# Patient Record
Sex: MALE | Race: WHITE | Employment: FULL TIME | URBAN - METROPOLITAN AREA
[De-identification: names, ages, dates, MRNs, and addresses within clinical notes are randomized per-mention and may not be internally consistent; named-entity substitution may affect disease eponyms.]

---

## 2017-02-20 ENCOUNTER — TELEPHONE (OUTPATIENT)
Dept: CARDIOLOGY CLINIC | Age: 52
End: 2017-02-20

## 2017-02-20 DIAGNOSIS — R07.9 CHEST PAIN, UNSPECIFIED TYPE: Primary | ICD-10-CM

## 2018-10-03 ENCOUNTER — APPOINTMENT (OUTPATIENT)
Dept: GENERAL RADIOLOGY | Age: 53
End: 2018-10-03
Payer: COMMERCIAL

## 2018-10-03 ENCOUNTER — HOSPITAL ENCOUNTER (OUTPATIENT)
Age: 53
Setting detail: OBSERVATION
Discharge: HOME | End: 2018-10-04
Attending: EMERGENCY MEDICINE | Admitting: FAMILY MEDICINE
Payer: COMMERCIAL

## 2018-10-03 ENCOUNTER — APPOINTMENT (OUTPATIENT)
Dept: NUCLEAR MEDICINE | Age: 53
End: 2018-10-03
Payer: COMMERCIAL

## 2018-10-03 DIAGNOSIS — R07.9 CHEST PAIN, UNSPECIFIED TYPE: Primary | ICD-10-CM

## 2018-10-03 LAB
ALBUMIN SERPL-MCNC: 4.1 GM/DL (ref 3.4–5)
ALP BLD-CCNC: 44 IU/L (ref 40–129)
ALT SERPL-CCNC: 22 U/L (ref 10–40)
ANION GAP SERPL CALCULATED.3IONS-SCNC: 13 MMOL/L (ref 4–16)
AST SERPL-CCNC: 19 IU/L (ref 15–37)
BASOPHILS ABSOLUTE: 0.1 K/CU MM
BASOPHILS RELATIVE PERCENT: 0.8 % (ref 0–1)
BILIRUB SERPL-MCNC: 0.4 MG/DL (ref 0–1)
BUN BLDV-MCNC: 17 MG/DL (ref 6–23)
CALCIUM SERPL-MCNC: 9 MG/DL (ref 8.3–10.6)
CHLORIDE BLD-SCNC: 102 MMOL/L (ref 99–110)
CO2: 25 MMOL/L (ref 21–32)
CREAT SERPL-MCNC: 0.9 MG/DL (ref 0.9–1.3)
D DIMER: <200 NG/ML(DDU)
DIFFERENTIAL TYPE: ABNORMAL
EKG ATRIAL RATE: 81 BPM
EKG DIAGNOSIS: NORMAL
EKG P AXIS: 42 DEGREES
EKG P-R INTERVAL: 146 MS
EKG Q-T INTERVAL: 358 MS
EKG QRS DURATION: 80 MS
EKG QTC CALCULATION (BAZETT): 415 MS
EKG R AXIS: 26 DEGREES
EKG T AXIS: 27 DEGREES
EKG VENTRICULAR RATE: 81 BPM
EOSINOPHILS ABSOLUTE: 0.2 K/CU MM
EOSINOPHILS RELATIVE PERCENT: 2.6 % (ref 0–3)
GFR AFRICAN AMERICAN: >60 ML/MIN/1.73M2
GFR NON-AFRICAN AMERICAN: >60 ML/MIN/1.73M2
GLUCOSE BLD-MCNC: 118 MG/DL (ref 70–99)
HCT VFR BLD CALC: 43.7 % (ref 42–52)
HEMOGLOBIN: 14.4 GM/DL (ref 13.5–18)
IMMATURE NEUTROPHIL %: 0.8 % (ref 0–0.43)
LV EF: 60 %
LVEF MODALITY: NORMAL
LYMPHOCYTES ABSOLUTE: 2 K/CU MM
LYMPHOCYTES RELATIVE PERCENT: 27.6 % (ref 24–44)
MCH RBC QN AUTO: 30.3 PG (ref 27–31)
MCHC RBC AUTO-ENTMCNC: 33 % (ref 32–36)
MCV RBC AUTO: 91.8 FL (ref 78–100)
MONOCYTES ABSOLUTE: 0.7 K/CU MM
MONOCYTES RELATIVE PERCENT: 9.2 % (ref 0–4)
NUCLEATED RBC %: 0 %
PDW BLD-RTO: 12.4 % (ref 11.7–14.9)
PLATELET # BLD: 190 K/CU MM (ref 140–440)
PMV BLD AUTO: 9.1 FL (ref 7.5–11.1)
POTASSIUM SERPL-SCNC: 4.5 MMOL/L (ref 3.5–5.1)
RBC # BLD: 4.76 M/CU MM (ref 4.6–6.2)
SEGMENTED NEUTROPHILS ABSOLUTE COUNT: 4.2 K/CU MM
SEGMENTED NEUTROPHILS RELATIVE PERCENT: 59 % (ref 36–66)
SODIUM BLD-SCNC: 140 MMOL/L (ref 135–145)
TOTAL IMMATURE NEUTOROPHIL: 0.06 K/CU MM
TOTAL NUCLEATED RBC: 0 K/CU MM
TOTAL PROTEIN: 7 GM/DL (ref 6.4–8.2)
TROPONIN T: <0.01 NG/ML
WBC # BLD: 7.2 K/CU MM (ref 4–10.5)

## 2018-10-03 PROCEDURE — 84484 ASSAY OF TROPONIN QUANT: CPT

## 2018-10-03 PROCEDURE — 94761 N-INVAS EAR/PLS OXIMETRY MLT: CPT

## 2018-10-03 PROCEDURE — 3430000000 HC RX DIAGNOSTIC RADIOPHARMACEUTICAL: Performed by: INTERNAL MEDICINE

## 2018-10-03 PROCEDURE — 93017 CV STRESS TEST TRACING ONLY: CPT

## 2018-10-03 PROCEDURE — 85379 FIBRIN DEGRADATION QUANT: CPT

## 2018-10-03 PROCEDURE — 6370000000 HC RX 637 (ALT 250 FOR IP): Performed by: PHYSICIAN ASSISTANT

## 2018-10-03 PROCEDURE — G0378 HOSPITAL OBSERVATION PER HR: HCPCS

## 2018-10-03 PROCEDURE — 99243 OFF/OP CNSLTJ NEW/EST LOW 30: CPT | Performed by: INTERNAL MEDICINE

## 2018-10-03 PROCEDURE — 71045 X-RAY EXAM CHEST 1 VIEW: CPT

## 2018-10-03 PROCEDURE — A9500 TC99M SESTAMIBI: HCPCS | Performed by: INTERNAL MEDICINE

## 2018-10-03 PROCEDURE — 36415 COLL VENOUS BLD VENIPUNCTURE: CPT

## 2018-10-03 PROCEDURE — 6370000000 HC RX 637 (ALT 250 FOR IP): Performed by: EMERGENCY MEDICINE

## 2018-10-03 PROCEDURE — 93010 ELECTROCARDIOGRAM REPORT: CPT | Performed by: INTERNAL MEDICINE

## 2018-10-03 PROCEDURE — 6370000000 HC RX 637 (ALT 250 FOR IP): Performed by: INTERNAL MEDICINE

## 2018-10-03 PROCEDURE — 80053 COMPREHEN METABOLIC PANEL: CPT

## 2018-10-03 PROCEDURE — 85025 COMPLETE CBC W/AUTO DIFF WBC: CPT

## 2018-10-03 PROCEDURE — 99285 EMERGENCY DEPT VISIT HI MDM: CPT

## 2018-10-03 PROCEDURE — 78452 HT MUSCLE IMAGE SPECT MULT: CPT

## 2018-10-03 PROCEDURE — 2580000003 HC RX 258: Performed by: NURSE PRACTITIONER

## 2018-10-03 PROCEDURE — 93005 ELECTROCARDIOGRAM TRACING: CPT | Performed by: EMERGENCY MEDICINE

## 2018-10-03 RX ORDER — NITROGLYCERIN 0.4 MG/1
0.4 TABLET SUBLINGUAL EVERY 5 MIN PRN
Status: DISCONTINUED | OUTPATIENT
Start: 2018-10-03 | End: 2018-10-04 | Stop reason: HOSPADM

## 2018-10-03 RX ORDER — ACETAMINOPHEN 325 MG/1
650 TABLET ORAL EVERY 4 HOURS PRN
Status: DISCONTINUED | OUTPATIENT
Start: 2018-10-03 | End: 2018-10-04 | Stop reason: HOSPADM

## 2018-10-03 RX ORDER — NITROGLYCERIN 0.4 MG/1
0.4 TABLET SUBLINGUAL EVERY 5 MIN PRN
Status: COMPLETED | OUTPATIENT
Start: 2018-10-03 | End: 2018-10-03

## 2018-10-03 RX ORDER — CALCIUM CARBONATE 200(500)MG
2 TABLET,CHEWABLE ORAL PRN
COMMUNITY
End: 2020-02-04

## 2018-10-03 RX ORDER — SODIUM CHLORIDE 0.9 % (FLUSH) 0.9 %
10 SYRINGE (ML) INJECTION EVERY 12 HOURS SCHEDULED
Status: DISCONTINUED | OUTPATIENT
Start: 2018-10-03 | End: 2018-10-04 | Stop reason: HOSPADM

## 2018-10-03 RX ORDER — ASPIRIN 81 MG/1
81 TABLET ORAL DAILY
Status: DISCONTINUED | OUTPATIENT
Start: 2018-10-03 | End: 2018-10-04 | Stop reason: HOSPADM

## 2018-10-03 RX ORDER — ASPIRIN 81 MG/1
243 TABLET, CHEWABLE ORAL ONCE
Status: COMPLETED | OUTPATIENT
Start: 2018-10-03 | End: 2018-10-03

## 2018-10-03 RX ORDER — ONDANSETRON 2 MG/ML
4 INJECTION INTRAMUSCULAR; INTRAVENOUS EVERY 6 HOURS PRN
Status: DISCONTINUED | OUTPATIENT
Start: 2018-10-03 | End: 2018-10-04 | Stop reason: HOSPADM

## 2018-10-03 RX ORDER — NITROGLYCERIN 0.4 MG/1
0.4 TABLET SUBLINGUAL EVERY 5 MIN PRN
Status: DISCONTINUED | OUTPATIENT
Start: 2018-10-03 | End: 2018-10-03 | Stop reason: SDUPTHER

## 2018-10-03 RX ORDER — METOPROLOL TARTRATE 50 MG/1
100 TABLET, FILM COATED ORAL ONCE
Status: COMPLETED | OUTPATIENT
Start: 2018-10-03 | End: 2018-10-03

## 2018-10-03 RX ORDER — SODIUM CHLORIDE 0.9 % (FLUSH) 0.9 %
10 SYRINGE (ML) INJECTION PRN
Status: DISCONTINUED | OUTPATIENT
Start: 2018-10-03 | End: 2018-10-04 | Stop reason: HOSPADM

## 2018-10-03 RX ADMIN — NITROGLYCERIN 0.4 MG: 0.4 TABLET, ORALLY DISINTEGRATING SUBLINGUAL at 16:15

## 2018-10-03 RX ADMIN — Medication 10 MILLICURIE: at 12:25

## 2018-10-03 RX ADMIN — NITROGLYCERIN 0.4 MG: 0.4 TABLET, ORALLY DISINTEGRATING SUBLINGUAL at 09:49

## 2018-10-03 RX ADMIN — ASPIRIN 81 MG CHEWABLE TABLET 243 MG: 81 TABLET CHEWABLE at 09:49

## 2018-10-03 RX ADMIN — NITROGLYCERIN 1 INCH: 20 OINTMENT TOPICAL at 11:40

## 2018-10-03 RX ADMIN — METOPROLOL TARTRATE 100 MG: 50 TABLET ORAL at 21:09

## 2018-10-03 RX ADMIN — NITROGLYCERIN 0.4 MG: 0.4 TABLET, ORALLY DISINTEGRATING SUBLINGUAL at 11:37

## 2018-10-03 RX ADMIN — NITROGLYCERIN 0.4 MG: 0.4 TABLET SUBLINGUAL at 21:05

## 2018-10-03 RX ADMIN — SODIUM CHLORIDE, PRESERVATIVE FREE 10 ML: 5 INJECTION INTRAVENOUS at 21:10

## 2018-10-03 RX ADMIN — Medication 30 MILLICURIE: at 13:45

## 2018-10-03 ASSESSMENT — PAIN DESCRIPTION - ORIENTATION
ORIENTATION: LEFT;UPPER

## 2018-10-03 ASSESSMENT — PAIN DESCRIPTION - DESCRIPTORS
DESCRIPTORS: ACHING;DULL
DESCRIPTORS: ACHING;DULL
DESCRIPTORS: DULL;ACHING
DESCRIPTORS: ACHING;DULL

## 2018-10-03 ASSESSMENT — PAIN DESCRIPTION - ONSET
ONSET: ON-GOING
ONSET: ON-GOING

## 2018-10-03 ASSESSMENT — PAIN SCALES - GENERAL
PAINLEVEL_OUTOF10: 5
PAINLEVEL_OUTOF10: 3
PAINLEVEL_OUTOF10: 2
PAINLEVEL_OUTOF10: 3

## 2018-10-03 ASSESSMENT — PAIN DESCRIPTION - PAIN TYPE
TYPE: ACUTE PAIN

## 2018-10-03 ASSESSMENT — PAIN DESCRIPTION - FREQUENCY
FREQUENCY: CONTINUOUS
FREQUENCY: CONTINUOUS

## 2018-10-03 ASSESSMENT — PAIN DESCRIPTION - LOCATION
LOCATION: CHEST
LOCATION: CHEST;SHOULDER
LOCATION: CHEST
LOCATION: CHEST

## 2018-10-03 ASSESSMENT — PAIN DESCRIPTION - DIRECTION: RADIATING_TOWARDS: SHOULDER BLADE

## 2018-10-03 ASSESSMENT — PAIN DESCRIPTION - PROGRESSION
CLINICAL_PROGRESSION: GRADUALLY IMPROVING
CLINICAL_PROGRESSION: GRADUALLY WORSENING

## 2018-10-03 NOTE — ED PROVIDER NOTES
Physician Provided Reason for Exam: chest pain  Acuity: Acute  Type of Exam: Initial  Additional signs and symptoms: Chest Pain (Dr Franklin Friday is pt cardiologist, hx  CAD, left sided chest pain radiating through to back and woke pt up at 0300,  short of breath    FINDINGS:  No lines or tubes. Normal cardiomediastinal silhouette.  The lungs are clear  without focal consolidation or pleural effusion.  No suspicious pulmonary  nodules.  No pulmonary edema.  No pneumothorax. No acute osseous abnormality.                   LABS:  Results for orders placed or performed during the hospital encounter of 10/03/18   CBC with Auto Diff   Result Value Ref Range    WBC 7.2 4.0 - 10.5 K/CU MM    RBC 4.76 4.6 - 6.2 M/CU MM    Hemoglobin 14.4 13.5 - 18.0 GM/DL    Hematocrit 43.7 42 - 52 %    MCV 91.8 78 - 100 FL    MCH 30.3 27 - 31 PG    MCHC 33.0 32.0 - 36.0 %    RDW 12.4 11.7 - 14.9 %    Platelets 215 705 - 736 K/CU MM    MPV 9.1 7.5 - 11.1 FL    Differential Type AUTOMATED DIFFERENTIAL     Segs Relative 59.0 36 - 66 %    Lymphocytes % 27.6 24 - 44 %    Monocytes % 9.2 (H) 0 - 4 %    Eosinophils % 2.6 0 - 3 %    Basophils % 0.8 0 - 1 %    Segs Absolute 4.2 K/CU MM    Lymphocytes # 2.0 K/CU MM    Monocytes # 0.7 K/CU MM    Eosinophils # 0.2 K/CU MM    Basophils # 0.1 K/CU MM    Nucleated RBC % 0.0 %    Total Nucleated RBC 0.0 K/CU MM    Total Immature Neutrophil 0.06 K/CU MM    Immature Neutrophil % 0.8 (H) 0 - 0.43 %   Comprehensive Metabolic Panel   Result Value Ref Range    Sodium 140 135 - 145 MMOL/L    Potassium 4.5 3.5 - 5.1 MMOL/L    Chloride 102 99 - 110 mMol/L    CO2 25 21 - 32 MMOL/L    BUN 17 6 - 23 MG/DL    CREATININE 0.9 0.9 - 1.3 MG/DL    Glucose 118 (H) 70 - 99 MG/DL    Calcium 9.0 8.3 - 10.6 MG/DL    Alb 4.1 3.4 - 5.0 GM/DL    Total Protein 7.0 6.4 - 8.2 GM/DL    Total Bilirubin 0.4 0.0 - 1.0 MG/DL    ALT 22 10 - 40 U/L    AST 19 15 - 37 IU/L    Alkaline Phosphatase 44 40 - 129 IU/L    GFR Non- >60

## 2018-10-03 NOTE — CONSULTS
Name:  Nia Saavedra /Age/Sex: 1965  (47 y.o. male)   MRN & CSN:  [id number] & [id number] Admission Date/Time: 10/3/2018  8:29 AM   Location:  [facility] PCP: Jaqui Bernstein, [address] [place] Day: 1          Referring physician:  Katherine Reynolds MD         Reason for consultation:  CP        Thanks for referral.    Information source: patient    CC;  CP    Thank you for involving me in taking  care of Nia Saavedra who  is a 47 y. o.year  Old male  Presents with  Recurrent, mid sternal, moderate cp radiation to LUE non exertional, started this am of sudden onset with SOB and diaphoresis, took nitro with some relief. EKG and trops are normal.                      Past medical history:    has a past medical history of CAD (coronary artery disease); H/O echocardiogram; Hyperlipidemia; and Hypertension. Past surgical history:   has a past surgical history that includes Appendectomy and shoulder surgery. Social History:   reports that he has never smoked. He has quit using smokeless tobacco. He reports that he drinks about 9.0 oz of alcohol per week . He reports that he does not use drugs. Family history:  family history includes Cancer in his mother; Heart Disease in his father.     No Known Allergies      nitroGLYCERIN (NITROSTAT) SL tablet 0.4 mg Q5 Min PRN   nitroGLYCERIN (NITROSTAT) SL tablet 0.4 mg Q5 Min PRN     Current Facility-Administered Medications   Medication Dose Route Frequency Provider Last Rate Last Dose    nitroGLYCERIN (NITROSTAT) SL tablet 0.4 mg  0.4 mg Sublingual Q5 Min PRN Katrinka Dakin Jolliff, PA-C   0.4 mg at 10/03/18 1137    nitroGLYCERIN (NITROSTAT) SL tablet 0.4 mg  0.4 mg Sublingual Q5 Min PRN Jennifer Blackman MD         Current Outpatient Prescriptions   Medication Sig Dispense Refill    aspirin 81 MG tablet Take 81 mg by mouth daily       Review of Systems:  All 14 systems reviewed, all negative except for  CP    Physical Examination:    BP (!) 131/90   Pulse 83

## 2018-10-04 ENCOUNTER — APPOINTMENT (OUTPATIENT)
Dept: CT IMAGING | Age: 53
End: 2018-10-04
Payer: COMMERCIAL

## 2018-10-04 VITALS
OXYGEN SATURATION: 98 % | SYSTOLIC BLOOD PRESSURE: 142 MMHG | TEMPERATURE: 98.8 F | DIASTOLIC BLOOD PRESSURE: 92 MMHG | RESPIRATION RATE: 18 BRPM | HEIGHT: 68 IN | HEART RATE: 84 BPM | BODY MASS INDEX: 36.1 KG/M2 | WEIGHT: 238.2 LBS

## 2018-10-04 LAB
ANION GAP SERPL CALCULATED.3IONS-SCNC: 10 MMOL/L (ref 4–16)
BUN BLDV-MCNC: 16 MG/DL (ref 6–23)
CALCIUM SERPL-MCNC: 8.4 MG/DL (ref 8.3–10.6)
CHLORIDE BLD-SCNC: 99 MMOL/L (ref 99–110)
CHOLESTEROL: 213 MG/DL
CO2: 27 MMOL/L (ref 21–32)
CREAT SERPL-MCNC: 1 MG/DL (ref 0.9–1.3)
ESTIMATED AVERAGE GLUCOSE: 123 MG/DL
GFR AFRICAN AMERICAN: >60 ML/MIN/1.73M2
GFR NON-AFRICAN AMERICAN: >60 ML/MIN/1.73M2
GLUCOSE BLD-MCNC: 136 MG/DL (ref 70–99)
HBA1C MFR BLD: 5.9 % (ref 4.2–6.3)
HDLC SERPL-MCNC: 42 MG/DL
LDL CHOLESTEROL DIRECT: 118 MG/DL
LV EF: 53 %
LVEF MODALITY: NORMAL
POTASSIUM SERPL-SCNC: 4.3 MMOL/L (ref 3.5–5.1)
SODIUM BLD-SCNC: 136 MMOL/L (ref 135–145)
TRIGL SERPL-MCNC: 569 MG/DL

## 2018-10-04 PROCEDURE — G0378 HOSPITAL OBSERVATION PER HR: HCPCS

## 2018-10-04 PROCEDURE — 36415 COLL VENOUS BLD VENIPUNCTURE: CPT

## 2018-10-04 PROCEDURE — 2580000003 HC RX 258: Performed by: INTERNAL MEDICINE

## 2018-10-04 PROCEDURE — 93306 TTE W/DOPPLER COMPLETE: CPT

## 2018-10-04 PROCEDURE — 83721 ASSAY OF BLOOD LIPOPROTEIN: CPT

## 2018-10-04 PROCEDURE — 96372 THER/PROPH/DIAG INJ SC/IM: CPT

## 2018-10-04 PROCEDURE — 6370000000 HC RX 637 (ALT 250 FOR IP): Performed by: NURSE PRACTITIONER

## 2018-10-04 PROCEDURE — G0008 ADMIN INFLUENZA VIRUS VAC: HCPCS | Performed by: FAMILY MEDICINE

## 2018-10-04 PROCEDURE — 6360000002 HC RX W HCPCS: Performed by: NURSE PRACTITIONER

## 2018-10-04 PROCEDURE — 80061 LIPID PANEL: CPT

## 2018-10-04 PROCEDURE — 6360000002 HC RX W HCPCS: Performed by: FAMILY MEDICINE

## 2018-10-04 PROCEDURE — 71275 CT ANGIOGRAPHY CHEST: CPT

## 2018-10-04 PROCEDURE — 99212 OFFICE O/P EST SF 10 MIN: CPT | Performed by: INTERNAL MEDICINE

## 2018-10-04 PROCEDURE — 83036 HEMOGLOBIN GLYCOSYLATED A1C: CPT

## 2018-10-04 PROCEDURE — 75574 CT ANGIO HRT W/3D IMAGE: CPT | Performed by: INTERNAL MEDICINE

## 2018-10-04 PROCEDURE — 2580000003 HC RX 258: Performed by: NURSE PRACTITIONER

## 2018-10-04 PROCEDURE — 90686 IIV4 VACC NO PRSV 0.5 ML IM: CPT | Performed by: FAMILY MEDICINE

## 2018-10-04 PROCEDURE — 6360000004 HC RX CONTRAST MEDICATION: Performed by: INTERNAL MEDICINE

## 2018-10-04 PROCEDURE — 80048 BASIC METABOLIC PNL TOTAL CA: CPT

## 2018-10-04 RX ORDER — ATORVASTATIN CALCIUM 40 MG/1
80 TABLET, FILM COATED ORAL NIGHTLY
Status: DISCONTINUED | OUTPATIENT
Start: 2018-10-04 | End: 2018-10-04 | Stop reason: HOSPADM

## 2018-10-04 RX ORDER — NITROGLYCERIN 0.4 MG/1
TABLET SUBLINGUAL
Qty: 25 TABLET | Refills: 3 | Status: SHIPPED | OUTPATIENT
Start: 2018-10-04 | End: 2021-06-18 | Stop reason: SDUPTHER

## 2018-10-04 RX ORDER — ATORVASTATIN CALCIUM 80 MG/1
80 TABLET, FILM COATED ORAL NIGHTLY
Qty: 30 TABLET | Refills: 3 | Status: ON HOLD | OUTPATIENT
Start: 2018-10-04 | End: 2019-11-02 | Stop reason: HOSPADM

## 2018-10-04 RX ORDER — 0.9 % SODIUM CHLORIDE 0.9 %
10 VIAL (ML) INJECTION
Status: COMPLETED | OUTPATIENT
Start: 2018-10-04 | End: 2018-10-04

## 2018-10-04 RX ADMIN — SODIUM CHLORIDE, PRESERVATIVE FREE 10 ML: 5 INJECTION INTRAVENOUS at 09:26

## 2018-10-04 RX ADMIN — SODIUM CHLORIDE, PRESERVATIVE FREE 10 ML: 5 INJECTION INTRAVENOUS at 10:49

## 2018-10-04 RX ADMIN — INFLUENZA A VIRUS A/MICHIGAN/45/2015 X-275 (H1N1) ANTIGEN (FORMALDEHYDE INACTIVATED), INFLUENZA A VIRUS A/SINGAPORE/INFIMH-16-0019/2016 IVR-186 (H3N2) ANTIGEN (FORMALDEHYDE INACTIVATED), INFLUENZA B VIRUS B/PHUKET/3073/2013 ANTIGEN (FORMALDEHYDE INACTIVATED), AND INFLUENZA B VIRUS B/MARYLAND/15/2016 BX-69A ANTIGEN (FORMALDEHYDE INACTIVATED) 0.5 ML: 15; 15; 15; 15 INJECTION, SUSPENSION INTRAMUSCULAR at 10:47

## 2018-10-04 RX ADMIN — ASPIRIN 81 MG: 81 TABLET, COATED ORAL at 10:49

## 2018-10-04 RX ADMIN — ENOXAPARIN SODIUM 40 MG: 40 INJECTION SUBCUTANEOUS at 10:49

## 2018-10-04 RX ADMIN — IOPAMIDOL 100 ML: 755 INJECTION, SOLUTION INTRAVENOUS at 09:26

## 2018-10-04 ASSESSMENT — PAIN SCALES - GENERAL: PAINLEVEL_OUTOF10: 0

## 2018-10-04 NOTE — DISCHARGE SUMMARY
Nia Saavedra 1965 [id number]  PCP:  Jaqui Bernstein MD    Admit date: 10/3/2018  Admitting Physician: Katherine Reynolds MD    Discharge date: 10/4/2018 Discharge Physician: Katherine Reynolds MD         Hospital Course and Discharge Diagnoses Include:    Chest Pain r/o ACS  - trops neg  - stress test normal  - echo: ef 50-55%  - CTA cardiac: mild-mod CAD, per cardio okay to d/c  - asa + statin increased   - cardio following     Obesity  HTN  HLD  CAD        Physical Exam on Discharge date: 10/04/18  Gen:  awake, alert, cooperative, no apparent distress  Head/Eyes:  Normocephalic atraumatic, EOMI   NECK:   symmetrical, trachea midline  LUNGS: Normal Effort   CARDIOVASCULAR:  Normal rate  ABDOMEN: Non tender, non distended, no HSM noted. MUSCULOSKELETAL:  ROM WNL  NEUROLOGIC: Alert and Oriented,  Cranial nerves II-XII are grossly intact. SKIN:  no bruising or bleeding, normal skin color,  no redness    Procedures:  See above  Xr Chest Portable    Result Date: 10/3/2018  EXAMINATION: SINGLE XRAY VIEW OF THE CHEST 10/3/2018 8:43 am COMPARISON: February 18, 2017 HISTORY: ORDERING SYSTEM PROVIDED HISTORY: chest pain TECHNOLOGIST PROVIDED HISTORY: Reason for exam:->chest pain Ordering Physician Provided Reason for Exam: chest pain Acuity: Acute Type of Exam: Initial Additional signs and symptoms: Chest Pain (Dr Minesh Andrade is pt cardiologist, hx CAD, left sided chest pain radiating through to back and woke pt up at 0300, short of breath FINDINGS: No lines or tubes. Normal cardiomediastinal silhouette. The lungs are clear without focal consolidation or pleural effusion. No suspicious pulmonary nodules. No pulmonary edema. No pneumothorax. No acute osseous abnormality. No acute cardiopulmonary disease.      Nm Myocardial Spect Rest Exercise Or Rx    Result Date: 10/3/2018  Cardiac Perfusion Imaging   Demographics   Patient Name      Clare SCALES       Date of study        10/03/2018   Date of Birth     1965

## 2018-10-04 NOTE — PLAN OF CARE
Problem: Health Behavior:  Goal: Identification of resources available to assist in meeting health care needs will improve  Identification of resources available to assist in meeting health care needs will improve  Outcome: Ongoing

## 2018-10-04 NOTE — CARE COORDINATION
Referral from [facility]. Faxed 1x $40 voucher for Nitro and Atorvastatin to the pharmacy. Please have patient contact our office at [phone] with any questions.

## 2019-03-16 ENCOUNTER — APPOINTMENT (OUTPATIENT)
Dept: GENERAL RADIOLOGY | Age: 54
End: 2019-03-16
Payer: COMMERCIAL

## 2019-03-16 ENCOUNTER — HOSPITAL ENCOUNTER (EMERGENCY)
Age: 54
Discharge: HOME | End: 2019-03-16
Payer: COMMERCIAL

## 2019-03-16 VITALS
DIASTOLIC BLOOD PRESSURE: 89 MMHG | SYSTOLIC BLOOD PRESSURE: 144 MMHG | TEMPERATURE: 97.9 F | WEIGHT: 230 LBS | HEART RATE: 95 BPM | BODY MASS INDEX: 34.86 KG/M2 | RESPIRATION RATE: 16 BRPM | OXYGEN SATURATION: 97 % | HEIGHT: 68 IN

## 2019-03-16 DIAGNOSIS — M70.41 PREPATELLAR BURSITIS OF RIGHT KNEE: Primary | ICD-10-CM

## 2019-03-16 PROCEDURE — 96372 THER/PROPH/DIAG INJ SC/IM: CPT

## 2019-03-16 PROCEDURE — 73560 X-RAY EXAM OF KNEE 1 OR 2: CPT

## 2019-03-16 PROCEDURE — 99283 EMERGENCY DEPT VISIT LOW MDM: CPT

## 2019-03-16 PROCEDURE — 6360000002 HC RX W HCPCS: Performed by: PHYSICIAN ASSISTANT

## 2019-03-16 RX ORDER — KETOROLAC TROMETHAMINE 30 MG/ML
30 INJECTION, SOLUTION INTRAMUSCULAR; INTRAVENOUS ONCE
Status: COMPLETED | OUTPATIENT
Start: 2019-03-16 | End: 2019-03-16

## 2019-03-16 RX ORDER — IBUPROFEN 800 MG/1
800 TABLET ORAL EVERY 8 HOURS PRN
Qty: 30 TABLET | Refills: 0 | Status: ON HOLD | OUTPATIENT
Start: 2019-03-16 | End: 2019-11-02 | Stop reason: HOSPADM

## 2019-03-16 RX ADMIN — KETOROLAC TROMETHAMINE 30 MG: 30 INJECTION, SOLUTION INTRAMUSCULAR; INTRAVENOUS at 11:13

## 2019-03-16 ASSESSMENT — PAIN DESCRIPTION - LOCATION: LOCATION: KNEE

## 2019-03-16 ASSESSMENT — PAIN SCALES - GENERAL
PAINLEVEL_OUTOF10: 10
PAINLEVEL_OUTOF10: 10

## 2019-03-16 ASSESSMENT — PAIN DESCRIPTION - ORIENTATION: ORIENTATION: RIGHT

## 2019-03-16 ASSESSMENT — PAIN DESCRIPTION - PAIN TYPE: TYPE: ACUTE PAIN

## 2019-05-31 ENCOUNTER — OFFICE VISIT (OUTPATIENT)
Dept: INTERNAL MEDICINE CLINIC | Age: 54
End: 2019-05-31
Payer: COMMERCIAL

## 2019-05-31 VITALS
SYSTOLIC BLOOD PRESSURE: 148 MMHG | BODY MASS INDEX: 35.4 KG/M2 | DIASTOLIC BLOOD PRESSURE: 102 MMHG | OXYGEN SATURATION: 96 % | HEIGHT: 68 IN | WEIGHT: 233.6 LBS | HEART RATE: 84 BPM

## 2019-05-31 DIAGNOSIS — I25.10 CORONARY ARTERY DISEASE INVOLVING NATIVE CORONARY ARTERY OF NATIVE HEART WITHOUT ANGINA PECTORIS: ICD-10-CM

## 2019-05-31 DIAGNOSIS — E78.5 HYPERLIPIDEMIA, UNSPECIFIED HYPERLIPIDEMIA TYPE: ICD-10-CM

## 2019-05-31 DIAGNOSIS — R73.03 PREDIABETES: ICD-10-CM

## 2019-05-31 DIAGNOSIS — I10 ESSENTIAL HYPERTENSION: Primary | ICD-10-CM

## 2019-05-31 PROCEDURE — 99204 OFFICE O/P NEW MOD 45 MIN: CPT | Performed by: FAMILY MEDICINE

## 2019-05-31 RX ORDER — LISINOPRIL 10 MG/1
10 TABLET ORAL DAILY
Qty: 30 TABLET | Refills: 2 | Status: ON HOLD | OUTPATIENT
Start: 2019-05-31 | End: 2019-11-02 | Stop reason: HOSPADM

## 2019-05-31 ASSESSMENT — PATIENT HEALTH QUESTIONNAIRE - PHQ9
SUM OF ALL RESPONSES TO PHQ QUESTIONS 1-9: 0
SUM OF ALL RESPONSES TO PHQ9 QUESTIONS 1 & 2: 0
2. FEELING DOWN, DEPRESSED OR HOPELESS: 0
SUM OF ALL RESPONSES TO PHQ QUESTIONS 1-9: 0
1. LITTLE INTEREST OR PLEASURE IN DOING THINGS: 0

## 2019-06-05 ASSESSMENT — ENCOUNTER SYMPTOMS
BACK PAIN: 0
SHORTNESS OF BREATH: 0
BLOOD IN STOOL: 0
WHEEZING: 0
DIARRHEA: 0
NAUSEA: 0
EYES NEGATIVE: 1
CHEST TIGHTNESS: 0
ABDOMINAL PAIN: 0
CONSTIPATION: 0

## 2019-06-05 NOTE — PROGRESS NOTES
Srinivas Ventura  1965  49 y.o.  male    Chief Complaint   Patient presents with    New Patient         History of Present Illness  Srinivas Ventura is a 49 y.o. male who presents today for new patient visit.  - HTN- Uncontrolled. He states his Bp has remained up with several elevated readings. -CAD-Stable. No chest pain. He has seen Dr. Cristine Lieberman in the past, but has not followed up recently  -HL- , HDL 42, . He went off the Atorvastatin 80 mg  -Prediabetes- Hba1c 5.9  -Moderate CTS- Hands hurt, but he is not ready to do anything about it    Review of Systems   Constitutional: Negative for chills, diaphoresis and fever. HENT: Negative. Eyes: Negative. Respiratory: Negative for chest tightness, shortness of breath and wheezing. Cardiovascular: Negative for chest pain and palpitations. Gastrointestinal: Negative for abdominal pain, blood in stool, constipation, diarrhea and nausea. Genitourinary: Negative for difficulty urinating and dysuria. Musculoskeletal: Negative for back pain. Chronic shoulder pain     Skin: Negative. Neurological: Positive for numbness (hands). Negative for dizziness, light-headedness and headaches. Psychiatric/Behavioral: Negative for sleep disturbance. No changes in mood       No Known Allergies    Past Medical History:   Diagnosis Date    CAD (coronary artery disease)     H/O echocardiogram 04/28/2016    EF 55% WNL- Stage 1 dysfunction     Hyperlipidemia     Hypertension        Past Surgical History:   Procedure Laterality Date    APPENDECTOMY      SHOULDER SURGERY      right      Family History   Problem Relation Age of Onset    Cancer Mother         lung    Heart Disease Father        Social History     Tobacco Use    Smoking status: Never Smoker    Smokeless tobacco: Former User     Types: Chew   Substance Use Topics    Alcohol use:  Yes     Alcohol/week: 9.0 oz     Types: 12 Cans of beer, 3 Shots of liquor per week     Comment: Only on the [employer] Drug use: No       Current Outpatient Medications   Medication Sig Dispense Refill    lisinopril (PRINIVIL;ZESTRIL) 10 MG tablet Take 1 tablet by mouth daily 30 tablet 2    aspirin 81 MG tablet Take 81 mg by mouth daily      ibuprofen (ADVIL;MOTRIN) 800 MG tablet Take 1 tablet by mouth every 8 hours as needed for Pain 30 tablet 0    atorvastatin (LIPITOR) 80 MG tablet Take 1 tablet by mouth nightly 30 tablet 3    nitroGLYCERIN (NITROSTAT) 0.4 MG SL tablet up to max of 3 total doses. If no relief after 1 dose, call 911. 25 tablet 3    calcium carbonate (TUMS) 500 MG chewable tablet Take 2 tablets by mouth as needed for Heartburn       No current facility-administered medications for this visit. OBJECTIVE:    BP (!) 148/102   Pulse 84   Ht 5' 8\" (1.727 m)   Wt 233 lb 9.6 oz (106 kg)   SpO2 96%   BMI 35.52 kg/m²     Physical Exam   Constitutional: He is oriented to person, place, and time. He appears well-developed. No distress. HENT:   Right Ear: External ear normal.   Left Ear: External ear normal.   Nose: Nose normal.   Mouth/Throat: Oropharynx is clear and moist.   Eyes: Pupils are equal, round, and reactive to light. EOM are normal.   Neck: Neck supple. Cardiovascular: Normal rate, regular rhythm and normal heart sounds. Pulmonary/Chest: Effort normal and breath sounds normal. No respiratory distress. Abdominal: Soft. Bowel sounds are normal. He exhibits no distension. There is no tenderness. Musculoskeletal: Normal range of motion. He exhibits no edema. Lymphadenopathy:     He has no cervical adenopathy. Neurological: He is alert and oriented to person, place, and time. A sensory deficit (hands ) is present. No cranial nerve deficit. Skin: Skin is warm and dry. Psychiatric: He has a normal mood and affect. Vitals reviewed. ASSESSMENT:  1. Essential hypertension    2. Prediabetes    3.  Coronary artery disease involving native coronary artery of native heart without angina pectoris    4. Hyperlipidemia, unspecified hyperlipidemia type        PLAN:    Orders Placed This Encounter   Procedures    BASIC METABOLIC PANEL       Orders Placed This Encounter   Medications    lisinopril (PRINIVIL;ZESTRIL) 10 MG tablet     Sig: Take 1 tablet by mouth daily     Dispense:  30 tablet     Refill:  2   Old records reviewed  Start Lisinopril 10 mg  ADR's explained  Monitor BP   Obtain BMP as directed. Due to insurance he would like to obtain other labs in September  The patient is asked to make an attempt to improve diet and exercise patterns to aid in medical management of this problem. Advised that he f/u with cardiology         Return in about 3 months (around 8/31/2019) for HTN.     Electronically Signed by Rey Louise DO

## 2019-08-02 ENCOUNTER — TELEPHONE (OUTPATIENT)
Dept: INTERNAL MEDICINE CLINIC | Age: 54
End: 2019-08-02

## 2019-11-02 ENCOUNTER — HOSPITAL ENCOUNTER (OUTPATIENT)
Age: 54
Setting detail: OBSERVATION
Discharge: HOME | End: 2019-11-02
Attending: EMERGENCY MEDICINE | Admitting: INTERNAL MEDICINE
Payer: COMMERCIAL

## 2019-11-02 ENCOUNTER — APPOINTMENT (OUTPATIENT)
Dept: GENERAL RADIOLOGY | Age: 54
End: 2019-11-02
Payer: COMMERCIAL

## 2019-11-02 VITALS
OXYGEN SATURATION: 98 % | SYSTOLIC BLOOD PRESSURE: 122 MMHG | WEIGHT: 231.5 LBS | HEART RATE: 83 BPM | TEMPERATURE: 97.7 F | BODY MASS INDEX: 35.09 KG/M2 | RESPIRATION RATE: 18 BRPM | HEIGHT: 68 IN | DIASTOLIC BLOOD PRESSURE: 74 MMHG

## 2019-11-02 DIAGNOSIS — R07.9 CHEST PAIN, UNSPECIFIED TYPE: Primary | ICD-10-CM

## 2019-11-02 LAB
ALBUMIN SERPL-MCNC: 4.2 GM/DL (ref 3.4–5)
ALP BLD-CCNC: 45 IU/L (ref 40–128)
ALT SERPL-CCNC: 18 U/L (ref 10–40)
AMPHETAMINES: NEGATIVE
ANION GAP SERPL CALCULATED.3IONS-SCNC: 14 MMOL/L (ref 4–16)
AST SERPL-CCNC: 21 IU/L (ref 15–37)
BARBITURATE SCREEN URINE: NEGATIVE
BASOPHILS ABSOLUTE: 0.1 K/CU MM
BASOPHILS RELATIVE PERCENT: 1.2 % (ref 0–1)
BENZODIAZEPINE SCREEN, URINE: NEGATIVE
BILIRUB SERPL-MCNC: 0.2 MG/DL (ref 0–1)
BUN BLDV-MCNC: 17 MG/DL (ref 6–23)
CALCIUM SERPL-MCNC: 8.7 MG/DL (ref 8.3–10.6)
CANNABINOID SCREEN URINE: NEGATIVE
CHLORIDE BLD-SCNC: 103 MMOL/L (ref 99–110)
CHOLESTEROL: 167 MG/DL
CO2: 23 MMOL/L (ref 21–32)
COCAINE METABOLITE: NEGATIVE
CREAT SERPL-MCNC: 0.9 MG/DL (ref 0.9–1.3)
D DIMER: <200 NG/ML(DDU)
DIFFERENTIAL TYPE: ABNORMAL
EKG ATRIAL RATE: 90 BPM
EKG ATRIAL RATE: 97 BPM
EKG DIAGNOSIS: NORMAL
EKG DIAGNOSIS: NORMAL
EKG P AXIS: 39 DEGREES
EKG P AXIS: 40 DEGREES
EKG P-R INTERVAL: 150 MS
EKG P-R INTERVAL: 150 MS
EKG Q-T INTERVAL: 350 MS
EKG Q-T INTERVAL: 368 MS
EKG QRS DURATION: 82 MS
EKG QRS DURATION: 84 MS
EKG QTC CALCULATION (BAZETT): 444 MS
EKG QTC CALCULATION (BAZETT): 450 MS
EKG R AXIS: 14 DEGREES
EKG R AXIS: 37 DEGREES
EKG T AXIS: 24 DEGREES
EKG T AXIS: 40 DEGREES
EKG VENTRICULAR RATE: 90 BPM
EKG VENTRICULAR RATE: 97 BPM
EOSINOPHILS ABSOLUTE: 0.2 K/CU MM
EOSINOPHILS RELATIVE PERCENT: 2.5 % (ref 0–3)
GFR AFRICAN AMERICAN: >60 ML/MIN/1.73M2
GFR NON-AFRICAN AMERICAN: >60 ML/MIN/1.73M2
GLUCOSE BLD-MCNC: 131 MG/DL (ref 70–99)
HCT VFR BLD CALC: 42.4 % (ref 42–52)
HDLC SERPL-MCNC: 51 MG/DL
HEMOGLOBIN: 13.7 GM/DL (ref 13.5–18)
IMMATURE NEUTROPHIL %: 0.4 % (ref 0–0.43)
LDL CHOLESTEROL DIRECT: 96 MG/DL
LYMPHOCYTES ABSOLUTE: 1.8 K/CU MM
LYMPHOCYTES RELATIVE PERCENT: 26.4 % (ref 24–44)
MAGNESIUM: 2.3 MG/DL (ref 1.8–2.4)
MCH RBC QN AUTO: 29.3 PG (ref 27–31)
MCHC RBC AUTO-ENTMCNC: 32.3 % (ref 32–36)
MCV RBC AUTO: 90.8 FL (ref 78–100)
MONOCYTES ABSOLUTE: 0.7 K/CU MM
MONOCYTES RELATIVE PERCENT: 9.7 % (ref 0–4)
NUCLEATED RBC %: 0 %
OPIATES, URINE: ABNORMAL
OXYCODONE: ABNORMAL
PDW BLD-RTO: 12.7 % (ref 11.7–14.9)
PHENCYCLIDINE, URINE: NEGATIVE
PLATELET # BLD: 201 K/CU MM (ref 140–440)
PMV BLD AUTO: 9.2 FL (ref 7.5–11.1)
POTASSIUM SERPL-SCNC: 4.2 MMOL/L (ref 3.5–5.1)
RBC # BLD: 4.67 M/CU MM (ref 4.6–6.2)
SEGMENTED NEUTROPHILS ABSOLUTE COUNT: 4.1 K/CU MM
SEGMENTED NEUTROPHILS RELATIVE PERCENT: 59.8 % (ref 36–66)
SODIUM BLD-SCNC: 140 MMOL/L (ref 135–145)
TOTAL IMMATURE NEUTOROPHIL: 0.03 K/CU MM
TOTAL NUCLEATED RBC: 0 K/CU MM
TOTAL PROTEIN: 6.9 GM/DL (ref 6.4–8.2)
TRIGL SERPL-MCNC: 337 MG/DL
TROPONIN T: <0.01 NG/ML
TROPONIN T: <0.01 NG/ML
WBC # BLD: 6.8 K/CU MM (ref 4–10.5)

## 2019-11-02 PROCEDURE — 99285 EMERGENCY DEPT VISIT HI MDM: CPT

## 2019-11-02 PROCEDURE — 6360000002 HC RX W HCPCS: Performed by: EMERGENCY MEDICINE

## 2019-11-02 PROCEDURE — 85379 FIBRIN DEGRADATION QUANT: CPT

## 2019-11-02 PROCEDURE — 93005 ELECTROCARDIOGRAM TRACING: CPT | Performed by: EMERGENCY MEDICINE

## 2019-11-02 PROCEDURE — 99243 OFF/OP CNSLTJ NEW/EST LOW 30: CPT | Performed by: INTERNAL MEDICINE

## 2019-11-02 PROCEDURE — 80307 DRUG TEST PRSMV CHEM ANLYZR: CPT

## 2019-11-02 PROCEDURE — 6370000000 HC RX 637 (ALT 250 FOR IP): Performed by: INTERNAL MEDICINE

## 2019-11-02 PROCEDURE — 96372 THER/PROPH/DIAG INJ SC/IM: CPT

## 2019-11-02 PROCEDURE — G0378 HOSPITAL OBSERVATION PER HR: HCPCS

## 2019-11-02 PROCEDURE — 6370000000 HC RX 637 (ALT 250 FOR IP): Performed by: EMERGENCY MEDICINE

## 2019-11-02 PROCEDURE — 84484 ASSAY OF TROPONIN QUANT: CPT

## 2019-11-02 PROCEDURE — 71046 X-RAY EXAM CHEST 2 VIEWS: CPT

## 2019-11-02 PROCEDURE — 80053 COMPREHEN METABOLIC PANEL: CPT

## 2019-11-02 PROCEDURE — 80061 LIPID PANEL: CPT

## 2019-11-02 PROCEDURE — 96374 THER/PROPH/DIAG INJ IV PUSH: CPT

## 2019-11-02 PROCEDURE — 36415 COLL VENOUS BLD VENIPUNCTURE: CPT

## 2019-11-02 PROCEDURE — 6360000002 HC RX W HCPCS: Performed by: INTERNAL MEDICINE

## 2019-11-02 PROCEDURE — 83735 ASSAY OF MAGNESIUM: CPT

## 2019-11-02 PROCEDURE — 93010 ELECTROCARDIOGRAM REPORT: CPT | Performed by: INTERNAL MEDICINE

## 2019-11-02 PROCEDURE — 85025 COMPLETE CBC W/AUTO DIFF WBC: CPT

## 2019-11-02 PROCEDURE — 83721 ASSAY OF BLOOD LIPOPROTEIN: CPT

## 2019-11-02 RX ORDER — NITROGLYCERIN 0.4 MG/1
0.4 TABLET SUBLINGUAL EVERY 5 MIN PRN
Status: DISCONTINUED | OUTPATIENT
Start: 2019-11-02 | End: 2019-11-02 | Stop reason: HOSPADM

## 2019-11-02 RX ORDER — MORPHINE SULFATE 4 MG/ML
4 INJECTION, SOLUTION INTRAMUSCULAR; INTRAVENOUS ONCE
Status: COMPLETED | OUTPATIENT
Start: 2019-11-02 | End: 2019-11-02

## 2019-11-02 RX ORDER — ACETAMINOPHEN 325 MG/1
650 TABLET ORAL EVERY 4 HOURS PRN
Status: DISCONTINUED | OUTPATIENT
Start: 2019-11-02 | End: 2019-11-02 | Stop reason: HOSPADM

## 2019-11-02 RX ORDER — ASPIRIN 81 MG/1
81 TABLET, CHEWABLE ORAL DAILY
Status: DISCONTINUED | OUTPATIENT
Start: 2019-11-02 | End: 2019-11-02 | Stop reason: HOSPADM

## 2019-11-02 RX ORDER — ASPIRIN 81 MG/1
243 TABLET, CHEWABLE ORAL ONCE
Status: COMPLETED | OUTPATIENT
Start: 2019-11-02 | End: 2019-11-02

## 2019-11-02 RX ADMIN — ASPIRIN 81 MG 81 MG: 81 TABLET ORAL at 09:31

## 2019-11-02 RX ADMIN — ASPIRIN 81 MG 243 MG: 81 TABLET ORAL at 02:44

## 2019-11-02 RX ADMIN — MORPHINE SULFATE 4 MG: 4 INJECTION, SOLUTION INTRAMUSCULAR; INTRAVENOUS at 02:44

## 2019-11-02 RX ADMIN — ENOXAPARIN SODIUM 40 MG: 40 INJECTION SUBCUTANEOUS at 09:31

## 2019-11-02 ASSESSMENT — PAIN DESCRIPTION - ORIENTATION
ORIENTATION: LEFT
ORIENTATION: LEFT

## 2019-11-02 ASSESSMENT — HEART SCORE: ECG: 0

## 2019-11-02 ASSESSMENT — PAIN DESCRIPTION - DESCRIPTORS
DESCRIPTORS: ACHING;STABBING
DESCRIPTORS: ACHING

## 2019-11-02 ASSESSMENT — PAIN SCALES - GENERAL
PAINLEVEL_OUTOF10: 8
PAINLEVEL_OUTOF10: 8
PAINLEVEL_OUTOF10: 7
PAINLEVEL_OUTOF10: 7

## 2019-11-02 ASSESSMENT — PAIN DESCRIPTION - PAIN TYPE
TYPE: ACUTE PAIN
TYPE: ACUTE PAIN

## 2019-11-02 ASSESSMENT — PAIN DESCRIPTION - LOCATION
LOCATION: CHEST
LOCATION: ARM;CHEST

## 2020-01-10 ENCOUNTER — OFFICE VISIT (OUTPATIENT)
Dept: INTERNAL MEDICINE CLINIC | Age: 55
End: 2020-01-10
Payer: COMMERCIAL

## 2020-01-10 VITALS
SYSTOLIC BLOOD PRESSURE: 151 MMHG | OXYGEN SATURATION: 97 % | HEART RATE: 86 BPM | BODY MASS INDEX: 35.83 KG/M2 | DIASTOLIC BLOOD PRESSURE: 102 MMHG | HEIGHT: 68 IN | WEIGHT: 236.4 LBS

## 2020-01-10 PROCEDURE — 99213 OFFICE O/P EST LOW 20 MIN: CPT | Performed by: FAMILY MEDICINE

## 2020-01-10 ASSESSMENT — PATIENT HEALTH QUESTIONNAIRE - PHQ9
2. FEELING DOWN, DEPRESSED OR HOPELESS: 0
1. LITTLE INTEREST OR PLEASURE IN DOING THINGS: 0
SUM OF ALL RESPONSES TO PHQ QUESTIONS 1-9: 0
SUM OF ALL RESPONSES TO PHQ QUESTIONS 1-9: 0
SUM OF ALL RESPONSES TO PHQ9 QUESTIONS 1 & 2: 0

## 2020-01-17 ENCOUNTER — HOSPITAL ENCOUNTER (OUTPATIENT)
Dept: ULTRASOUND IMAGING | Age: 55
Discharge: HOME | End: 2020-01-17
Payer: COMMERCIAL

## 2020-01-17 PROCEDURE — 76705 ECHO EXAM OF ABDOMEN: CPT

## 2020-01-18 ASSESSMENT — ENCOUNTER SYMPTOMS
NAUSEA: 0
SHORTNESS OF BREATH: 0
DIARRHEA: 0
CHEST TIGHTNESS: 0
SINUS PAIN: 0
BLOOD IN STOOL: 0
COUGH: 0
ABDOMINAL PAIN: 1
CONSTIPATION: 0
BACK PAIN: 0
RHINORRHEA: 1
SORE THROAT: 1

## 2020-01-26 ENCOUNTER — APPOINTMENT (OUTPATIENT)
Dept: GENERAL RADIOLOGY | Age: 55
End: 2020-01-26
Payer: COMMERCIAL

## 2020-01-26 ENCOUNTER — HOSPITAL ENCOUNTER (EMERGENCY)
Age: 55
Discharge: HOME | End: 2020-01-26
Payer: COMMERCIAL

## 2020-01-26 ENCOUNTER — APPOINTMENT (OUTPATIENT)
Dept: ULTRASOUND IMAGING | Age: 55
End: 2020-01-26
Payer: COMMERCIAL

## 2020-01-26 VITALS
HEART RATE: 85 BPM | OXYGEN SATURATION: 98 % | RESPIRATION RATE: 16 BRPM | SYSTOLIC BLOOD PRESSURE: 126 MMHG | HEIGHT: 68 IN | DIASTOLIC BLOOD PRESSURE: 74 MMHG | TEMPERATURE: 98.2 F | WEIGHT: 236 LBS | BODY MASS INDEX: 35.77 KG/M2

## 2020-01-26 PROCEDURE — 93971 EXTREMITY STUDY: CPT

## 2020-01-26 PROCEDURE — 6370000000 HC RX 637 (ALT 250 FOR IP): Performed by: PHYSICIAN ASSISTANT

## 2020-01-26 PROCEDURE — 96372 THER/PROPH/DIAG INJ SC/IM: CPT

## 2020-01-26 PROCEDURE — 73560 X-RAY EXAM OF KNEE 1 OR 2: CPT

## 2020-01-26 PROCEDURE — 99284 EMERGENCY DEPT VISIT MOD MDM: CPT

## 2020-01-26 PROCEDURE — 6360000002 HC RX W HCPCS: Performed by: PHYSICIAN ASSISTANT

## 2020-01-26 RX ORDER — KETOROLAC TROMETHAMINE 30 MG/ML
60 INJECTION, SOLUTION INTRAMUSCULAR; INTRAVENOUS ONCE
Status: COMPLETED | OUTPATIENT
Start: 2020-01-26 | End: 2020-01-26

## 2020-01-26 RX ORDER — HYDROCODONE BITARTRATE AND ACETAMINOPHEN 5; 325 MG/1; MG/1
1 TABLET ORAL ONCE
Status: COMPLETED | OUTPATIENT
Start: 2020-01-26 | End: 2020-01-26

## 2020-01-26 RX ORDER — IBUPROFEN 600 MG/1
600 TABLET ORAL EVERY 6 HOURS PRN
Qty: 20 TABLET | Refills: 0 | Status: SHIPPED | OUTPATIENT
Start: 2020-01-26 | End: 2020-02-04

## 2020-01-26 RX ORDER — TRAMADOL HYDROCHLORIDE 50 MG/1
50 TABLET ORAL ONCE
Status: COMPLETED | OUTPATIENT
Start: 2020-01-26 | End: 2020-01-26

## 2020-01-26 RX ORDER — HYDROCODONE BITARTRATE AND ACETAMINOPHEN 5; 325 MG/1; MG/1
1 TABLET ORAL EVERY 8 HOURS PRN
Qty: 12 TABLET | Refills: 0 | Status: SHIPPED | OUTPATIENT
Start: 2020-01-26 | End: 2020-01-29

## 2020-01-26 RX ADMIN — TRAMADOL HYDROCHLORIDE 50 MG: 50 TABLET, FILM COATED ORAL at 02:25

## 2020-01-26 RX ADMIN — HYDROCODONE BITARTRATE AND ACETAMINOPHEN 1 TABLET: 5; 325 TABLET ORAL at 04:35

## 2020-01-26 RX ADMIN — KETOROLAC TROMETHAMINE 60 MG: 30 INJECTION, SOLUTION INTRAMUSCULAR at 02:25

## 2020-01-26 ASSESSMENT — PAIN SCALES - GENERAL
PAINLEVEL_OUTOF10: 10
PAINLEVEL_OUTOF10: 6
PAINLEVEL_OUTOF10: 9
PAINLEVEL_OUTOF10: 10

## 2020-01-26 ASSESSMENT — PAIN DESCRIPTION - LOCATION: LOCATION: KNEE

## 2020-01-26 ASSESSMENT — PAIN DESCRIPTION - PAIN TYPE: TYPE: ACUTE PAIN

## 2020-01-26 ASSESSMENT — PAIN DESCRIPTION - ORIENTATION: ORIENTATION: LEFT

## 2020-01-26 NOTE — ED PROVIDER NOTES
Triage Chief Complaint:   Knee Pain (posterior knee popped about an hour ago )    Kiana:  Gianluca Vazquez is a 49 y.o. male that presents today complaining of L sided knee pain. Context is, patient was trying to dance and sprained his knee. Has been unable to ambulate well since. No paresthesias. Pain is ranked 10/10. Patient admits to no radiation. Pain is made worse with movement and palpation. Pain relieved some with rest.     ROS:  At least 06 systems reviewed and otherwise negative except as in the [address] Past Medical History:   Diagnosis Date    CAD (coronary artery disease)     H/O echocardiogram 04/28/2016    EF 55% WNL- Stage 1 dysfunction     Hyperlipidemia     Hypertension      Past Surgical History:   Procedure Laterality Date    APPENDECTOMY      SHOULDER SURGERY      right      Family History   Problem Relation Age of Onset    Cancer Mother         lung    Heart Disease Father      Social History     Socioeconomic History    Marital status: Single     Spouse name: Not on file    Number of children: Not on file    Years of education: Not on file    Highest education level: Not on file   Occupational History    Not on file   Social Needs    Financial resource strain: Not on file    Food insecurity:     Worry: Not on file     Inability: Not on file    Transportation needs:     Medical: Not on file     Non-medical: Not on file   Tobacco Use    Smoking status: Never Smoker    Smokeless tobacco: Former User     Types: Chew   Substance and Sexual Activity    Alcohol use:  Yes     Alcohol/week: 15.0 standard drinks     Types: 12 Cans of beer, 3 Shots of liquor per week     Comment: Only on the [employer] Drug use: No    Sexual activity: Not on file   Lifestyle    Physical activity:     Days per week: Not on file     Minutes per session: Not on file    Stress: Not on file   Relationships    Social connections:     Talks on phone: Not on file     Gets together: Not on file     Attends follow-up is warranted. Fatty liver. MDM:   Neurovascularly intact. Patient presents today with signs and symptoms that are congruent with musculoskeletal strain/sprain of the knee   Xray negative for any acute osseous abnormality     I have very low clinical suspicion of any fracture. However patient is educated that should symptoms persist and did not improve over the next 4-5 days patient should have orthopedic follow up as referred for x-rays to rule out fracture. I estimate there is LOW risk for Fracture, COMPARTMENT SYNDROME, DEEP VENOUS THROMBOSIS, COMPLETE TENDON RUPTURE, OR NEUROVASCULAR INJURY, thus I consider the discharge disposition reasonable. Pt is to be discharged home. Pt is  to return immediately to the emergency department if he has any new, worrisome or worsening symptoms. Pt is to follow up with PCP within 2 days. Patient/Surrogate vocalizes agreement and understanding with this plan and he has no questions upon disposition. Pt is comfortable upon disposition home. Patient is stable, Patients vital signs are stable. Vital signs and nursing notes reviewed during ED course. I independently managed patient today in the ED. All pertinent Lab data and radiographic results reviewed with patient at bedside. The patient and/or the family were informed of the results of any tests/labs/imaging, the treatment plan, and time was allotted to answer questions. See chart for details of medications given during the ED stay. /70   Pulse 98   Temp 98.3 °F (36.8 °C) (Oral)   Resp 20   Ht 5' 8\" (1.727 m)   Wt 236 lb (107 kg)   SpO2 95%   BMI 35.88 kg/m²       Clinical Impression:  1.  Acute pain of right knee        Disposition referral (if applicable):  [phone]    In 2 days      Disposition medications (if applicable):  New Prescriptions    No medications on file       Comment: Please note this report has been produced using speech recognition software and may contain errors related to that system including errors in grammar, punctuation, and spelling, as well as words and phrases that may be inappropriate. If there are any questions or concerns please feel free to contact the dictating provider for clarification.     Curt Lester, [place], [place]  01/26/20 1465

## 2020-01-26 NOTE — ED NOTES
Bed: ED-34  Expected date:   Expected time:   Means of arrival:   Comments:  triage     Benita Modi RN  01/26/20 [id number]

## 2020-01-26 NOTE — ED NOTES
Narrative   EXAMINATION:   DUPLEX VENOUS ULTRASOUND OF THE LEFT LOWER EXTREMITY       1/26/2020 1:47 am       TECHNIQUE:   Duplex ultrasound and Doppler images were obtained of the left lower   extremity.       COMPARISON:   None.       HISTORY:   ORDERING SYSTEM PROVIDED HISTORY: pain   TECHNOLOGIST PROVIDED HISTORY:   Reason for exam:->pain   Reason for Exam: pain   Acuity: Acute   Type of Exam: Initial   Additional signs and symptoms: knee popped while dancing tonight       FINDINGS:   The visualized veins of the left lower extremity are patent and free of   echogenic thrombus. The veins are normally compressible and have normal   phasic flow.  Note is made of a Baker's cyst measuring 5.6 x 1.3 x 1.5 cm.           Impression   1. No evidence of DVT in the left lower extremity.         Marylen Boyden, RN  01/26/20 [id number]

## 2020-01-31 ENCOUNTER — HOSPITAL ENCOUNTER (OUTPATIENT)
Dept: NUCLEAR MEDICINE | Age: 55
Discharge: HOME | End: 2020-01-31
Payer: COMMERCIAL

## 2020-01-31 VITALS — BODY MASS INDEX: 36.07 KG/M2 | WEIGHT: 238 LBS | HEIGHT: 68 IN

## 2020-01-31 PROCEDURE — 3430000000 HC RX DIAGNOSTIC RADIOPHARMACEUTICAL: Performed by: FAMILY MEDICINE

## 2020-01-31 PROCEDURE — A9537 TC99M MEBROFENIN: HCPCS | Performed by: FAMILY MEDICINE

## 2020-01-31 PROCEDURE — 78226 HEPATOBILIARY SYSTEM IMAGING: CPT

## 2020-01-31 RX ADMIN — Medication 6 MILLICURIE: at 11:25

## 2020-02-04 ENCOUNTER — OFFICE VISIT (OUTPATIENT)
Dept: ORTHOPEDIC SURGERY | Age: 55
End: 2020-02-04
Payer: COMMERCIAL

## 2020-02-04 VITALS — OXYGEN SATURATION: 99 % | RESPIRATION RATE: 16 BRPM | HEART RATE: 87 BPM

## 2020-02-04 PROCEDURE — 99203 OFFICE O/P NEW LOW 30 MIN: CPT | Performed by: PHYSICIAN ASSISTANT

## 2020-02-04 NOTE — PROGRESS NOTES
History     Socioeconomic History    Marital status: Single     Spouse name: None    Number of children: None    Years of education: None    Highest education level: None   Occupational History    None   Social Needs    Financial resource strain: None    Food insecurity:     Worry: None     Inability: None    Transportation needs:     Medical: None     Non-medical: None   Tobacco Use    Smoking status: Never Smoker    Smokeless tobacco: Former User     Types: Chew   Substance and Sexual Activity    Alcohol use: Yes     Alcohol/week: 15.0 standard drinks     Types: 12 Cans of beer, 3 Shots of liquor per week     Comment: Only on the [employer] Drug use: No    Sexual activity: None   Lifestyle    Physical activity:     Days per week: None     Minutes per session: None    Stress: None   Relationships    Social connections:     Talks on phone: None     Gets together: None     Attends Judaism service: None     Active member of club or organization: None     Attends meetings of clubs or organizations: None     Relationship status: None    Intimate partner violence:     Fear of current or ex partner: None     Emotionally abused: None     Physically abused: None     Forced sexual activity: None   Other Topics Concern    None   Social History Narrative    None       REVIEW OF SYSTEMS  Comprehensive ROS completed. In specific,  - Constitutional: Denies fevers, chills, fatigue, weakness, unexpected weight loss/gain  - Cardiovascular: Denies chest pain, shortness of breath, palpitation and dyspnea on exertion  - Musculoskeletal: pain left knee  - Neuro: Denies numbness, tingling, paresthesias, loss of consciousness, dizziness  - Skin: Denies ulceration, bruising, scars, open wounds    All other systems reviewed and are negative unless otherwise stated above or in the HPI.       PHYSICAL EXAM  VITAL SIGNS: Pulse 87   Resp 16   SpO2 99%   General Appearance Alert, well appearing, No acute distress   Eyes clear   Ears, Nose, Throat clear    Neck Supple, non tender   Respiratory Clear breath sounds bilaterally, non-labored breathing   Cardiovascular Heart regular rate and rythm   Gastrointestinal Abdomen: soft, non-tender, non-distended   Lymphatics No adenopathy   Musculoskeletal LLE- SILT; CR <2 sec; moderate knee effusion; no redness/warmth/erythema; moderate swelling posterior knee consistent with bakers cyst; able to do SLR; knee ROM 0-120 actively; calf soft/NT; no laxity about knee with varus/valgus stress; negative anterior/posterior drawer; negative mcmurrays. Skin Normal. No rash or lesions   Neurological Awake, alert and oriented. No focal deficits. Motor and sensory intact   Psychiatric Normal       LABS   No results for input(s): WBC, HEMOGLOBIN, HCT, PLT, NA, K, CL, CO2, BUN, CREATININE, CALCIUM, PHOS, ALBUMIN, MG, INR, PTT, CKMB, TROPONINI, AST, ALT, LIPASE, LACTATE, TSH in the last 72 hours.     Invalid input(s): GLU, PT, CK1, TBILI, URINE  No components found for: HGBA1C    IMAGING  Narrative   EXAMINATION:   TWO XRAY VIEWS OF THE LEFT KNEE       1/26/2020 2:42 am       COMPARISON:   None.       HISTORY:   ORDERING SYSTEM PROVIDED HISTORY: pain   TECHNOLOGIST PROVIDED HISTORY:   Reason for exam:->pain   Reason for Exam: twisted knee dancing   Acuity: Acute   Type of Exam: Initial       FINDINGS:   There is no acute fracture or dislocation.  Mild degenerative changes noted   of the anterior compartment.  Soft tissues are unremarkable           Impression   No acute osseous abnormality             Narrative   EXAMINATION:   DUPLEX VENOUS ULTRASOUND OF THE LEFT LOWER EXTREMITY       1/26/2020 1:47 am       TECHNIQUE:   Duplex ultrasound and Doppler images were obtained of the left lower   extremity.       COMPARISON:   None.       HISTORY:   ORDERING SYSTEM PROVIDED HISTORY: pain   TECHNOLOGIST PROVIDED HISTORY:   Reason for exam:->pain   Reason for Exam: pain   Acuity: Acute   Type of Exam: Initial

## 2020-02-05 ENCOUNTER — TELEPHONE (OUTPATIENT)
Dept: INTERNAL MEDICINE CLINIC | Age: 55
End: 2020-02-05

## 2020-02-05 NOTE — TELEPHONE ENCOUNTER
I'm not sure what he is talking about. He had a HIDA scan for the gallbladder?   See result note and inform pt

## 2020-03-26 ENCOUNTER — HOSPITAL ENCOUNTER (OUTPATIENT)
Dept: ULTRASOUND IMAGING | Age: 55
Discharge: HOME | End: 2020-03-26
Payer: COMMERCIAL

## 2020-03-26 ENCOUNTER — OFFICE VISIT (OUTPATIENT)
Dept: FAMILY MEDICINE CLINIC | Age: 55
End: 2020-03-26
Payer: COMMERCIAL

## 2020-03-26 ENCOUNTER — TELEPHONE (OUTPATIENT)
Dept: INTERNAL MEDICINE CLINIC | Age: 55
End: 2020-03-26

## 2020-03-26 ENCOUNTER — TELEPHONE (OUTPATIENT)
Dept: ORTHOPEDIC SURGERY | Age: 55
End: 2020-03-26

## 2020-03-26 VITALS
BODY MASS INDEX: 35.67 KG/M2 | HEART RATE: 95 BPM | DIASTOLIC BLOOD PRESSURE: 94 MMHG | TEMPERATURE: 98.2 F | OXYGEN SATURATION: 97 % | SYSTOLIC BLOOD PRESSURE: 166 MMHG | WEIGHT: 234.6 LBS

## 2020-03-26 PROCEDURE — 99202 OFFICE O/P NEW SF 15 MIN: CPT | Performed by: NURSE PRACTITIONER

## 2020-03-26 PROCEDURE — 93971 EXTREMITY STUDY: CPT

## 2020-03-26 NOTE — PROGRESS NOTES
History of Present Illness     Chief Complaint   Pain (left calf )    Perlita Quintero is a 48year old male who presents to the walk in clinic for complaint of pain and swelling in the mid left calf, pain non radiating, denies any know injury. Onset of symptoms was 5 days. Patient describes pain as burning. Pain severity 7/10. Pain is aggravated by ambulation and touch. Pain is alleviated by nothing. Symptoms associated with pain include swelling and lividity inner lower left ankle. The patient denies any injuries. He has not used any OTC or Rx products for the pain, he has not applied warm moist heat or ice to area of concern. He was recently diagnosed with baker cyst and started wearing a knee brace 2 days ago intermittently. He attempted to contact his PCP and office was closed with message to go to the walk in clinic for further evaluation. Past Medical History:   Diagnosis Date    CAD (coronary artery disease)     H/O echocardiogram 04/28/2016    EF 55% WNL- Stage 1 dysfunction     Hyperlipidemia     Hypertension      Family History   Problem Relation Age of Onset    Cancer Mother         lung    Heart Disease Father        No Known Allergies  Social History     Socioeconomic History    Marital status: Single     Spouse name: Not on file    Number of children: Not on file    Years of education: Not on file    Highest education level: Not on file   Occupational History    Not on file   Social Needs    Financial resource strain: Not on file    Food insecurity     Worry: Not on file     Inability: Not on file    Transportation needs     Medical: Not on file     Non-medical: Not on file   Tobacco Use    Smoking status: Never Smoker    Smokeless tobacco: Former User     Types: Chew   Substance and Sexual Activity    Alcohol use:  Yes     Alcohol/week: 15.0 standard drinks     Types: 12 Cans of beer, 3 Shots of liquor per week     Comment: Only on the [place] Drug use: No    Sexual activity: Not on file   Lifestyle    Physical activity     Days per week: Not on file     Minutes per session: Not on file    Stress: Not on file   Relationships    Social connections     Talks on phone: Not on file     Gets together: Not on file     Attends Mandaen service: Not on file     Active member of club or organization: Not on file     Attends meetings of clubs or organizations: Not on file     Relationship status: Not on file    Intimate partner violence     Fear of current or ex partner: Not on file     Emotionally abused: Not on file     Physically abused: Not on file     Forced sexual activity: Not on file   Other Topics Concern    Not on file   Social History Narrative    Not on file     REVIEW OF SYSTEMS:    CONSTITUTIONAL:  negative for fevers, chills, diaphoresis, activity change, appetite change, night sweats and unexpected weight change.    RESPIRATORY: Negative for shortness of breath, negative cough, negative wheeze  CARDIOVASCULAR:  Negative for chest pain, palpitations, exertional chest pressure/discomfort, edema, syncope  GASTROINTESTINAL: negative for nausea, vomiting, diarrhea, constipation, blood in stool and abdominal pain  HEMATOLOGIC/LYMPHATIC:  negative for easy bruising, bleeding and lymphadenopathy  ALLERGIC/IMMUNOLOGIC:  negative for recurrent infections, angioedema, anaphylaxis and drug reaction  MUSCULOSKELETAL: Positive left calf pain, joint swelling, decreased range of motion and muscle weakness  NEURO: negative for headache, AMS, decrease sensations  SKIN: Negative for rashes or lesions       Physical Exam     BP (!) 166/94   Pulse 95   Temp 98.2 °F (36.8 °C) (Oral)   Wt 234 lb 9.6 oz (106.4 kg)   SpO2 97%   BMI 35.67 kg/m²   PHYSICAL EXAM:    CONSTITUTIONAL:  awake, alert, cooperative, no apparent distress, and appears stated age  CHEST: Chest expansion equal and symmetrical, no intercostal retraction, no increased work of breathing  LUNGS: Clear and equal bilaterally, good air movement, no wheezes or rhonchi, no shortness of breath noted, no cough  CARDIOVASCULAR: Normal S1 and S2 , no murmurs or gallops ,no pericardial rubs  EXTREMITIES: Tenderness to posterior left calf on exam, positive Homans sign on left lower extremity, slight swelling of left calf compared to right calf, noted sock line on left calf, lividity noted inner left lower ankle along with ecchymosis going along posterior ankle, PT and DP pulses 2+ and equal bilateral, cap refill good increased pain and guarding when walking LLE  NEURO: Oriented X 3, No focal deficits  SKIN: warm and dry      Clinic Course     Studies: Duplex, venous, left lower extremity - stat    Patient is wearing velcro type brace on left knee however he is wearing brace incorrectly and brace has slid off knee on to lower knee upper calf. Pain and swelling could be related to brace however he state that pain started prior to wearing brace. Due to positive Hollmes sign, swelling and no report of recent injury we will precede with venous duplex to R/O DVT today. His blood pressure is noted elevated in triage, could be related to pain but he does have a cardiac history, I suggest that he monitor his blood pressure over the next few days. Should he develop visual changes or have a severe headache with elevated blood pressure he needs to go immediately to the emergency department    We have discussed the need to maintain yearly flu immunization, pneumococcal vaccination. We have discussed Coronavirus precaution including handwashing practice, wiping items touched in public such as gas pumps, door handles, shopping carts, etc. Self monitoring for infection - fever, chills, cough, SOB. Should they develop symptoms they should call office for further instructions.

## 2020-03-27 ENCOUNTER — ANCILLARY ORDERS (OUTPATIENT)
Dept: FAMILY MEDICINE CLINIC | Age: 55
End: 2020-03-27

## 2020-04-14 ENCOUNTER — OFFICE VISIT (OUTPATIENT)
Dept: ORTHOPEDIC SURGERY | Age: 55
End: 2020-04-14
Payer: COMMERCIAL

## 2020-04-14 VITALS
RESPIRATION RATE: 16 BRPM | BODY MASS INDEX: 35.46 KG/M2 | HEART RATE: 103 BPM | OXYGEN SATURATION: 98 % | HEIGHT: 68 IN | WEIGHT: 234 LBS

## 2020-04-14 PROCEDURE — 99212 OFFICE O/P EST SF 10 MIN: CPT | Performed by: ORTHOPAEDIC SURGERY

## 2020-04-14 NOTE — PROGRESS NOTES
ORTHOPEDIC PROGRESS NOTE    2020    Patient name: Tu Benjamin  : 1965    SUBJECTIVE   The patient was seen and examined. Tu Benjamin is a 48 y.o. male continued to report 6/10 pain in the left knee. Mild medial joint line pain. He still feels most of his symptoms are posterior in the popliteal area. He has not pursued formal therapy. He is not interested in an injection at this time. OBJECTIVE     Vitals:    20 1508   Pulse: 103   Resp: 16   SpO2: 98%       Physical Exam:   GEN: AO NAD  MS: LLE: CR<2sec, SILT, no knee effusion, no erythema, palpable bakers cyst, knee ROM 0-120, + medial joint line pain with palpation. Labs:   CBC/COAGS  No results for input(s): WBC, HEMOGLOBIN, HCT, PLT, INR in the last 72 hours. BMP  No results for input(s): NA, K, CL, CO2, BUN, CREATININE in the last 72 hours. Invalid input(s): GLU      ASSESSMENT     48 y.o. male with Left knee OA and bakers cyst    PLAN     1. Activity, PT/OT: will pursue home exercise program at this time. exercise print out provided  2. DVT prophylaxis: none indicated. Patient does take 81mg ASA daily   3. Aleeve OTC prn  4. Follow up as needed. If pain continues that he may benefits from an injection and/or formal physical therapy.      Electronically signed by:Anibal Hayes MD, 2020 3:36 PM

## 2020-05-18 ENCOUNTER — HOSPITAL ENCOUNTER (OUTPATIENT)
Dept: GENERAL RADIOLOGY | Age: 55
Discharge: HOME | End: 2020-05-18
Payer: COMMERCIAL

## 2020-05-18 ENCOUNTER — HOSPITAL ENCOUNTER (OUTPATIENT)
Age: 55
Discharge: HOME | End: 2020-05-18
Payer: COMMERCIAL

## 2020-05-18 ENCOUNTER — OFFICE VISIT (OUTPATIENT)
Dept: FAMILY MEDICINE CLINIC | Age: 55
End: 2020-05-18
Payer: COMMERCIAL

## 2020-05-18 ENCOUNTER — TELEPHONE (OUTPATIENT)
Dept: INTERNAL MEDICINE CLINIC | Age: 55
End: 2020-05-18

## 2020-05-18 VITALS
SYSTOLIC BLOOD PRESSURE: 138 MMHG | WEIGHT: 236 LBS | HEIGHT: 68 IN | BODY MASS INDEX: 35.77 KG/M2 | OXYGEN SATURATION: 97 % | TEMPERATURE: 98.3 F | HEART RATE: 99 BPM | DIASTOLIC BLOOD PRESSURE: 100 MMHG

## 2020-05-18 LAB — URIC ACID, SERUM: 6.9 MG/DL (ref 3.5–7.2)

## 2020-05-18 PROCEDURE — 99213 OFFICE O/P EST LOW 20 MIN: CPT | Performed by: NURSE PRACTITIONER

## 2020-05-18 PROCEDURE — 36415 COLL VENOUS BLD VENIPUNCTURE: CPT | Performed by: NURSE PRACTITIONER

## 2020-05-18 PROCEDURE — 73562 X-RAY EXAM OF KNEE 3: CPT

## 2020-05-18 RX ORDER — IBUPROFEN 800 MG/1
800 TABLET ORAL EVERY 8 HOURS PRN
Qty: 40 TABLET | Refills: 0 | Status: SHIPPED | OUTPATIENT
Start: 2020-05-18 | End: 2020-05-21

## 2020-05-18 RX ORDER — IBUPROFEN 200 MG
200 TABLET ORAL EVERY 6 HOURS PRN
COMMUNITY
End: 2020-05-18 | Stop reason: SDUPTHER

## 2020-05-18 ASSESSMENT — ENCOUNTER SYMPTOMS
RESPIRATORY NEGATIVE: 1
VOMITING: 0
NAUSEA: 0
DIARRHEA: 0

## 2020-05-18 NOTE — PROGRESS NOTES
Readings from Last 3 Encounters:   05/18/20 (!) 138/100   03/26/20 (!) 166/94   01/26/20 126/74     Wt Readings from Last 3 Encounters:   05/18/20 236 lb (107 kg)   04/14/20 234 lb (106.1 kg)   03/26/20 234 lb 9.6 oz (106.4 kg)         Physical Exam  Constitutional:       Appearance: Normal appearance. HENT:      Head: Normocephalic. Neck:      Musculoskeletal: Neck supple. Cardiovascular:      Rate and Rhythm: Normal rate and regular rhythm. Heart sounds: Normal heart sounds. Pulmonary:      Effort: Pulmonary effort is normal.      Breath sounds: Normal breath sounds. Musculoskeletal:      Left knee: He exhibits decreased range of motion and swelling. Tenderness found. Skin:     General: Skin is warm and dry. Neurological:      Mental Status: He is alert and oriented to person, place, and time. Psychiatric:         Mood and Affect: Mood normal.         Behavior: Behavior normal.         Lab Results   Component Value Date    WBC 6.8 11/02/2019    HGB 13.7 11/02/2019    HCT 42.4 11/02/2019    MCV 90.8 11/02/2019     11/02/2019     Lab Results   Component Value Date     11/02/2019    K 4.2 11/02/2019     11/02/2019    CO2 23 11/02/2019    BUN 17 11/02/2019    CREATININE 0.9 11/02/2019    GLUCOSE 131 (H) 11/02/2019    CALCIUM 8.7 11/02/2019    PROT 6.9 11/02/2019    LABALBU 4.2 11/02/2019    BILITOT 0.2 11/02/2019    ALKPHOS 45 11/02/2019    AST 21 11/02/2019    ALT 18 11/02/2019    LABGLOM >60 11/02/2019    GFRAA >60 11/02/2019     Lab Results   Component Value Date    CHOL 167 11/02/2019    CHOL 213 (H) 10/04/2018     Lab Results   Component Value Date    TRIG 337 (H) 11/02/2019    TRIG 569 (H) 10/04/2018     Lab Results   Component Value Date    HDL 51 11/02/2019    HDL 42 10/04/2018     No results found for: LDLCALC, LDLCHOLESTEROL  Lab Results   Component Value Date    LABA1C 5.9 10/04/2018     No results found for: TSHFT4, TSH, TSHHS      ASSESSMENT/PLAN:      1.  Acute pain

## 2020-05-18 NOTE — TELEPHONE ENCOUNTER
Patient feels that he has gout in his knee. Warm to touch and painful. He contacted ortho and they told him to contact PCP.  Please advise

## 2020-05-19 ENCOUNTER — TELEPHONE (OUTPATIENT)
Dept: FAMILY MEDICINE CLINIC | Age: 55
End: 2020-05-19

## 2020-05-20 ENCOUNTER — TELEPHONE (OUTPATIENT)
Dept: FAMILY MEDICINE CLINIC | Age: 55
End: 2020-05-20

## 2020-05-20 NOTE — TELEPHONE ENCOUNTER
Put external referral - send to Steffen Llanes or Erika. Give patient phone number so he can call them tomorrow.

## 2020-05-21 ENCOUNTER — HOSPITAL ENCOUNTER (EMERGENCY)
Age: 55
Discharge: HOME | End: 2020-05-21
Payer: COMMERCIAL

## 2020-05-21 ENCOUNTER — TELEPHONE (OUTPATIENT)
Dept: INTERNAL MEDICINE CLINIC | Age: 55
End: 2020-05-21

## 2020-05-21 ENCOUNTER — APPOINTMENT (OUTPATIENT)
Dept: GENERAL RADIOLOGY | Age: 55
End: 2020-05-21
Payer: COMMERCIAL

## 2020-05-21 VITALS
OXYGEN SATURATION: 98 % | SYSTOLIC BLOOD PRESSURE: 160 MMHG | DIASTOLIC BLOOD PRESSURE: 96 MMHG | WEIGHT: 236 LBS | TEMPERATURE: 98.5 F | RESPIRATION RATE: 18 BRPM | HEIGHT: 68 IN | BODY MASS INDEX: 35.77 KG/M2 | HEART RATE: 78 BPM

## 2020-05-21 PROCEDURE — 99283 EMERGENCY DEPT VISIT LOW MDM: CPT

## 2020-05-21 PROCEDURE — 73560 X-RAY EXAM OF KNEE 1 OR 2: CPT

## 2020-05-21 PROCEDURE — 6370000000 HC RX 637 (ALT 250 FOR IP): Performed by: PHYSICIAN ASSISTANT

## 2020-05-21 RX ORDER — HYDROCODONE BITARTRATE AND ACETAMINOPHEN 5; 325 MG/1; MG/1
1 TABLET ORAL EVERY 6 HOURS PRN
Qty: 10 TABLET | Refills: 0 | Status: SHIPPED | OUTPATIENT
Start: 2020-05-21 | End: 2020-05-24

## 2020-05-21 RX ORDER — LIDOCAINE 4 G/G
1 PATCH TOPICAL DAILY
Qty: 30 PATCH | Refills: 0 | Status: SHIPPED | OUTPATIENT
Start: 2020-05-21 | End: 2020-06-20

## 2020-05-21 RX ORDER — HYDROCODONE BITARTRATE AND ACETAMINOPHEN 5; 325 MG/1; MG/1
1 TABLET ORAL ONCE
Status: COMPLETED | OUTPATIENT
Start: 2020-05-21 | End: 2020-05-21

## 2020-05-21 RX ORDER — IBUPROFEN 600 MG/1
600 TABLET ORAL EVERY 6 HOURS PRN
Qty: 30 TABLET | Refills: 0 | Status: SHIPPED | OUTPATIENT
Start: 2020-05-21 | End: 2020-11-13

## 2020-05-21 RX ADMIN — HYDROCODONE BITARTRATE AND ACETAMINOPHEN 1 TABLET: 5; 325 TABLET ORAL at 07:48

## 2020-05-21 ASSESSMENT — PAIN SCALES - GENERAL
PAINLEVEL_OUTOF10: 10
PAINLEVEL_OUTOF10: 10
PAINLEVEL_OUTOF10: 9
PAINLEVEL_OUTOF10: 8

## 2020-05-21 ASSESSMENT — PAIN DESCRIPTION - PAIN TYPE: TYPE: ACUTE PAIN

## 2020-05-21 ASSESSMENT — PAIN DESCRIPTION - LOCATION
LOCATION: KNEE

## 2020-05-21 ASSESSMENT — PAIN DESCRIPTION - ORIENTATION
ORIENTATION: LEFT

## 2020-05-21 ASSESSMENT — PAIN DESCRIPTION - FREQUENCY: FREQUENCY: CONTINUOUS

## 2020-05-21 ASSESSMENT — PAIN DESCRIPTION - ONSET: ONSET: OTHER (COMMENT)

## 2020-05-21 ASSESSMENT — PAIN DESCRIPTION - DESCRIPTORS: DESCRIPTORS: SHARP;THROBBING;SHOOTING

## 2020-05-21 NOTE — ED PROVIDER NOTES
eMERGENCY dEPARTMENT eNCOUnter      PCP: Sirisha Glover, [address]    Chief Complaint   Patient presents with    Knee Pain     left,nki       HPI    Titus Moreno is a 48 y.o. male who presents with left knee pain x1 week. 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 The following day, pain increased in the anterior and upper aspect of his knee and has been consistent since onset. Pain worsens with action of the knee and ambulation. He has been taking ibuprofen with improvement of symptoms, however states that this does not last long enough to provide enough relief while working. Patient was seen at a walk-in clinic and had negative x-rays, states that he also had lab work to check uric acid levels with his history of gout. He denies any overlying skin changes of his knee, no warmth. No fevers. No known lacerations or cuts to the knee. He denies history of similar pain. Does state that he has a Baker's cyst in this knee and has seen orthopedic physician, Dr. Gerald Domingo in the past for this. Patient denies numbness, weakness, tingling, and functional/motor deficits. No history of DVT. REVIEW OF SYSTEMS    General: Denies fever or chills  Cardiac: Denies chest pain  Pulmonary: Denies shortness of breath  GI: Denies abdominal pain, vomiting, or diarrhea  : No dysuria or hematuria  Musculoskeletal: Denies any other musculoskeletal injuries, including chest wall and back.     All other review of systems are negative  See HPI and nursing notes for additional information     PAST MEDICAL & SURGICAL HISTORY    Past Medical History:   Diagnosis Date    CAD (coronary artery disease)     H/O echocardiogram 04/28/2016    EF 55% WNL- Stage 1 dysfunction     Hyperlipidemia     Hypertension      Past Surgical History:   Procedure Laterality Date    APPENDECTOMY      and phrases that may be inappropriate. If there are any questions or concerns please feel free to contact the dictating provider for clarification.           Lashell Johnson [place]  05/21/20 0094

## 2020-05-21 NOTE — TELEPHONE ENCOUNTER
Patient has appointment with Dr. Keira Thomas Thursday. Should patient wait until Ortho for them to do the MRI? This is the impression of the Xray:  No acute osseous abnormality. Viri Jorgenesn is high clinical suspicion for occult   injury or internal derangement MRI could be obtained on a nonemergent basis.

## 2020-06-01 ENCOUNTER — HOSPITAL ENCOUNTER (OUTPATIENT)
Dept: MRI IMAGING | Age: 55
Discharge: HOME | End: 2020-06-01
Payer: COMMERCIAL

## 2020-06-01 PROCEDURE — 73721 MRI JNT OF LWR EXTRE W/O DYE: CPT

## 2020-06-02 ENCOUNTER — OFFICE VISIT (OUTPATIENT)
Dept: ORTHOPEDIC SURGERY | Age: 55
End: 2020-06-02
Payer: COMMERCIAL

## 2020-06-02 VITALS — BODY MASS INDEX: 35.16 KG/M2 | RESPIRATION RATE: 16 BRPM | WEIGHT: 232 LBS | HEIGHT: 68 IN

## 2020-06-02 PROCEDURE — 20610 DRAIN/INJ JOINT/BURSA W/O US: CPT | Performed by: ORTHOPAEDIC SURGERY

## 2020-06-02 PROCEDURE — 99203 OFFICE O/P NEW LOW 30 MIN: CPT | Performed by: ORTHOPAEDIC SURGERY

## 2020-06-02 RX ORDER — IBUPROFEN 800 MG/1
800 TABLET ORAL EVERY 8 HOURS PRN
Qty: 90 TABLET | Refills: 1 | Status: ON HOLD | OUTPATIENT
Start: 2020-06-02 | End: 2021-07-21 | Stop reason: SDUPTHER

## 2020-06-02 ASSESSMENT — ENCOUNTER SYMPTOMS
EYE REDNESS: 0
EYE PAIN: 0
CHEST TIGHTNESS: 0
COLOR CHANGE: 0
VOMITING: 0
WHEEZING: 0
SHORTNESS OF BREATH: 0

## 2020-06-02 NOTE — LETTER
[address] [facility]  [address]  Phone: [phone]  Fax: [phone]    Meera Pablo MD        June 2, 2020     Patient: Katy Mora   YOB: 1965   Date of Visit: 6/2/2020       To Whom It May Concern: It is my medical opinion that Bess Garrett may return to work on 6/3/2020. If you have any questions or concerns, please don't hesitate to call.     Sincerely,        Meera Pablo MD

## 2020-06-02 NOTE — PROGRESS NOTES
6/2/2020   Chief Complaint   Patient presents with    Knee Pain     left knee pain         History of Present Illness:                             Deanne Kruse is a 48 y.o. male who presents today with left-sided knee pain. He thinks he may have injured the knee about 5 months ago while dancing. Since that time he has had progressively worsening pain, swelling, and stiffness in the knee. He also complains of catching and popping both in medial aspect of the knee and underneath his kneecap. He has difficult time bending the knee because of pressure in the back of his knee and has been diagnosed with a Baker's cyst.  He has been treated with over-the-counter anti-inflammatory medications but feels that his symptoms become more severe making it difficult to function on a daily basis. He is walking with a severe limp and has trouble bending and squatting on the knee. He has no history of previous injuries or issues with the knee. He did have an MRI performed and then presents today for further evaluation discussion of treatment options. Patient here for a new patient ED follow up on left knee pain. He has been seen at the ED a few times for this issue and the pain and swelling is just persistently worsening. He states there was KNI and cannot relate it to a direct cause. Pain is global but also medial aspect. He states he has not had any prior surgeries or consecutive treatment other than OTC NSAIDS and a knee brace. He rates his pain a 4-7/10 depending on activity. His      Provider needs to conduct a hands on physical today due to patients injury        MRI LEFT KNEE 6/1/2020  Impression   Radial tear involving the posterior horn medial meniscus near the root with   mild extrusion of the body approximately 3 mm.       Small joint effusion.  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       Very small semimembranosus gastrocnemius bursal cyst.       Mild distal quadriceps tendinosis.      laxity, normal patellar mobility, no bony tenderness and no MCL laxity. No medial joint line and no lateral joint line tenderness noted. Comments: Left Lower Extremity:  There is moderate tenderness to palpation throughout the knee most significant along the medial joint line and lateral patellofemoral joint. There is a mild effusion present and mild global swelling at the knee anteriorly. Moderate restricted range of motion at the knee with approximately 5 degrees short of full extension and knee flexion up to 110 degrees with pain at the extremes of motion. There is mild crepitation at the knee during active range of motion. There is normal knee alignment. There is 5 out of 5 strength with knee flexion and extension. There is no instability to varus or valgus stress testing or anterior and posterior drawer testing. Sensation is intact to light touch throughout the lower extremity. There is a moderately positive Candace's test with tenderness to palpation and pain along the medial joint line. Skin is intact. Pulses intact    No pain with active range of motion of the hip. Strength and range of motion of the hip are intact. No tenderness to palpation at the hip. Skin:     General: Skin is warm and dry. Neurological:      Mental Status: He is alert and oriented to person, place, and time. Psychiatric:         Mood and Affect: Mood normal.         Behavior: Behavior normal.            Diagnostic testing:  X-rays reviewed in office, I independently reviewed the films in the office today:   MRI LEFT KNEE 6/1/2020  Impression   Radial tear involving the posterior horn medial meniscus near the root with   mild extrusion of the body approximately 3 mm.       Small joint effusion.  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       Very small semimembranosus gastrocnemius bursal cyst.       Mild distal quadriceps tendinosis.           ED XR LEFT KNEE 5/21/2020      Impression   No acute osseous stretching and low impact activities such as walking, biking, or elliptical machines. We discussed the possibility of physical therapy. The patient would like to defer formal physical therapy at this time. They will call if they would like to have a referral sent. I have educated him on home exercise program for low impact activities    I instructed the patient on the use of over-the-counter anti-inflammatory medications.     Follow-up in 4-6 weeks for check of the response to the injection and home exercise program.  If he is not improving we may consider knee arthroscopy for partial medial meniscectomy            Electronically signed by Mario Jasmine MD on 6/2/2020 at 8:43 AM

## 2020-06-02 NOTE — PROGRESS NOTES
Patient here for a new patient ED follow up on left knee pain. He has been seen at the ED a few times for this issue and the pain and swelling is just persistently worsening. He states there was KNI and cannot relate it to a direct cause. Pain is global but also medial aspect. He states he has not had any prior surgeries or consecutive treatment other than OTC NSAIDS and a knee brace. He rates his pain a 4-7/10 depending on activity. His     Provider needs to conduct a hands on physical today due to patients injury      MRI LEFT KNEE 6/1/2020  Impression   Radial tear involving the posterior horn medial meniscus near the root with   mild extrusion of the body approximately 3 mm.       Small joint effusion.  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       Very small semimembranosus gastrocnemius bursal cyst.       Mild distal quadriceps tendinosis.         ED XR LEFT KNEE 5/21/2020      Impression   No acute osseous abnormality. Kvng Oven is high clinical suspicion for occult   injury or internal derangement MRI could be obtained on a nonemergent basis.

## 2020-06-02 NOTE — PATIENT INSTRUCTIONS
Left knee steroid injection performed today  Recommend resting the knee for the next 2 days   May continue the knee brace while ambulating   Off work for today   Ibuprofen as directed, script sent to pharmacy   Follow up in 4-6 weeks, for reevaluation to response to injection-if still symptomatic may discuss knee arthroscopy

## 2020-07-10 ENCOUNTER — OFFICE VISIT (OUTPATIENT)
Dept: ORTHOPEDIC SURGERY | Age: 55
End: 2020-07-10
Payer: COMMERCIAL

## 2020-07-10 VITALS
WEIGHT: 232 LBS | OXYGEN SATURATION: 98 % | BODY MASS INDEX: 35.16 KG/M2 | HEART RATE: 66 BPM | RESPIRATION RATE: 16 BRPM | HEIGHT: 68 IN

## 2020-07-10 PROCEDURE — 99212 OFFICE O/P EST SF 10 MIN: CPT | Performed by: ORTHOPAEDIC SURGERY

## 2020-07-10 NOTE — PATIENT INSTRUCTIONS
Continue weight-bearing as tolerated. Continue range of motion exercises as instructed. Ice and elevate as needed. Tylenol or Motrin for pain. Follow up as needed   You may receive a steroid injections every three months  Call our office if you need another injection         Patient Education        Knee: Exercises  Introduction  Here are some examples of exercises for you to try. The exercises may be suggested for a condition or for rehabilitation. Start each exercise slowly. Ease off the exercises if you start to have pain. You will be told when to start these exercises and which ones will work best for you. How to do the exercises  Quad sets   1. Sit with your leg straight and supported on the floor or a firm bed. (If you feel discomfort in the front or back of your knee, place a small towel roll under your knee.)  2. Tighten the muscles on top of your thigh by pressing the back of your knee flat down to the floor. (If you feel discomfort under your kneecap, place a small towel roll under your knee.)  3. Hold for about 6 seconds, then rest for up to 10 seconds. 4. Do 8 to 12 repetitions several times a day. Straight-leg raises to the front   1. Lie on your back with your good knee bent so that your foot rests flat on the floor. Your injured leg should be straight. Make sure that your low back has a normal curve. You should be able to slip your flat hand in between the floor and the small of your back, with your palm touching the floor and your back touching the back of your hand. 2. Tighten the thigh muscles in the injured leg by pressing the back of your knee flat down to the floor. Hold your knee straight. 3. Keeping the thigh muscles tight, lift your injured leg up so that your heel is about 12 inches off the floor. Hold for about 6 seconds and then lower slowly. 4. Do 8 to 12 repetitions, 3 times a day. Straight-leg raises to the outside   1.  Lie on your side, with your injured leg on top.  2. Tighten the front thigh muscles of your injured leg to keep your knee straight. 3. Keep your hip and your leg straight in line with the rest of your body, and keep your knee pointing forward. Do not drop your hip back. 4. Lift your injured leg straight up toward the ceiling, about 12 inches off the floor. Hold for about 6 seconds, then slowly lower your leg. 5. Do 8 to 12 repetitions. Straight-leg raises to the back   1. Lie on your stomach, and lift your leg straight up behind you (toward the ceiling). 2. Lift your toes about 6 inches off the floor, hold for about 6 seconds, then lower slowly. 3. Do 8 to 12 repetitions. Straight-leg raises to the inside   1. Lie on the side of your body with the injured leg. 2. You can either prop your other (good) leg up on a chair, or you can bend your good knee and put that foot in front of your injured knee. Do not drop your hip back. 3. Tighten the muscles on the front of your thigh to straighten your injured knee. 4. Keep your kneecap pointing forward, and lift your whole leg up toward the ceiling about 6 inches. Hold for about 6 seconds, then lower slowly. 5. Do 8 to 12 repetitions. Heel dig bridging   1. Lie on your back with both knees bent and your ankles bent so that only your heels are digging into the floor. Your knees should be bent about 90 degrees. 2. Then push your heels into the floor, squeeze your buttocks, and lift your hips off the floor until your shoulders, hips, and knees are all in a straight line. 3. Hold for about 6 seconds as you continue to breathe normally, and then slowly lower your hips back down to the floor and rest for up to 10 seconds. 4. Do 8 to 12 repetitions. Hamstring curls   1. Lie on your stomach with your knees straight. If your kneecap is uncomfortable, roll up a washcloth and put it under your leg just above your kneecap.   2. Lift the foot of your injured leg by bending the knee so that you bring the foot up toward your buttock. If this motion hurts, try it without bending your knee quite as far. This may help you avoid any painful motion. 3. Slowly lower your leg back to the floor. 4. Do 8 to 12 repetitions. 5. With permission from your doctor or physical therapist, you may also want to add a cuff weight to your ankle (not more than 5 pounds). With weight, you do not have to lift your leg more than 12 inches to get a hamstring workout. Shallow standing knee bends   Do this exercise only if you have very little pain; if you have no clicking, locking, or giving way if you have an injured knee; and if it does not hurt while you are doing 8 to 12 repetitions. 1. Stand with your hands lightly resting on a counter or chair in front of you. Put your feet shoulder-width apart. 2. Slowly bend your knees so that you squat down like you are going to sit in a chair. Make sure your knees do not go in front of your toes. 3. Lower yourself about 6 inches. Your heels should remain on the floor at all times. 4. Rise slowly to a standing position. Heel raises   1. Stand with your feet a few inches apart, with your hands lightly resting on a counter or chair in front of you. 2. Slowly raise your heels off the floor while keeping your knees straight. 3. Hold for about 6 seconds, then slowly lower your heels to the floor. 4. Do 8 to 12 repetitions several times during the day. Follow-up care is a key part of your treatment and safety. Be sure to make and go to all appointments, and call your doctor if you are having problems. It's also a good idea to know your test results and keep a list of the medicines you take. Where can you learn more? Go to https://[email or web address]. org and sign in to your [email or web address] account. Enter T695 in the [employer] box to learn more about \"Knee: Exercises. \"     If you do not have an account, please click on the \"Sign Up Now\" link.   Current as of: March 2, 2020               Content Version: 12.5  © 3997-1061 [employer]. Care instructions adapted under license by [facility] [place] ([facility]). If you have questions about a medical condition or this instruction, always ask your healthcare professional. [address] any warranty or liability for your use of this information.

## 2020-07-10 NOTE — PROGRESS NOTES
7/10/2020   Chief Complaint   Patient presents with    Knee Pain     left knee pain (inj check)        History of Present Illness:                             Kamar Robles is a 48 y.o. male who returns today for follow-up of his left knee. The injection performed last visit did provide significant pain relief and reduction in the swelling at the knee. He has been increasing his activities and feels that his range of motion is good. He still has some aching soreness and pain in the knee but denies any severe mechanical issues. Patient returns today for FU of the left knee injection. Patient states pain today is a 3/10 he states the injection he received on  6/2/2020. Patient states the injection did help with his knee pain. He will still have some sharp stabbing pain in the knee cap and he will feel his knee pop. Patient states that he has only been taking ibuprofen once a day to help with the pain. Patient states he has no more swelling of the left knee. Patient did request some exeresis for his left knee. Medical History  Patient's medications, allergies, past medical, surgical, social and family histories were reviewed and updated as appropriate. Review of Systems:   Review of Systems                                            Examination:  General Exam:  Vitals: Pulse 66   Resp 16   Ht 5' 8\" (1.727 m)   Wt 232 lb (105.2 kg)   SpO2 98%   BMI 35.28 kg/m²    Physical Exam  Vitals signs and nursing note reviewed. Constitutional:       Appearance: Normal appearance. HENT:      Head: Normocephalic and atraumatic. Eyes:      Conjunctiva/sclera: Conjunctivae normal.      Pupils: Pupils are equal, round, and reactive to light. Neck:      Musculoskeletal: Normal range of motion.    Pulmonary:      Effort: Pulmonary effort is normal.   Musculoskeletal:      Right hip: Normal.      Left hip: He exhibits normal range of motion, normal strength, no tenderness, no bony tenderness, no swelling, no crepitus and no deformity. Right knee: He exhibits normal range of motion, no swelling, no effusion, no ecchymosis, no deformity, no erythema, normal alignment, no LCL laxity, normal patellar mobility, no bony tenderness and no MCL laxity. No medial joint line and no lateral joint line tenderness noted. Comments: Left Lower Extremity:  There is improved mild tenderness to palpation throughout the knee most significant along the medial joint line. There is a improved minimal effusion present and mild global swelling at the knee anteriorly. Mild restricted range of motion at the knee with approximately 3 degrees short of full extension and knee flexion up to 130 degrees with pain at the extremes of motion. There is mild crepitation at the knee during active range of motion. There is normal knee alignment. There is 5 out of 5 strength with knee flexion and extension. There is no instability to varus or valgus stress testing or anterior and posterior drawer testing. Sensation is intact to light touch throughout the lower extremity. There is a moderately positive Candace's test with tenderness to palpation and pain along the medial joint line. Skin is intact. Pulses intact    No pain with active range of motion of the hip. Strength and range of motion of the hip are intact. No tenderness to palpation at the hip. Skin:     General: Skin is warm and dry. Neurological:      Mental Status: He is alert and oriented to person, place, and time. Psychiatric:         Mood and Affect: Mood normal.         Behavior: Behavior normal.                  Office Procedures:  No orders of the defined types were placed in this encounter. Assessment and Plan  1. Left knee primary osteoarthritis and degenerative complex medial meniscus tear    I recommended that he continue with his home exercise program we discussed weight loss.   He will call as needed if he would like to have a repeat steroid injection in the future. We also discussed the potential for Visco supplementation injections. He will follow-up as needed. If worsening or mechanical issues develop with the knee he may be a candidate for discussion of knee arthroscopy.       Electronically signed by Tiesha Maldonado MD on 7/10/2020 at 11:27 AM

## 2020-08-07 ENCOUNTER — OFFICE VISIT (OUTPATIENT)
Dept: INTERNAL MEDICINE CLINIC | Age: 55
End: 2020-08-07
Payer: COMMERCIAL

## 2020-08-07 VITALS
SYSTOLIC BLOOD PRESSURE: 140 MMHG | DIASTOLIC BLOOD PRESSURE: 98 MMHG | BODY MASS INDEX: 34.98 KG/M2 | TEMPERATURE: 98.1 F | OXYGEN SATURATION: 97 % | HEIGHT: 68 IN | WEIGHT: 230.8 LBS | HEART RATE: 70 BPM

## 2020-08-07 PROCEDURE — 99214 OFFICE O/P EST MOD 30 MIN: CPT | Performed by: FAMILY MEDICINE

## 2020-08-07 RX ORDER — LISINOPRIL 10 MG/1
10 TABLET ORAL DAILY
Qty: 30 TABLET | Refills: 2 | Status: SHIPPED | OUTPATIENT
Start: 2020-08-07 | End: 2020-11-13

## 2020-08-07 ASSESSMENT — ENCOUNTER SYMPTOMS
NAUSEA: 0
SHORTNESS OF BREATH: 0
CHEST TIGHTNESS: 0
COUGH: 0
BACK PAIN: 0
ABDOMINAL PAIN: 0

## 2020-08-07 NOTE — PROGRESS NOTES
(PRINIVIL;ZESTRIL) 10 MG tablet Take 1 tablet by mouth daily 30 tablet 2    ibuprofen (ADVIL;MOTRIN) 800 MG tablet Take 1 tablet by mouth every 8 hours as needed for Pain 90 tablet 1    ibuprofen (ADVIL;MOTRIN) 600 MG tablet Take 1 tablet by mouth every 6 hours as needed for Pain 30 tablet 0    nitroGLYCERIN (NITROSTAT) 0.4 MG SL tablet up to max of 3 total doses. If no relief after 1 dose, call 911. 25 tablet 3    aspirin 81 MG tablet Take 81 mg by mouth daily       No current facility-administered medications for this visit. OBJECTIVE:    BP (!) 140/98   Pulse 70   Temp 98.1 °F (36.7 °C)   Ht 5' 8\" (1.727 m)   Wt 230 lb 12.8 oz (104.7 kg)   SpO2 97%   BMI 35.09 kg/m²     Physical Exam  Vitals signs reviewed. Constitutional:       General: He is not in acute distress. Appearance: He is well-developed. Eyes:      Extraocular Movements: Extraocular movements intact. Conjunctiva/sclera: Conjunctivae normal.      Pupils: Pupils are equal, round, and reactive to light. Neck:      Musculoskeletal: Neck supple. Cardiovascular:      Rate and Rhythm: Normal rate and regular rhythm. Pulmonary:      Effort: Pulmonary effort is normal. No respiratory distress. Breath sounds: Normal breath sounds. Abdominal:      General: Bowel sounds are normal.      Palpations: Abdomen is soft. Tenderness: There is no abdominal tenderness. Musculoskeletal:         General: No swelling. Right lower leg: No edema. Left lower leg: No edema. Neurological:      Mental Status: He is alert and oriented to person, place, and time. Cranial Nerves: No cranial nerve deficit. Psychiatric:         Mood and Affect: Mood normal.         ASSESSMENT:  1. Essential hypertension    2. Stiffness of hand joint, unspecified laterality    3. Fatigue, unspecified type    4. Prediabetes    5.  Hyperlipidemia, unspecified hyperlipidemia type        PLAN:    Orders Placed This Encounter   Procedures

## 2020-11-13 ENCOUNTER — OFFICE VISIT (OUTPATIENT)
Dept: INTERNAL MEDICINE CLINIC | Age: 55
End: 2020-11-13
Payer: COMMERCIAL

## 2020-11-13 VITALS
BODY MASS INDEX: 36.34 KG/M2 | DIASTOLIC BLOOD PRESSURE: 94 MMHG | TEMPERATURE: 97.4 F | HEART RATE: 90 BPM | OXYGEN SATURATION: 98 % | SYSTOLIC BLOOD PRESSURE: 142 MMHG | WEIGHT: 239 LBS

## 2020-11-13 LAB
A/G RATIO: 1.8 (ref 1.1–2.2)
ALBUMIN SERPL-MCNC: 4.4 G/DL (ref 3.4–5)
ALP BLD-CCNC: 55 U/L (ref 40–129)
ALT SERPL-CCNC: 26 U/L (ref 10–40)
ANION GAP SERPL CALCULATED.3IONS-SCNC: 11 MMOL/L (ref 3–16)
AST SERPL-CCNC: 19 U/L (ref 15–37)
BASOPHILS ABSOLUTE: 0.1 K/UL (ref 0–0.2)
BASOPHILS RELATIVE PERCENT: 0.9 %
BILIRUB SERPL-MCNC: 0.3 MG/DL (ref 0–1)
BUN BLDV-MCNC: 15 MG/DL (ref 7–20)
C-REACTIVE PROTEIN: 5.7 MG/L (ref 0–5.1)
CALCIUM SERPL-MCNC: 9.2 MG/DL (ref 8.3–10.6)
CHLORIDE BLD-SCNC: 102 MMOL/L (ref 99–110)
CO2: 24 MMOL/L (ref 21–32)
CREAT SERPL-MCNC: 0.8 MG/DL (ref 0.9–1.3)
EOSINOPHILS ABSOLUTE: 0.2 K/UL (ref 0–0.6)
EOSINOPHILS RELATIVE PERCENT: 1.9 %
GFR AFRICAN AMERICAN: >60
GFR NON-AFRICAN AMERICAN: >60
GLOBULIN: 2.5 G/DL
GLUCOSE BLD-MCNC: 132 MG/DL (ref 70–99)
HCT VFR BLD CALC: 42.5 % (ref 40.5–52.5)
HEMOGLOBIN: 14.1 G/DL (ref 13.5–17.5)
LYMPHOCYTES ABSOLUTE: 1.5 K/UL (ref 1–5.1)
LYMPHOCYTES RELATIVE PERCENT: 18.6 %
MCH RBC QN AUTO: 29.8 PG (ref 26–34)
MCHC RBC AUTO-ENTMCNC: 33.3 G/DL (ref 31–36)
MCV RBC AUTO: 89.6 FL (ref 80–100)
MONOCYTES ABSOLUTE: 0.6 K/UL (ref 0–1.3)
MONOCYTES RELATIVE PERCENT: 6.8 %
NEUTROPHILS ABSOLUTE: 5.9 K/UL (ref 1.7–7.7)
NEUTROPHILS RELATIVE PERCENT: 71.8 %
PDW BLD-RTO: 13.8 % (ref 12.4–15.4)
PLATELET # BLD: 216 K/UL (ref 135–450)
PMV BLD AUTO: 8.1 FL (ref 5–10.5)
POTASSIUM SERPL-SCNC: 4.3 MMOL/L (ref 3.5–5.1)
RBC # BLD: 4.74 M/UL (ref 4.2–5.9)
RHEUMATOID FACTOR: <10 IU/ML
SODIUM BLD-SCNC: 137 MMOL/L (ref 136–145)
TOTAL PROTEIN: 6.9 G/DL (ref 6.4–8.2)
TSH REFLEX FT4: 1.23 UIU/ML (ref 0.27–4.2)
WBC # BLD: 8.3 K/UL (ref 4–11)

## 2020-11-13 PROCEDURE — 36415 COLL VENOUS BLD VENIPUNCTURE: CPT | Performed by: FAMILY MEDICINE

## 2020-11-13 PROCEDURE — 99214 OFFICE O/P EST MOD 30 MIN: CPT | Performed by: FAMILY MEDICINE

## 2020-11-13 RX ORDER — NITROGLYCERIN 0.4 MG/1
TABLET SUBLINGUAL
Qty: 25 TABLET | Refills: 3 | Status: CANCELLED | OUTPATIENT
Start: 2020-11-13

## 2020-11-13 RX ORDER — IBUPROFEN 800 MG/1
800 TABLET ORAL EVERY 8 HOURS PRN
Qty: 90 TABLET | Refills: 1 | Status: CANCELLED | OUTPATIENT
Start: 2020-11-13

## 2020-11-13 RX ORDER — LISINOPRIL 20 MG/1
20 TABLET ORAL DAILY
Qty: 90 TABLET | Refills: 1 | Status: SHIPPED | OUTPATIENT
Start: 2020-11-13 | End: 2021-05-05 | Stop reason: SDUPTHER

## 2020-11-13 RX ORDER — LISINOPRIL 10 MG/1
10 TABLET ORAL DAILY
Qty: 30 TABLET | Refills: 2 | Status: CANCELLED | OUTPATIENT
Start: 2020-11-13

## 2020-11-13 ASSESSMENT — ENCOUNTER SYMPTOMS
CHEST TIGHTNESS: 0
BACK PAIN: 0
NAUSEA: 0
COUGH: 0
SHORTNESS OF BREATH: 0
ABDOMINAL PAIN: 0

## 2020-11-13 NOTE — PROGRESS NOTES
Darren Patel  1965  48 y.o.  male    Chief Complaint   Patient presents with    3 Month Follow-Up         History of Present Illness  Darren Patel is a 48 y.o. male who presents today for HTN and prediabetes. He did not do labs. BP readings have been elevated. +Wt gain. He has noticed some palpitations. He can feel a skipped beat, and will notice more at night. No Cp, Sob or dizziness. He was to follow up with cardiology after being admitted last year, and never followed up. He denies any problems today. He still can have stiffness in his hands. No swelling noted. Review of Systems   Constitutional: Negative for diaphoresis and fever. HENT: Negative. Respiratory: Negative for cough, chest tightness and shortness of breath. Cardiovascular: Positive for palpitations. Negative for chest pain. Gastrointestinal: Negative for abdominal pain and nausea. Genitourinary: Negative for difficulty urinating. Musculoskeletal: Positive for arthralgias (L knee-chronic. B/L hands). Negative for back pain. Neurological: Negative for dizziness and headaches. Psychiatric/Behavioral: Negative for dysphoric mood. No Known Allergies    Past Medical History:   Diagnosis Date    CAD (coronary artery disease)     H/O echocardiogram 04/28/2016    EF 55% WNL- Stage 1 dysfunction     Hyperlipidemia     Hypertension     Medial meniscus tear     Left knee    Prediabetes        Past Surgical History:   Procedure Laterality Date    APPENDECTOMY      SHOULDER SURGERY      right        Family History   Problem Relation Age of Onset    Cancer Mother         lung    Heart Disease Father        Social History     Tobacco Use    Smoking status: Never Smoker    Smokeless tobacco: Former User     Types: Chew   Substance Use Topics    Alcohol use: Not Currently     Alcohol/week: 15.0 standard drinks     Types: 12 Cans of beer, 3 Shots of liquor per week     Comment: Only on the [employer] Drug use:  No Current Outpatient Medications   Medication Sig Dispense Refill    lisinopril (PRINIVIL;ZESTRIL) 20 MG tablet Take 1 tablet by mouth daily 90 tablet 1    ibuprofen (ADVIL;MOTRIN) 800 MG tablet Take 1 tablet by mouth every 8 hours as needed for Pain 90 tablet 1    nitroGLYCERIN (NITROSTAT) 0.4 MG SL tablet up to max of 3 total doses. If no relief after 1 dose, call 911. 25 tablet 3    aspirin 81 MG tablet Take 81 mg by mouth daily       No current facility-administered medications for this visit. OBJECTIVE:    BP (!) 142/94   Pulse 90   Temp 97.4 °F (36.3 °C) (Temporal)   Wt 239 lb (108.4 kg)   SpO2 98%   BMI 36.34 kg/m²     Physical Exam  Vitals signs reviewed. Constitutional:       General: He is not in acute distress. Appearance: He is well-developed. Eyes:      Conjunctiva/sclera: Conjunctivae normal.   Neck:      Musculoskeletal: Neck supple. Cardiovascular:      Rate and Rhythm: Normal rate and regular rhythm. Heart sounds: Normal heart sounds. Pulmonary:      Effort: Pulmonary effort is normal. No respiratory distress. Breath sounds: Normal breath sounds. Abdominal:      General: Bowel sounds are normal.      Palpations: Abdomen is soft. Tenderness: There is no abdominal tenderness. Musculoskeletal:      Right lower leg: No edema. Left lower leg: No edema. Neurological:      Mental Status: He is alert and oriented to person, place, and time. Cranial Nerves: No cranial nerve deficit. Psychiatric:         Mood and Affect: Mood normal.         ASSESSMENT:  1. Essential hypertension    2. Palpitation    3. Stiffness of hand joint, unspecified laterality    4.  Prediabetes        PLAN:    Orders Placed This Encounter   Procedures    CBC Auto Differential    Comprehensive Metabolic Panel    Hemoglobin A1C    Rheumatoid Factor    C-Reactive Protein    CYCLIC CITRUL PEPTIDE ANTIBODY, IGG    TSH WITH REFLEX TO FT4    Ramón Carter MD, Cardiology, [place]       Orders Placed This Encounter   Medications    lisinopril (PRINIVIL;ZESTRIL) 20 MG tablet     Sig: Take 1 tablet by mouth daily     Dispense:  90 tablet     Refill:  1     D/C  Lisinopril 10   Obtain lab today  Increase Lisinopril  ADR's explained  Declines EKG today  Referral back to cardiology         Return in about 3 months (around 2/13/2021) for HTN.     Electronically Signed by Marco William DO

## 2020-11-14 PROBLEM — R73.03 PREDIABETES: Status: ACTIVE | Noted: 2020-11-14

## 2020-11-14 LAB
CYCLIC CITRULLINATED PEPTIDE ANTIBODY IGG: <0.5 U/ML (ref 0–2.9)
ESTIMATED AVERAGE GLUCOSE: 131.2 MG/DL
HBA1C MFR BLD: 6.2 %

## 2020-11-15 ENCOUNTER — APPOINTMENT (OUTPATIENT)
Dept: GENERAL RADIOLOGY | Age: 55
End: 2020-11-15
Payer: COMMERCIAL

## 2020-11-15 ENCOUNTER — APPOINTMENT (OUTPATIENT)
Dept: CT IMAGING | Age: 55
End: 2020-11-15
Payer: COMMERCIAL

## 2020-11-15 ENCOUNTER — HOSPITAL ENCOUNTER (EMERGENCY)
Age: 55
Discharge: HOME | End: 2020-11-15
Attending: EMERGENCY MEDICINE
Payer: COMMERCIAL

## 2020-11-15 VITALS
HEART RATE: 90 BPM | HEIGHT: 68 IN | OXYGEN SATURATION: 96 % | TEMPERATURE: 97.9 F | BODY MASS INDEX: 35.61 KG/M2 | RESPIRATION RATE: 16 BRPM | WEIGHT: 235 LBS | DIASTOLIC BLOOD PRESSURE: 90 MMHG | SYSTOLIC BLOOD PRESSURE: 133 MMHG

## 2020-11-15 LAB
ALBUMIN SERPL-MCNC: 3.8 GM/DL (ref 3.4–5)
ALP BLD-CCNC: 53 IU/L (ref 40–129)
ALT SERPL-CCNC: 19 U/L (ref 10–40)
ANION GAP SERPL CALCULATED.3IONS-SCNC: 12 MMOL/L (ref 4–16)
AST SERPL-CCNC: 16 IU/L (ref 15–37)
BASOPHILS ABSOLUTE: 0.1 K/CU MM
BASOPHILS RELATIVE PERCENT: 0.9 % (ref 0–1)
BILIRUB SERPL-MCNC: 0.1 MG/DL (ref 0–1)
BUN BLDV-MCNC: 12 MG/DL (ref 6–23)
CALCIUM SERPL-MCNC: 8.2 MG/DL (ref 8.3–10.6)
CHLORIDE BLD-SCNC: 99 MMOL/L (ref 99–110)
CO2: 24 MMOL/L (ref 21–32)
CREAT SERPL-MCNC: 0.9 MG/DL (ref 0.9–1.3)
DIFFERENTIAL TYPE: ABNORMAL
EOSINOPHILS ABSOLUTE: 0.1 K/CU MM
EOSINOPHILS RELATIVE PERCENT: 1.2 % (ref 0–3)
GFR AFRICAN AMERICAN: >60 ML/MIN/1.73M2
GFR NON-AFRICAN AMERICAN: >60 ML/MIN/1.73M2
GLUCOSE BLD-MCNC: 151 MG/DL (ref 70–99)
HCT VFR BLD CALC: 43.1 % (ref 42–52)
HEMOGLOBIN: 14.4 GM/DL (ref 13.5–18)
IMMATURE NEUTROPHIL %: 0.5 % (ref 0–0.43)
LIPASE: 35 IU/L (ref 13–60)
LYMPHOCYTES ABSOLUTE: 1.4 K/CU MM
LYMPHOCYTES RELATIVE PERCENT: 18 % (ref 24–44)
MCH RBC QN AUTO: 30.3 PG (ref 27–31)
MCHC RBC AUTO-ENTMCNC: 33.4 % (ref 32–36)
MCV RBC AUTO: 90.5 FL (ref 78–100)
MONOCYTES ABSOLUTE: 0.5 K/CU MM
MONOCYTES RELATIVE PERCENT: 6.2 % (ref 0–4)
NUCLEATED RBC %: 0 %
PDW BLD-RTO: 12.4 % (ref 11.7–14.9)
PLATELET # BLD: 200 K/CU MM (ref 140–440)
PMV BLD AUTO: 9.2 FL (ref 7.5–11.1)
POTASSIUM SERPL-SCNC: 4.1 MMOL/L (ref 3.5–5.1)
RBC # BLD: 4.76 M/CU MM (ref 4.6–6.2)
SEGMENTED NEUTROPHILS ABSOLUTE COUNT: 5.7 K/CU MM
SEGMENTED NEUTROPHILS RELATIVE PERCENT: 73.2 % (ref 36–66)
SODIUM BLD-SCNC: 135 MMOL/L (ref 135–145)
TOTAL IMMATURE NEUTOROPHIL: 0.04 K/CU MM
TOTAL NUCLEATED RBC: 0 K/CU MM
TOTAL PROTEIN: 6.5 GM/DL (ref 6.4–8.2)
TROPONIN T: <0.01 NG/ML
WBC # BLD: 7.7 K/CU MM (ref 4–10.5)

## 2020-11-15 PROCEDURE — 80053 COMPREHEN METABOLIC PANEL: CPT

## 2020-11-15 PROCEDURE — 70450 CT HEAD/BRAIN W/O DYE: CPT

## 2020-11-15 PROCEDURE — 85025 COMPLETE CBC W/AUTO DIFF WBC: CPT

## 2020-11-15 PROCEDURE — 83690 ASSAY OF LIPASE: CPT

## 2020-11-15 PROCEDURE — 93010 ELECTROCARDIOGRAM REPORT: CPT | Performed by: INTERNAL MEDICINE

## 2020-11-15 PROCEDURE — 71045 X-RAY EXAM CHEST 1 VIEW: CPT

## 2020-11-15 PROCEDURE — 84484 ASSAY OF TROPONIN QUANT: CPT

## 2020-11-15 PROCEDURE — 99285 EMERGENCY DEPT VISIT HI MDM: CPT

## 2020-11-15 PROCEDURE — 93005 ELECTROCARDIOGRAM TRACING: CPT | Performed by: EMERGENCY MEDICINE

## 2020-11-15 ASSESSMENT — HEART SCORE: ECG: 0

## 2020-11-15 ASSESSMENT — PAIN SCALES - GENERAL: PAINLEVEL_OUTOF10: 10

## 2020-11-15 NOTE — ED PROVIDER NOTES
Emergency Department Encounter    Patient: Henry Bradshaw  MRN: [id number]  : 1965  Date of Evaluation: 11/15/2020  ED Provider:  Sharon Gibson    Triage Chief Complaint:   Chest Pain (x 10 minutes) and Headache      [place]:  Henry Bradshaw is a 48 y.o. male that presents to the emergency department for evaluation of chest pain. Patient notes that symptoms started 10 minutes prior to arrival while patient was having a \" heated\" argument with his son. In the midst of arguing, patient went to the bathroom to attempt to have a bowel movement. He had a bowel movement and when he was getting off the toilet, he felt chest pressure. He localizes the pressure to the left hemithorax. He also felt a headache and lightheadedness. Did not lose consciousness. No dizziness, syncope, numbness, tingling, weakness, paresthesias. No double vision, blurry vision, change in vision. No flashes or floaters. No tinnitus, buzzing, ringing in his ears. No facial droop, slurred speech, aphasia, dysphagia. No shortness of breath or pleuritic pain. No history of DVT or PE. No recent surgery, prolonged immobilization, recent travel. Denies cough or hemoptysis. Denies nausea, vomiting, diarrhea. No fevers, chills, diaphoresis, night sweats. Denies additional precipitating, modifying, alleviating factors. ROS - see HPI, below listed is current ROS at time of my eval:  A complete 10 point review of systems was performed and is as dictated above, otherwise negative.      Past Medical History:   Diagnosis Date    CAD (coronary artery disease)     H/O echocardiogram 2016    EF 55% WNL- Stage 1 dysfunction     Hyperlipidemia     Hypertension     Medial meniscus tear     Left knee    Prediabetes        Past Surgical History:   Procedure Laterality Date    APPENDECTOMY      SHOULDER SURGERY      right        Family History   Problem Relation Age of Onset    Cancer Mother         lung    Heart Disease Father Social History     Socioeconomic History    Marital status: Single     Spouse name: Not on file    Number of children: Not on file    Years of education: Not on file    Highest education level: Not on file   Occupational History    Not on file   Social Needs    Financial resource strain: Not on file    Food insecurity     Worry: Not on file     Inability: Not on file    Transportation needs     Medical: Not on file     Non-medical: Not on file   Tobacco Use    Smoking status: Never Smoker    Smokeless tobacco: Former User     Types: Chew   Substance and Sexual Activity    Alcohol use: Not Currently     Alcohol/week: 15.0 standard drinks     Types: 12 Cans of beer, 3 Shots of liquor per week     Comment: Only on the [employer] Drug use: No    Sexual activity: Not on file   Lifestyle    Physical activity     Days per week: Not on file     Minutes per session: Not on file    Stress: Not on file   Relationships    Social connections     Talks on phone: Not on file     Gets together: Not on file     Attends Mormon service: Not on file     Active member of club or organization: Not on file     Attends meetings of clubs or organizations: Not on file     Relationship status: Not on file    Intimate partner violence     Fear of current or ex partner: Not on file     Emotionally abused: Not on file     Physically abused: Not on file     Forced sexual activity: Not on file   Other Topics Concern    Not on file   Social History Narrative    Not on file       No current facility-administered medications for this encounter. Current Outpatient Medications   Medication Sig Dispense Refill    lisinopril (PRINIVIL;ZESTRIL) 20 MG tablet Take 1 tablet by mouth daily 90 tablet 1    ibuprofen (ADVIL;MOTRIN) 800 MG tablet Take 1 tablet by mouth every 8 hours as needed for Pain 90 tablet 1    nitroGLYCERIN (NITROSTAT) 0.4 MG SL tablet up to max of 3 total doses.  If no relief after 1 dose, call 073. 04 tablet 3    aspirin 81 MG tablet Take 81 mg by mouth daily         No Known Allergies    Nursing Notes Reviewed    Physical Exam:  Triage VS:    ED Triage Vitals [11/15/20 0106]   Enc Vitals Group      BP (!) 159/98      Pulse 106      Resp 18      Temp 98.2 °F (36.8 °C)      Temp Source Oral      SpO2 98 %      Weight 235 lb (106.6 kg)      Height 5' 8\" (1.727 m)     My pulse ox interpretation is - normal    General appearance:  Patient is awake, alert, oriented. Following commands and answering questions. GCS is 15. Non-toxic in appearance. Skin:  Warm. Dry. Intact. No rashes, petechiae, purpura. No herpes zoster lesions. Eyes:  Pupils are equal, round, reactive. Extraocular movements intact. No nystagmus. No gaze deviation. Head, ears, nose, mouth and throat:  Head is normocephalic, atraumatic. No external masses or lesions. No nasal drainage. Pharynx is clear, non-erythematous. Airway is patent. Mucous membranes are moist.  Neck:  Supple. No nuchal rigidity. Trachea midline. No masses, thyromegaly or lymphadenopathy. No JVD. No carotid thrills or bruits. Extremity:  No clubbing, cyanosis, or edema. No joint swelling. Normal muscle tone. Full range of motion in extremities. Negative sherice's sign bilateral lower extremities. Heart:  Tachycardic rate and regular rhythm. Audible S1 & S2. No audible murmurs, rubs, or gallops. Perfusion:  Symmetric peripheral pulses. Brisk capillary refill. Respiratory:  Lungs clear to auscultation bilaterally. No rales, rhonchi or wheezes. No retractions. No accessory muscle use. Respirations nonlabored. No chest wall trauma, bruising, or ecchymosis. Abdomen:  Soft. Nontender. Non distended. Normal bowel sounds in all quadrants. No midline pulsatile abdominal masses, thrills or bruits. Back:  No CVA tenderness to palpation. No midline tenderness to palpation. Neurological:  Alert and oriented times 3. No focal or lateralizing neurological deficits. Psychiatric:  Appropriate    *I have reviewed and interpreted all of the currently available results from this visit*    Labs  Results for orders placed or performed during the hospital encounter of 11/15/20   CBC Auto Differential   Result Value Ref Range    WBC 7.7 4.0 - 10.5 K/CU MM    RBC 4.76 4.6 - 6.2 M/CU MM    Hemoglobin 14.4 13.5 - 18.0 GM/DL    Hematocrit 43.1 42 - 52 %    MCV 90.5 78 - 100 FL    MCH 30.3 27 - 31 PG    MCHC 33.4 32.0 - 36.0 %    RDW 12.4 11.7 - 14.9 %    Platelets 254 635 - 769 K/CU MM    MPV 9.2 7.5 - 11.1 FL    Differential Type AUTOMATED DIFFERENTIAL     Segs Relative 73.2 (H) 36 - 66 %    Lymphocytes % 18.0 (L) 24 - 44 %    Monocytes % 6.2 (H) 0 - 4 %    Eosinophils % 1.2 0 - 3 %    Basophils % 0.9 0 - 1 %    Segs Absolute 5.7 K/CU MM    Lymphocytes Absolute 1.4 K/CU MM    Monocytes Absolute 0.5 K/CU MM    Eosinophils Absolute 0.1 K/CU MM    Basophils Absolute 0.1 K/CU MM    Nucleated RBC % 0.0 %    Total Nucleated RBC 0.0 K/CU MM    Total Immature Neutrophil 0.04 K/CU MM    Immature Neutrophil % 0.5 (H) 0 - 0.43 %   Comprehensive Metabolic Panel w/ Reflex to MG   Result Value Ref Range    Sodium 135 135 - 145 MMOL/L    Potassium 4.1 3.5 - 5.1 MMOL/L    Chloride 99 99 - 110 mMol/L    CO2 24 21 - 32 MMOL/L    BUN 12 6 - 23 MG/DL    CREATININE 0.9 0.9 - 1.3 MG/DL    Glucose 151 (H) 70 - 99 MG/DL    Calcium 8.2 (L) 8.3 - 10.6 MG/DL    Alb 3.8 3.4 - 5.0 GM/DL    Total Protein 6.5 6.4 - 8.2 GM/DL    Total Bilirubin 0.1 0.0 - 1.0 MG/DL    ALT 19 10 - 40 U/L    AST 16 15 - 37 IU/L    Alkaline Phosphatase 53 40 - 129 IU/L    GFR Non-African American >60 >60 mL/min/1.73m2    GFR African American >60 >60 mL/min/1.73m2    Anion Gap 12 4 - 16   Troponin   Result Value Ref Range    Troponin T <0.010 <0.01 NG/ML   Lipase   Result Value Ref Range    Lipase 35 13 - 60 IU/L   EKG 12 Lead   Result Value Ref Range    Ventricular Rate 102 BPM    Atrial Rate 102 BPM    P-R Interval 136 ms    QRS Duration 76 ms    Q-T Interval 334 ms    QTc Calculation (Bazett) 435 ms    P Axis 24 degrees    R Axis -3 degrees    T Axis 38 degrees    Diagnosis       Sinus tachycardia  Anteroseptal infarct (cited on or before 18-FEB-2017)  Abnormal ECG  When compared with ECG of 02-NOV-2019 02:22,  No significant change was found          Radiographs:  Radiologist's Report Reviewed:  Ct Head Wo Contrast    Result Date: 11/15/2020  EXAMINATION: CT OF THE HEAD WITHOUT CONTRAST  11/15/2020 2:19 am TECHNIQUE: CT of the head was performed without the administration of intravenous contrast. Dose modulation, iterative reconstruction, and/or weight based adjustment of the mA/kV was utilized to reduce the radiation dose to as low as reasonably achievable. COMPARISON: 10/04/2015 HISTORY: ORDERING SYSTEM PROVIDED HISTORY: headache TECHNOLOGIST PROVIDED HISTORY: Reason for exam:->headache Has a \"code stroke\" or \"stroke alert\" been called? ->No Reason for Exam: headache FINDINGS: BRAIN/VENTRICLES: There is no acute intracranial hemorrhage, mass effect or midline shift. No abnormal extra-axial fluid collection. The gray-white differentiation is maintained without evidence of an acute infarct. There is no evidence of hydrocephalus. ORBITS: The visualized portion of the orbits demonstrate no acute abnormality. SINUSES: The visualized paranasal sinuses and mastoid air cells demonstrate no acute abnormality. SOFT TISSUES/SKULL:  No acute abnormality of the visualized skull or soft tissues. No acute intracranial abnormality. Xr Chest Portable    Result Date: 11/15/2020  EXAMINATION: ONE XRAY VIEW OF THE CHEST 11/15/2020 1:48 am COMPARISON: 11/02/2019 HISTORY: ORDERING SYSTEM PROVIDED HISTORY: CP TECHNOLOGIST PROVIDED HISTORY: Reason for exam:->CP Reason for Exam: CP Acuity: Acute Type of Exam: Initial FINDINGS: Cardiac silhouette unremarkable. Costophrenic angles sharp. Lungs clear. Trachea midline. No pneumothorax.   Osseous structures intact. No acute cardiopulmonary process. EKG: (All EKG's are interpreted by myself in the absence of a cardiologist) EKG performed on 11/15/2020 at 1:09 AM demonstrates ventricular rate of 102 bpm in sinus tachycardia. No signs of acute ischemia, infarct, high degree heart block. Intervals are within normal limits. MDM:  Patient was seen and evaluated in the emergency department by myself. A thorough history and physical exam were performed, prior medical records were reviewed. Upon arrival to the emergency department, patient's vital signs were noted. He was mildly tachycardic. No tachypnea or hypoxia. Blood pressure slightly elevated 159/98. Patient was afebrile. Patient was connected to continuous cardiopulmonary monitoring. Rhythm strip interpreted by myself demonstrating sinus tachycardia. EKG was performed, interpreted by myself, as detailed above. Differential diagnoses and treatment plan were discussed. Pertinent labs were drawn and radiographic studies were performed. Once results are available, they are reviewed by myself. Labs demonstrate no leukocytosis, anemia, thrombocytopenia. Electrolytes are all within normal limits. No signs of kidney injury. Glucose elevated 151. AST ALT unremarkable. Troponin less than 0.010. Lipase is 35. Chest x-ray radiology report reads no acute cardiopulmonary process. CT brain without IV contrast radiology report reads no acute intracranial abnormality    On repeat evaluations, patient remains hemodynamically stable. Reports improvement in symptoms through the ED course. Results of laboratory and radiographic data along with differential diagnoses and treatment plan were discussed with the patient. Patient presents with chest pain. Heart score is:    HEART Score:  Heart Score for chest pain patients  History:  Moderately Suspicious  ECG: Normal  Patient Age: > 39 and < 65 years  *Risk factors for Atherosclerotic disease: Coronary Artery Disease, Hypercholesterolemia, Hypertension  Risk Factors: > 3 Risk factors or history of atherosclerotic disease*  Troponin: < 1X normal limit  Heart Score Total: 4    Patient is moderate risk for major acute coronary event. Last echocardiogram is from October 2018 at which time left ventricular ejection fraction was 50 to 55%. Last stress test is from October 2018 at which time patient had a normal stress nuclear scintigraphic study suggestive of normal myocardial perfusion. Low clinical suspicion for pulmonary embolism. Results of laboratory and radiographic data along with differential diagnosis and treatment discussed with patient. He presents with acute headache and chest pain. Symptoms resolved while patient is in the emergency department. He attributes his symptoms to stress. He states that once he is calm down his symptoms have resolved. He is requesting discharge. Declines waiting for second troponin. He is moderate risk for major acute coronary event. He does have an outpatient appointment with his cardiologist Dr. Gloria Bundy. He states that he will follow up with Dr. Gloria Bundy. He is instructed to follow-up within 72 hours for stress test, echocardiogram, Holter monitoring. Instructed to return to the emergency department immediately with any new, worsening, concerning symptoms. Additional verbal and printed discharge instructions were provided. Patient verbalized understanding and was agreeable with discharge plan. Was discharged in hemodynamically stable condition. Clinical Impressions:  1.  Acute chest pain        Disposition referral:  [address]  [facility] [phone]    In 2 days  Please follow-up with your primary care provider for reevaluation    [address] [facility] Emergency Department  [facility] [id number] [phone]  Go to   Immediately with any new, worsening, concerning symptoms. Félix Caban MD  [facility], [address] [place] [address]  [phone]    In 3 days  Please follow-up with cardiologist within 72 hours for stress test, echocardiogram, Holter monitoring. ED Provider Disposition  DISPOSITION  Discharge      Comment: Please note this report has been produced using speech recognition software and may contain errors related to that system including errors in grammar, punctuation, and spelling, as well as words and phrases that may be inappropriate. Efforts were made to edit the dictations.        [address] [place] [address],   11/15/20 8760

## 2020-11-18 LAB
EKG ATRIAL RATE: 102 BPM
EKG DIAGNOSIS: NORMAL
EKG P AXIS: 24 DEGREES
EKG P-R INTERVAL: 136 MS
EKG Q-T INTERVAL: 334 MS
EKG QRS DURATION: 76 MS
EKG QTC CALCULATION (BAZETT): 435 MS
EKG R AXIS: -3 DEGREES
EKG T AXIS: 38 DEGREES
EKG VENTRICULAR RATE: 102 BPM

## 2020-12-08 ENCOUNTER — TELEPHONE (OUTPATIENT)
Dept: CARDIOLOGY CLINIC | Age: 55
End: 2020-12-08

## 2021-05-05 ENCOUNTER — OFFICE VISIT (OUTPATIENT)
Dept: INTERNAL MEDICINE CLINIC | Age: 56
End: 2021-05-05
Payer: COMMERCIAL

## 2021-05-05 VITALS
TEMPERATURE: 97.1 F | WEIGHT: 235 LBS | DIASTOLIC BLOOD PRESSURE: 88 MMHG | OXYGEN SATURATION: 100 % | HEART RATE: 100 BPM | BODY MASS INDEX: 35.73 KG/M2 | SYSTOLIC BLOOD PRESSURE: 130 MMHG

## 2021-05-05 DIAGNOSIS — G89.29 CHRONIC LEFT SHOULDER PAIN: ICD-10-CM

## 2021-05-05 DIAGNOSIS — I10 ESSENTIAL HYPERTENSION: Primary | ICD-10-CM

## 2021-05-05 DIAGNOSIS — M25.512 CHRONIC LEFT SHOULDER PAIN: ICD-10-CM

## 2021-05-05 DIAGNOSIS — G56.03 BILATERAL CARPAL TUNNEL SYNDROME: ICD-10-CM

## 2021-05-05 PROCEDURE — 99213 OFFICE O/P EST LOW 20 MIN: CPT | Performed by: FAMILY MEDICINE

## 2021-05-05 RX ORDER — LISINOPRIL 20 MG/1
20 TABLET ORAL DAILY
Qty: 90 TABLET | Refills: 1 | Status: SHIPPED | OUTPATIENT
Start: 2021-05-05 | End: 2021-09-28 | Stop reason: SDUPTHER

## 2021-05-05 ASSESSMENT — ENCOUNTER SYMPTOMS
DIARRHEA: 0
NAUSEA: 0
COUGH: 0
CHEST TIGHTNESS: 0
ABDOMINAL PAIN: 0
BACK PAIN: 0
CONSTIPATION: 0
SHORTNESS OF BREATH: 0

## 2021-05-05 ASSESSMENT — PATIENT HEALTH QUESTIONNAIRE - PHQ9
SUM OF ALL RESPONSES TO PHQ9 QUESTIONS 1 & 2: 0
SUM OF ALL RESPONSES TO PHQ QUESTIONS 1-9: 0
SUM OF ALL RESPONSES TO PHQ QUESTIONS 1-9: 0
1. LITTLE INTEREST OR PLEASURE IN DOING THINGS: 0

## 2021-05-07 ENCOUNTER — HOSPITAL ENCOUNTER (OUTPATIENT)
Age: 56
Discharge: HOME | End: 2021-05-07
Payer: COMMERCIAL

## 2021-05-07 ENCOUNTER — HOSPITAL ENCOUNTER (OUTPATIENT)
Dept: GENERAL RADIOLOGY | Age: 56
Discharge: HOME | End: 2021-05-07
Payer: COMMERCIAL

## 2021-05-07 DIAGNOSIS — G89.29 CHRONIC LEFT SHOULDER PAIN: ICD-10-CM

## 2021-05-07 DIAGNOSIS — M25.512 CHRONIC LEFT SHOULDER PAIN: ICD-10-CM

## 2021-05-07 PROCEDURE — 73030 X-RAY EXAM OF SHOULDER: CPT

## 2021-05-12 ENCOUNTER — TELEPHONE (OUTPATIENT)
Dept: INTERNAL MEDICINE CLINIC | Age: 56
End: 2021-05-12

## 2021-05-12 DIAGNOSIS — M25.512 CHRONIC LEFT SHOULDER PAIN: Primary | ICD-10-CM

## 2021-05-12 DIAGNOSIS — G89.29 CHRONIC LEFT SHOULDER PAIN: Primary | ICD-10-CM

## 2021-05-14 ENCOUNTER — TELEPHONE (OUTPATIENT)
Dept: INTERNAL MEDICINE CLINIC | Age: 56
End: 2021-05-14

## 2021-06-01 ENCOUNTER — OFFICE VISIT (OUTPATIENT)
Dept: ORTHOPEDIC SURGERY | Age: 56
End: 2021-06-01
Payer: COMMERCIAL

## 2021-06-01 VITALS
OXYGEN SATURATION: 94 % | BODY MASS INDEX: 35.61 KG/M2 | RESPIRATION RATE: 15 BRPM | WEIGHT: 235 LBS | HEART RATE: 87 BPM | HEIGHT: 68 IN

## 2021-06-01 DIAGNOSIS — S43.422A SPRAIN OF LEFT ROTATOR CUFF CAPSULE, INITIAL ENCOUNTER: Primary | ICD-10-CM

## 2021-06-01 DIAGNOSIS — G56.03 BILATERAL CARPAL TUNNEL SYNDROME: ICD-10-CM

## 2021-06-01 PROCEDURE — 99214 OFFICE O/P EST MOD 30 MIN: CPT | Performed by: ORTHOPAEDIC SURGERY

## 2021-06-01 NOTE — PROGRESS NOTES
6/1/2021   Chief Complaint   Patient presents with    Wrist Pain     bilateral    Shoulder Pain     left shoulder pain         History of Present Illness:                             Shayna Traylor is a 55 y.o. male who presents today for evaluation of his left shoulder pain and weakness. He had an injury about 5 weeks ago when he was lifting a heavy log. He had sudden sharp pain in the shoulder and since that time has had difficulty with reaching out away from his body or overhead. He has profound weakness and cannot lift anything heavy. He has to keep his arm by his side for activities because of sharp shooting pain at the anterior and superior aspects of the shoulder with activities away from his body. He is pain is constant and worse with active movement or lifting. He has dealt with similar issues in the past on his right shoulder. He previously underwent right rotator cuff repair and did well following the recovery process with therapy. Additionally he has been dealing with bilateral hand pain numbness and tingling. He had an EMG performed in 2016. IMPRESSION:     1. Moderate bilateral carpal tunnel syndrome with prolongation of both median  and sensory distal latencies and transcarpal sensory latencies on the left and  right side. Mild neurogenic EMG changes are seen in thenar muscles.     2. No definite EMG changes are seen in the left arm, shoulder and cervical  paraspinal muscles at this time. Nerve conduction velocities are normal       Patient presents to the office today for left shoulder pain and Bilateral wrist pain. Pt states that pain in the left shoulder started about 5 weeks ago when he lifted a heavy log. Pt states that his pain in the left shoulder started the next day. Pt states he is having a difficult time raising his arm above his shoulder and behind his back. Pt states he has been taking ibuprofen which does not help with his pain.  Pt states pain is globally in the shoulder and will travel into his elbow. Pt states that he is having bilateral wrist pain which has been going on for years now. He does wear a brace at work which does help with the numbness and tingling in the bilateral wrist.         Medical History  Patient's medications, allergies, past medical, surgical, social and family histories were reviewed and updated as appropriate. Past Medical History:   Diagnosis Date    Bilateral carpal tunnel syndrome     CAD (coronary artery disease)     H/O echocardiogram 04/28/2016    EF 55% WNL- Stage 1 dysfunction     Hyperlipidemia     Hypertension     Medial meniscus tear     Left knee    Prediabetes      Past Surgical History:   Procedure Laterality Date    APPENDECTOMY      SHOULDER SURGERY      right      Family History   Problem Relation Age of Onset    Cancer Mother         lung    Heart Disease Father      Social History     Socioeconomic History    Marital status: Single     Spouse name: Not on file    Number of children: Not on file    Years of education: Not on file    Highest education level: Not on file   Occupational History    Not on file   Tobacco Use    Smoking status: Never Smoker    Smokeless tobacco: Former User     Types: Chew   Vaping Use    Vaping Use: Never used   Substance and Sexual Activity    Alcohol use: Not Currently     Alcohol/week: 15.0 standard drinks     Types: 12 Cans of beer, 3 Shots of liquor per week     Comment: Only on the [employer] Drug use: No    Sexual activity: Not on file   Other Topics Concern    Not on file   Social History Narrative    Not on file     Social Determinants of Health     Financial Resource Strain:     Difficulty of Paying Living Expenses:    Food Insecurity:     Worried About Running Out of Food in the Last Year:     Ran Out of Food in the Last Year:    Transportation Needs:     Lack of Transportation (Medical):      Lack of Transportation (Non-Medical):    Physical Activity:     Days of Exercise per Week:     Minutes of Exercise per Session:    Stress:     Feeling of Stress :    Social Connections:     Frequency of Communication with Friends and Family:     Frequency of Social Gatherings with Friends and Family:     Attends Christianity Services:     Active Member of Clubs or Organizations:     Attends Club or Organization Meetings:     Marital Status:    Intimate Partner Violence:     Fear of Current or Ex-Partner:     Emotionally Abused:     Physically Abused:     Sexually Abused:      Current Outpatient Medications   Medication Sig Dispense Refill    lisinopril (PRINIVIL;ZESTRIL) 20 MG tablet Take 1 tablet by mouth daily 90 tablet 1    ibuprofen (ADVIL;MOTRIN) 800 MG tablet Take 1 tablet by mouth every 8 hours as needed for Pain 90 tablet 1    nitroGLYCERIN (NITROSTAT) 0.4 MG SL tablet up to max of 3 total doses. If no relief after 1 dose, call 911. (Patient not taking: Reported on 5/5/2021) 25 tablet 3    aspirin 81 MG tablet Take 81 mg by mouth daily       No current facility-administered medications for this visit. No Known Allergies    Review of Systems:   Review of Systems                                            Examination:  General Exam:  Vitals: Pulse 87   Resp 15   Ht 5' 8\" (1.727 m)   Wt 235 lb (106.6 kg)   SpO2 94%   BMI 35.73 kg/m²    Physical Exam  Vitals and nursing note reviewed. Constitutional:       Appearance: Normal appearance. HENT:      Head: Normocephalic and atraumatic. Eyes:      Conjunctiva/sclera: Conjunctivae normal.      Pupils: Pupils are equal, round, and reactive to light. Pulmonary:      Effort: Pulmonary effort is normal.   Musculoskeletal:      Right shoulder: No swelling, deformity, laceration, tenderness or bony tenderness. Normal range of motion. Normal strength. Right elbow: Normal.      Left elbow: Normal. No swelling, deformity, effusion or lacerations. Normal range of motion. No tenderness.       Cervical back: Normal range of motion. Comments: Left Upper Extremity:    There is moderate to severe tenderness diffusely throughout the shoulder. Tenderness to palpation is maximal over the anterior and lateral aspects of the shoulder specifically along the greater tuberosity and proximal biceps tendon. Active range of motion is limited due to pain. Forward elevation present to 100 degrees, abduction present to 80 degrees, external rotation 15 degrees. Passive range of motion is moderately restricted and limited due to pain and apprehension. Forward elevation 120 degrees, abduction 100 degrees. Rotator cuff testing is difficult due to pain. Strength 4/5 forward elevation and abduction of the shoulder. There is breakaway to strength testing due to pain. Positive empty can test.  Positive drop arm sign. Positive Goss and Neer signs. Moderate pain at the acromioclavicular joint with crossarm test.    Skin is intact. Sensation is intact in the upper extremity. 2+ radial pulse. No edema. No muscle atrophy. Left Upper Extremity:    There is mild tenderness to palpation of the volar aspect of the wrist at the level of the carpal tunnel. There is decreased sensation to light touch in the median nerve distribution. Sensation is intact to light touch along the ulnar and radial nerves. Positive carpal compression test and Phalen's test.  There is mild relative weakness with 4+ strength out of 5 during  test, pinch test, and flexor pollicis brevis. Range of motion of the wrist and fingers is intact without limitation. Skin is intact without wounds or rash. 2+ radial pulse with brisk cap refill throughout the fingers. No atrophy of the thenar musculature. Strength 5/5 in finger and wrist flexion and extension. Skin:     General: Skin is warm and dry. Neurological:      Mental Status: He is alert and oriented to person, place, and time.    Psychiatric:         Mood and Affect: Mood normal. Behavior: Behavior normal.            Diagnostic testing:  X-ray images were reviewed by myself and discussed with the patient:  FINDINGS:   Glenohumeral joint is normally aligned.  No evidence of acute fracture or   dislocation.  No abnormal periarticular calcifications.  AC joint and   glenohumeral joint degenerative changes.       Visualized lung is unremarkable.           Impression   No acute abnormality.       AC joint and glenohumeral joint degenerative changes         Office Procedures:  Orders Placed This Encounter   Procedures    MRI SHOULDER LEFT WO CONTRAST     Standing Status:   Future     Standing Expiration Date:   6/1/2022     Order Specific Question:   Reason for exam:     Answer:   possible rotator cuff tear       Assessment and Plan  1. Left shoulder rotator cuff sprain, likely full-thickness tear    2. Left shoulder mild primary osteoarthritis    3. Bilateral carpal tunnel syndrome    The patient is having worsening pain and dysfunction in the shoulder despite appropriate conservative measures. He has severe pain and weakness after his lifting injury which is consistent with rotator cuff tear      I feel it is medically necessary to obtain an MRI to evaluate for tear of the rotator cuff and other intra-articular pathology such injury to the labrum, biceps tendon, and cartilage surfaces of the shoulder. I have instructed the patient on gentle range of motion exercises for the shoulder and avoidance of lifting pushing and pulling with that arm. Continue with anti-inflammatory medications and activity modification to avoid stress and strain at the shoulder. You will call if he needs a restrictions at work. They will follow-up after the MRI for a review of the results.           Electronically signed by Catia Swift MD on 6/1/2021 at 9:16 AM

## 2021-06-01 NOTE — PATIENT INSTRUCTIONS
Continue to avoid heavy lifting pushing or pulling of the left arm  Continue range of motion exercises as instructed. Ice and elevate as needed. Tylenol or Motrin for pain. MRI of the left shoulder   Central scheduling [phone] will be calling you to schedule your MRI. If you do not hear from them with in a week give them a call.

## 2021-06-01 NOTE — PROGRESS NOTES
Patient presents to the office today for left shoulder pain and Bilateral wrist pain. Pt states that pain in the left shoulder started about 5 weeks ago when he lifted a heavy log. Pt states that his pain in the left shoulder started the next day. Pt states he is having a difficult time raising his arm above his shoulder and behind his back. Pt states he has been taking ibuprofen which does not help with his pain. Pt states pain is globally in the shoulder and will travel into his elbow. Pt states that he is having bilateral wrist pain which has been going on for years now.  He does wear a brace at work which does help with the numbness and tingling in the bilateral wrist.

## 2021-06-04 ENCOUNTER — HOSPITAL ENCOUNTER (OUTPATIENT)
Dept: MRI IMAGING | Age: 56
Discharge: HOME | End: 2021-06-04
Payer: COMMERCIAL

## 2021-06-04 DIAGNOSIS — S43.422A SPRAIN OF LEFT ROTATOR CUFF CAPSULE, INITIAL ENCOUNTER: ICD-10-CM

## 2021-06-04 PROCEDURE — 73221 MRI JOINT UPR EXTREM W/O DYE: CPT

## 2021-06-11 ENCOUNTER — OFFICE VISIT (OUTPATIENT)
Dept: ORTHOPEDIC SURGERY | Age: 56
End: 2021-06-11
Payer: COMMERCIAL

## 2021-06-11 VITALS
OXYGEN SATURATION: 94 % | HEART RATE: 100 BPM | WEIGHT: 235 LBS | HEIGHT: 68 IN | BODY MASS INDEX: 35.61 KG/M2 | RESPIRATION RATE: 16 BRPM

## 2021-06-11 DIAGNOSIS — G56.03 BILATERAL CARPAL TUNNEL SYNDROME: ICD-10-CM

## 2021-06-11 DIAGNOSIS — S43.422A SPRAIN OF LEFT ROTATOR CUFF CAPSULE, INITIAL ENCOUNTER: Primary | ICD-10-CM

## 2021-06-11 PROCEDURE — 99214 OFFICE O/P EST MOD 30 MIN: CPT | Performed by: ORTHOPAEDIC SURGERY

## 2021-06-11 NOTE — PROGRESS NOTES
Patient returns to the office for a follow up on his recent MRI pertaining to his left shoulder. MRI completed on 6/4/21 revealed results as follows:       MRI SHOULDER LEFT WO CONTRAST  Impression   1. Image quality is degraded secondary to motion artifact. 2. Full-thickness tearing of the distal and anterior supraspinatus tendon   measuring approximately 1.0 x 1.5 cm.  Underlying moderate supraspinatus,   infraspinatus, and subscapularis tendinosis. 3. The proximal long head biceps tendon is not well visualized.  Findings may   reflect severe tendinosis versus tearing.  Tendinosis is favored. 4. Mild glenohumeral and mild to moderate acromioclavicular osteoarthritis. 5. No acute osseous abnormality identified. Pain is rated in office at a 8/10 with patient reporting worsening symptoms since his last office visit. He continues to have the sharp pain along the rotator cuff region with limited ROM without the patient able to sit or sleep comfortably. Patient continues to take Ibuprofen as needed with no relief. No recent injury reported at this time.

## 2021-06-11 NOTE — PATIENT INSTRUCTIONS
MRI results discussed  Surgery discussed in office  Consent signed  Follow up for surgery as scheduled  You may call FRANΑΡΑΝΤΙ, our Surgery Scheduler, with any further questions or concerns regarding your upcoming procedure at [phone]

## 2021-06-11 NOTE — PROGRESS NOTES
6/11/2021   Chief Complaint   Patient presents with    Shoulder Pain     left        History of Present Illness:                             Patrick Knapp is a 55 y.o. male who returns today for follow-up of his left shoulder. Symptoms are the same and may be even a little worse with his pain level and weakness at this time. He continues to have problems reaching out away from her body and continues to have popping sensations with rotational movements of the shoulder. Additionally he is still having same symptoms with pain numbness and tingling in the hand along the median nerve distribution consistent with his carpal tunnel syndrome. He had an injury about 5 weeks ago when he was lifting a heavy log. He had sudden sharp pain in the shoulder and since that time has had difficulty with reaching out away from his body or overhead. He has profound weakness and cannot lift anything heavy. He has to keep his arm by his side for activities because of sharp shooting pain at the anterior and superior aspects of the shoulder with activities away from his body. He is pain is constant and worse with active movement or lifting. He has dealt with similar issues in the past on his right shoulder. He previously underwent right rotator cuff repair and did well following the recovery process with therapy.     Additionally he has been dealing with bilateral hand pain numbness and tingling. He had an EMG performed in 2016.     IMPRESSION:     1.  Moderate bilateral carpal tunnel syndrome with prolongation of both median  and sensory distal latencies and transcarpal sensory latencies on the left and  right side.  Mild neurogenic EMG changes are seen in thenar muscles.     2.  No definite EMG changes are seen in the left arm, shoulder and cervical  paraspinal muscles at this time.  Nerve conduction velocities are normal      Patient returns to the office for a follow up on his recent MRI pertaining to his left Transportation Needs:     Lack of Transportation (Medical):  Lack of Transportation (Non-Medical):    Physical Activity:     Days of Exercise per Week:     Minutes of Exercise per Session:    Stress:     Feeling of Stress :    Social Connections:     Frequency of Communication with Friends and Family:     Frequency of Social Gatherings with Friends and Family:     Attends [employer] Services:     Active Member of Clubs or Organizations:     Attends Club or Organization Meetings:     Marital Status:    Intimate Partner Violence:     Fear of Current or Ex-Partner:     Emotionally Abused:     Physically Abused:     Sexually Abused:      Current Outpatient Medications   Medication Sig Dispense Refill    lisinopril (PRINIVIL;ZESTRIL) 20 MG tablet Take 1 tablet by mouth daily 90 tablet 1    ibuprofen (ADVIL;MOTRIN) 800 MG tablet Take 1 tablet by mouth every 8 hours as needed for Pain 90 tablet 1    nitroGLYCERIN (NITROSTAT) 0.4 MG SL tablet up to max of 3 total doses. If no relief after 1 dose, call 911. 25 tablet 3    aspirin 81 MG tablet Take 81 mg by mouth daily       No current facility-administered medications for this visit. No Known Allergies      Review of Systems   Constitutional: Negative for chills and fever. HENT: Negative for congestion and sneezing. Eyes: Negative for pain and redness. Respiratory: Negative for chest tightness, shortness of breath and wheezing. Cardiovascular: Negative for chest pain and palpitations. Gastrointestinal: Negative for vomiting. Musculoskeletal: Positive for arthralgias. Skin: Negative for color change and rash. Neurological: Positive for weakness and numbness. Psychiatric/Behavioral: Negative for agitation. The patient is not nervous/anxious.                                                 Examination:  General Exam:  Vitals: Pulse 100   Resp 16   Ht 5' 8\" (1.727 m)   Wt 235 lb (106.6 kg)   SpO2 94%   BMI 35.73 kg/m² Physical Exam  Vitals and nursing note reviewed. Constitutional:       Appearance: Normal appearance. HENT:      Head: Normocephalic and atraumatic. Eyes:      Conjunctiva/sclera: Conjunctivae normal.      Pupils: Pupils are equal, round, and reactive to light. Pulmonary:      Effort: Pulmonary effort is normal.   Musculoskeletal:      Right shoulder: No swelling, deformity, laceration, tenderness or bony tenderness. Normal range of motion. Normal strength. Right elbow: Normal.      Left elbow: Normal. No swelling, deformity, effusion or lacerations. Normal range of motion. No tenderness. Cervical back: Normal range of motion. Comments: Left Upper Extremity:    There is moderate to severe tenderness diffusely throughout the shoulder. Tenderness to palpation is maximal over the anterior and lateral aspects of the shoulder specifically along the greater tuberosity and proximal biceps tendon. Active range of motion is limited due to pain. Forward elevation present to 100 degrees, abduction present to 80 degrees, external rotation 15 degrees. Passive range of motion is moderately restricted and limited due to pain and apprehension. Forward elevation 120 degrees, abduction 100 degrees. Rotator cuff testing is difficult due to pain. Strength 4/5 forward elevation and abduction of the shoulder. There is breakaway to strength testing due to pain. Positive empty can test.  Positive drop arm sign. Positive Goss and Neer signs. Moderate pain at the acromioclavicular joint with crossarm test.    Skin is intact. Sensation is intact in the upper extremity. 2+ radial pulse. No edema. No muscle atrophy. Left Upper Extremity:    There is mild tenderness to palpation of the volar aspect of the wrist at the level of the carpal tunnel. There is decreased sensation to light touch in the median nerve distribution. Sensation is intact to light touch along the ulnar and radial nerves. Positive carpal compression test and Phalen's test.  There is mild relative weakness with 4+ strength out of 5 during  test, pinch test, and flexor pollicis brevis. Range of motion of the wrist and fingers is intact without limitation. Skin is intact without wounds or rash. 2+ radial pulse with brisk cap refill throughout the fingers. No atrophy of the thenar musculature. Strength 5/5 in finger and wrist flexion and extension. Skin:     General: Skin is warm and dry. Neurological:      Mental Status: He is alert and oriented to person, place, and time. Psychiatric:         Mood and Affect: Mood normal.         Behavior: Behavior normal.            Diagnostic testing:  X-ray images were reviewed by myself and discussed with the patient:    MRI images of the left shoulder were reviewed by myself and discussed with the patient:     MRI SHOULDER LEFT WO CONTRAST  Impression   1. Image quality is degraded secondary to motion artifact. 2. Full-thickness tearing of the distal and anterior supraspinatus tendon   measuring approximately 1.0 x 1.5 cm.  Underlying moderate supraspinatus,   infraspinatus, and subscapularis tendinosis. 3. The proximal long head biceps tendon is not well visualized.  Findings may   reflect severe tendinosis versus tearing.  Tendinosis is favored. 4. Mild glenohumeral and mild to moderate acromioclavicular osteoarthritis. 5. No acute osseous abnormality identified. Office Procedures:  No orders of the defined types were placed in this encounter. Assessment and Plan  1. Left shoulder rotator cuff tear and impingement syndrome    2. Bilateral left worse than right carpal tunnel syndrome    I reviewed the findings of the MRI with the patient. We discussed the severity of the injury to the rotator cuff.   Given the patient's symptoms and the findings on the MRI I have recommended that we proceed with shoulder arthroscopy for rotator cuff repair and subacromial decompression. We discussed the risks and benefits of surgery. We discussed the postoperative course and need for initial immobilization followed by subsequent rehabilitation and physical therapy. We discussed the potential risks with surgery including possible incomplete repair or rerupture after successful repair. We discussed the potential for residual pain, weakness, or stiffness at the shoulder despite appropriate surgical intervention. We discussed the goals of surgery to restore range of motion and strength which may take months to return. The patient understands and would like to proceed with shoulder arthroscopy for rotator cuff repair and subacromial decompression. We discussed his carpal tunnel syndrome in the left upper extremity as well. His symptoms have been progressing and have discussed the findings of the EMG. He would like to proceed with carpal tunnel release at the time of his rotator cuff repair. We discussed risks and benefits of this procedure as well along with the expected recovery process. All his questions were answered.             Electronically signed by Lucy Anne MD on 6/11/2021 at 10:40 AM

## 2021-06-17 DIAGNOSIS — S43.422A SPRAIN OF LEFT ROTATOR CUFF CAPSULE, INITIAL ENCOUNTER: ICD-10-CM

## 2021-06-17 DIAGNOSIS — G56.02 CARPAL TUNNEL SYNDROME ON LEFT: ICD-10-CM

## 2021-06-17 DIAGNOSIS — Z01.818 PRE-OP EVALUATION: Primary | ICD-10-CM

## 2021-06-18 ENCOUNTER — TELEPHONE (OUTPATIENT)
Dept: CARDIOLOGY CLINIC | Age: 56
End: 2021-06-18

## 2021-06-18 ENCOUNTER — OFFICE VISIT (OUTPATIENT)
Dept: CARDIOLOGY CLINIC | Age: 56
End: 2021-06-18
Payer: COMMERCIAL

## 2021-06-18 VITALS
SYSTOLIC BLOOD PRESSURE: 120 MMHG | HEART RATE: 100 BPM | HEIGHT: 68 IN | DIASTOLIC BLOOD PRESSURE: 78 MMHG | BODY MASS INDEX: 35.25 KG/M2 | WEIGHT: 232.6 LBS

## 2021-06-18 DIAGNOSIS — I10 ESSENTIAL HYPERTENSION: ICD-10-CM

## 2021-06-18 DIAGNOSIS — I25.10 CAD IN NATIVE ARTERY: Primary | ICD-10-CM

## 2021-06-18 DIAGNOSIS — E78.5 HYPERLIPIDEMIA, UNSPECIFIED HYPERLIPIDEMIA TYPE: ICD-10-CM

## 2021-06-18 PROCEDURE — 93000 ELECTROCARDIOGRAM COMPLETE: CPT | Performed by: NURSE PRACTITIONER

## 2021-06-18 PROCEDURE — 99214 OFFICE O/P EST MOD 30 MIN: CPT | Performed by: NURSE PRACTITIONER

## 2021-06-18 RX ORDER — NITROGLYCERIN 0.4 MG/1
TABLET SUBLINGUAL
Qty: 25 TABLET | Refills: 3 | Status: SHIPPED | OUTPATIENT
Start: 2021-06-18 | End: 2021-09-28 | Stop reason: SDUPTHER

## 2021-06-18 ASSESSMENT — ENCOUNTER SYMPTOMS
ORTHOPNEA: 0
SHORTNESS OF BREATH: 0

## 2021-06-18 NOTE — PROGRESS NOTES
movements intact. Pupils: Pupils are equal, round, and reactive to light. Cardiovascular:      Rate and Rhythm: Normal rate and regular rhythm. Pulses: Normal pulses. Pulmonary:      Effort: Pulmonary effort is normal.      Breath sounds: Normal breath sounds. Abdominal:      General: There is no distension. Palpations: Abdomen is soft. Tenderness: There is no abdominal tenderness. Musculoskeletal:         General: Tenderness and signs of injury present. Cervical back: Normal range of motion and neck supple. Right lower leg: No edema. Left lower leg: No edema. Skin:     General: Skin is warm. Capillary Refill: Capillary refill takes less than 2 seconds. Neurological:      General: No focal deficit present. Mental Status: He is alert and oriented to person, place, and time. Psychiatric:         Mood and Affect: Mood normal.         Behavior: Behavior normal.             All pertinent data reviewed and discussed with patient including:       ASSESSMENT/PLAN:    Hypertension   Blood pressure is Controlled   Encouraged a low sodium diet  Recommend to continue with lisinopril    Dyslipidemia   No recent lipids available   To have lipids done with pcp- if not will have labs done   Discussed healthy eating habits including low fat -low cholesterol diet    Left ventricular hypertrophy  Commended recheck echocardiogram this patient has a history of having mild concentric LVH in the setting of hypertension and ETOH use          Medications reviewed and confirmed with patient     Tests ordered:  echo    OV in 6 months     Signed:  RYANN Cummins CNP, 6/18/2021, 3:06 PM    An electronic signature was used to authenticate this note.

## 2021-06-18 NOTE — LETTER
Norma Sheth  1965  [id number]    Have you had any Chest Pain that is not new? - No         Have you had any Shortness of Breath - No  If Yes - When     Have you had any dizziness - No     Have you had any palpitations that are not new? - No       Do you have any edema - swelling in No    If Yes - CHECK TO SEE IF THE EDEMA IS PITTING    When did you have your last labs drawn 11/2020 in epic. Lipids not done        If we do not have these labs you are retrieve these labs for these providers! Do you have a surgery or procedure scheduled in the near future - Yes  If Yes- DO EKG  If Yes - Who is the surgery with?  Dr. Carol Rockwell  Phone number of physician     Fax number of physician     Type of surgery  Left Rotator Cuff and Carpal Tunnel Release  GIVE THIS INFORMATION TO HANNAH HERNÁNDEZ

## 2021-06-18 NOTE — PATIENT INSTRUCTIONS
**It is YOUR responsibilty to bring medication bottles and/or updated medication list to [address] [place] [address]. This will allow us to better serve you and all your healthcare needs**      Please be informed that if you contact our office outside of normal business hours the physician on call cannot help with any scheduling or rescheduling issues, procedure instruction questions or any type of medication issue. We advise you for any urgent/emergency that you go to the nearest emergency room!     PLEASE CALL OUR OFFICE DURING NORMAL BUSINESS HOURS    Monday - Friday   8 am to 5 pm    [place]: [address]: [phone]    [place]:  [phone]

## 2021-06-18 NOTE — TELEPHONE ENCOUNTER
Pt needs Echo for cardiac clear and would like to go to [address] to have done to expedite surgery, please.

## 2021-06-20 PROBLEM — S43.422A SPRAIN OF LEFT ROTATOR CUFF CAPSULE: Status: ACTIVE | Noted: 2021-06-20

## 2021-06-20 ASSESSMENT — ENCOUNTER SYMPTOMS
EYE REDNESS: 0
EYE PAIN: 0
VOMITING: 0
WHEEZING: 0
COLOR CHANGE: 0
SHORTNESS OF BREATH: 0
CHEST TIGHTNESS: 0

## 2021-06-24 ENCOUNTER — TELEPHONE (OUTPATIENT)
Dept: ORTHOPEDIC SURGERY | Age: 56
End: 2021-06-24

## 2021-06-24 ENCOUNTER — TELEPHONE (OUTPATIENT)
Dept: CARDIOLOGY CLINIC | Age: 56
End: 2021-06-24

## 2021-06-24 NOTE — TELEPHONE ENCOUNTER
Called patient's insurance spoke with Josselin Loza no prior Rolan Bahena is needed for cpt codes 61101, [id number] with dx codes: G56.02 and C98.962G  Ref# [id number]

## 2021-07-05 ENCOUNTER — HOSPITAL ENCOUNTER (EMERGENCY)
Age: 56
Discharge: HOME | End: 2021-07-05
Attending: EMERGENCY MEDICINE
Payer: COMMERCIAL

## 2021-07-05 ENCOUNTER — APPOINTMENT (OUTPATIENT)
Dept: GENERAL RADIOLOGY | Age: 56
End: 2021-07-05
Payer: COMMERCIAL

## 2021-07-05 VITALS
DIASTOLIC BLOOD PRESSURE: 85 MMHG | HEART RATE: 84 BPM | TEMPERATURE: 98.5 F | RESPIRATION RATE: 16 BRPM | HEIGHT: 68 IN | BODY MASS INDEX: 35.92 KG/M2 | SYSTOLIC BLOOD PRESSURE: 117 MMHG | WEIGHT: 237 LBS | OXYGEN SATURATION: 98 %

## 2021-07-05 DIAGNOSIS — M25.569 KNEE PAIN, UNSPECIFIED CHRONICITY, UNSPECIFIED LATERALITY: Primary | ICD-10-CM

## 2021-07-05 PROCEDURE — 6370000000 HC RX 637 (ALT 250 FOR IP): Performed by: EMERGENCY MEDICINE

## 2021-07-05 PROCEDURE — 73590 X-RAY EXAM OF LOWER LEG: CPT

## 2021-07-05 PROCEDURE — 73552 X-RAY EXAM OF FEMUR 2/>: CPT

## 2021-07-05 PROCEDURE — 99285 EMERGENCY DEPT VISIT HI MDM: CPT

## 2021-07-05 PROCEDURE — 73562 X-RAY EXAM OF KNEE 3: CPT

## 2021-07-05 PROCEDURE — 73502 X-RAY EXAM HIP UNI 2-3 VIEWS: CPT

## 2021-07-05 RX ORDER — ONDANSETRON 4 MG/1
4 TABLET, ORALLY DISINTEGRATING ORAL ONCE
Status: COMPLETED | OUTPATIENT
Start: 2021-07-05 | End: 2021-07-05

## 2021-07-05 RX ORDER — ACETAMINOPHEN 325 MG/1
650 TABLET ORAL EVERY 6 HOURS PRN
Qty: 120 TABLET | Refills: 3 | Status: SHIPPED | OUTPATIENT
Start: 2021-07-05 | End: 2021-09-28

## 2021-07-05 RX ORDER — NAPROXEN 500 MG/1
500 TABLET ORAL 2 TIMES DAILY
Qty: 60 TABLET | Refills: 0 | Status: ON HOLD | OUTPATIENT
Start: 2021-07-05 | End: 2021-07-21 | Stop reason: HOSPADM

## 2021-07-05 RX ORDER — HYDROCODONE BITARTRATE AND ACETAMINOPHEN 5; 325 MG/1; MG/1
1 TABLET ORAL ONCE
Status: COMPLETED | OUTPATIENT
Start: 2021-07-05 | End: 2021-07-05

## 2021-07-05 RX ADMIN — HYDROCODONE BITARTRATE AND ACETAMINOPHEN 1 TABLET: 5; 325 TABLET ORAL at 02:23

## 2021-07-05 RX ADMIN — ONDANSETRON 4 MG: 4 TABLET, ORALLY DISINTEGRATING ORAL at 02:23

## 2021-07-05 ASSESSMENT — ENCOUNTER SYMPTOMS
ALLERGIC/IMMUNOLOGIC NEGATIVE: 1
RESPIRATORY NEGATIVE: 1
EYES NEGATIVE: 1
GASTROINTESTINAL NEGATIVE: 1

## 2021-07-05 ASSESSMENT — PAIN SCALES - GENERAL
PAINLEVEL_OUTOF10: 8
PAINLEVEL_OUTOF10: 7

## 2021-07-05 NOTE — ED PROVIDER NOTES
[address] CHIEF COMPLAINT:   Fall      [place]:  Jaqueline Tan is a 55 y.o. male that presents with complaint of left knee pain. Patient has a history of left knee pain he does see orthopedic surgery he is getting ready to have a knee replacement. Patient states tonight at a bonfire he turned the wrong way and heard a pop, crack in his left knee he feels like his kneecap was dislocated now feels back into place but it hurts. He denies any injury or fall or chest pain or shortness of breath or weakness numbness or tingling that is new no other question concerns no other pain. No other questions or concerns he has never had any Dislocation before does have meniscal injuries before. REVIEW OF SYSTEMS:  At least 10 systems reviewed and otherwise acutely negative except as in the [address] Review of Systems   Constitutional: Negative. HENT: Negative. Eyes: Negative. Respiratory: Negative. Cardiovascular: Negative. Gastrointestinal: Negative. Endocrine: Negative. Genitourinary: Negative. Musculoskeletal: Positive for arthralgias and myalgias. Skin: Negative. Allergic/Immunologic: Negative. Neurological: Negative. Hematological: Negative. Psychiatric/Behavioral: Negative. All other systems reviewed and are negative.       Past Medical History:   Diagnosis Date    Bilateral carpal tunnel syndrome     CAD (coronary artery disease)     H/O echocardiogram 04/28/2016    EF 55% WNL- Stage 1 dysfunction     Hyperlipidemia     Hypertension     Medial meniscus tear     Left knee    Prediabetes      Past Surgical History:   Procedure Laterality Date    APPENDECTOMY      SHOULDER SURGERY      right      Family History   Problem Relation Age of Onset    Cancer Mother         lung    Heart Disease Father     Heart Attack Sister     Stroke Sister      Social History     Socioeconomic History    Marital status: Single     Spouse name: Not on file    Number of children: Not on file    Years of education: Not on file    Highest education level: Not on file   Occupational History    Not on file   Tobacco Use    Smoking status: Never Smoker    Smokeless tobacco: Former User     Types: Chew   Vaping Use    Vaping Use: Never used   Substance and Sexual Activity    Alcohol use: Not Currently     Alcohol/week: 15.0 standard drinks     Types: 12 Cans of beer, 3 Shots of liquor per week     Comment: Only on the [employer] Drug use: No    Sexual activity: Not on file   Other Topics Concern    Not on file   Social History Narrative    Not on file     Social Determinants of Health     Financial Resource Strain:     Difficulty of Paying Living Expenses:    Food Insecurity:     Worried About Running Out of Food in the Last Year:     Ran Out of Food in the Last Year:    Transportation Needs:     Lack of Transportation (Medical):  Lack of Transportation (Non-Medical):    Physical Activity:     Days of Exercise per Week:     Minutes of Exercise per Session:    Stress:     Feeling of Stress :    Social Connections:     Frequency of Communication with Friends and Family:     Frequency of Social Gatherings with Friends and Family:     Attends Zoroastrian Services:     Active Member of Clubs or Organizations:     Attends Club or Organization Meetings:     Marital Status:    Intimate Partner Violence:     Fear of Current or Ex-Partner:     Emotionally Abused:     Physically Abused:     Sexually Abused:      No current facility-administered medications for this encounter. Current Outpatient Medications   Medication Sig Dispense Refill    naproxen (NAPROSYN) 500 MG tablet Take 1 tablet by mouth 2 times daily 60 tablet 0    acetaminophen (TYLENOL) 325 MG tablet Take 2 tablets by mouth every 6 hours as needed for Pain 120 tablet 3    nitroGLYCERIN (NITROSTAT) 0.4 MG SL tablet up to max of 3 total doses. If no relief after 1 dose, call 911. 25 tablet 3    lisinopril (PRINIVIL;ZESTRIL) 20 MG tablet Take 1 tablet by mouth daily 90 tablet 1    ibuprofen (ADVIL;MOTRIN) 800 MG tablet Take 1 tablet by mouth every 8 hours as needed for Pain 90 tablet 1    aspirin 81 MG tablet Take 81 mg by mouth daily        No Known Allergies  No current facility-administered medications for this encounter. Current Outpatient Medications   Medication Sig Dispense Refill    naproxen (NAPROSYN) 500 MG tablet Take 1 tablet by mouth 2 times daily 60 tablet 0    acetaminophen (TYLENOL) 325 MG tablet Take 2 tablets by mouth every 6 hours as needed for Pain 120 tablet 3    nitroGLYCERIN (NITROSTAT) 0.4 MG SL tablet up to max of 3 total doses. If no relief after 1 dose, call 911. 25 tablet 3    lisinopril (PRINIVIL;ZESTRIL) 20 MG tablet Take 1 tablet by mouth daily 90 tablet 1    ibuprofen (ADVIL;MOTRIN) 800 MG tablet Take 1 tablet by mouth every 8 hours as needed for Pain 90 tablet 1    aspirin 81 MG tablet Take 81 mg by mouth daily         Nursing Notes Reviewed    VITAL SIGNS:  ED Triage Vitals   Enc Vitals Group      BP       Pulse       Resp       Temp       Temp src       SpO2       Weight       Height       Head Circumference       Peak Flow       Pain Score       Pain Loc       Pain Edu? Excl. in [address]? PHYSICAL EXAM:  Physical Exam  Vitals and nursing note reviewed. Constitutional:       General: He is not in acute distress. Appearance: Normal appearance. He is well-developed and well-groomed. He is not ill-appearing, toxic-appearing or diaphoretic. HENT:      Head: Normocephalic and atraumatic. Right Ear: External ear normal.      Left Ear: External ear normal.      Nose: No congestion or rhinorrhea. Eyes:      General: No scleral icterus. Right eye: No discharge. Left eye: No discharge. Extraocular Movements: Extraocular movements intact. Conjunctiva/sclera: Conjunctivae normal.   Neck:      Vascular: No JVD. Trachea: Phonation normal.   Cardiovascular:      Rate and Rhythm: Normal rate and regular rhythm. Pulses: Normal pulses. Heart sounds: Normal heart sounds. No murmur heard. No friction rub. No gallop. Pulmonary:      Effort: Pulmonary effort is normal. No respiratory distress. Breath sounds: Normal breath sounds. No stridor. No wheezing, rhonchi or rales. Abdominal:      General: Bowel sounds are normal. There is no distension. Palpations: Abdomen is soft. There is no mass. Tenderness: There is no abdominal tenderness. There is no guarding or rebound. Negative signs include Cerda's sign, Rovsing's sign and McBurney's sign. Hernia: No hernia is present. Musculoskeletal:         General: Tenderness present. No swelling, deformity or signs of injury. Cervical back: Full passive range of motion without pain and normal range of motion. No edema, erythema, signs of trauma, rigidity, torticollis or crepitus. No pain with movement. Normal range of motion. Left hip: Normal.      Left upper leg: Normal.      Left knee: No swelling, deformity, effusion, erythema, ecchymosis or lacerations. Decreased range of motion. Tenderness present over the patellar tendon. Normal patellar mobility. Normal pulse. Right lower leg: No edema. Left lower leg: Normal. No edema. Left ankle: Normal.   Skin:     General: Skin is warm. Coloration: Skin is not jaundiced or pale. Findings: No bruising, erythema, lesion or rash. Neurological:      General: No focal deficit present. Mental Status: He is alert and oriented to person, place, and time. GCS: GCS eye subscore is 4. GCS verbal subscore is 5. GCS motor subscore is 6. Cranial Nerves: Cranial nerves are intact. No cranial nerve deficit, dysarthria or facial asymmetry. Sensory: Sensation is intact. No sensory deficit. Motor: Motor function is intact.  No weakness, tremor, atrophy, abnormal muscle tone or seizure activity. Coordination: Coordination is intact. Coordination normal.   Psychiatric:         Mood and Affect: Mood normal.         Behavior: Behavior normal. Behavior is cooperative. Thought Content: Thought content normal.         Judgment: Judgment normal.           I have reviewed andinterpreted all of the currently available lab results from this visit (if applicable):    No results found for this visit on 07/05/21. Radiographs (if obtained):  [] The following radiograph was interpreted by myself in the absence of a radiologist:  [x] Radiologist's Report Reviewed:    Xray Knee, Xray L femur, L tib/fib    EKG (if obtained): (All EKG's are interpreted by myself in the absence of a cardiologist)    MDM:    Patient here with left knee pain. Again before arrival he states he was at [facility] for 4 July had some drinks he states he turned the wrong way and heard a pop, crack in his left knee. He thought like his kneecap was dislocated but now appears back into place. He has never dislocated his kneecap before. He denies any anterior posterior dislocation just side to side kneecap pain. He appears otherwise well he came in by EMS he does have pain over his patellar tendon, patella x-ray performed negative for fracture dislocation. He has 2+ DP PT popliteal pulses. No signs of ischemia or compartment syndrome his compartments are soft. He does have range of motion decreased secondary to pain. Again x-rays negative for fracture dislocation. I will put him in a knee immobilizer given possible dislocation and spontaneous reduction of kneecap he does see orthopedic surgeon already supposed to be having knee replacement surgery of his left knee here soon as well. Otherwise patient stable give him anti-inflammatories crutches and knee immobilizer discharge stable. I do not think he has ischemia or DVT or compartment syndrome or infection patient stable discharge.   He is okay with plan.     CLINICAL IMPRESSION:  Final diagnoses:   Knee pain, unspecified chronicity, unspecified laterality       (Please note that portions of this note may have been completed with a voice recognition program. Efforts were made to edit the dictations but occasionally words aremis-transcribed.)    DISPOSITION REFERRAL (if applicable):  DO Berto Shabazz[phone]    In 1 day      [facility] Emergency Department  [facility] [address]    If symptoms worsen    Jovan Lunsford MD  [address] [facility] [address] [place] [address]    Schedule an appointment as soon as possible for a visit in 1 day        DISPOSITION MEDICATIONS (if applicable):  New Prescriptions    ACETAMINOPHEN (TYLENOL) 325 MG TABLET    Take 2 tablets by mouth every 6 hours as needed for Pain    NAPROXEN (NAPROSYN) 500 MG TABLET    Take 1 tablet by mouth 2 times daily          DO Tiffanie Spencer DO  07/05/21 7166

## 2021-07-05 NOTE — ED NOTES
Bed: 01TR-01  Expected date: 7/5/21  Expected time:   Means of arrival:   Comments:  EMS     Christel Witt RN  07/05/21 9930

## 2021-07-07 DIAGNOSIS — G56.02 CARPAL TUNNEL SYNDROME ON LEFT: ICD-10-CM

## 2021-07-07 DIAGNOSIS — Z01.818 PRE-OP EXAM: Primary | ICD-10-CM

## 2021-07-07 DIAGNOSIS — S43.422A SPRAIN OF LEFT ROTATOR CUFF CAPSULE, INITIAL ENCOUNTER: ICD-10-CM

## 2021-07-13 NOTE — PROGRESS NOTES
..Surgery  7/21/2021  you will be called  7/20/2021    with times               1. Do not eat or drink anything after midnight - unless instructed by your doctor prior to surgery. This includes                   no water, chewing gum or mints. 2. Follow your directions as prescribed by the doctor for your procedure and medications. 3. Check with your Doctor regarding stopping Plavix, Coumadin, Lovenox,Effient,Pradaxa,Xarelto, Fragmin or other blood thinners and                   follow their instructions. 4. Do not smoke, and do not drink any alcoholic beverages 24 hours prior to surgery. This includes NA Beer. 5. You may brush your teeth and gargle the morning of surgery. DO NOT SWALLOW WATER   6. You MUST make arrangements for a responsible adult to take you home after your surgery and be able to check on you every couple                   hours for the day. You will not be allowed to leave alone or drive yourself home. It is strongly suggested someone stay with you the first 24                   hrs. Your surgery will be cancelled if you do not have a ride home. 7. Please wear simple, loose fitting clothing to the hospital.  Briana Hurt not bring valuables (money, credit cards, checkbooks, etc.) Do not wear any                   makeup (including no eye makeup) or nail polish on your fingers or toes. 8. DO NOT wear any jewelry or piercings on day of surgery. All body piercing jewelry must be removed. 9. If you have dentures, they will be removed before going to the OR; we will provide you a container. If you wear contact lenses or glasses,                  they will be removed; please bring a case for them. 10. If you  have a Living Will and Durable Power of  for Healthcare, please bring in a copy. 11. Please bring picture ID,  insurance card, paperwork from the doctors office    (H & P, Consent, & card for implantable devices).            12. Take a shower

## 2021-07-14 ENCOUNTER — ANESTHESIA EVENT (OUTPATIENT)
Dept: OPERATING ROOM | Age: 56
End: 2021-07-14
Payer: COMMERCIAL

## 2021-07-14 ENCOUNTER — PROCEDURE VISIT (OUTPATIENT)
Dept: CARDIOLOGY CLINIC | Age: 56
End: 2021-07-14
Payer: COMMERCIAL

## 2021-07-14 ENCOUNTER — HOSPITAL ENCOUNTER (OUTPATIENT)
Age: 56
Discharge: HOME | End: 2021-07-14
Payer: COMMERCIAL

## 2021-07-14 ENCOUNTER — HOSPITAL ENCOUNTER (OUTPATIENT)
Dept: PREADMISSION TESTING | Age: 56
Discharge: HOME | End: 2021-07-14

## 2021-07-14 ENCOUNTER — HOSPITAL ENCOUNTER (OUTPATIENT)
Dept: LAB | Age: 56
Discharge: HOME | End: 2021-07-14
Payer: COMMERCIAL

## 2021-07-14 DIAGNOSIS — I10 ESSENTIAL HYPERTENSION: ICD-10-CM

## 2021-07-14 LAB
ANION GAP SERPL CALCULATED.3IONS-SCNC: 15 MMOL/L (ref 4–16)
BACTERIA: NEGATIVE /HPF
BILIRUBIN URINE: NEGATIVE MG/DL
BLOOD, URINE: NEGATIVE
BUN BLDV-MCNC: 17 MG/DL (ref 6–23)
CALCIUM SERPL-MCNC: 9.4 MG/DL (ref 8.3–10.6)
CHLORIDE BLD-SCNC: 101 MMOL/L (ref 99–110)
CHOLESTEROL: 219 MG/DL
CLARITY: CLEAR
CO2: 25 MMOL/L (ref 21–32)
COLOR: YELLOW
CREAT SERPL-MCNC: 0.9 MG/DL (ref 0.9–1.3)
GFR AFRICAN AMERICAN: >60 ML/MIN/1.73M2
GFR NON-AFRICAN AMERICAN: >60 ML/MIN/1.73M2
GLUCOSE BLD-MCNC: 119 MG/DL (ref 70–99)
GLUCOSE, URINE: NEGATIVE MG/DL
HCT VFR BLD CALC: 45 % (ref 42–52)
HDLC SERPL-MCNC: 49 MG/DL
HEMOGLOBIN: 14.7 GM/DL (ref 13.5–18)
KETONES, URINE: NEGATIVE MG/DL
LDL CHOLESTEROL DIRECT: 147 MG/DL
LEUKOCYTE ESTERASE, URINE: NEGATIVE
LV EF: 58 %
LVEF MODALITY: NORMAL
MCH RBC QN AUTO: 29.5 PG (ref 27–31)
MCHC RBC AUTO-ENTMCNC: 32.7 % (ref 32–36)
MCV RBC AUTO: 90.4 FL (ref 78–100)
MUCUS: ABNORMAL HPF
NITRITE URINE, QUANTITATIVE: NEGATIVE
PDW BLD-RTO: 12.9 % (ref 11.7–14.9)
PH, URINE: 5 (ref 5–8)
PLATELET # BLD: 222 K/CU MM (ref 140–440)
PMV BLD AUTO: 9.2 FL (ref 7.5–11.1)
POTASSIUM SERPL-SCNC: 4.4 MMOL/L (ref 3.5–5.1)
PROTEIN UA: NEGATIVE MG/DL
RBC # BLD: 4.98 M/CU MM (ref 4.6–6.2)
RBC URINE: <1 /HPF (ref 0–3)
SARS-COV-2: DETECTED
SODIUM BLD-SCNC: 141 MMOL/L (ref 135–145)
SOURCE: ABNORMAL
SPECIFIC GRAVITY UA: 1.02 (ref 1–1.03)
TRICHOMONAS: ABNORMAL /HPF
TRIGL SERPL-MCNC: 204 MG/DL
UROBILINOGEN, URINE: NEGATIVE MG/DL (ref 0.2–1)
WBC # BLD: 7.6 K/CU MM (ref 4–10.5)
WBC UA: 1 /HPF (ref 0–2)

## 2021-07-14 PROCEDURE — 81001 URINALYSIS AUTO W/SCOPE: CPT

## 2021-07-14 PROCEDURE — 85027 COMPLETE CBC AUTOMATED: CPT

## 2021-07-14 PROCEDURE — 36415 COLL VENOUS BLD VENIPUNCTURE: CPT

## 2021-07-14 PROCEDURE — 93306 TTE W/DOPPLER COMPLETE: CPT | Performed by: INTERNAL MEDICINE

## 2021-07-14 PROCEDURE — 83721 ASSAY OF BLOOD LIPOPROTEIN: CPT

## 2021-07-14 PROCEDURE — U0005 INFEC AGEN DETEC AMPLI PROBE: HCPCS

## 2021-07-14 PROCEDURE — 80048 BASIC METABOLIC PNL TOTAL CA: CPT

## 2021-07-14 PROCEDURE — U0003 INFECTIOUS AGENT DETECTION BY NUCLEIC ACID (DNA OR RNA); SEVERE ACUTE RESPIRATORY SYNDROME CORONAVIRUS 2 (SARS-COV-2) (CORONAVIRUS DISEASE [COVID-19]), AMPLIFIED PROBE TECHNIQUE, MAKING USE OF HIGH THROUGHPUT TECHNOLOGIES AS DESCRIBED BY CMS-2020-01-R: HCPCS

## 2021-07-14 PROCEDURE — 80061 LIPID PANEL: CPT

## 2021-07-14 NOTE — PROGRESS NOTES
Called and notified Dr Humberto Estrella office that the patient had glucose of 119 and had + COVID test results- pt gave no hx of having COVID or recent exposure.  Then called and notified Dr Dereck Hermosillo of the above- states that his understanding is that if pt tested + for covid had to wait 6 weeks for elective surgery

## 2021-07-15 ENCOUNTER — TELEPHONE (OUTPATIENT)
Dept: INTERNAL MEDICINE CLINIC | Age: 56
End: 2021-07-15

## 2021-07-15 ENCOUNTER — TELEPHONE (OUTPATIENT)
Dept: CARDIOLOGY CLINIC | Age: 56
End: 2021-07-15

## 2021-07-15 DIAGNOSIS — Z01.818 PRE-OP EVALUATION: Primary | ICD-10-CM

## 2021-07-15 DIAGNOSIS — G56.02 CARPAL TUNNEL SYNDROME ON LEFT: ICD-10-CM

## 2021-07-15 DIAGNOSIS — S43.422A SPRAIN OF LEFT ROTATOR CUFF CAPSULE, INITIAL ENCOUNTER: ICD-10-CM

## 2021-07-15 NOTE — TELEPHONE ENCOUNTER
Results given to the patient. Limited study due to patients body habitus. Left ventricular function and size is normal, EF is estimated at 55-60%. Mild left ventricular hypertrophy. E/A reversal; indeterminate diastolic function. No regional wall motion abnormalities were detected. Mild pulmonic regurgitation present. No significant valvular disease noted. No evidence of pericardial effusion.

## 2021-07-16 ENCOUNTER — HOSPITAL ENCOUNTER (OUTPATIENT)
Age: 56
Setting detail: SPECIMEN
Discharge: HOME | End: 2021-07-16
Payer: COMMERCIAL

## 2021-07-16 PROCEDURE — U0003 INFECTIOUS AGENT DETECTION BY NUCLEIC ACID (DNA OR RNA); SEVERE ACUTE RESPIRATORY SYNDROME CORONAVIRUS 2 (SARS-COV-2) (CORONAVIRUS DISEASE [COVID-19]), AMPLIFIED PROBE TECHNIQUE, MAKING USE OF HIGH THROUGHPUT TECHNOLOGIES AS DESCRIBED BY CMS-2020-01-R: HCPCS

## 2021-07-16 PROCEDURE — U0005 INFEC AGEN DETEC AMPLI PROBE: HCPCS

## 2021-07-17 LAB
SARS-COV-2: NOT DETECTED
SOURCE: NORMAL

## 2021-07-19 ENCOUNTER — TELEPHONE (OUTPATIENT)
Dept: ORTHOPEDIC SURGERY | Age: 56
End: 2021-07-19

## 2021-07-19 NOTE — ANESTHESIA PRE PROCEDURE
Department of Anesthesiology  Preprocedure Note       Name:  Candance South   Age:  55 y.o.  :  1965                                          MRN:  [id number]         Date:  2021      Surgeon: Eloisa Rachel):  Batsheva Florentino MD    Procedure: Procedure(s):  LEFT SHOULDER ARTHROSCOPY ROTATOR CUFF REPAIR, SUBACROMIAL DECOMPRESSION  LEFTCARPAL TUNNEL RELEASE    Medications prior to admission:   Prior to Admission medications    Medication Sig Start Date End Date Taking? Authorizing Provider   naproxen (NAPROSYN) 500 MG tablet Take 1 tablet by mouth 2 times daily 21   Mayra Bo, DO   acetaminophen (TYLENOL) 325 MG tablet Take 2 tablets by mouth every 6 hours as needed for Pain 21   Mayra Bo, DO   nitroGLYCERIN (NITROSTAT) 0.4 MG SL tablet up to max of 3 total doses. If no relief after 1 dose, call 911. 21   RYANN Whitlock CNP   lisinopril (PRINIVIL;ZESTRIL) 20 MG tablet Take 1 tablet by mouth daily  Patient taking differently: Take 20 mg by mouth nightly  21   Danya Weems,    ibuprofen (ADVIL;MOTRIN) 800 MG tablet Take 1 tablet by mouth every 8 hours as needed for Pain 20   Batsheva Florentino MD   aspirin 81 MG tablet Take 81 mg by mouth daily    Historical Provider, MD       Current medications:    No current facility-administered medications for this encounter. Current Outpatient Medications   Medication Sig Dispense Refill    naproxen (NAPROSYN) 500 MG tablet Take 1 tablet by mouth 2 times daily 60 tablet 0    acetaminophen (TYLENOL) 325 MG tablet Take 2 tablets by mouth every 6 hours as needed for Pain 120 tablet 3    nitroGLYCERIN (NITROSTAT) 0.4 MG SL tablet up to max of 3 total doses.  If no relief after 1 dose, call 911. 25 tablet 3    lisinopril (PRINIVIL;ZESTRIL) 20 MG tablet Take 1 tablet by mouth daily (Patient taking differently: Take 20 mg by mouth nightly ) 90 tablet 1    ibuprofen (ADVIL;MOTRIN) 800 MG tablet Take 1 tablet by mouth every 8 hours as needed for Pain 90 tablet 1    aspirin 81 MG tablet Take 81 mg by mouth daily         Allergies:  No Known Allergies    Problem List:    Patient Active Problem List   Diagnosis Code    Hypertension I10    Hyperlipidemia E78.5    Prediabetes R73.03    Medial meniscus tear S83.249A    Bilateral carpal tunnel syndrome G56.03    Sprain of left rotator cuff capsule S43.422A       Past Medical History:        Diagnosis Date    Arthritis     Bilateral carpal tunnel syndrome     CAD (coronary artery disease)     ECHO 07/14/2021    EF 55-60%. Mild Pulmonic regurgitation is present, No evidence of pericardial effusion.     H/O echocardiogram 04/28/2016    EF 55% WNL- Stage 1 dysfunction     Hyperlipidemia     Hypertension     Medial meniscus tear     Left knee    Prediabetes     pt denies any hx of diabetes    Wears glasses     to read       Past Surgical History:        Procedure Laterality Date    APPENDECTOMY  age 13   [facility] HAND SURGERY Right 2002    SHOULDER SURGERY Right 2006       Social History:    Social History     Tobacco Use    Smoking status: Never Smoker    Smokeless tobacco: Former User     Types: Chew   Substance Use Topics    Alcohol use: Not Currently     Alcohol/week: 15.0 standard drinks     Types: 12 Cans of beer, 3 Shots of liquor per week     Comment: Only on the weeknds /per pt on 7/13/2021\" 4-5 beers per weekend, never drank every day                                Counseling given: Not Answered      Vital Signs (Current):   Vitals:    07/13/21 1411   Weight: 237 lb (107.5 kg)   Height: 5' 8\" (1.727 m)                                              BP Readings from Last 3 Encounters:   07/05/21 117/85   06/18/21 120/78   05/05/21 130/88       NPO Status:                                                                                 BMI:   Wt Readings from Last 3 Encounters:   07/05/21 237 lb (107.5 kg)   06/18/21 232 lb 9.6 oz (105.5 kg)   06/11/21 235 lb (106.6 kg)     Body mass index is 36.04 kg/m². CBC:   Lab Results   Component Value Date    WBC 7.6 07/14/2021    RBC 4.98 07/14/2021    HGB 14.7 07/14/2021    HCT 45.0 07/14/2021    MCV 90.4 07/14/2021    RDW 12.9 07/14/2021     07/14/2021       CMP:   Lab Results   Component Value Date     07/14/2021    K 4.4 07/14/2021     07/14/2021    CO2 25 07/14/2021    BUN 17 07/14/2021    CREATININE 0.9 07/14/2021    GFRAA >60 07/14/2021    AGRATIO 1.8 11/13/2020    LABGLOM >60 07/14/2021    GLUCOSE 119 07/14/2021    PROT 6.5 11/15/2020    CALCIUM 9.4 07/14/2021    BILITOT 0.1 11/15/2020    ALKPHOS 53 11/15/2020    AST 16 11/15/2020    ALT 19 11/15/2020       POC Tests: No results for input(s): POCGLU, POCNA, POCK, POCCL, POCBUN, POCHEMO, POCHCT in the last 72 hours. Coags:   Lab Results   Component Value Date    PROTIME 10.2 10/04/2015    INR 0.89 10/04/2015    APTT 24.9 10/04/2015       HCG (If Applicable): No results found for: PREGTESTUR, PREGSERUM, HCG, HCGQUANT     ABGs: No results found for: PHART, PO2ART, KAW1KOR, KQQ0KXM, BEART, U1PUSWBB     Type & Screen (If Applicable):  No results found for: LABABO, LABRH    Drug/Infectious Status (If Applicable):  No results found for: HIV, HEPCAB    COVID-19 Screening (If Applicable):   Lab Results   Component Value Date    COVID19 NOT DETECTED 07/16/2021           Anesthesia Evaluation  Patient summary reviewed  Airway: Mallampati: II        Dental:          Pulmonary: breath sounds clear to auscultation                             Cardiovascular:    (+) hypertension:, CAD:, hyperlipidemia        Rhythm: regular                      Neuro/Psych:                ROS comment: alot of etoh  GI/Hepatic/Renal:             Endo/Other:    (+) : arthritis: OA., .                 Abdominal:   (+) obese,           Vascular:           Other Findings:           Anesthesia Plan      general and regional     ASA 3     (Chart review only  Rapid on dos with hx of +7/14/ and - 7/16  Third covid PCR neg 7/20/21-Cleared per Dr Girish Merino  )  Induction: intravenous. MIPS: Postoperative opioids intended. Anesthetic plan and risks discussed with patient. Plan discussed with CRNA.     Attending anesthesiologist reviewed and agrees with Preprocedure content            RYANN Clark - CRNA   7/19/2021

## 2021-07-19 NOTE — TELEPHONE ENCOUNTER
Patient called in and wanted to make sure his surgery was still a go after his repeat covid test came back negative. He has experienced no symptoms nor has he been exposed to anyone with symptoms. His Girlfriend was also tested and her results were negative as well. We have his cardiac clearance.  We will proceed with surgery on July 21, 2021

## 2021-07-20 NOTE — PROGRESS NOTES
Patient had covid test repeated on 7/16/2021 and it was negative - states he had another test done yesterday and waiting on results

## 2021-07-20 NOTE — PROGRESS NOTES
Called and notified the patient surgery 7/21/2021@ 2627 with arrival at [phone]- verbalized understanding

## 2021-07-21 ENCOUNTER — HOSPITAL ENCOUNTER (OUTPATIENT)
Age: 56
Setting detail: OUTPATIENT SURGERY
Discharge: HOME | End: 2021-07-21
Attending: ORTHOPAEDIC SURGERY | Admitting: ORTHOPAEDIC SURGERY
Payer: COMMERCIAL

## 2021-07-21 ENCOUNTER — ANESTHESIA (OUTPATIENT)
Dept: OPERATING ROOM | Age: 56
End: 2021-07-21
Payer: COMMERCIAL

## 2021-07-21 VITALS
HEIGHT: 68 IN | BODY MASS INDEX: 35.92 KG/M2 | HEART RATE: 82 BPM | RESPIRATION RATE: 16 BRPM | SYSTOLIC BLOOD PRESSURE: 129 MMHG | TEMPERATURE: 96.9 F | WEIGHT: 237 LBS | DIASTOLIC BLOOD PRESSURE: 74 MMHG | OXYGEN SATURATION: 95 %

## 2021-07-21 VITALS
DIASTOLIC BLOOD PRESSURE: 73 MMHG | TEMPERATURE: 97.2 F | RESPIRATION RATE: 1 BRPM | OXYGEN SATURATION: 100 % | SYSTOLIC BLOOD PRESSURE: 105 MMHG

## 2021-07-21 DIAGNOSIS — E78.5 HYPERLIPIDEMIA, UNSPECIFIED HYPERLIPIDEMIA TYPE: ICD-10-CM

## 2021-07-21 DIAGNOSIS — G56.02 CARPAL TUNNEL SYNDROME ON LEFT: ICD-10-CM

## 2021-07-21 DIAGNOSIS — Z01.818 PRE-OP EXAM: ICD-10-CM

## 2021-07-21 DIAGNOSIS — S83.232A COMPLEX TEAR OF MEDIAL MENISCUS OF LEFT KNEE AS CURRENT INJURY, INITIAL ENCOUNTER: ICD-10-CM

## 2021-07-21 DIAGNOSIS — S43.422A SPRAIN OF LEFT ROTATOR CUFF CAPSULE, INITIAL ENCOUNTER: ICD-10-CM

## 2021-07-21 LAB
EKG ATRIAL RATE: 74 BPM
EKG DIAGNOSIS: NORMAL
EKG P AXIS: 35 DEGREES
EKG P-R INTERVAL: 140 MS
EKG Q-T INTERVAL: 372 MS
EKG QRS DURATION: 78 MS
EKG QTC CALCULATION (BAZETT): 412 MS
EKG R AXIS: 14 DEGREES
EKG T AXIS: 21 DEGREES
EKG VENTRICULAR RATE: 74 BPM

## 2021-07-21 PROCEDURE — 93005 ELECTROCARDIOGRAM TRACING: CPT | Performed by: ANESTHESIOLOGY

## 2021-07-21 PROCEDURE — 3600000004 HC SURGERY LEVEL 4 BASE: Performed by: ORTHOPAEDIC SURGERY

## 2021-07-21 PROCEDURE — 7100000000 HC PACU RECOVERY - FIRST 15 MIN: Performed by: ORTHOPAEDIC SURGERY

## 2021-07-21 PROCEDURE — 3700000000 HC ANESTHESIA ATTENDED CARE: Performed by: ORTHOPAEDIC SURGERY

## 2021-07-21 PROCEDURE — 64415 NJX AA&/STRD BRCH PLXS IMG: CPT | Performed by: ANESTHESIOLOGY

## 2021-07-21 PROCEDURE — 6360000002 HC RX W HCPCS

## 2021-07-21 PROCEDURE — 6360000002 HC RX W HCPCS: Performed by: ORTHOPAEDIC SURGERY

## 2021-07-21 PROCEDURE — 3600000014 HC SURGERY LEVEL 4 ADDTL 15MIN: Performed by: ORTHOPAEDIC SURGERY

## 2021-07-21 PROCEDURE — 2580000003 HC RX 258: Performed by: NURSE ANESTHETIST, CERTIFIED REGISTERED

## 2021-07-21 PROCEDURE — L3650 SO 8 ABD RESTRAINT PRE OTS: HCPCS | Performed by: ORTHOPAEDIC SURGERY

## 2021-07-21 PROCEDURE — 2500000003 HC RX 250 WO HCPCS: Performed by: NURSE ANESTHETIST, CERTIFIED REGISTERED

## 2021-07-21 PROCEDURE — 93010 ELECTROCARDIOGRAM REPORT: CPT | Performed by: INTERNAL MEDICINE

## 2021-07-21 PROCEDURE — 2580000003 HC RX 258

## 2021-07-21 PROCEDURE — 29827 SHO ARTHRS SRG RT8TR CUF RPR: CPT | Performed by: ORTHOPAEDIC SURGERY

## 2021-07-21 PROCEDURE — 3700000001 HC ADD 15 MINUTES (ANESTHESIA): Performed by: ORTHOPAEDIC SURGERY

## 2021-07-21 PROCEDURE — 6360000002 HC RX W HCPCS: Performed by: ANESTHESIOLOGY

## 2021-07-21 PROCEDURE — 6360000002 HC RX W HCPCS: Performed by: NURSE ANESTHETIST, CERTIFIED REGISTERED

## 2021-07-21 PROCEDURE — 7100000011 HC PHASE II RECOVERY - ADDTL 15 MIN: Performed by: ORTHOPAEDIC SURGERY

## 2021-07-21 PROCEDURE — C1713 ANCHOR/SCREW BN/BN,TIS/BN: HCPCS | Performed by: ORTHOPAEDIC SURGERY

## 2021-07-21 PROCEDURE — 2500000003 HC RX 250 WO HCPCS: Performed by: ANESTHESIOLOGY

## 2021-07-21 PROCEDURE — 2709999900 HC NON-CHARGEABLE SUPPLY: Performed by: ORTHOPAEDIC SURGERY

## 2021-07-21 PROCEDURE — 2580000003 HC RX 258: Performed by: ORTHOPAEDIC SURGERY

## 2021-07-21 PROCEDURE — 29826 SHO ARTHRS SRG DECOMPRESSION: CPT | Performed by: ORTHOPAEDIC SURGERY

## 2021-07-21 PROCEDURE — 7100000010 HC PHASE II RECOVERY - FIRST 15 MIN: Performed by: ORTHOPAEDIC SURGERY

## 2021-07-21 PROCEDURE — 7100000001 HC PACU RECOVERY - ADDTL 15 MIN: Performed by: ORTHOPAEDIC SURGERY

## 2021-07-21 PROCEDURE — 2580000003 HC RX 258: Performed by: ANESTHESIOLOGY

## 2021-07-21 PROCEDURE — 2720000010 HC SURG SUPPLY STERILE: Performed by: ORTHOPAEDIC SURGERY

## 2021-07-21 PROCEDURE — 64721 CARPAL TUNNEL SURGERY: CPT | Performed by: ORTHOPAEDIC SURGERY

## 2021-07-21 DEVICE — ANCHOR SUT L19.1MM DIA4.75MM BIOCOMPOSITE FULL THRD: Type: IMPLANTABLE DEVICE | Site: SHOULDER | Status: FUNCTIONAL

## 2021-07-21 DEVICE — ANCHOR SUTURE BIOCOMP 4.75X19.1 MM SWIVELOCK C: Type: IMPLANTABLE DEVICE | Site: SHOULDER | Status: FUNCTIONAL

## 2021-07-21 RX ORDER — ONDANSETRON 2 MG/ML
4 INJECTION INTRAMUSCULAR; INTRAVENOUS
Status: DISCONTINUED | OUTPATIENT
Start: 2021-07-21 | End: 2021-07-21 | Stop reason: HOSPADM

## 2021-07-21 RX ORDER — ROPIVACAINE HYDROCHLORIDE 5 MG/ML
INJECTION, SOLUTION EPIDURAL; INFILTRATION; PERINEURAL
Status: COMPLETED | OUTPATIENT
Start: 2021-07-21 | End: 2021-07-21

## 2021-07-21 RX ORDER — FENTANYL CITRATE 50 UG/ML
25 INJECTION, SOLUTION INTRAMUSCULAR; INTRAVENOUS EVERY 5 MIN PRN
Status: DISCONTINUED | OUTPATIENT
Start: 2021-07-21 | End: 2021-07-21 | Stop reason: HOSPADM

## 2021-07-21 RX ORDER — FENTANYL CITRATE 50 UG/ML
50 INJECTION, SOLUTION INTRAMUSCULAR; INTRAVENOUS EVERY 5 MIN PRN
Status: DISCONTINUED | OUTPATIENT
Start: 2021-07-21 | End: 2021-07-21 | Stop reason: HOSPADM

## 2021-07-21 RX ORDER — PHENYLEPHRINE HYDROCHLORIDE 10 MG/ML
INJECTION INTRAVENOUS PRN
Status: DISCONTINUED | OUTPATIENT
Start: 2021-07-21 | End: 2021-07-21 | Stop reason: SDUPTHER

## 2021-07-21 RX ORDER — VECURONIUM BROMIDE 1 MG/ML
INJECTION, POWDER, LYOPHILIZED, FOR SOLUTION INTRAVENOUS PRN
Status: DISCONTINUED | OUTPATIENT
Start: 2021-07-21 | End: 2021-07-21 | Stop reason: SDUPTHER

## 2021-07-21 RX ORDER — PROPOFOL 10 MG/ML
INJECTION, EMULSION INTRAVENOUS PRN
Status: DISCONTINUED | OUTPATIENT
Start: 2021-07-21 | End: 2021-07-21 | Stop reason: SDUPTHER

## 2021-07-21 RX ORDER — HYDRALAZINE HYDROCHLORIDE 20 MG/ML
5 INJECTION INTRAMUSCULAR; INTRAVENOUS EVERY 10 MIN PRN
Status: DISCONTINUED | OUTPATIENT
Start: 2021-07-21 | End: 2021-07-21 | Stop reason: HOSPADM

## 2021-07-21 RX ORDER — FENTANYL CITRATE 50 UG/ML
INJECTION, SOLUTION INTRAMUSCULAR; INTRAVENOUS PRN
Status: DISCONTINUED | OUTPATIENT
Start: 2021-07-21 | End: 2021-07-21 | Stop reason: SDUPTHER

## 2021-07-21 RX ORDER — DEXAMETHASONE SODIUM PHOSPHATE 4 MG/ML
INJECTION, SOLUTION INTRA-ARTICULAR; INTRALESIONAL; INTRAMUSCULAR; INTRAVENOUS; SOFT TISSUE PRN
Status: DISCONTINUED | OUTPATIENT
Start: 2021-07-21 | End: 2021-07-21 | Stop reason: SDUPTHER

## 2021-07-21 RX ORDER — SODIUM CHLORIDE, SODIUM LACTATE, POTASSIUM CHLORIDE, CALCIUM CHLORIDE 600; 310; 30; 20 MG/100ML; MG/100ML; MG/100ML; MG/100ML
INJECTION, SOLUTION INTRAVENOUS CONTINUOUS PRN
Status: DISCONTINUED | OUTPATIENT
Start: 2021-07-21 | End: 2021-07-21 | Stop reason: SDUPTHER

## 2021-07-21 RX ORDER — SUCCINYLCHOLINE CHLORIDE 20 MG/ML
INJECTION INTRAMUSCULAR; INTRAVENOUS PRN
Status: DISCONTINUED | OUTPATIENT
Start: 2021-07-21 | End: 2021-07-21 | Stop reason: SDUPTHER

## 2021-07-21 RX ORDER — LABETALOL HYDROCHLORIDE 5 MG/ML
5 INJECTION, SOLUTION INTRAVENOUS EVERY 10 MIN PRN
Status: DISCONTINUED | OUTPATIENT
Start: 2021-07-21 | End: 2021-07-21 | Stop reason: HOSPADM

## 2021-07-21 RX ORDER — ONDANSETRON 2 MG/ML
INJECTION INTRAMUSCULAR; INTRAVENOUS PRN
Status: DISCONTINUED | OUTPATIENT
Start: 2021-07-21 | End: 2021-07-21 | Stop reason: SDUPTHER

## 2021-07-21 RX ORDER — MIDAZOLAM HYDROCHLORIDE 1 MG/ML
INJECTION INTRAMUSCULAR; INTRAVENOUS PRN
Status: DISCONTINUED | OUTPATIENT
Start: 2021-07-21 | End: 2021-07-21 | Stop reason: SDUPTHER

## 2021-07-21 RX ORDER — SODIUM CHLORIDE, SODIUM LACTATE, POTASSIUM CHLORIDE, CALCIUM CHLORIDE 600; 310; 30; 20 MG/100ML; MG/100ML; MG/100ML; MG/100ML
INJECTION, SOLUTION INTRAVENOUS CONTINUOUS
Status: DISCONTINUED | OUTPATIENT
Start: 2021-07-21 | End: 2021-07-21 | Stop reason: HOSPADM

## 2021-07-21 RX ORDER — LIDOCAINE HYDROCHLORIDE 20 MG/ML
INJECTION, SOLUTION EPIDURAL; INFILTRATION; INTRACAUDAL; PERINEURAL PRN
Status: DISCONTINUED | OUTPATIENT
Start: 2021-07-21 | End: 2021-07-21 | Stop reason: SDUPTHER

## 2021-07-21 RX ORDER — OXYCODONE HYDROCHLORIDE AND ACETAMINOPHEN 5; 325 MG/1; MG/1
1 TABLET ORAL EVERY 6 HOURS PRN
Qty: 28 TABLET | Refills: 0 | Status: SHIPPED | OUTPATIENT
Start: 2021-07-21 | End: 2021-07-28

## 2021-07-21 RX ORDER — ROCURONIUM BROMIDE 10 MG/ML
INJECTION, SOLUTION INTRAVENOUS PRN
Status: DISCONTINUED | OUTPATIENT
Start: 2021-07-21 | End: 2021-07-21 | Stop reason: SDUPTHER

## 2021-07-21 RX ORDER — CEFAZOLIN SODIUM 2 G/100ML
2000 INJECTION, SOLUTION INTRAVENOUS
Status: COMPLETED | OUTPATIENT
Start: 2021-07-21 | End: 2021-07-21

## 2021-07-21 RX ORDER — IBUPROFEN 800 MG/1
800 TABLET ORAL EVERY 8 HOURS PRN
Qty: 90 TABLET | Refills: 1 | Status: SHIPPED | OUTPATIENT
Start: 2021-07-21

## 2021-07-21 RX ORDER — LIDOCAINE HYDROCHLORIDE 10 MG/ML
INJECTION, SOLUTION INFILTRATION; PERINEURAL
Status: COMPLETED | OUTPATIENT
Start: 2021-07-21 | End: 2021-07-21

## 2021-07-21 RX ORDER — ETOMIDATE 2 MG/ML
INJECTION INTRAVENOUS PRN
Status: DISCONTINUED | OUTPATIENT
Start: 2021-07-21 | End: 2021-07-21 | Stop reason: SDUPTHER

## 2021-07-21 RX ADMIN — SUGAMMADEX 200 MG: 100 INJECTION, SOLUTION INTRAVENOUS at 16:02

## 2021-07-21 RX ADMIN — FENTANYL CITRATE 50 MCG: 50 INJECTION, SOLUTION INTRAMUSCULAR; INTRAVENOUS at 16:37

## 2021-07-21 RX ADMIN — VECURONIUM BROMIDE FOR INJECTION 1 MG: 1 INJECTION, POWDER, LYOPHILIZED, FOR SOLUTION INTRAVENOUS at 15:33

## 2021-07-21 RX ADMIN — PHENYLEPHRINE HYDROCHLORIDE 100 MCG: 10 INJECTION INTRAVENOUS at 14:38

## 2021-07-21 RX ADMIN — CEFAZOLIN SODIUM 2000 MG: 2 INJECTION, SOLUTION INTRAVENOUS at 13:56

## 2021-07-21 RX ADMIN — FENTANYL CITRATE 100 MCG: 50 INJECTION, SOLUTION INTRAMUSCULAR; INTRAVENOUS at 13:52

## 2021-07-21 RX ADMIN — VECURONIUM BROMIDE FOR INJECTION 2 MG: 1 INJECTION, POWDER, LYOPHILIZED, FOR SOLUTION INTRAVENOUS at 15:16

## 2021-07-21 RX ADMIN — SODIUM CHLORIDE, POTASSIUM CHLORIDE, SODIUM LACTATE AND CALCIUM CHLORIDE: 600; 310; 30; 20 INJECTION, SOLUTION INTRAVENOUS at 14:50

## 2021-07-21 RX ADMIN — ETOMIDATE 15 MG: 2 INJECTION, SOLUTION INTRAVENOUS at 13:52

## 2021-07-21 RX ADMIN — DEXAMETHASONE SODIUM PHOSPHATE 8 MG: 4 INJECTION, SOLUTION INTRAMUSCULAR; INTRAVENOUS at 14:00

## 2021-07-21 RX ADMIN — PROPOFOL 100 MG: 10 INJECTION, EMULSION INTRAVENOUS at 13:52

## 2021-07-21 RX ADMIN — MIDAZOLAM 2 MG: 1 INJECTION INTRAMUSCULAR; INTRAVENOUS at 13:52

## 2021-07-21 RX ADMIN — MIDAZOLAM 2 MG: 1 INJECTION INTRAMUSCULAR; INTRAVENOUS at 13:16

## 2021-07-21 RX ADMIN — VECURONIUM BROMIDE FOR INJECTION 2 MG: 1 INJECTION, POWDER, LYOPHILIZED, FOR SOLUTION INTRAVENOUS at 14:48

## 2021-07-21 RX ADMIN — SODIUM CHLORIDE, POTASSIUM CHLORIDE, SODIUM LACTATE AND CALCIUM CHLORIDE: 600; 310; 30; 20 INJECTION, SOLUTION INTRAVENOUS at 10:53

## 2021-07-21 RX ADMIN — PHENYLEPHRINE HYDROCHLORIDE 40 MCG/MIN: 10 INJECTION INTRAVENOUS at 15:13

## 2021-07-21 RX ADMIN — ROPIVACAINE HYDROCHLORIDE 20 ML: 5 INJECTION, SOLUTION EPIDURAL; INFILTRATION; PERINEURAL at 12:54

## 2021-07-21 RX ADMIN — LIDOCAINE HYDROCHLORIDE 100 MG: 20 INJECTION, SOLUTION EPIDURAL; INFILTRATION; INTRACAUDAL; PERINEURAL at 13:52

## 2021-07-21 RX ADMIN — FENTANYL CITRATE 50 MCG: 50 INJECTION, SOLUTION INTRAMUSCULAR; INTRAVENOUS at 16:26

## 2021-07-21 RX ADMIN — LIDOCAINE HYDROCHLORIDE 1 ML: 10 INJECTION, SOLUTION INFILTRATION; PERINEURAL at 12:54

## 2021-07-21 RX ADMIN — ROCURONIUM BROMIDE 50 MG: 10 INJECTION INTRAVENOUS at 13:52

## 2021-07-21 RX ADMIN — SODIUM CHLORIDE, POTASSIUM CHLORIDE, SODIUM LACTATE AND CALCIUM CHLORIDE: 600; 310; 30; 20 INJECTION, SOLUTION INTRAVENOUS at 13:41

## 2021-07-21 RX ADMIN — ONDANSETRON 4 MG: 2 INJECTION INTRAMUSCULAR; INTRAVENOUS at 15:40

## 2021-07-21 RX ADMIN — SUCCINYLCHOLINE CHLORIDE 160 MG: 20 INJECTION, SOLUTION INTRAMUSCULAR; INTRAVENOUS at 13:52

## 2021-07-21 ASSESSMENT — PULMONARY FUNCTION TESTS
PIF_VALUE: 25
PIF_VALUE: 20
PIF_VALUE: 20
PIF_VALUE: 1
PIF_VALUE: 20
PIF_VALUE: 21
PIF_VALUE: 22
PIF_VALUE: 19
PIF_VALUE: 20
PIF_VALUE: 20
PIF_VALUE: 32
PIF_VALUE: 7
PIF_VALUE: 20
PIF_VALUE: 22
PIF_VALUE: 22
PIF_VALUE: 19
PIF_VALUE: 20
PIF_VALUE: 21
PIF_VALUE: 20
PIF_VALUE: 21
PIF_VALUE: 23
PIF_VALUE: 21
PIF_VALUE: 20
PIF_VALUE: 21
PIF_VALUE: 21
PIF_VALUE: 20
PIF_VALUE: 2
PIF_VALUE: 3
PIF_VALUE: 19
PIF_VALUE: 21
PIF_VALUE: 21
PIF_VALUE: 22
PIF_VALUE: 20
PIF_VALUE: 21
PIF_VALUE: 20
PIF_VALUE: 21
PIF_VALUE: 21
PIF_VALUE: 20
PIF_VALUE: 19
PIF_VALUE: 20
PIF_VALUE: 21
PIF_VALUE: 3
PIF_VALUE: 20
PIF_VALUE: 22
PIF_VALUE: 20
PIF_VALUE: 21
PIF_VALUE: 21
PIF_VALUE: 20
PIF_VALUE: 21
PIF_VALUE: 21
PIF_VALUE: 20
PIF_VALUE: 20
PIF_VALUE: 21
PIF_VALUE: 20
PIF_VALUE: 21
PIF_VALUE: 20
PIF_VALUE: 21
PIF_VALUE: 1
PIF_VALUE: 20
PIF_VALUE: 20
PIF_VALUE: 22
PIF_VALUE: 21
PIF_VALUE: 20
PIF_VALUE: 21
PIF_VALUE: 20
PIF_VALUE: 21
PIF_VALUE: 2
PIF_VALUE: 20
PIF_VALUE: 23
PIF_VALUE: 21
PIF_VALUE: 20
PIF_VALUE: 21
PIF_VALUE: 20
PIF_VALUE: 21
PIF_VALUE: 20
PIF_VALUE: 21
PIF_VALUE: 20
PIF_VALUE: 21
PIF_VALUE: 21
PIF_VALUE: 20
PIF_VALUE: 24
PIF_VALUE: 21
PIF_VALUE: 2
PIF_VALUE: 10
PIF_VALUE: 20
PIF_VALUE: 21
PIF_VALUE: 19
PIF_VALUE: 22
PIF_VALUE: 20
PIF_VALUE: 26
PIF_VALUE: 22
PIF_VALUE: 21
PIF_VALUE: 20
PIF_VALUE: 20
PIF_VALUE: 1
PIF_VALUE: 19
PIF_VALUE: 20
PIF_VALUE: 21
PIF_VALUE: 19
PIF_VALUE: 20
PIF_VALUE: 22
PIF_VALUE: 21
PIF_VALUE: 20
PIF_VALUE: 21
PIF_VALUE: 25
PIF_VALUE: 3
PIF_VALUE: 25
PIF_VALUE: 20
PIF_VALUE: 21
PIF_VALUE: 21
PIF_VALUE: 17
PIF_VALUE: 22
PIF_VALUE: 21
PIF_VALUE: 21
PIF_VALUE: 4
PIF_VALUE: 20
PIF_VALUE: 22
PIF_VALUE: 22
PIF_VALUE: 21
PIF_VALUE: 22
PIF_VALUE: 21
PIF_VALUE: 19
PIF_VALUE: 21

## 2021-07-21 ASSESSMENT — PAIN SCALES - GENERAL
PAINLEVEL_OUTOF10: 8
PAINLEVEL_OUTOF10: 4
PAINLEVEL_OUTOF10: 4
PAINLEVEL_OUTOF10: 7

## 2021-07-21 ASSESSMENT — PAIN DESCRIPTION - ORIENTATION
ORIENTATION: RIGHT
ORIENTATION: LEFT
ORIENTATION: LEFT

## 2021-07-21 ASSESSMENT — PAIN DESCRIPTION - PAIN TYPE
TYPE: SURGICAL PAIN

## 2021-07-21 ASSESSMENT — PAIN DESCRIPTION - LOCATION
LOCATION: SHOULDER

## 2021-07-21 ASSESSMENT — PAIN DESCRIPTION - DESCRIPTORS
DESCRIPTORS: BURNING

## 2021-07-21 ASSESSMENT — PAIN DESCRIPTION - PROGRESSION
CLINICAL_PROGRESSION: GRADUALLY IMPROVING
CLINICAL_PROGRESSION: GRADUALLY IMPROVING

## 2021-07-21 NOTE — ANESTHESIA POSTPROCEDURE EVALUATION
Department of Anesthesiology  Postprocedure Note    Patient: Jovany Marquez  MRN: [id number]  YOB: 1965  Date of evaluation: 7/21/2021  Time:  4:15 PM     Procedure Summary     Date: 07/21/21 Room / Location: [address]    Anesthesia Start: 7599 Anesthesia Stop: 1615    Procedures:       LEFT SHOULDER ARTHROSCOPY ROTATOR CUFF REPAIR, SUBACROMIAL DECOMPRESSION (Left Shoulder)      LEFTCARPAL TUNNEL RELEASE (Left Hand) Diagnosis: (LEFT CARPAL TUNELL SYNDROME, LEFT SHOULDER ROTATOR CUFF TEAR, IMPINGEMENT SYNDROME)    Surgeons: Marily Solis MD Responsible Provider: Arlet Dee MD    Anesthesia Type: general, regional ASA Status: 3          Anesthesia Type: general, regional    Romana Phase I:      Romana Phase II:      Last vitals: Reviewed and per EMR flowsheets.        Anesthesia Post Evaluation    Patient location during evaluation: PACU  Patient participation: complete - patient participated  Level of consciousness: awake and alert  Pain score: 2  Airway patency: patent  Nausea & Vomiting: no vomiting and no nausea  Complications: no  Cardiovascular status: blood pressure returned to baseline and hemodynamically stable  Respiratory status: acceptable, spontaneous ventilation, nonlabored ventilation and nasal cannula  Hydration status: stable

## 2021-07-21 NOTE — PROGRESS NOTES
Patient brought to PACU for preprocedural block. Handoff report given bedside from [address] [place] [address] from [facility]. Monitors applied, alarms on. Patient awake, alert and oriented. Dr. Marina Ariza at bedside.

## 2021-07-21 NOTE — ANESTHESIA PROCEDURE NOTES
Peripheral Block    Patient location during procedure: pre-op  Start time: 7/21/2021 1:11 PM  End time: 7/21/2021 1:16 PM  Staffing  Performed: anesthesiologist   Anesthesiologist: Conor Looney MD  Preanesthetic Checklist  Completed: patient identified, IV checked, site marked, risks and benefits discussed, surgical consent, monitors and equipment checked, pre-op evaluation, timeout performed, anesthesia consent given, oxygen available and patient being monitored  Peripheral Block  Patient position: supine  Prep: ChloraPrep  Patient monitoring: cardiac monitor, continuous pulse ox, continuous capnometry, frequent blood pressure checks and IV access  Block type: Brachial plexus  Laterality: left  Injection technique: single-shot  Guidance: nerve stimulator and ultrasound guided  Local infiltration: lidocaine  Interscalene  Provider prep: mask and sterile gloves  Local infiltration: lidocaine  Needle  Needle type: combined needle/nerve stimulator   Needle gauge: 22 G  Needle length: 10 cm  Needle localization: nerve stimulator and ultrasound guidance  Test dose: negative  Assessment  Injection assessment: negative aspiration for heme, no paresthesia on injection and local visualized surrounding nerve on ultrasound  Slow fractionated injection: yes  Hemodynamics: stable  Additional Notes  Prior to Nerve block rhythm strip showed min ST depression, but stat EKG showed no ST changes  Good exercise tolerance, asymptomatic cardiac complaints  Old q waves but recent echo wnl, cardiac clearance  Dr Que Singh aware and will proceed with sx    Pt tolerated procedure well, unremarkable, no complications  Medications Administered  Lidocaine 1 % injection, 1 mL  ropivacaine (NAROPIN) 0.5% injection, 20 mL  Reason for block: post-op pain management

## 2021-07-21 NOTE — H&P
Chief Complaint   Patient presents with    Shoulder Pain       left         History of Present Illness:                             Jazzmine Cramer is a 55 y.o. male who returns today for follow-up of his left shoulder. Symptoms are the same and may be even a little worse with his pain level and weakness at this time. He continues to have problems reaching out away from her body and continues to have popping sensations with rotational movements of the shoulder.      Additionally he is still having same symptoms with pain numbness and tingling in the hand along the median nerve distribution consistent with his carpal tunnel syndrome.     He had an injury about 5 weeks ago when he was lifting a heavy log.  He had sudden sharp pain in the shoulder and since that time has had difficulty with reaching out away from his body or overhead. Ki Goodrich has profound weakness and cannot lift anything heavy.  He has to keep his arm by his side for activities because of sharp shooting pain at the anterior and superior aspects of the shoulder with activities away from his body.  He is pain is constant and worse with active movement or lifting.  He has dealt with similar issues in the past on his right shoulder.  He previously underwent right rotator cuff repair and did well following the recovery process with therapy.     Additionally he has been dealing with bilateral hand pain numbness and tingling.  He had an EMG performed in 2016.     IMPRESSION:     1.  Moderate bilateral carpal tunnel syndrome with prolongation of both median  and sensory distal latencies and transcarpal sensory latencies on the left and  right side.  Mild neurogenic EMG changes are seen in thenar muscles.     2.  No definite EMG changes are seen in the left arm, shoulder and cervical  paraspinal muscles at this time. Nerve conduction velocities are normal        Patient returns to the office for a follow up on his recent MRI pertaining to his left shoulder. MRI completed on 6/4/21 revealed results as follows:               Pain is rated in office at a 8/10 with patient reporting worsening symptoms since his last office visit. He continues to have the sharp pain along the rotator cuff region with limited ROM without the patient able to sit or sleep comfortably. Patient continues to take Ibuprofen as needed with no relief. No recent injury reported at this time.            Medical History  Patient's medications, allergies, past medical, surgical, social and family histories were reviewed and updated as appropriate. Past Medical History:   Diagnosis Date    Arthritis     Bilateral carpal tunnel syndrome     CAD (coronary artery disease)     ECHO 07/14/2021    EF 55-60%. Mild Pulmonic regurgitation is present, No evidence of pericardial effusion.     H/O echocardiogram 04/28/2016    EF 55% WNL- Stage 1 dysfunction     Hyperlipidemia     Hypertension     Medial meniscus tear     Left knee    Prediabetes     pt denies any hx of diabetes    Wears glasses     to read     Past Surgical History:   Procedure Laterality Date    APPENDECTOMY  age 13   Kenzie Gordon HAND SURGERY Right 2002    SHOULDER SURGERY Right 2006     Family History   Problem Relation Age of Onset    Cancer Mother         lung    Heart Disease Father     Heart Attack Sister     Stroke Sister      Social History     Socioeconomic History    Marital status: Single     Spouse name: None    Number of children: None    Years of education: None    Highest education level: None   Occupational History    None   Tobacco Use    Smoking status: Never Smoker    Smokeless tobacco: Former User     Types: Chew   Vaping Use    Vaping Use: Never used   Substance and Sexual Activity    Alcohol use: Not Currently     Alcohol/week: 15.0 standard drinks     Types: 12 Cans of beer, 3 Shots of liquor per week     Comment: Only on the weeknds /per pt on 7/13/2021\" 4-5 beers per weekend, never drank every day    Drug use: No    Sexual activity: None   Other Topics Concern    None   Social History Narrative    None     Social Determinants of Health     Financial Resource Strain:     Difficulty of Paying Living Expenses:    Food Insecurity:     Worried About Running Out of Food in the Last Year:     [address] in the Last Year:    Transportation Needs:     Lack of Transportation (Medical):  Lack of Transportation (Non-Medical):    Physical Activity:     Days of Exercise per Week:     Minutes of Exercise per Session:    Stress:     Feeling of Stress :    Social Connections:     Frequency of Communication with Friends and Family:     Frequency of Social Gatherings with Friends and Family:     Attends [employer]ologist Services:     Active Member of Clubs or Organizations:     Attends Club or Organization Meetings:     Marital Status:    Intimate Partner Violence:     Fear of Current or Ex-Partner:     Emotionally Abused:     Physically Abused:     Sexually Abused:      Current Facility-Administered Medications   Medication Dose Route Frequency Provider Last Rate Last Admin    lactated ringers infusion   Intravenous Continuous Forestine Boast,  mL/hr at 07/21/21 1053 New Bag at 07/21/21 1053    ceFAZolin (ANCEF) 2000 mg in dextrose 4 % 100 mL IVPB (premix)  2,000 mg Intravenous On Call to OR Jennifer Staley MD         No Known Allergies      Review of Systems   Constitutional: Negative for chills and fever. HENT: Negative for congestion and sneezing. Eyes: Negative for pain and redness. Respiratory: Negative for chest tightness, shortness of breath and wheezing. Cardiovascular: Negative for chest pain and palpitations. Gastrointestinal: Negative for vomiting. Musculoskeletal: Positive for arthralgias. Skin: Negative for color change and rash. Neurological: Positive for weakness and numbness. Psychiatric/Behavioral: Negative for agitation. The patient is not nervous/anxious. Examination:  General Exam:  Vitals: Ht 5' 8\" (1.727 m)   Wt 237 lb (107.5 kg)   BMI 36.04 kg/m²    Physical Exam   Constitutional:       Appearance: Normal appearance. HENT:      Head: Normocephalic and atraumatic. Eyes:      Conjunctiva/sclera: Conjunctivae normal.      Pupils: Pupils are equal, round, and reactive to light. Pulmonary:      Effort: Pulmonary effort is normal.   Musculoskeletal:      Right shoulder: No swelling, deformity, laceration, tenderness or bony tenderness. Normal range of motion. Normal strength. Right elbow: Normal.      Left elbow: Normal. No swelling, deformity, effusion or lacerations. Normal range of motion. No tenderness. Cervical back: Normal range of motion. Comments: Left Upper Extremity:    There is moderate to severe tenderness diffusely throughout the shoulder. Tenderness to palpation is maximal over the anterior and lateral aspects of the shoulder specifically along the greater tuberosity and proximal biceps tendon. Active range of motion is limited due to pain. Forward elevation present to 100 degrees, abduction present to 80 degrees, external rotation 15 degrees. Passive range of motion is moderately restricted and limited due to pain and apprehension. Forward elevation 120 degrees, abduction 100 degrees. Rotator cuff testing is difficult due to pain. Strength 4/5 forward elevation and abduction of the shoulder. There is breakaway to strength testing due to pain. Positive empty can test.  Positive drop arm sign. Positive Goss and Neer signs. Moderate pain at the acromioclavicular joint with crossarm test.    Skin is intact. Sensation is intact in the upper extremity. 2+ radial pulse. No edema. No muscle atrophy. Left Upper Extremity:    There is mild tenderness to palpation of the volar aspect of the wrist at the level of the carpal tunnel.   There is decreased sensation to light touch in the median nerve distribution. Sensation is intact to light touch along the ulnar and radial nerves. Positive carpal compression test and Phalen's test.  There is mild relative weakness with 4+ strength out of 5 during  test, pinch test, and flexor pollicis brevis. Range of motion of the wrist and fingers is intact without limitation. Skin is intact without wounds or rash. 2+ radial pulse with brisk cap refill throughout the fingers. No atrophy of the thenar musculature. Strength 5/5 in finger and wrist flexion and extension. Skin:     General: Skin is warm and dry. Neurological:      Mental Status: He is alert and oriented to person, place, and time. Psychiatric:         Mood and Affect: Mood normal.         Behavior: Behavior normal.     Diagnostic testing:       MRI images of the left shoulder were reviewed by myself and discussed with the patient:     MRI SHOULDER LEFT WO CONTRAST  Impression   1. Image quality is degraded secondary to motion artifact. 2. Full-thickness tearing of the distal and anterior supraspinatus tendon   measuring approximately 1.0 x 1.5 cm.  Underlying moderate supraspinatus,   infraspinatus, and subscapularis tendinosis. 3. The proximal long head biceps tendon is not well visualized.  Findings may   reflect severe tendinosis versus tearing.  Tendinosis is favored. 4. Mild glenohumeral and mild to moderate acromioclavicular osteoarthritis. 5. No acute osseous abnormality identified.          Office Procedures:    Assessment and Plan  1. Left shoulder rotator cuff tear and impingement syndrome     2. Bilateral left worse than right carpal tunnel syndrome     I reviewed the findings of the MRI with the patient. We discussed the severity of the injury to the rotator cuff.   Given the patient's symptoms and the findings on the MRI I have recommended that we proceed with shoulder arthroscopy for rotator cuff repair and subacromial decompression.     We discussed the risks and benefits of surgery. We discussed the postoperative course and need for initial immobilization followed by subsequent rehabilitation and physical therapy.     We discussed the potential risks with surgery including possible incomplete repair or rerupture after successful repair. We discussed the potential for residual pain, weakness, or stiffness at the shoulder despite appropriate surgical intervention. We discussed the goals of surgery to restore range of motion and strength which may take months to return.     The patient understands and would like to proceed with shoulder arthroscopy for rotator cuff repair and subacromial decompression. We discussed his carpal tunnel syndrome in the left upper extremity as well. His symptoms have been progressing and have discussed the findings of the EMG. He would like to proceed with carpal tunnel release at the time of his rotator cuff repair. We discussed risks and benefits of this procedure as well along with the expected recovery process.   All his questions were answered.       Electronically signed by Maria Luz Costello MD on 7/21/2021 at 12:36 PM

## 2021-07-21 NOTE — OP NOTE
Operative Note      Patient: Lisa Garcia  YOB: 1965  MRN: [id number]    Date of Procedure: 7/21/2021    Pre-Op Diagnosis: LEFT CARPAL TUNELL SYNDROME, LEFT SHOULDER ROTATOR CUFF TEAR, IMPINGEMENT SYNDROME    Post-Op Diagnosis: Same       Procedure(s):  LEFT SHOULDER ARTHROSCOPY ROTATOR CUFF REPAIR, SUBACROMIAL DECOMPRESSION  LEFTCARPAL TUNNEL RELEASE   Left shoulder arthroscopic debridement of acromioclavicular joint and removal of calcified loose body at the anterior aspect of the acromion  Left shoulder arthroscopic biceps tenotomy    Surgeon(s):  Jackie Cash MD    Assistant:   * No surgical staff found *    Anesthesia: General    Estimated Blood Loss (mL): Minimal    Complications: None    Specimens:   * No specimens in log *    Implants:  Implant Name Type Inv. Item Serial No.  Lot No. LRB No. Used Action   ANCHOR SUT L19.1MM DIA4. 75MM BIOCOMPOSITE FULL THRD  ANCHOR SUT L19.1MM DIA4. 75MM BIOCOMPOSITE FULL THRD  [employer] [id number] Left 1 Implanted   ANCHOR SUT L19.1MM DIA4. 75MM BIOCOMPOSITE FULL THRD  ANCHOR SUT L19.1MM DIA4. 75MM BIOCOMPOSITE FULL THRD  [employer] [id number] Left 1 Implanted   ANCHOR SUT L19.1MM DIA4. 75MM BIOCOMPOSITE FULL THRD  ANCHOR SUT L19.1MM DIA4. 75MM BIOCOMPOSITE FULL THRD  [employer] [id number] Left 1 Implanted   [facility] SUTURE BIOCOMP 4.75X19.1 MM [employer]  ANCHOR SUTURE BIOCOMP 4.75X19.1 MM [employer] [id number] Left 1 Implanted         Drains: * No LDAs found *    Findings: There is a calcified body that it was loose at the anterior margin of the acromion adjacent to the rotator cuff. This area was freed from its soft tissue attachments and removed with a grasper and measured 15 x 10 mm on the back table. Detailed Description of Procedure: The patient was identified in the preoperative area and the site was marked. Regional nerve block was placed by the anesthesia department for postoperative pain relief.   The patient was taken back to the operating room placed supine on the table. After induction of general endotracheal anesthesia, the left upper extremity was prepped and draped in sterile fashion with an arm tourniquet. A timeout was performed and preoperative antibiotics were given. The arm was exsanguinated with an Esmarch and tourniquet was inflated to 250 mmHg and deflated the conclusion of the case after less than 10 minutes of tourniquet time. An incision was made over the palmar aspect of the hand overlying the carpal tunnel. Dissection was carried down to subcutaneous tissues and bleeding was controlled with bipolar. Fibers of the palmar fascia were released and retracted for better visualization of the transverse carpal ligament. The transverse carpal ligament was incised along its mid aspect sharply with a 15 blade and the release was carried out under direct visualization distally to the level of the palmar fat and proximally into the distal forearm fascia. The extent of the release was completed under direct visualization with dissecting scissors and the 2 leaflets of the transverse carpal ligament retracted nicely. The median nerve was identified and was healthy appearing and protected throughout the case. The tourniquet was deflated and the wound was irrigated. Skin was closed with interrupted horizontal mattress 4-0 nylon sutures. Sterile dressings applied. Next attention was taken to the rotator cuff portion of the surgery. The patient was placed semiupright in the beachchair position with his head neck in neutral.  The left upper extremity was again prepped and draped in sterile fashion and a timeout was performed and preoperative antibiotics were given. Examination under anesthesia revealed full passive range of motion of the shoulder with no instability.     Standard posterior portal was established with an 11 blade followed by blunt trocar and arthroscope was inserted diagnostic arthroscopy was carried out there is evidence of mild chondromalacia present on both sides of the glenohumeral joint with no evidence of deep articular cartilage lesions. There was severe of fraying and tearing of the internal jugular portion of the long head of the biceps tendon. Fibers of the tear were impinging at the glenohumeral joint. There was evidence of a large full-thickness rotator cuff tear involving the supra spinatus tendon visualized from the intra-articular portion of the joint. Standard anterior portal established under direct visualization and shaver is introduced and debridement was carried out along the frayed and degenerative biceps and labral tissue. Due to the involvement of the biceps tendon was felt that biceps tenotomy would be indicated in order to prevent future symptoms are related to of the tear. The release was performed with the cautery device and the proximal biceps tendon was allowed to retract distally into the groove. The remaining labral tissue superiorly was contoured with the shaver to a stable healthy rim. The subscapularis was healthy and normal-appearing. The posterior portal was redirected into the subacromial space. A bursectomy was performed through a lateral portal better visualizing the rotator cuff tear. The anterior aspect of the coracoacromial ligament was released from the anterior acromion. There is evidence of a loose calcified body within the soft tissues at the anterior edge of the acromion adjacent to the rotator cuff tear. This area was probed and found to be unstable and consistent with a possible loose body versus calcification within the soft tissues at the anterior acromion. This calcified body was freed of its soft tissue attachments and removed with a grasper. It was measured on the back table, be 15 mm in length and 10 mm in width.   Subacromial decompression was carried out medially to the level of the acromioclavicular joint where there was prominent inferior spurring at the distal aspect of the clavicle. The arthroscopic shaver was used to perform a decompression of the undersurface of the acromioclavicular joint reducing impingement at the rotator cuff. Cannulas were placed for the rotator cuff repair and a grasper was used to evaluate the rotator cuff tissue which appeared healthy and mobilized well to the greater tuberosity. Beverli Finical was used to clear soft tissue and prepare the healing site of the greater tuberosity. There was a large tear anterior to posterior involving the entire supraspinatus and portions of the infraspinatus. 2 Arthrex 4.75 mm swivel lock anchors were placed adjacent to the articular cartilage at the greater tuberosity for a medial row and fiber tape was passed through the rotator cuff anteriorly and posteriorly with the scorpion suture passer. The individual limbs of the anchors were crossed and cinched into place and secured with the lateral row of anchors using Arthrex 4.75 mm swivel locks. The fiber tape was cut flush and the repair was evaluated through full passive range of motion of the shoulder and there was excellent stability of the repair and excellent coverage of the greater tuberosity with no further impingement at the acromion. Instruments were removed and portals were closed with interrupted 3-0 nylon sutures. A sterile dressing was applied with Xeroform, 4 x 8's, ABD, and foam tape. He was placed into a sling and extubated taken to PACU in stable condition. All sponge and needle counts were correct. Postoperative plan:  Patient be discharged home in a sling and be allowed to do gentle range of motion activities at the shoulder wrist and fingers and avoid any lifting pushing or pulling activities. He will follow-up for suture removal in 2 weeks and we will proceed with physical therapy at that time.       Electronically signed by Jennifer Grant MD on 7/21/2021 at 3:47 PM

## 2021-08-03 ENCOUNTER — OFFICE VISIT (OUTPATIENT)
Dept: ORTHOPEDIC SURGERY | Age: 56
End: 2021-08-03

## 2021-08-03 VITALS — HEIGHT: 68 IN | RESPIRATION RATE: 16 BRPM | WEIGHT: 237 LBS | BODY MASS INDEX: 35.92 KG/M2

## 2021-08-03 DIAGNOSIS — Z98.890 S/P CARPAL TUNNEL RELEASE: ICD-10-CM

## 2021-08-03 DIAGNOSIS — Z98.890 S/P ARTHROSCOPY OF LEFT SHOULDER: Primary | ICD-10-CM

## 2021-08-03 DIAGNOSIS — Z09 POSTOP CHECK: ICD-10-CM

## 2021-08-03 PROCEDURE — 99024 POSTOP FOLLOW-UP VISIT: CPT | Performed by: ORTHOPAEDIC SURGERY

## 2021-08-03 NOTE — PATIENT INSTRUCTIONS
Continue to avoid any lifting pushing or pulling of the left shoulder. Continue range of motion exercises as instructed. Ice and elevate as needed. Tylenol or Motrin for pain. May start to work on pendulum exercises   Follow up in 3 weeks   Sutures removed today keep incision dry and clean. Leave incision open to the air.

## 2021-08-03 NOTE — PROGRESS NOTES
8/3/2021   Chief Complaint   Patient presents with    Post-Op Check     left shoulder arthroscopy/carpal tunnel release DOS 7/21/21        History of Present Illness:                             Nain Siddiqi is a 55 y.o. male returns today for a follow-up of his left shoulder rotator cuff repair. He is doing well making improvements with his shoulder and feels that it is work is going well with related restrictions. He has improved feeling in his hand following carpal tunnel release but has been dealing with ongoing swelling issues. Patient returns to the office today for s/p of the left shoulder arthroscopy/carpal tunnel release DOS 7/21/21. Pt states that pain today is a 5/10. He does have a difficult time laying flat on his back at night. Pt states he did return to work on friday with light duty restrictions     Medical History  Patient's medications, allergies, past medical, surgical, social and family histories were reviewed and updated as appropriate. Review of Systems                                            Examination:  General Exam:  Vitals: Resp 16   Ht 5' 8\" (1.727 m)   Wt 237 lb (107.5 kg)   BMI 36.04 kg/m²    Physical Exam     Left upper extremity:  Well-healed surgical arthroscopic incisions. No erythema, drainage, or induration. Good active pendulum range of motion with the shoulder. Passive range of motion shoulder is present with 40 ratio 120 degrees and abduction of 100 degrees with good internal/external rotation. There is mild diffuse soft tissue swelling present throughout the arm extending into the wrist and hand. No erythema or drainage at the healing carpal tunnel surgery site. Good sensation to light touch in the median nerve distribution    Office Procedures:  No orders of the defined types were placed in this encounter. Assessment and Plan  1. Left shoulder rotator cuff repair    2.   Left carpal tunnel release    Patient is healing well and improving following surgery left upper extremity. I have explained to him gentle range of motion exercises to be performed as a home exercise program at this stage. Continue use of the sling for protection. Follow-up in 3 weeks for check of his progress we will proceed with formal physical therapy at that time.         Electronically signed by Melany Lopez MD on 8/3/2021 at 12:05 PM

## 2021-08-03 NOTE — PROGRESS NOTES
Patient returns to the office today for s/p of the left shoulder arthroscopy/carpal tunnel release DOS 7/21/21. Pt states that pain today is a 5/10. He does have a difficult time laying flat on his back at night.  Pt states he did return to work on friday with light duty restrictions

## 2021-08-26 ENCOUNTER — OFFICE VISIT (OUTPATIENT)
Dept: ORTHOPEDIC SURGERY | Age: 56
End: 2021-08-26

## 2021-08-26 VITALS
OXYGEN SATURATION: 97 % | HEIGHT: 68 IN | BODY MASS INDEX: 35.92 KG/M2 | WEIGHT: 237 LBS | HEART RATE: 90 BPM | RESPIRATION RATE: 16 BRPM

## 2021-08-26 DIAGNOSIS — Z98.890 S/P ARTHROSCOPY OF LEFT SHOULDER: Primary | ICD-10-CM

## 2021-08-26 DIAGNOSIS — G56.02 CARPAL TUNNEL SYNDROME ON LEFT: ICD-10-CM

## 2021-08-26 DIAGNOSIS — Z98.890 S/P CARPAL TUNNEL RELEASE: ICD-10-CM

## 2021-08-26 DIAGNOSIS — S43.422A SPRAIN OF LEFT ROTATOR CUFF CAPSULE, INITIAL ENCOUNTER: ICD-10-CM

## 2021-08-26 DIAGNOSIS — Z09 POSTOP CHECK: ICD-10-CM

## 2021-08-26 PROCEDURE — 99024 POSTOP FOLLOW-UP VISIT: CPT | Performed by: ORTHOPAEDIC SURGERY

## 2021-08-26 NOTE — PATIENT INSTRUCTIONS
Discontinue sling  Continue to work on at home ROM and stretching exercises  May take Ibuprofen as needed  Rest, ice, and elevate as needed  Follow up in 6 weeks  therapy ordered today

## 2021-08-30 NOTE — PROGRESS NOTES
Patient returns to the office for a 5 week post operative follow up on his left shoulder arthroscopy and carpal tunnel release that was performed on 7/21/21. He continues to have some aching within the shoulder but has begun to notice improved ROM of the extremity. He has stiffness within the left wrist which he contributes to wearing the sling and keeping the patient in a resting position. He does continue to work on at home therapy exercises with pain rated at a 2/10 today. He continues to wear sling while at work and take Ibuprofen as needed. No new or worsening of symptoms reported.
degrees. Well-healed surgical scar over the carpal tunnel. No erythema, drainage, or induration. Mild swelling throughout the hand and fingers. Sensation is improved in the median nerve distribution but there is stiffness and weakness from disuse while being in a sling. Office Procedures:  No orders of the defined types were placed in this encounter. Assessment and Plan  1. Left rotator cuff repair    2. Left carpal tunnel release    He appears to be healing well following the procedures on his left upper extremity. I recommend that we proceed with rehabilitation efforts. He will discontinue use of his sling. I wrote a prescription for physical therapy so that he may begin advancement of his activities. He understands this is a slow progress and he should proceed slowly with strengthening activities and focus mostly initially at range of motion and active mobility. Continue restrictions at work.   Follow-up in 6 weeks for check of progress        Electronically signed by Elise Singh MD on 8/30/2021 at 8:30 AM

## 2021-09-07 ENCOUNTER — TELEPHONE (OUTPATIENT)
Dept: ORTHOPEDIC SURGERY | Age: 56
End: 2021-09-07

## 2021-09-07 NOTE — TELEPHONE ENCOUNTER
Patient called into the office stating that 7 weeks ago patient has surgery. Patient states that his hand is swollen and past the wrist also. Patient states that his hand is very swollen. Patient states that finger tap the thumb to the pinky. Patient states that his finger look like sausages.     [phone] - Please advise

## 2021-09-09 ENCOUNTER — HOSPITAL ENCOUNTER (OUTPATIENT)
Dept: PHYSICAL THERAPY | Age: 56
Setting detail: THERAPIES SERIES
Discharge: HOME | End: 2021-09-09
Payer: COMMERCIAL

## 2021-09-09 PROCEDURE — 97162 PT EVAL MOD COMPLEX 30 MIN: CPT

## 2021-09-09 PROCEDURE — 97016 VASOPNEUMATIC DEVICE THERAPY: CPT

## 2021-09-09 PROCEDURE — 97140 MANUAL THERAPY 1/> REGIONS: CPT

## 2021-09-09 ASSESSMENT — PAIN DESCRIPTION - PAIN TYPE: TYPE: SURGICAL PAIN

## 2021-09-09 ASSESSMENT — PAIN - FUNCTIONAL ASSESSMENT: PAIN_FUNCTIONAL_ASSESSMENT: PREVENTS OR INTERFERES WITH ALL ACTIVE AND SOME PASSIVE ACTIVITIES

## 2021-09-09 ASSESSMENT — PAIN DESCRIPTION - LOCATION: LOCATION: HAND;SHOULDER

## 2021-09-09 ASSESSMENT — PAIN DESCRIPTION - FREQUENCY: FREQUENCY: CONTINUOUS

## 2021-09-09 ASSESSMENT — PAIN SCALES - GENERAL: PAINLEVEL_OUTOF10: 4

## 2021-09-09 ASSESSMENT — PAIN DESCRIPTION - ORIENTATION: ORIENTATION: LEFT

## 2021-09-09 ASSESSMENT — PAIN DESCRIPTION - DESCRIPTORS: DESCRIPTORS: SHARP;ACHING

## 2021-09-09 ASSESSMENT — PAIN DESCRIPTION - PROGRESSION: CLINICAL_PROGRESSION: GRADUALLY IMPROVING

## 2021-09-09 ASSESSMENT — PAIN DESCRIPTION - ONSET: ONSET: PROGRESSIVE

## 2021-09-09 NOTE — PLAN OF CARE
Outpatient Physical Therapy           [place]           [x] Phone: [phone]   Fax: [phone]  [facility]ce Preciado           [] Phone: [phone]   Fax: [phone]     To: Referring Practitioner: Enriqueta Robertson    From: Isadora Donahue, PT, PT     Patient: Bam Dong       : 1965  Diagnosis: Diagnosis: L shoulder RTC-R, bicep tenotomy, carpal tunnel release   Treatment Diagnosis: Treatment Diagnosis: L shoulder and wrist stiffness, weakness, pain   Date: 2021    Physical Therapy Certification/Re-Certification Form  Dear Dr. Enriqueta Robertson,   The following patient has been evaluated for physical therapy services and for therapy to continue, insurance requires physician review of the treatment plan initially and every 90 days. Please review the attached evaluation and/or summary of the patient's plan of care, and verify that you agree therapy should continue by signing the attached document and sending it back to our office. Assessment:    Assessment: Pt is a 56 yo male who presents 7 weeks postop L RTC repair (large tear) w/ DCE, Bicep tenotomy, and carpal tunnel release. He has Hx prior R shoulder surgery and R carpal tunnel needs done also. He demonstrates limited ROM w/ decreased strength, pain and decreased use of L UE w/ swellig in hand/wrist still as well. These limitations are affecting his ability to perform his usual activity and limiting sleep. He will benefit from PT to help restore ROM, strength and function. Prior to  he had intemittant L shoulder pain w/ limiting activity. Patient agrees with established plan of care and assisted in the development of their short term and long term goals. Patient had no adverse reaction with initial treatment and there are no barriers to learning. Demonstrates no mental or cognitive disorder.         Plan of Care/Treatment to date:  [x] Therapeutic Exercise  [x] Modalities:  [x] Therapeutic Activity     [] Ultrasound  [] Electrical Stimulation  [] Gait Training      [] Cervical Traction [] Lumbar Traction  [x] Neuromuscular Re-education    [x] Cold/hotpack [] Iontophoresis   [x] Instruction in HEP      [x] Vasopneumatic    [] Dry Needling  [x] Manual Therapy               [] Aquatic Therapy       Other:          Frequency/Duration:  # Days per week: [] 1 day # Weeks: [] 1 week [] 5 weeks     [x] 2 days   [] 2 weeks [] 6 weeks     [] 3 days   [] 3 weeks [] 7 weeks     [] 4 days   [] 4 weeks [x] 8 weeks         [] 9 weeks [] 10 weeks         [] 11 weeks [] 12 weeks    Rehab Potential/Progress: [] Excellent [x] Good [] Fair  [] Poor     Goals:    Patient goals : able to raise arm and get strength back in hand and arm  Short term goals  Time Frame for Short term goals: 4 weeks  Short term goal 1: Pt will be able to report at least 25% reduction in pain  Short term goal 2: Pt will be able to begin light strengthening w/o pain  Short term goal 3: Pt will report improved sleep, at least 3-4 hours at a time and in bed 1-2nights/week  Long term goals  Time Frame for Long term goals : 8 week  Long term goal 1: Pt will be independent w/ HEP and able to continue to progress on his own  Long term goal 2: Pt will be able to reach [place] at least 140° L UE for return to prior activity  Long term goal 3: Pt will have improved DASH score by 20% or more  Long term goal 4: Pt will have strength in L UE that is at least 90% of R UE for prior activity      Electronically signed by:  Malena Arredondo, PT, PT, MPT, ATC  9/9/2021, 6:43 PM    9/9/2021, 6:43 PM  If you have any questions or concerns, please don't hesitate to call.   Thank you for your referral.      Physician Signature:________________________________Date:_________ TIME: _____  By signing above, therapists plan is approved by physician

## 2021-09-09 NOTE — FLOWSHEET NOTE
Outpatient Physical Therapy  [place]           [x] Phone: [phone]   Fax: [phone]  Hadley Juarez           [] Phone: [phone]   Fax: [phone]        Physical Therapy Daily Treatment Note  Date:  2021    Patient Name:  Zelda Brito    :  1965  MRN: [id number]  Restrictions/Precautions: Other position/activity restrictions: 7 weeks postop 21  Diagnosis:   Diagnosis: L shoulder RTC-R, bicep tenotomy, carpal tunnel release  Date of Injury/Surgery: 21  Treatment Diagnosis: Treatment Diagnosis: L shoulder and wrist stiffness, weakness, pain    Insurance/Certification information: PT Insurance Information: [employer] no PT coverage, 2° [employer] 30 PCY   Referring Physician:  Referring Practitioner: Leydi [place]  Next Doctor Visit:    Plan of care signed (Y/N):  Pending cosign  Outcome Measure:  DASH 70%  Visit# / total visits:    POC  Pain level: 4/10   Goals:     Patient goals : able to raise arm and get strength back in hand and arm  Short term goals  Time Frame for Short term goals: 4 weeks  Short term goal 1: Pt will be able to report at least 25% reduction in pain  Short term goal 2: Pt will be able to begin light strengthening w/o pain  Short term goal 3: Pt will report improved sleep, at least 3-4 hours at a time and in bed 1-2nights/week  Long term goals  Time Frame for Long term goals : 8 week  Long term goal 1: Pt will be independent w/ HEP and able to continue to progress on his own  Long term goal 2: Pt will be able to reach [place] at least 140° L UE for return to prior activity  Long term goal 3: Pt will have improved DASH score by 20% or more  Long term goal 4: Pt will have strength in L UE that is at least 90% of R UE for prior activity    Summary of Evaluation: Assessment: Pt is a 54 yo male who presents 7 weeks postop L RTC repair (large tear) w/ DCE, Bicep tenotomy, and carpal tunnel release. He has Hx prior R shoulder surgery and R carpal tunnel needs done also. He demonstrates limited ROM w/ decreased strength, pain and decreased use of L UE w/ swellig in hand/wrist still as well. These limitations are affecting his ability to perform his usual activity and limiting sleep. He will benefit from PT to help restore ROM, strength and function. Prior to 2021 he had intemittant L shoulder pain w/ limiting activity. Subjective:  See manuel         Any changes in Ambulatory Summary Sheet? None        Objective:  See eval   COVID screening questions were asked and patient attested that there had been no contact or symptoms        Exercises: (No more than 4 columns)   Exercise/Equipment 9/9/21  #1 Date Date           WARM UP                     TABLE      Manual work  As below           Arm on table, wrist flex/ext  gravity eliminated 5x                    STANDING      scap retract arms down  Elbows bent  10x  10x                                              PROPRIOCEPTION                                    MODALITIES      Vaso shoulder 15'     CP to wrist  NEXT         Other Therapeutic Activities/Education: 9/9/2021: Patient received education on their current pathology and how their condition effects them with their functional activities. Patient understood discussion and questions were answered. Patient understands their activity limitations and understands rational for treatment progression. Home Exercise Program:  Issued, practiced and pt demo ability to perform 9/9/2021         Manual Treatments:  PROM all planes L shoulder to elmer      Modalities:  Vaso to L shoulder, pt seated w/ arm on pillow, 15'  (NEXT add CP to wrist prn)      Communication with other providers:  POC set for cosign 9/9/21      Assessment:  Pt is a 54 yo male who presents 7 weeks postop L RTC repair (large tear) w/ DCE, Bicep tenotomy, and carpal tunnel release. He has Hx prior R shoulder surgery and R carpal tunnel needs done also.   He demonstrates limited ROM w/ decreased strength, pain and decreased use of L UE w/ swellig in hand/wrist still as well. These limitations are affecting his ability to perform his usual activity and limiting sleep. He will benefit from PT to help restore ROM, strength and function. Prior to 2021 he had intemittant L shoulder pain w/ limiting activity.       Plan for Next Session:   continue ROM for shoulder and wrist, puruse OT order for wrist prn, advance to elmer, ice for pain and swelling      Time In / Time Out:  1600/1700        Timed Code/Total Treatment Minutes:  15/60  1 Man 15', 1 Vaso 15', 1 Eval 30'      Next Progress Note due:  30 days      Plan of Care Interventions:  [x] Therapeutic Exercise  [x] Modalities:  [x] Therapeutic Activity     [] Ultrasound  [] Estim  [] Gait Training      [] Cervical Traction [] Lumbar Traction  [x] Neuromuscular Re-education    [x] Cold/hotpack [] Iontophoresis   [x] Instruction in HEP      [x] Vasopneumatic   [] Dry Needling    [x] Manual Therapy               [] Aquatic Therapy              Electronically signed by:  Addi Love, PT, PT, MPT, ATC  9/9/2021, 6:39 PM     9/9/2021, 6:42 PM

## 2021-09-13 ENCOUNTER — HOSPITAL ENCOUNTER (OUTPATIENT)
Dept: PHYSICAL THERAPY | Age: 56
Setting detail: THERAPIES SERIES
Discharge: HOME | End: 2021-09-13
Payer: COMMERCIAL

## 2021-09-13 PROCEDURE — 97016 VASOPNEUMATIC DEVICE THERAPY: CPT

## 2021-09-13 PROCEDURE — 97140 MANUAL THERAPY 1/> REGIONS: CPT

## 2021-09-13 PROCEDURE — 97110 THERAPEUTIC EXERCISES: CPT

## 2021-09-13 NOTE — FLOWSHEET NOTE
demonstrates limited ROM w/ decreased strength, pain and decreased use of L UE w/ swellig in hand/wrist still as well. These limitations are affecting his ability to perform his usual activity and limiting sleep. He will benefit from PT to help restore ROM, strength and function. Prior to 2021 he had intemittant L shoulder pain w/ limiting activity. Subjective:   Pain not too bad since here last, hand moving better and swelling down. Any changes in Ambulatory Summary Sheet? None        Objective:  COVID screening questions were asked and patient attested that there had been no contact or symptoms      PROM L shoulder  Flex 133  ER 35   Abd 105 w/ some pain   Limited wrist extension, tight flexors      Exercises: (No more than 4 columns)   Exercise/Equipment 9/9/21  #1 9/13/21  #2 Date           WARM UP                     TABLE      Manual work  As below As below           Arm on table, wrist flex/ext  gravity eliminated 5x Arm of chair 10x ea way, pron/supin too    Cane press up - 10x to [employer] ER in neutral  - 10x           Table slide flex  - 10x              STANDING      scap retract arms down  Elbows bent  10x  10x 10x  10x                                             PROPRIOCEPTION                                    MODALITIES      Vaso shoulder 15' 15'    CP to wrist  NEXT 15'        Other Therapeutic Activities/Education: 9/9/2021: Patient received education on their current pathology and how their condition effects them with their functional activities. Patient understood discussion and questions were answered. Patient understands their activity limitations and understands rational for treatment progression.          Home Exercise Program:  Issued, practiced and pt demo ability to perform 9/9/2021         Manual Treatments:  PROM all planes L shoulder to elmer, light wrist flexor stretching       Modalities:  Vaso to L shoulder, pt seated w/ arm on pillow, 15' w/ CP to wrist too      Communication with other providers:  POC set for cosign 9/9/21      Assessment:  Pt tolerated Rx well and is making nice ROM gains already w/ noted decrease in wrist swelling too. He continues w/ limited L shoulder and wrist ROM and strength and will benefit from PT to help gradually restore motion and strength back to prior levels. Pain after Rx 2/10.       Plan for Next Session:   continue ROM for shoulder and wrist, puruse OT order for wrist prn, advance to elmer, ice for pain and swelling      Time In / Time Out:  1635/1720      Timed Code/Total Treatment Minutes:  30/45   1 Man 20', 1 TE 10', 1 Vaso 15'       Next Progress Note due:  30 days      Plan of Care Interventions:  [x] Therapeutic Exercise  [x] Modalities:  [x] Therapeutic Activity     [] Ultrasound  [] Estim  [] Gait Training      [] Cervical Traction [] Lumbar Traction  [x] Neuromuscular Re-education    [x] Cold/hotpack [] Iontophoresis   [x] Instruction in HEP      [x] Vasopneumatic   [] Dry Needling    [x] Manual Therapy               [] Aquatic Therapy              Electronically signed by:  Anahy Wilson, PT, PT, MPT, ATC  9/13/2021, 9:30 AM       9/13/2021, 5:08 PM

## 2021-09-17 ENCOUNTER — HOSPITAL ENCOUNTER (OUTPATIENT)
Dept: PHYSICAL THERAPY | Age: 56
Setting detail: THERAPIES SERIES
Discharge: HOME | End: 2021-09-17
Payer: COMMERCIAL

## 2021-09-17 PROCEDURE — 97016 VASOPNEUMATIC DEVICE THERAPY: CPT | Performed by: PHYSICAL THERAPY ASSISTANT

## 2021-09-17 PROCEDURE — 97110 THERAPEUTIC EXERCISES: CPT | Performed by: PHYSICAL THERAPY ASSISTANT

## 2021-09-17 PROCEDURE — 97140 MANUAL THERAPY 1/> REGIONS: CPT | Performed by: PHYSICAL THERAPY ASSISTANT

## 2021-09-17 NOTE — FLOWSHEET NOTE
Outpatient Physical Therapy  [place]           [x] Phone: [phone]   Fax: [phone]  Jamari Copeland           [] Phone: [phone]   Fax: [phone]        Physical Therapy Daily Treatment Note  Date:  2021    Patient Name:  Kari Kiser    :  1965  MRN: [id number]  Restrictions/Precautions: Other position/activity restrictions: 7 weeks postop 21  Diagnosis:   Diagnosis: L shoulder RTC-R, bicep tenotomy, carpal tunnel release  Date of Injury/Surgery: 21  Treatment Diagnosis: Treatment Diagnosis: L shoulder and wrist stiffness, weakness, pain    Insurance/Certification information: PT Insurance Information: [employer] no PT coverage, 2° [employer] 30 PCY   Referring Physician:  Referring Practitioner: Gerry Hayes  Next Doctor Visit:    Plan of care signed (Y/N):  yes  Outcome Measure:  DASH 70%  Visit# / total visits:   3/16 POC  Pain level: 1-2/10   Goals:     Patient goals : able to raise arm and get strength back in hand and arm  Short term goals  Time Frame for Short term goals: 4 weeks  Short term goal 1: Pt will be able to report at least 25% reduction in pain  Short term goal 2: Pt will be able to begin light strengthening w/o pain  Short term goal 3: Pt will report improved sleep, at least 3-4 hours at a time and in bed 1-2nights/week  Long term goals  Time Frame for Long term goals : 8 week  Long term goal 1: Pt will be independent w/ HEP and able to continue to progress on his own  Long term goal 2: Pt will be able to reach [place] at least 140° L UE for return to prior activity  Long term goal 3: Pt will have improved DASH score by 20% or more  Long term goal 4: Pt will have strength in L UE that is at least 90% of R UE for prior activity    Summary of Evaluation: Assessment: Pt is a 56 yo male who presents 7 weeks postop L RTC repair (large tear) w/ DCE, Bicep tenotomy, and carpal tunnel release. He has Hx prior R shoulder surgery and R carpal tunnel needs done also.   He demonstrates limited ROM w/ decreased strength, pain and decreased use of L UE w/ swellig in hand/wrist still as well. These limitations are affecting his ability to perform his usual activity and limiting sleep. He will benefit from PT to help restore ROM, strength and function. Prior to 2021 he had intemittant L shoulder pain w/ limiting activity. Subjective:   Pain is good today and moving and using the arm pretty good. The pt reported he is trailing a use of the shoulder driving the [employer] a little. No pain or force used to do this. Any changes in Ambulatory Summary Sheet? None        Objective:  COVID screening questions were asked and patient attested that there had been no contact or symptoms    Pt reported he is using the left shoulder functionally at home. Nothing heavy. PROM L shoulder (no change in the motion this date. Discomfort at end ranges)  Flex 133  ER 35   Abd 105 w/ some pain   Limited wrist extension, tight flexors      Exercises: (No more than 4 columns)   Exercise/Equipment 9/9/21  #1 9/13/21  #2 9/17/21 #3           WARM UP   Pendulums CW and CCW x 15 ea                  TABLE      Manual work  As below As below  As below         Arm on table, wrist flex/ext  gravity eliminated 5x Arm of chair 10x ea way, pron/supin too Standing at counter 20x, and added lumbricals x 20. Cane press up - 10x to [employer] ER in neutral  - 10x  20x         Table slide flex  - 10x  10 x 2     table slide scaption   10 x 2   STANDING      scap retract arms down  Elbows bent  10x  10x 10x  10x 20x  20x                                            PROPRIOCEPTION                                    MODALITIES      Vaso shoulder 15' 15' 10'   CP to wrist  NEXT 15' 10'       Other Therapeutic Activities/Education: Pt cautioned to avoid testing the active use of the left shoulder too much.          Home Exercise Program:  Issued, practiced and pt demo ability to perform 9/9/2021 Manual Treatments:  PROM all planes L shoulder to elmer, light wrist flexor stretching x 15 min      Modalities:  Vaso to L shoulder, pt seated w/ arm on pillow, 10' w/ CP to wrist too      Communication with other providers:  POC set for cosign 9/9/21      Assessment:  Pt tolerated Rx well and is making nice ROM gains already w/ noted decrease in wrist swelling too. He continues w/ limited L shoulder and wrist ROM and strength and will benefit from PT to help gradually restore motion and strength back to prior levels. Pain after Rx 3/10. Plan for Next Session:   continue ROM for shoulder and wrist, puruse OT order for wrist prn, advance to elmer, ice for pain and swelling      Time In / Time Out:  1020/1100      Timed Code/Total Treatment Minutes:  30/40   1 Man 15', 1 TE 15', 1 Vaso 10'       Next Progress Note due:  30 days      Plan of Care Interventions:  [x] Therapeutic Exercise  [x] Modalities:  [x] Therapeutic Activity     [] Ultrasound  [] Estim  [] Gait Training      [] Cervical Traction [] Lumbar Traction  [x] Neuromuscular Re-education    [x] Cold/hotpack [] Iontophoresis   [x] Instruction in HEP      [x] Vasopneumatic   [] Dry Needling    [x] Manual Therapy               [] Aquatic Therapy              Electronically signed by:   Shari Kimbrough PTA 9/17/2021, 10:21 AM       9/17/2021, 10:21 AM

## 2021-09-20 ENCOUNTER — HOSPITAL ENCOUNTER (OUTPATIENT)
Dept: PHYSICAL THERAPY | Age: 56
Setting detail: THERAPIES SERIES
Discharge: HOME | End: 2021-09-20
Payer: COMMERCIAL

## 2021-09-20 PROCEDURE — 97140 MANUAL THERAPY 1/> REGIONS: CPT

## 2021-09-20 PROCEDURE — 97110 THERAPEUTIC EXERCISES: CPT

## 2021-09-20 PROCEDURE — 97016 VASOPNEUMATIC DEVICE THERAPY: CPT

## 2021-09-20 NOTE — FLOWSHEET NOTE
Outpatient Physical Therapy  [place]           [x] Phone: [phone]   Fax: [phone]  Suri Kennethelisa           [] Phone: [phone]   Fax: [phone]        Physical Therapy Daily Treatment Note  Date:  2021    Patient Name:  Eric Maloney    :  1965  MRN: [id number]  Restrictions/Precautions: Other position/activity restrictions: 7 weeks postop 21  Diagnosis:   Diagnosis: L shoulder RTC-R, bicep tenotomy, carpal tunnel release  Date of Injury/Surgery: 21  Treatment Diagnosis: Treatment Diagnosis: L shoulder and wrist stiffness, weakness, pain    Insurance/Certification information: PT Insurance Information: [employer] no PT coverage, 2° [employer]BS 30 PCY   Referring Physician:  Referring Practitioner: Christine De León  Next Doctor Visit:    Plan of care signed (Y/N):  yes  Outcome Measure:  DASH 70%  Visit# / total visits:    POC  Pain level: 4/10       Goals:     Patient goals : able to raise arm and get strength back in hand and arm  Short term goals  Time Frame for Short term goals: 4 weeks  Short term goal 1: Pt will be able to report at least 25% reduction in pain    Short term goal 2: Pt will be able to begin light strengthening w/o pain  Short term goal 3: Pt will report improved sleep, at least 3-4 hours at a time and in bed 1-2nights/week  Long term goals  Time Frame for Long term goals : 8 week  Long term goal 1: Pt will be independent w/ HEP and able to continue to progress on his own  Long term goal 2: Pt will be able to reach [place] at least 140° L UE for return to prior activity  Long term goal 3: Pt will have improved DASH score by 20% or more  Long term goal 4: Pt will have strength in L UE that is at least 90% of R UE for prior activity    Summary of Evaluation: Assessment: Pt is a 56 yo male who presents 7 weeks postop L RTC repair (large tear) w/ DCE, Bicep tenotomy, and carpal tunnel release. He has Hx prior R shoulder surgery and R carpal tunnel needs done also.   He demonstrates limited ROM w/ decreased strength, pain and decreased use of L UE w/ swellig in hand/wrist still as well. These limitations are affecting his ability to perform his usual activity and limiting sleep. He will benefit from PT to help restore ROM, strength and function. Prior to 2021 he had intemittant L shoulder pain w/ limiting activity. Subjective:   Rochelle Arnett arrives to therapy stating that the arm is a little more sore than usual. Thinks the weather may have something to do with it but also he was busy over the weekend, may have used it a little too much, got  over the weekend. Feels like he has more strength in his hand and more mobility in his shoulder since starting PT. Pain was improving prior to his activities this weekend. Feels like he has some swelling in his wrist.       Any changes in Ambulatory Summary Sheet? None        Objective:  COVID screening questions were asked and patient attested that there had been no contact or symptoms    Limited wrist extension ROM and shoulder abduction.     Exercises: (No more than 4 columns)   Exercise/Equipment 9/13/21  #2 9/17/21 #3 9/20/2021 #4           WARM UP  Pendulums CW and CCW x 15 ea Pendulums CW/CCW 15* ea                  TABLE      Manual work  As below  As below As below          Arm on table, wrist flex/ext  Arm of chair 10x ea way, pron/supin too Standing at counter 20x, and added lumbricals x 20. 20* ea flex/ex/pron/supin and lumbricals   Cane press up 10x to elmer 20x 20*   Cane ER in neutral  10x  20x 20*   Table slide flex  10x  10 x 2  20*   table slide scaption  10 x 2 20*         STANDING      scap retract arms down  Elbows bent  10x  10x 20x  20x 20*  20*                                            PROPRIOCEPTION                                    MODALITIES      Vaso shoulder 15' 10'    CP to wrist  15' 10'        Other Therapeutic Activities/Education:       Home Exercise Program:  Issued, practiced and pt demo ability to perform 9/9/2021         Manual Treatments:  PROM all planes L shoulder to elmer, light wrist flexor stretching x 15 min      Modalities:  Vaso to L shoulder, pt seated w/ arm on pillow, 10' w/ CP to wrist too      Communication with other providers:  POC set for cosign 9/9/21      Assessment: Elayne Borjas tolerated today's session well. He demonstrates decent flexion ROM for stage of healing. Limited wrist extension and shoulder abduction.  End session pain: 1-2/10      Plan for Next Session:   continue ROM for shoulder and wrist, puruse OT order for wrist prn, advance to elmer, ice for pain and swelling      Time In / Time Out:  1630/1712      Timed Code/Total Treatment Minutes:  32'/42' 1 vaso 8 1 man 13' 1 TE 17'       Next Progress Note due:  30 days      Plan of Care Interventions:  [x] Therapeutic Exercise  [x] Modalities:  [x] Therapeutic Activity     [] Ultrasound  [] Estim  [] Gait Training      [] Cervical Traction [] Lumbar Traction  [x] Neuromuscular Re-education    [x] Cold/hotpack [] Iontophoresis   [x] Instruction in HEP      [x] Vasopneumatic   [] Dry Needling    [x] Manual Therapy               [] Aquatic Therapy              Electronically signed by:  Peter Croft PTA         9/20/2021, 9:38 AM

## 2021-09-24 ENCOUNTER — HOSPITAL ENCOUNTER (OUTPATIENT)
Dept: PHYSICAL THERAPY | Age: 56
Setting detail: THERAPIES SERIES
Discharge: HOME | End: 2021-09-24
Payer: COMMERCIAL

## 2021-09-24 PROCEDURE — 97016 VASOPNEUMATIC DEVICE THERAPY: CPT | Performed by: PHYSICAL THERAPY ASSISTANT

## 2021-09-24 PROCEDURE — 97110 THERAPEUTIC EXERCISES: CPT | Performed by: PHYSICAL THERAPY ASSISTANT

## 2021-09-24 NOTE — FLOWSHEET NOTE
Outpatient Physical Therapy  [facility]           [x] Phone: [phone]   Fax: [phone]  [place]           [] Phone: [phone]   Fax: [phone]        Physical Therapy Daily Treatment Note  Date:  2021    Patient Name:  Michael Pereira    :  1965  MRN: [id number]  Restrictions/Precautions: Other position/activity restrictions: 7 weeks postop 21  Diagnosis:   Diagnosis: L shoulder RTC-R, bicep tenotomy, carpal tunnel release  Date of Injury/Surgery: 21  Treatment Diagnosis: Treatment Diagnosis: L shoulder and wrist stiffness, weakness, pain    Insurance/Certification information: PT Insurance Information: [employer] no PT coverage, 2° BCBS 30 PCY   Referring Physician:  Referring Practitioner: Terrell Crisostomo  Next Doctor Visit:    Plan of care signed (Y/N):  yes  Outcome Measure:  DASH 70%  Visit# / total visits:    POC  Pain level: 3/10       Goals:     Patient goals : able to raise arm and get strength back in hand and arm  Short term goals  Time Frame for Short term goals: 4 weeks  Short term goal 1: Pt will be able to report at least 25% reduction in pain    Short term goal 2: Pt will be able to begin light strengthening w/o pain  Short term goal 3: Pt will report improved sleep, at least 3-4 hours at a time and in bed 1-2nights/week  Long term goals  Time Frame for Long term goals : 8 week  Long term goal 1: Pt will be independent w/ HEP and able to continue to progress on his own  Long term goal 2: Pt will be able to reach OH at least 140° L UE for return to prior activity  Long term goal 3: Pt will have improved DASH score by 20% or more  Long term goal 4: Pt will have strength in L UE that is at least 90% of R UE for prior activity    Summary of Evaluation: Assessment: Pt is a 56 yo male who presents 7 weeks postop L RTC repair (large tear) w/ DCE, Bicep tenotomy, and carpal tunnel release. He has Hx prior R shoulder surgery and R carpal tunnel needs done also.   He demonstrates limited ROM w/ decreased strength, pain and decreased use of L UE w/ swellig in hand/wrist still as well. These limitations are affecting his ability to perform his usual activity and limiting sleep. He will benefit from PT to help restore ROM, strength and function. Prior to 2021 he had intemittant L shoulder pain w/ limiting activity. Subjective:   Haley Barrier arrives to therapy stating that he worked yesterday and today 15 hours each. The pt has been out of the sling 3 weeks without problems. The pt is not yet able to sleep in the bed. Most nights ends up in the recliner. Return to the surgeon in approx: Oct 7 or 8th.  2021    Any changes in Ambulatory Summary Sheet? None        Objective:  COVID screening questions were asked and patient attested that there had been no contact or symptoms    Left : AAROM flexion measured 143*, scaption 90* *w/ a feel of something roll over, ER at 45*abd. At 45* no pain. Exercises: (No more than 4 columns)   Exercise/Equipment 9/13/21  #2 9/17/21 #3 9/20/2021 #4 9/24/21 #5            WARM UP  Pendulums CW and CCW x 15 ea Pendulums CW/CCW 15* ea     PULLEY Scaption     X 20           TABLE       Manual work  As below  As below As below  --          Arm on table, wrist flex/ext  Arm of chair 10x ea way, pron/supin too Standing at counter 20x, and added lumbricals x 20. 20* ea flex/ex/pron/supin and lumbricals  YFlex bar, on counter, twist with right x 20 reps  Wrist extn Yflex bar x 20 reps  Wrist \"hammer\" Yflex bar x 20 reps  Wrist pron/sup. Yflex bar x 20 reps   Cane press up 10x to elmer 20x 20* Supine cane press, x 20 reps   Cane ER in neutral  10x  20x 20* Seated sm. Ball at axilla, wand ER x 20   Table slide flex  10x  10 x 2  20* Wheel on table flxn x 20   table slide scaption  10 x 2 20* Orange roll on table scap.  X 20 reps thumb up          STANDING       scap retract arms down  Elbows bent  10x  10x 20x  20x 20*  20* At wall 1/2 roll at spine x 20 reps     Wall ISO for shoulder  Flxn, Extn, abd, ER, IR    5x5\" all direction submax contraction                                           PROPRIOCEPTION                                          MODALITIES       Vaso shoulder 15' 10'  10'   CP to wrist  15' 10'  Pt refused        Other Therapeutic Activities/Education: The amount of contraction to the isometric today should be submax. Home Exercise Program:  Issued, practiced and pt demo ability to perform 9/9/2021         Manual Treatments:    Modalities:  Vaso to L shoulder, pt seated w/ arm on pillow, 10'      Communication with other providers:  POC set for cosign 9/9/21      Assessment: Nga Finney tolerated today's session well. He demonstratesimproving flexion ROM for stage of healing. Limited wrist extension and shoulder abduction. The introduction of the isometric shoulder motion in the sub max contraction performed without increasing pain. End session pain: 1-2/10      Plan for Next Session:   continue ROM for shoulder and wrist, puruse OT order for wrist prn, advance to elmer, ice for pain and swelling. Time In / Time Out:  1345/1435      Timed Code/Total Treatment Minutes:  40'/50'  2 TE,  1 vaso,    Next Progress Note due:  30 days      Plan of Care Interventions:  [x] Therapeutic Exercise  [x] Modalities:  [x] Therapeutic Activity     [] Ultrasound  [] Estim  [] Gait Training      [] Cervical Traction [] Lumbar Traction  [x] Neuromuscular Re-education    [x] Cold/hotpack [] Iontophoresis   [x] Instruction in HEP      [x] Vasopneumatic   [] Dry Needling    [x] Manual Therapy               [] Aquatic Therapy              Electronically signed by:   Srikanth Moody PTA 9/24/21 9/24/2021, 7:12 AM

## 2021-09-27 ENCOUNTER — HOSPITAL ENCOUNTER (OUTPATIENT)
Dept: PHYSICAL THERAPY | Age: 56
Setting detail: THERAPIES SERIES
Discharge: HOME | End: 2021-09-27
Payer: COMMERCIAL

## 2021-09-27 PROCEDURE — 97110 THERAPEUTIC EXERCISES: CPT

## 2021-09-27 PROCEDURE — 97016 VASOPNEUMATIC DEVICE THERAPY: CPT

## 2021-09-27 PROCEDURE — 97140 MANUAL THERAPY 1/> REGIONS: CPT

## 2021-09-27 NOTE — FLOWSHEET NOTE
Outpatient Physical Therapy  [facility]           [x] Phone: [phone]   Fax: [phone]  [facility] [place]           [] Phone: [phone]   Fax: [phone]        Physical Therapy Daily Treatment Note  Date:  2021    Patient Name:  Marce Damian    :  1965  MRN: [id number]  Restrictions/Precautions: Other position/activity restrictions: 7 weeks postop 21  Diagnosis:   Diagnosis: L shoulder RTC-R, bicep tenotomy, carpal tunnel release  Date of Injury/Surgery: 21  Treatment Diagnosis: Treatment Diagnosis: L shoulder and wrist stiffness, weakness, pain    Insurance/Certification information: PT Insurance Information: [employer] no PT coverage, 2° [employer]BS 30 PCY   Referring Physician:  Referring Practitioner: Kathleen Hodge Doctor Visit:    Plan of care signed (Y/N):  yes  Outcome Measure:  DASH 70%  Visit# / total visits:    POC  Pain level: 3/10 shoulder       Goals:     Patient goals : able to raise arm and get strength back in hand and arm  Short term goals  Time Frame for Short term goals: 4 weeks  Short term goal 1: Pt will be able to report at least 25% reduction in pain Progressing   Short term goal 2: Pt will be able to begin light strengthening w/o pain   Short term goal 3: Pt will report improved sleep, at least 3-4 hours at a time and in bed 1-2nights/week Met   Long term goals  Time Frame for Long term goals : 8 week  Long term goal 1: Pt will be independent w/ HEP and able to continue to progress on his own  Long term goal 2: Pt will be able to reach OH at least 140° L UE for return to prior activity  Long term goal 3: Pt will have improved DASH score by 20% or more  Long term goal 4: Pt will have strength in L UE that is at least 90% of R UE for prior activity    Summary of Evaluation: Assessment: Pt is a 56 yo male who presents 7 weeks postop L RTC repair (large tear) w/ DCE, Bicep tenotomy, and carpal tunnel release.   He has Hx prior R shoulder surgery and R carpal tunnel needs done also. He demonstrates limited ROM w/ decreased strength, pain and decreased use of L UE w/ swellig in hand/wrist still as well. These limitations are affecting his ability to perform his usual activity and limiting sleep. He will benefit from PT to help restore ROM, strength and function. Prior to 2021 he had intemittant L shoulder pain w/ limiting activity. Subjective:    Yimi Moore arrives to therapy stating that the arm is feeling okay. Just got off work so it's a little sore. Still on restrictions. Reports that the pain has improved, not as painful just more sore. Sleeping better now. Occasional pain depending on the way he moves it. Sleeping in bed about 4-5, 2-3 nights.  is improving in his L hand. Any changes in Ambulatory Summary Sheet? None        Objective:  COVID screening questions were asked and patient attested that there had been no contact or symptoms    Discomfort with ER iso. Exercises: (No more than 4 columns)   Exercise/Equipment 9/20/2021 #4 9/24/21 #5 9/27/2021 #6           WARM UP Pendulums CW/CCW 15* ea      PULLEY Scaption   X 20  20*         TABLE      Manual work  As below  --          Arm on table, wrist flex/ext  20* ea flex/ex/pron/supin and lumbricals  YFlex bar, on counter, twist with right x 20 reps  Wrist extn Yflex bar x 20 reps  Wrist \"hammer\" Yflex bar x 20 reps  Wrist pron/sup. Yflex bar x 20 reps  YFlex bar, on counter, twist with right x 20 reps  Wrist extn Yflex bar x 20 reps  Wrist \"hammer\" Yflex bar x 20 reps  Wrist pron/sup. Yflex bar x 20 reps   Cane press up 20* Supine cane press, x 20 reps forgot   Cane ER in neutral  20* Seated sm. Ball at axilla, wand ER x 20 forgot   Table slide flex  20* Wheel on table flxn x 20 20*   table slide scaption 20* Orange roll on table scap.  X 20 reps thumb up 20*         STANDING      scap retract arms down  Elbows bent  20*  20* At wall 1/2 roll at spine x 20 reps   20* ea    Wall ISO for shoulder  Flxn, Extn, abd, ER, IR  5x5\" all direction submax contraction 10*5\" all directions                                       PROPRIOCEPTION                                    MODALITIES      Vaso shoulder  10'    CP to wrist   Pt refused         Other Therapeutic Activities/Education:       Home Exercise Program:  Issued, practiced and pt demo ability to perform 9/9/2021         Manual Treatments:  PROM all planes L shoulder to elmer, light wrist flexor stretching x 15 min  Modalities:  Vaso to L shoulder, pt seated w/ arm on pillow, 10'      Communication with other providers:  [address] [facility] set for cosign 9/9/21      Assessment: Adriana Gunter tolerated today's session well. He is gaining ROM passively and his pain levels are improving per his report. He will continue to benefit from skilled PT services in order to appropriately progress AROM/strengthening per protocol, to restore PLOF. End session pain: 0/10      Plan for Next Session:   continue ROM for shoulder and wrist, puruse OT order for wrist prn, advance to elmer, ice for pain and swelling.       Time In / Time Out:  1648/1734      Timed Code/Total Treatment Minutes:  36'/46' 1 man 13' 1 TE 21' 1 vaso 10'    Next Progress Note due:  30 days      Plan of Care Interventions:  [x] Therapeutic Exercise  [x] Modalities:  [x] Therapeutic Activity     [] Ultrasound  [] Estim  [] Gait Training      [] Cervical Traction [] Lumbar Traction  [x] Neuromuscular Re-education    [x] Cold/hotpack [] Iontophoresis   [x] Instruction in HEP      [x] Vasopneumatic   [] Dry Needling    [x] Manual Therapy               [] Aquatic Therapy              Electronically signed by:  Nohemy Mijares PTA             9/27/2021, 12:47 PM

## 2021-09-28 ENCOUNTER — OFFICE VISIT (OUTPATIENT)
Dept: INTERNAL MEDICINE CLINIC | Age: 56
End: 2021-09-28
Payer: COMMERCIAL

## 2021-09-28 VITALS
DIASTOLIC BLOOD PRESSURE: 80 MMHG | HEART RATE: 83 BPM | SYSTOLIC BLOOD PRESSURE: 130 MMHG | WEIGHT: 213 LBS | BODY MASS INDEX: 32.28 KG/M2 | HEIGHT: 68 IN | OXYGEN SATURATION: 99 % | TEMPERATURE: 98.4 F

## 2021-09-28 DIAGNOSIS — I10 ESSENTIAL HYPERTENSION: Primary | ICD-10-CM

## 2021-09-28 DIAGNOSIS — R73.03 PREDIABETES: ICD-10-CM

## 2021-09-28 DIAGNOSIS — Z79.899 LONG TERM USE OF DRUG: ICD-10-CM

## 2021-09-28 DIAGNOSIS — E78.5 HYPERLIPIDEMIA, UNSPECIFIED HYPERLIPIDEMIA TYPE: ICD-10-CM

## 2021-09-28 PROCEDURE — 99214 OFFICE O/P EST MOD 30 MIN: CPT | Performed by: FAMILY MEDICINE

## 2021-09-28 RX ORDER — LISINOPRIL 20 MG/1
20 TABLET ORAL DAILY
Qty: 90 TABLET | Refills: 1 | Status: SHIPPED | OUTPATIENT
Start: 2021-09-28 | End: 2022-05-02

## 2021-09-28 RX ORDER — NITROGLYCERIN 0.4 MG/1
TABLET SUBLINGUAL
Qty: 25 TABLET | Refills: 3 | Status: SHIPPED | OUTPATIENT
Start: 2021-09-28 | End: 2022-10-31

## 2021-09-28 SDOH — ECONOMIC STABILITY: FOOD INSECURITY: WITHIN THE PAST 12 MONTHS, THE FOOD YOU BOUGHT JUST DIDN'T LAST AND YOU DIDN'T HAVE MONEY TO GET MORE.: NEVER TRUE

## 2021-09-28 SDOH — ECONOMIC STABILITY: FOOD INSECURITY: WITHIN THE PAST 12 MONTHS, YOU WORRIED THAT YOUR FOOD WOULD RUN OUT BEFORE YOU GOT MONEY TO BUY MORE.: NEVER TRUE

## 2021-09-28 ASSESSMENT — ENCOUNTER SYMPTOMS
SHORTNESS OF BREATH: 0
NAUSEA: 0
BACK PAIN: 0
COUGH: 0
ABDOMINAL PAIN: 0

## 2021-09-28 ASSESSMENT — SOCIAL DETERMINANTS OF HEALTH (SDOH): HOW HARD IS IT FOR YOU TO PAY FOR THE VERY BASICS LIKE FOOD, HOUSING, MEDICAL CARE, AND HEATING?: NOT HARD AT ALL

## 2021-09-28 NOTE — PROGRESS NOTES
Rory Carranza (:  1965) is a 55 y.o. male,Established patient, here for evaluation of the following chief complaint(s):  6 Month Follow-Up, Hypertension, and Hyperlipidemia         ASSESSMENT/PLAN:  1. Essential hypertension, chronic  -     lisinopril (PRINIVIL;ZESTRIL) 20 MG tablet; Take 1 tablet by mouth daily, Disp-90 tablet, R-1D/C  Lisinopril 10Normal  2. Hyperlipidemia, unspecified hyperlipidemia type, chronic  -     Lipid Panel; Future  3. Prediabetes  -     Comprehensive Metabolic Panel; Future  -     Hemoglobin A1C; Future  4. Long term use of drug  -     Comprehensive Metabolic Panel; Future  The patient is asked to make an attempt to improve diet and exercise patterns   On this date 2021 I have spent 30 minutes reviewing previous notes, test results and face to face with the patient discussing the diagnosis and importance of compliance with the treatment plan as well as documenting on the day of the visit. Return in about 5 months (around 2022) for HTN. Lab Results   Component Value Date     10/01/2021    K 4.0 10/01/2021     10/01/2021    CO2 24 10/01/2021    BUN 18 10/01/2021    CREATININE 0.8 10/01/2021    GLUCOSE 108 10/01/2021    CALCIUM 8.8 10/01/2021      Lab Results   Component Value Date    TSHFT4 1.23 2020     Hemoglobin A1C 6.2  See comment % Final 2020 10:58 AM       Subjective   SUBJECTIVE/OBJECTIVE:  HPI: This 56 yo m here for the following  Patient Active Problem List    Diagnosis Date Noted    Carpal tunnel syndrome on left     Sprain of left rotator cuff capsule 2021    Bilateral carpal tunnel syndrome     Prediabetes 2020    Medial meniscus tear     Hypertension     Hyperlipidemia      S/P L shoulder arthroscopy and L carpal tunnel release  -HTN- stable on Lisinopril  -HLD- Pt trying to change diet and lose weight  -Prediabetes    Review of Systems   Constitutional: Negative for diaphoresis and fever.    Respiratory: Negative for cough and shortness of breath. Cardiovascular: Negative for chest pain and palpitations. Gastrointestinal: Negative for abdominal pain and nausea. Genitourinary: Negative for difficulty urinating. Musculoskeletal: Positive for arthralgias (knee). Negative for back pain. Neurological: Negative for dizziness and headaches. No Known Allergies  Current Outpatient Medications   Medication Sig Dispense Refill    nitroGLYCERIN (NITROSTAT) 0.4 MG SL tablet up to max of 3 total doses. If no relief after 1 dose, call 911. 25 tablet 3    lisinopril (PRINIVIL;ZESTRIL) 20 MG tablet Take 1 tablet by mouth daily 90 tablet 1    ibuprofen (ADVIL;MOTRIN) 800 MG tablet Take 1 tablet by mouth every 8 hours as needed for Pain 90 tablet 1    aspirin 81 MG tablet Take 81 mg by mouth daily       No current facility-administered medications for this visit. Vitals:    09/28/21 1603   BP: 130/80   Pulse: 83   Temp: 98.4 °F (36.9 °C)   SpO2: 99%   Weight: 213 lb (96.6 kg)   Height: 5' 8\" (1.727 m)     Objective   Physical Exam  Vitals reviewed. Constitutional:       General: He is not in acute distress. Eyes:      Conjunctiva/sclera: Conjunctivae normal.   Cardiovascular:      Rate and Rhythm: Normal rate and regular rhythm. Pulmonary:      Effort: Pulmonary effort is normal. No respiratory distress. Breath sounds: Normal breath sounds. Abdominal:      General: Bowel sounds are normal.      Palpations: Abdomen is soft. Tenderness: There is no abdominal tenderness. Musculoskeletal:      Cervical back: Neck supple. Right lower leg: No edema. Left lower leg: No edema. Neurological:      Mental Status: He is alert and oriented to person, place, and time. Psychiatric:         Mood and Affect: Mood normal.                An electronic signature was used to authenticate this note.     --Severa Som, DO

## 2021-10-01 ENCOUNTER — HOSPITAL ENCOUNTER (OUTPATIENT)
Dept: PHYSICAL THERAPY | Age: 56
Setting detail: THERAPIES SERIES
Discharge: HOME | End: 2021-10-01
Payer: COMMERCIAL

## 2021-10-01 ENCOUNTER — HOSPITAL ENCOUNTER (OUTPATIENT)
Age: 56
Discharge: HOME | End: 2021-10-01
Payer: COMMERCIAL

## 2021-10-01 DIAGNOSIS — R73.03 PREDIABETES: ICD-10-CM

## 2021-10-01 DIAGNOSIS — Z79.899 LONG TERM USE OF DRUG: ICD-10-CM

## 2021-10-01 DIAGNOSIS — E78.5 HYPERLIPIDEMIA, UNSPECIFIED HYPERLIPIDEMIA TYPE: ICD-10-CM

## 2021-10-01 LAB
ALBUMIN SERPL-MCNC: 4.4 GM/DL (ref 3.4–5)
ALP BLD-CCNC: 62 IU/L (ref 40–128)
ALT SERPL-CCNC: 17 U/L (ref 10–40)
ANION GAP SERPL CALCULATED.3IONS-SCNC: 11 MMOL/L (ref 4–16)
AST SERPL-CCNC: 16 IU/L (ref 15–37)
BILIRUB SERPL-MCNC: 0.3 MG/DL (ref 0–1)
BUN BLDV-MCNC: 18 MG/DL (ref 6–23)
CALCIUM SERPL-MCNC: 8.8 MG/DL (ref 8.3–10.6)
CHLORIDE BLD-SCNC: 102 MMOL/L (ref 99–110)
CHOLESTEROL: 180 MG/DL
CO2: 24 MMOL/L (ref 21–32)
CREAT SERPL-MCNC: 0.8 MG/DL (ref 0.9–1.3)
ESTIMATED AVERAGE GLUCOSE: 120 MG/DL
GFR AFRICAN AMERICAN: >60 ML/MIN/1.73M2
GFR NON-AFRICAN AMERICAN: >60 ML/MIN/1.73M2
GLUCOSE BLD-MCNC: 108 MG/DL (ref 70–99)
HBA1C MFR BLD: 5.8 % (ref 4.2–6.3)
HDLC SERPL-MCNC: 45 MG/DL
LDL CHOLESTEROL DIRECT: 114 MG/DL
POTASSIUM SERPL-SCNC: 4 MMOL/L (ref 3.5–5.1)
SODIUM BLD-SCNC: 137 MMOL/L (ref 135–145)
TOTAL PROTEIN: 6.9 GM/DL (ref 6.4–8.2)
TRIGL SERPL-MCNC: 102 MG/DL

## 2021-10-01 PROCEDURE — 80053 COMPREHEN METABOLIC PANEL: CPT

## 2021-10-01 PROCEDURE — 36415 COLL VENOUS BLD VENIPUNCTURE: CPT

## 2021-10-01 PROCEDURE — 80061 LIPID PANEL: CPT

## 2021-10-01 PROCEDURE — 97016 VASOPNEUMATIC DEVICE THERAPY: CPT

## 2021-10-01 PROCEDURE — 83036 HEMOGLOBIN GLYCOSYLATED A1C: CPT

## 2021-10-01 PROCEDURE — 97110 THERAPEUTIC EXERCISES: CPT

## 2021-10-01 PROCEDURE — 83721 ASSAY OF BLOOD LIPOPROTEIN: CPT

## 2021-10-01 PROCEDURE — 97140 MANUAL THERAPY 1/> REGIONS: CPT

## 2021-10-01 NOTE — FLOWSHEET NOTE
shoulder surgery and R carpal tunnel needs done also. He demonstrates limited ROM w/ decreased strength, pain and decreased use of L UE w/ swellig in hand/wrist still as well. These limitations are affecting his ability to perform his usual activity and limiting sleep. He will benefit from PT to help restore ROM, strength and function. Prior to 2021 he had intemittant L shoulder pain w/ limiting activity. Subjective:    Jose Sosa arrives to therapy stating that the shoulder is just sore, getting a little better every day. His ability to sleep on it is improved. Wrist isn't painful, just still a little stiff. Any changes in Ambulatory Summary Sheet? None        Objective:  COVID screening questions were asked and patient attested that there had been no contact or symptoms    Weakness and shaking noted bottom of cane flexion (cane at hips). Exercises: (No more than 4 columns)   Exercise/Equipment 9/24/21 #5 9/27/2021 #6 10/1/2021 #7           WARM UP       LISANDRA Scaption  X 20  20* 20*         TABLE      Manual work  --  --         Arm on table, wrist flex/ext   YFlex bar, on counter, twist with right x 20 reps  Wrist extn Yflex bar x 20 reps  Wrist \"hammer\" Yflex bar x 20 reps  Wrist pron/sup. Yflex bar x 20 reps  YFlex bar, on counter, twist with right x 20 reps  Wrist extn Yflex bar x 20 reps  Wrist \"hammer\" Yflex bar x 20 reps  Wrist pron/sup. Yflex bar x 20 reps  YFlex bar, on counter, twist with right x 20 reps  Wrist extn Yflex bar x 20 reps  Wrist \"hammer\" Yflex bar x 20 reps  Wrist pron/sup. Yflex bar x 20 reps   Cane press up Supine cane press, x 20 reps forgot No cane 2*10   Cane flexion    2*10   Cane ER in neutral  Seated sm. Ball at axilla, wand ER x 20 forgot 2*10   Table slide flex  Wheel on table flxn x 20 20* Incline table next?   table slide scaption Orange roll on table scap.  X 20 reps thumb up 20* -   Ball rolls on table fwd/cw/ccw   15* ea         STANDING scap retract arms down  Elbows bent  At wall 1/2 roll at spine x 20 reps   20* ea  RTB 20* ea   Wall ISO for shoulder  Flxn, Extn, abd, ER, IR 5x5\" all direction submax contraction 10*5\" all directions  10*5\" all directions                                       PROPRIOCEPTION                                    MODALITIES      Vaso shoulder 10'     CP to wrist  Pt refused          Other Therapeutic Activities/Education:       Home Exercise Program:  Issued, practiced and pt demo ability to perform 9/9/2021         Manual Treatments:  PROM all planes L shoulder to elmer, light wrist flexor stretching x 15 min  Modalities:  Vaso to L shoulder, pt seated w/ arm on pillow, 10'      Communication with other providers:  POC set for cosign 9/9/21      Assessment: Brennen Tucker tolerated some light progression of exercise today with no reports of increased pain. He is slowly gaining PROM and strength for more AROM/AAROM activities. He remains weak with against gravity activity. End session pain: 0/10      Plan for Next Session:   continue ROM for shoulder and wrist, puruse OT order for wrist prn, advance to elmer, ice for pain and swelling.       Time In / Time Out:  1300/1350    Timed Code/Total Treatment Minutes:  40'/50' 1 vaso 10' 1 man 10' 2 TE 30'    Next Progress Note due:  30 days      Plan of Care Interventions:  [x] Therapeutic Exercise  [x] Modalities:  [x] Therapeutic Activity     [] Ultrasound  [] Estim  [] Gait Training      [] Cervical Traction [] Lumbar Traction  [x] Neuromuscular Re-education    [x] Cold/hotpack [] Iontophoresis   [x] Instruction in HEP      [x] Vasopneumatic   [] Dry Needling    [x] Manual Therapy               [] Aquatic Therapy              Electronically signed by:  Carlos A Freeman PTA             10/1/2021, 6:55 AM

## 2021-10-04 ENCOUNTER — HOSPITAL ENCOUNTER (OUTPATIENT)
Dept: PHYSICAL THERAPY | Age: 56
Setting detail: THERAPIES SERIES
Discharge: HOME | End: 2021-10-04
Payer: COMMERCIAL

## 2021-10-04 PROCEDURE — 97140 MANUAL THERAPY 1/> REGIONS: CPT

## 2021-10-04 PROCEDURE — 97110 THERAPEUTIC EXERCISES: CPT

## 2021-10-04 NOTE — FLOWSHEET NOTE
Outpatient Physical Therapy  [facility]           [x] Phone: [phone]   Fax: [phone]  [facility]           [] Phone: [phone]   Fax: [phone]        Physical Therapy Daily Treatment Note  Date:  10/4/2021    Patient Name:  Asya Beth    :  1965  MRN: [id number]  Restrictions/Precautions: Other position/activity restrictions: 7 weeks postop 21  Diagnosis:   Diagnosis: L shoulder RTC-R, bicep tenotomy, carpal tunnel release  Date of Injury/Surgery: 21  Treatment Diagnosis: Treatment Diagnosis: L shoulder and wrist stiffness, weakness, pain    Insurance/Certification information: PT Insurance Information: [employer] no PT coverage, 2° BCBS 30 PCY   Referring Physician:  Referring Practitioner: Ari Hodge Doctor Visit:    Plan of care signed (Y/N):  yes  Outcome Measure:  DASH 70%  Visit# / total visits:    POC  Pain level: 3/10 shoulder, 0/10 pain wrist       Goals:     Patient goals : able to raise arm and get strength back in hand and arm  Short term goals  Time Frame for Short term goals: 4 weeks  Short term goal 1: Pt will be able to report at least 25% reduction in pain Progressing   Short term goal 2: Pt will be able to begin light strengthening w/o pain Progressing   Short term goal 3: Pt will report improved sleep, at least 3-4 hours at a time and in bed 1-2nights/week Met   Long term goals  Time Frame for Long term goals : 8 week  Long term goal 1: Pt will be independent w/ HEP and able to continue to progress on his own  Long term goal 2: Pt will be able to reach OH at least 140° L UE for return to prior activity  Long term goal 3: Pt will have improved DASH score by 20% or more  Long term goal 4: Pt will have strength in L UE that is at least 90% of R UE for prior activity    Summary of Evaluation: Assessment: Pt is a 54 yo male who presents 7 weeks postop L RTC repair (large tear) w/ DCE, Bicep tenotomy, and carpal tunnel release.   He has Hx prior R shoulder surgery and R carpal tunnel needs done also. He demonstrates limited ROM w/ decreased strength, pain and decreased use of L UE w/ swellig in hand/wrist still as well. These limitations are affecting his ability to perform his usual activity and limiting sleep. He will benefit from PT to help restore ROM, strength and function. Prior to 2021 he had intemittant L shoulder pain w/ limiting activity. Subjective:    Ella Villatoro arrives to therapy stating that the shoulder is okay, mildly sore. Reports that the ulnar side of the wrist is more sore the last few days and gets a sharp pain with certain movements. Also reporting trigger finger in his 3rd digit. Any changes in Ambulatory Summary Sheet? None        Objective:  COVID screening questions were asked and patient attested that there had been no contact or symptoms    Mild swelling ulnar side of the wrist.     PROM  Flexion 137°  Abduction 111°  IR 64°  ER 52°       Exercises: (No more than 4 columns)   Exercise/Equipment 9/27/2021 #6 10/1/2021 #7 10/4/2021 #8           WARM UP       LISANDRA Scaption  20* 20* 20*         TABLE      Manual work   -- See below          Arm on table, wrist flex/ext   YFlex bar, on counter, twist with right x 20 reps  Wrist extn Yflex bar x 20 reps  Wrist \"hammer\" Yflex bar x 20 reps  Wrist pron/sup. Yflex bar x 20 reps  YFlex bar, on counter, twist with right x 20 reps  Wrist extn Yflex bar x 20 reps  Wrist \"hammer\" Yflex bar x 20 reps  Wrist pron/sup. Yflex bar x 20 reps Held today    Cane press up forgot No cane 2*10 No cane 2*10   Cane flexion   2*10 2*10   Cane ER in neutral  forgot 2*10 --   Circles in supine with arm at 90°   10* ea dir    Table slide flex  20* Incline table next?  Core wheel incline table 2*10 ea flexion/scaption   Ball rolls on table fwd/cw/ccw  15* ea 20* ea   S/L ER    2*10         STANDING      scap retract arms down  Elbows bent  20* ea  RTB 20* ea RTB 20* ea   Wall ISO for shoulder  Flxn, Extn, abd, ER, IR 10*5\" all directions  10*5\" all directions  10*5\" ea dir                                       PROPRIOCEPTION                                    MODALITIES      Vaso shoulder      CP to wrist           Other Therapeutic Activities/Education:       Home Exercise Program:  Issued, practiced and pt demo ability to perform 9/9/2021         Manual Treatments:  PROM all planes L shoulder to elmer, light wrist flexor stretching x 10  Modalities: Patient declined ice today       Communication with other providers:  POC set for cosign 9/9/21      Assessment: Hazel Florez had a set back with his wrist with increased pain and swelling on the ulnar side so we held on wrist activities today, he will ask the doctor about this on Thursday. He is making slow but consistent progress with strength and ROM in his shoulder. End session pain: 2/10      Plan for Next Session:   continue ROM for shoulder and wrist, puruse OT order for wrist prn, advance to elmer, ice for pain and swelling.       Time In / Time Out:  1645/1720    Timed Code/Total Treatment Minutes:  28' 1 man 10' 1 TE 25'    Next Progress Note due:  Due for Melyssa FRAZIER help take romulo and check goals 10/13      Plan of Care Interventions:  [x] Therapeutic Exercise  [x] Modalities:  [x] Therapeutic Activity     [] Ultrasound  [] Estim  [] Gait Training      [] Cervical Traction [] Lumbar Traction  [x] Neuromuscular Re-education    [x] Cold/hotpack [] Iontophoresis   [x] Instruction in HEP      [x] Vasopneumatic   [] Dry Needling    [x] Manual Therapy               [] Aquatic Therapy              Electronically signed by:  Torsten Tanner PTA             10/4/2021, 9:19 AM

## 2021-10-07 ENCOUNTER — OFFICE VISIT (OUTPATIENT)
Dept: ORTHOPEDIC SURGERY | Age: 56
End: 2021-10-07
Payer: COMMERCIAL

## 2021-10-07 VITALS
WEIGHT: 213 LBS | BODY MASS INDEX: 32.28 KG/M2 | OXYGEN SATURATION: 97 % | RESPIRATION RATE: 16 BRPM | HEIGHT: 68 IN | HEART RATE: 51 BPM

## 2021-10-07 DIAGNOSIS — Z98.890 S/P CARPAL TUNNEL RELEASE: ICD-10-CM

## 2021-10-07 DIAGNOSIS — Z98.890 S/P ARTHROSCOPY OF LEFT SHOULDER: Primary | ICD-10-CM

## 2021-10-07 DIAGNOSIS — M65.332 ACQUIRED TRIGGER FINGER OF LEFT MIDDLE FINGER: ICD-10-CM

## 2021-10-07 PROCEDURE — 99213 OFFICE O/P EST LOW 20 MIN: CPT | Performed by: ORTHOPAEDIC SURGERY

## 2021-10-07 PROCEDURE — 99024 POSTOP FOLLOW-UP VISIT: CPT | Performed by: ORTHOPAEDIC SURGERY

## 2021-10-07 NOTE — PROGRESS NOTES
Patient presents to the office with left shoulder arthroscopy DOS 7/21/21. Pt states he has been going to physical therapy and it has gone well. Pt states he is taking ibuprofen and icing everyday. Pt has developed what he believes is trigger finger of the middle left finger. Pt states his finger is locking in the mornings and is painful when it unlatches. Pt has also noticed a lump along the lateral aspect of the left hand. Pt states the wrist is painful to the touch. Pt denies any injuries.

## 2021-10-07 NOTE — LETTER
[address] [place] [address] and [facility]  [address] [facility]  Phone: [phone]  Fax: [phone]    Jackie Cash MD        October 7, 2021     Patient: Lisa Garcia   YOB: 1965   Date of Visit: 10/7/2021       To Whom It May Concern: It is my medical opinion that Sherryle Decent may return to light duty immediately with the following restrictions: for 6 weeks untill 11/22/21. If you have any questions or concerns, please don't hesitate to call.     Sincerely,        Jackie Cash MD

## 2021-10-07 NOTE — PATIENT INSTRUCTIONS
Continue weight-bearing as tolerated. Continue range of motion exercises as instructed. Ice and elevate as needed. Tylenol or Motrin for pain.   Follow up in 6 weeks  Continue light duty restrictions for 6 weeks

## 2021-10-07 NOTE — PROGRESS NOTES
10/7/2021   Chief Complaint   Patient presents with    Post-Op Check     Left shoulder arthroscopy DOS 7/21/21 11 weeks        History of Present Illness:                             Jazzmine Cramer is a 55 y.o. male who returns today for follow-up of his left shoulder rotator cuff repair and carpal tunnel release. He is doing well making advancements with therapy but still has aching soreness and weakness in the shoulder but has noticed improved range of motion. He has noticed pain swelling and tightness along the middle finger and occasional catching and popping during a flexion activities. Tenderness present over the palmar aspect of the hand overlying the metacarpal head of the middle finger. He denies any injuries to the middle finger. Pain is worse with repetitive gripping and movement activities. He generally feels better with stretching exercises to the middle finger. His sensation in the median nerve distribution is improved but he continues to have some swelling and    Patient presents to the office with left shoulder arthroscopy DOS 7/21/21. Pt states he has been going to physical therapy and it has gone well. Pt states he is taking ibuprofen and icing everyday. Pt has developed what he believes is trigger finger of the middle left finger. Pt states his finger is locking in the mornings and is painful when it unlatches. Pt has also noticed a lump along the lateral aspect of the left hand. Pt states the wrist is painful to the touch. Pt denies any injuries. Medical History  Patient's medications, allergies, past medical, surgical, social and family histories were reviewed and updated as appropriate. Review of Systems   Constitutional: Negative for activity change and fever. Respiratory: Negative for chest tightness and shortness of breath. Cardiovascular: Negative for chest pain. Skin: Negative for color change. Neurological: Negative for dizziness.    Psychiatric/Behavioral: The patient is not nervous/anxious. Examination:  General Exam:  Vitals: Pulse 51   Resp 16   Ht 5' 8\" (1.727 m)   Wt 213 lb (96.6 kg)   SpO2 97%   BMI 32.39 kg/m²    Physical Exam       Left upper extremity:  Well-healed arthroscopic scars at the shoulder. Active range of motion intact at the shoulder with forward elevation of 140 degrees and abduction of 120 degrees with good internal and external rotation. Rotator cuff is functioning well against gravity but there is relative weakness with strength testing during forward elevation abduction and external rotation. There is moderate tenderness to palpation over the A1 pulley of the middle finger. There is mild swelling diffusely throughout the digit primarily along the course of the flexor tendon. There is active triggering with range of motion of the middle finger. There is pain along the volar aspect of the middle finger with passive stretch extension across the MP and PIP joints of the middle finger. Active range of motion is mildly limited both in flexion and extension across the MP and IP joints of the middle finger. Sensation is intact to light touch throughout the hand in the median, ulnar, radial nerve distributions. Skin is intact, no erythema or wounds. 2+ radial pulse. Strength is 5/5 throughout finger MP and IP flexion and extension in the hand          Office Procedures:  No orders of the defined types were placed in this encounter. Assessment and Plan  1. Left shoulder rotator cuff repair    2. Left carpal tunnel release    3. Left middle finger trigger finger    4. Left wrist tendinitis    5. Left knee primary osteoarthritis and medial meniscus tear        Continue restrictions at work. Continue advancement with physical therapy. I advised him to build up strength and range of motion and push through some mild mild discomfort to increase function at the shoulder.     We discussed conservative treatments for his trigger finger. I have shown him stretching exercises and recommended anti-inflammatory medications twice a day. We discussed possible steroid injections but he would like to defer injection to the finger at this time. We discussed possible hand therapy if needed at the wrist and finger but I have recommended that we focus on his shoulder rehabilitation at this time and basic stretching exercises as a home program for his wrist and hand. Follow-up in 6 weeks for check of his progress with his physical therapy and determine whether he is able to return to work with his regular duties if he is doing well. We discussed also his chronic left knee pain. He may wish to have a steroid injection performed to the left knee at the next visit.         Electronically signed by Sindi Ochoa MD on 10/7/2021 at 10:10 AM

## 2021-10-09 ENCOUNTER — HOSPITAL ENCOUNTER (OUTPATIENT)
Dept: PHYSICAL THERAPY | Age: 56
Setting detail: THERAPIES SERIES
Discharge: HOME | End: 2021-10-09
Payer: COMMERCIAL

## 2021-10-11 PROBLEM — M65.332 ACQUIRED TRIGGER FINGER OF LEFT MIDDLE FINGER: Status: ACTIVE | Noted: 2021-10-11

## 2021-10-11 ASSESSMENT — ENCOUNTER SYMPTOMS
SHORTNESS OF BREATH: 0
CHEST TIGHTNESS: 0
COLOR CHANGE: 0

## 2021-10-13 ENCOUNTER — HOSPITAL ENCOUNTER (OUTPATIENT)
Dept: PHYSICAL THERAPY | Age: 56
Setting detail: THERAPIES SERIES
Discharge: HOME | End: 2021-10-13
Payer: COMMERCIAL

## 2021-10-13 PROCEDURE — 97140 MANUAL THERAPY 1/> REGIONS: CPT

## 2021-10-13 PROCEDURE — 97110 THERAPEUTIC EXERCISES: CPT

## 2021-10-13 NOTE — FLOWSHEET NOTE
Outpatient Physical Therapy  [facility]           [x] Phone: [phone]   Fax: [phone]  [facility]           [] Phone: [phone]   Fax: [phone]        Physical Therapy Daily Treatment Note  Date:  10/13/2021    Patient Name:  Aftab Coreas    :  1965  MRN: [id number]  Restrictions/Precautions: Other position/activity restrictions: 7 weeks postop 21  Diagnosis:   Diagnosis: L shoulder RTC-R, bicep tenotomy, carpal tunnel release  Date of Injury/Surgery: 21  Treatment Diagnosis: Treatment Diagnosis: L shoulder and wrist stiffness, weakness, pain    Insurance/Certification information: PT Insurance Information: [employer] no PT coverage, 2° BC[employer] 30 PCY   Referring Physician:  Referring Practitioner: King Laura Hodge Doctor Visit:    Plan of care signed (Y/N):  yes  Outcome Measure:  DASH 70%  Visit# / total visits:    POC  Pain level: 0/10 shoulder some stiffness, 4/10 pain wrist       Goals:     Patient goals : able to raise arm and get strength back in hand and arm  Short term goals  Time Frame for Short term goals: 4 weeks  Short term goal 1: Pt will be able to report at least 25% reduction in pain Met   Short term goal 2: Pt will be able to begin light strengthening w/o pain Progressing   Short term goal 3: Pt will report improved sleep, at least 3-4 hours at a time and in bed 1-2nights/week Met   Long term goals  Time Frame for Long term goals : 8 week  Long term goal 1: Pt will be independent w/ HEP and able to continue to progress on his own  Long term goal 2: Pt will be able to reach OH at least 140° L UE for return to prior activity  Long term goal 3: Pt will have improved DASH score by 20% or more  Long term goal 4: Pt will have strength in L UE that is at least 90% of R UE for prior activity    Summary of Evaluation: Assessment: Pt is a 54 yo male who presents 7 weeks postop L RTC repair (large tear) w/ DCE, Bicep tenotomy, and carpal tunnel release.   He has Hx fwd/cw/ccw  15* ea 20* ea 20* ea   S/L ER    2*10 2*10          STANDING       scap retract arms down  Elbows bent  20* ea  RTB 20* ea RTB 20* ea RTB 20* ea   Wall ISO for shoulder  Flxn, Extn, abd, ER, IR 10*5\" all directions  10*5\" all directions  10*5\" ea dir  10*5\" ea dir                                            PROPRIOCEPTION                                          MODALITIES       Vaso shoulder       CP to wrist            Other Therapeutic Activities/Education:       Home Exercise Program:  Issued, practiced and pt demo ability to perform 9/9/2021         Manual Treatments:  PROM all planes L shoulder to elmer, light wrist flexor stretching   Modalities: Patient declined ice today       Communication with other providers:  POC set for cosign 9/9/21      Assessment: Tina Ochoa tolerates tx session well without adverse reactions; states that PROM helped relieve most of his stiffness, does have some increased pain at end ranges. Continues to make steady gains in ROM. Displays good technique with exercises throughout tx session. End session pain: 0/10      Plan for Next Session:   continue ROM for shoulder and wrist, puruse OT order for wrist prn, advance to elmer, ice for pain and swelling.       Time In / Time Out: 1616 / 1700    Timed Code/Total Treatment Minutes:  40' / 40'    1 Man    2 TE     Next Progress Note due:  Due for Melyssa FRAZIER help take romulo and check goals 10/13      Plan of Care Interventions:  [x] Therapeutic Exercise  [x] Modalities:  [x] Therapeutic Activity     [] Ultrasound  [] Estim  [] Gait Training      [] Cervical Traction [] Lumbar Traction  [x] Neuromuscular Re-education    [x] Cold/hotpack [] Iontophoresis   [x] Instruction in HEP      [x] Vasopneumatic   [] Dry Needling    [x] Manual Therapy               [] Aquatic Therapy              Electronically signed by:  Fuad Crawley PTA,        10/13/2021, 4:23 PM

## 2021-10-14 NOTE — PROGRESS NOTES
Outpatient Physical Therapy           [place]           [x] Phone: [phone]   Fax: [phone]  [place] [place]           [] Phone: [phone]   Fax: [phone]      To: Neyda Bob          From: Jama Pennington, PT, PT     Patient: Ronny Fothergill                     : 1965  Diagnosis:  L shoulder RTC-R, bicep tenotomy, carpal tunnel release  Date: 10/14/2021  Treatment Diagnosis: L shoulder and wrist stiffness, weakness, pain      [x]  Progress Note                []  Discharge Note    Evaluation Date:  2021  Total Visits to date:   9 Cancels/No-shows to date:  0    Subjective:  Benjamin Gaspar arrives to tx session with 0/10 shoulder with stiffness, 4/10 pain wrist. States that he is finally able to sleep through the night in his bed. Reports that overall since starting therapy his L shoulder pain has been more under control and states that he thinks he has met his pain reduction goal.    Plan of Care/Treatment to date:  [x] Therapeutic Exercise    [x] Modalities:  [x] Therapeutic Activity     [] Ultrasound  [] Electrical Stimulation  [] Gait Training      [] Cervical Traction   [] Lumbar Traction  [x] Neuromuscular Re-education  [x] Cold/hotpack [] Iontophoresis  [x] Instruction in HEP      Other:  [x] Manual Therapy       [x]  Vasopneumatic  [] Aquatic Therapy       []   Dry Needle Therapy                      Objective/Significant Findings At Last Visit/Comments:  COVID screening questions were asked and patient attested that there had been no contact or symptoms        PROM  Flexion 145°  Abduction 112°  IR 66°  ER 56°     Just beginning to work on strength, so NT yet. Assessment:  Benjamin Gaspar tolerates tx session well without adverse reactions; states that PROM helped relieve most of his stiffness, does have some increased pain at end ranges. Continues to make steady gains in ROM. Displays good technique with exercises throughout tx session.  End session pain: 0/10    Goal Status:  [] Achieved [x]

## 2021-10-27 ENCOUNTER — HOSPITAL ENCOUNTER (OUTPATIENT)
Dept: PHYSICAL THERAPY | Age: 56
Setting detail: THERAPIES SERIES
Discharge: HOME | End: 2021-10-27
Payer: COMMERCIAL

## 2021-10-27 PROCEDURE — 97110 THERAPEUTIC EXERCISES: CPT

## 2021-10-27 PROCEDURE — 97140 MANUAL THERAPY 1/> REGIONS: CPT

## 2021-10-27 NOTE — FLOWSHEET NOTE
Outpatient Physical Therapy  [facility]           [x] Phone: [phone]   Fax: [phone]  [place]           [] Phone: [phone]   Fax: [phone]        Physical Therapy Daily Treatment Note  Date:  10/27/2021    Patient Name:  Jazzmine Cramer    :  1965  MRN: [id number]  Restrictions/Precautions: Other position/activity restrictions: 7 weeks postop 21  Diagnosis:   Diagnosis: L shoulder RTC-R, bicep tenotomy, carpal tunnel release  Date of Injury/Surgery: 21  Treatment Diagnosis: Treatment Diagnosis: L shoulder and wrist stiffness, weakness, pain    Insurance/Certification information: PT Insurance Information: [employer] no PT coverage, 2° BCBS 30 PCY   Referring Physician:  Referring Practitioner: Carlyle Moura  Next Doctor Visit:    Plan of care signed (Y/N):  yes  Outcome Measure:  DASH 70%  Visit# / total visits:   10/16 POC  Pain level: 2/10 shoulder some stiffness, 5/10 pain wrist       Goals:     Patient goals : able to raise arm and get strength back in hand and arm  Short term goals  Time Frame for Short term goals: 4 weeks  Short term goal 1: Pt will be able to report at least 25% reduction in pain Met   Short term goal 2: Pt will be able to begin light strengthening w/o pain Progressing   Short term goal 3: Pt will report improved sleep, at least 3-4 hours at a time and in bed 1-2nights/week Met   Long term goals  Time Frame for Long term goals : 8 week  Long term goal 1: Pt will be independent w/ HEP and able to continue to progress on his own  Long term goal 2: Pt will be able to reach OH at least 140° L UE for return to prior activity Progressing   Long term goal 3: Pt will have improved DASH score by 20% or more  Long term goal 4: Pt will have strength in L UE that is at least 90% of R UE for prior activity    Summary of Evaluation: Assessment: Pt is a 54 yo male who presents 7 weeks postop L RTC repair (large tear) w/ DCE, Bicep tenotomy, and carpal tunnel release. He has Hx prior R shoulder surgery and R carpal tunnel needs done also. He demonstrates limited ROM w/ decreased strength, pain and decreased use of L UE w/ swellig in hand/wrist still as well. These limitations are affecting his ability to perform his usual activity and limiting sleep. He will benefit from PT to help restore ROM, strength and function. Prior to 2021 he had intemittant L shoulder pain w/ limiting activity. Subjective:   Crystal Olivia arrives to therapy stating that the shoulder is just a little sore. The pain on the lateral side of the wrist continues to be painful, sharp pain when he  something and then tries to supinate he gets a sharp stabbing pain. Saw doc 10/7, he said the pain on the side of his wrist was tendonitis and gave him some exercises, but they don't seem to be working. Goes back to see him 11/18. Trigger finger has resolved with the exercises he was given for that. Doc said surgeries seemed to go fine and everything looks good there. Any changes in Ambulatory Summary Sheet? None        Objective:  COVID screening questions were asked and patient attested that there had been no contact or symptoms    AROM (PROM)  Flexion 115° mild shoulder hike (145°)  Abduction 95° moderate shoulder hike and pain (133°)  IR to S1 but equal to opposite side (85°)  ER base of occiput, pain (70°)    Wrist AROM  Ext 40°  Flex 70°    Scapular shaking during prone exercises.          Exercises: (No more than 4 columns)   Exercise/Equipment 10/4/2021 #8 10/13/21 #9 10/27/2021 #10           WARM UP      LISANDRA Scaption  20* 20* --         TABLE      Manual work  See below  See below  See below          Arm on table, wrist flex/ext  Held today  Held today --   Cane press up No cane 2*10 2*10 AROM 2*10   Cane flexion  2*10 2*10 AROM 2*10   Circles in supine with arm at 90° 10* ea dir  10* ea dir  2*10 ea dir    Table slide flex  Core wheel incline table 2*10 ea flexion/scaption Core wheel incline table 2*10 ea flexion/scaption Core wheel incline table 2*10 ea flexion/scaption    Ball rolls on table fwd/cw/ccw 20* ea 20* ea --   S/L ER  2*10 2*10 2*10   S/L abduction    2*10   Prone extension    2*10   Pone row    2*10         STANDING      scap retract arms down  Elbows bent  RTB 20* ea RTB 20* ea GTB 20* ea   Wall ISO for shoulder  Flxn, Extn, abd, ER, IR 10*5\" ea dir  10*5\" ea dir  --                                      PROPRIOCEPTION                                    MODALITIES      Vaso shoulder   -   CP to wrist    -       Other Therapeutic Activities/Education:       Home Exercise Program:  Issued, practiced and pt demo ability to perform 9/9/2021         Manual Treatments:  PROM all planes L shoulder to elmer,  Modalities: Patient declined ice today       Communication with other providers:  POC set for cosign 9/9/21      Assessment: Dileep Johnson is back following a 2 week break due to being on vacation. He has made improvements in AROM and PROM since last measured. He remains with pain and irritation in the L wrist but is doing exercises that the doc gave him for this. He will continue to benefit from skilled PT services in order to progress strengthening so he may regain at least 90% of his AROM to return to PLOF. End session pain: 0/10      Plan for Next Session:   continue ROM for shoulder and wrist, puruse OT order for wrist prn, advance to elmer, ice for pain and swelling.       Time In / Time Out: 1605/1650    Timed Code/Total Treatment Minutes:  39' 1 man 10' 2 TE 28'    Next Progress Note due:  Due for PN,       Plan of Care Interventions:  [x] Therapeutic Exercise  [x] Modalities:  [x] Therapeutic Activity     [] Ultrasound  [] Estim  [] Gait Training      [] Cervical Traction [] Lumbar Traction  [x] Neuromuscular Re-education    [x] Cold/hotpack [] Iontophoresis   [x] Instruction in HEP      [x] Vasopneumatic   [] Dry Needling    [x] Manual Therapy               [] Aquatic Therapy Electronically signed by:  Tanika Albrecht PTA           10/27/2021, 9:24 AM

## 2021-10-29 ENCOUNTER — HOSPITAL ENCOUNTER (OUTPATIENT)
Dept: PHYSICAL THERAPY | Age: 56
Setting detail: THERAPIES SERIES
Discharge: HOME | End: 2021-10-29
Payer: COMMERCIAL

## 2021-10-29 PROCEDURE — 97140 MANUAL THERAPY 1/> REGIONS: CPT

## 2021-10-29 PROCEDURE — 97110 THERAPEUTIC EXERCISES: CPT

## 2021-10-29 NOTE — FLOWSHEET NOTE
Outpatient Physical Therapy  [place]           [x] Phone: [phone]   Fax: [phone]  Gwendolyn Tanner           [] Phone: [phone]   Fax: [phone]        Physical Therapy Daily Treatment Note  Date:  10/29/2021    Patient Name:  Javy Smith    :  1965  MRN: [id number]  Restrictions/Precautions:  Other position/activity restrictions: 7 weeks postop 21  Diagnosis:   Diagnosis: L shoulder RTC-R, bicep tenotomy, carpal tunnel release  Date of Injury/Surgery: 21  Treatment Diagnosis: Treatment Diagnosis: L shoulder and wrist stiffness, weakness, pain    Insurance/Certification information: PT Insurance Information: [employer] no PT coverage, 2° BC[employer] 30 PCY   Referring Physician:  Referring Practitioner: Rafa Hodge Doctor Visit:    Plan of care signed (Y/N):  yes  Outcome Measure:  DASH 70%  Visit# / total visits:    POC  Pain level: 2/10 shoulder, 0/10 wrist at rest, 7-8/10 with supination      Goals:     Patient goals : able to raise arm and get strength back in hand and arm  Short term goals  Time Frame for Short term goals: 4 weeks  Short term goal 1: Pt will be able to report at least 25% reduction in pain Met   Short term goal 2: Pt will be able to begin light strengthening w/o pain Progressing   Short term goal 3: Pt will report improved sleep, at least 3-4 hours at a time and in bed 1-2nights/week Met   Long term goals  Time Frame for Long term goals : 8 week  Long term goal 1: Pt will be independent w/ HEP and able to continue to progress on his own  Long term goal 2: Pt will be able to reach OH at least 140° L UE for return to prior activity Progressing   Long term goal 3: Pt will have improved DASH score by 20% or more  Long term goal 4: Pt will have strength in L UE that is at least 90% of R UE for prior activity    Summary of Evaluation: Assessment: Pt is a 56 yo male who presents 7 weeks postop L RTC repair (large tear) w/ DCE, Bicep tenotomy, and carpal tunnel MODALITIES       Vaso shoulder  -     CP to wrist   -         Other Therapeutic Activities/Education:     Home Exercise Program:  Issued, practiced and pt demo ability to perform 9/9/2021         Manual Treatments:  PROM all planes L shoulder to elmer,  Modalities: Patient declined ice today       Communication with other providers:  POC set for cosign 9/9/21      Assessment: Krishna Pain tolerated today's session well. He is tolerating progression to AROM activities well with min increase in pain. He does require cues for S/L ER to keep from extending elbow. He will continue to benefit from skilled PT services in order to appropriately progress strengthening. End session pain: 0/10      Plan for Next Session:   continue ROM for shoulder and wrist, puruse OT order for wrist prn, advance to elmer, ice for pain and swelling.       Time In / Time Out: 1030/1105    Timed Code/Total Treatment Minutes:  28' 1 man 10' 1 TE 25'    Next Progress Note due:  Due for PN,       Plan of Care Interventions:  [x] Therapeutic Exercise  [x] Modalities:  [x] Therapeutic Activity     [] Ultrasound  [] Estim  [] Gait Training      [] Cervical Traction [] Lumbar Traction  [x] Neuromuscular Re-education    [x] Cold/hotpack [] Iontophoresis   [x] Instruction in HEP      [x] Vasopneumatic   [] Dry Needling    [x] Manual Therapy               [] Aquatic Therapy              Electronically signed by:  Fang Gonzalez PTA           10/29/2021, 6:52 AM

## 2021-11-01 ENCOUNTER — HOSPITAL ENCOUNTER (OUTPATIENT)
Dept: PHYSICAL THERAPY | Age: 56
Setting detail: THERAPIES SERIES
Discharge: HOME | End: 2021-11-01
Payer: COMMERCIAL

## 2021-11-01 PROCEDURE — 97140 MANUAL THERAPY 1/> REGIONS: CPT

## 2021-11-01 PROCEDURE — 97110 THERAPEUTIC EXERCISES: CPT

## 2021-11-01 NOTE — FLOWSHEET NOTE
Outpatient Physical Therapy  [place]           [x] Phone: [phone]   Fax: [phone]  Alley fabian           [] Phone: [phone]   Fax: [phone]        Physical Therapy Daily Treatment Note  Date:  2021    Patient Name:  Zhao Prieto    :  1965  MRN: [id number]  Restrictions/Precautions:  Other position/activity restrictions: 7 weeks postop 21  Diagnosis:   Diagnosis: L shoulder RTC-R, bicep tenotomy, carpal tunnel release  Date of Injury/Surgery: 21  Treatment Diagnosis: Treatment Diagnosis: L shoulder and wrist stiffness, weakness, pain    Insurance/Certification information: PT Insurance Information: [employer] no PT coverage, 2° BCBS 30 PCY   Referring Physician:  Referring Practitioner: Napoleon Hodge Doctor Visit:    Plan of care signed (Y/N):  yes  Outcome Measure:  DASH 70%  Visit# / total visits:    POC  Pain level: 2-3/10 shoulder, 0/10 wrist at rest, 7-8/10 with supination      Goals:     Patient goals : able to raise arm and get strength back in hand and arm  Short term goals  Time Frame for Short term goals: 4 weeks  Short term goal 1: Pt will be able to report at least 25% reduction in pain Met   Short term goal 2: Pt will be able to begin light strengthening w/o pain Progressing   Short term goal 3: Pt will report improved sleep, at least 3-4 hours at a time and in bed 1-2nights/week Met   Long term goals  Time Frame for Long term goals : 8 week  Long term goal 1: Pt will be independent w/ HEP and able to continue to progress on his own  Long term goal 2: Pt will be able to reach OH at least 140° L UE for return to prior activity Progressing   Long term goal 3: Pt will have improved DASH score by 20% or more  Long term goal 4: Pt will have strength in L UE that is at least 90% of R UE for prior activity    Summary of Evaluation: Assessment: Pt is a 56 yo male who presents 7 weeks postop L RTC repair (large tear) w/ DCE, Bicep tenotomy, and carpal tunnel release. He has Hx prior R shoulder surgery and R carpal tunnel needs done also. He demonstrates limited ROM w/ decreased strength, pain and decreased use of L UE w/ swellig in hand/wrist still as well. These limitations are affecting his ability to perform his usual activity and limiting sleep. He will benefit from PT to help restore ROM, strength and function. Prior to 2021 he had intemittant L shoulder pain w/ limiting activity. Subjective:    Wrist more painful movements than shoulder. Any changes in Ambulatory Summary Sheet? None        Objective:  COVID screening questions were asked and patient attested that there had been no contact or symptoms       Increased soreness/discomfort with S/L abduction. Stiff end range flexion/abduction. PROM L shoulder supine  Flex 148 w.  End range pain  Abd 163  ER in scap plane 80  IR scap plane 80  Pt unable to do wall push up d/t wrist pain        Exercises: (No more than 4 columns)   Exercise/Equipment 10/13/21 #9 10/27/2021 #10 10/29/2021 #11 11/1/21  #12             WARM UP       PULLEY Scaption  20* -- --    UBE   2'/2' @ 120 2'/2' @ [address] [facility] work  See below  See below  See below  See below           Arm on table, wrist flex/ext  Held today -- --    Cane press up 2*10 AROM 2*10 AROM 2*10 No cane, 1# 10x2    Cane flexion  2*10 AROM 2*10 AROM 2*10 No cane, 1# 10x2    Circles in supine with arm at 90° 10* ea dir  2*10 ea dir  2*10 ea dir  1# ABC 1x   Table slide flex  Core wheel incline table 2*10 ea flexion/scaption Core wheel incline table 2*10 ea flexion/scaption  Core wheel incline table 2*10 ea Wheel on wall 10x2   S/L ER  2*10 2*10 20* 20x    S/L abduction   2*10 20* 20x    Prone extension   2*10 20* 1# 20x   Pone row   2*10 20* 1# 20x          STANDING       scap retract arms down  Elbows bent  RTB 20* ea GTB 20* ea GTB 20* ea GTB 20x ea way    Ball on wall     Straight wrist 20x ea way   Chest press TB    Next PROPRIOCEPTION                                          MODALITIES       Vaso shoulder  -     CP to wrist   -         Other Therapeutic Activities/Education:     Home Exercise Program:  Issued, practiced and pt demo ability to perform 9/9/2021         Manual Treatments:  PROM all planes L shoulder to elmer,  [address] mobs inferior and posterior, scap mobs  Modalities: Patient declined ice today       Communication with other providers:  POC set for cosign 9/9/21, see PN 10/13/21      Assessment: Claudeen Brooke tolerated today's session well. He is tolerating progression to AROM activities well with min increase in pain. He continues w/ weakness in L shoulder and ROM deficits. He will continue to benefit from skilled PT services in order to appropriately progress strengthening. End session pain: 0/10      Plan for Next Session:   continue ROM for shoulder and wrist, puruse OT order for wrist prn, advance to elmer, ice for pain and swelling.       Time In / Time Out:  1500/1540    Timed Code/Total Treatment Minutes:     43/36       1 man 10' 2 TE 30'    Next Progress Note due:  See PN  10/13/21      Plan of Care Interventions:  [x] Therapeutic Exercise  [x] Modalities:  [x] Therapeutic Activity     [] Ultrasound  [] Estim  [] Gait Training      [] Cervical Traction [] Lumbar Traction  [x] Neuromuscular Re-education    [x] Cold/hotpack [] Iontophoresis   [x] Instruction in HEP      [x] Vasopneumatic   [] Dry Needling    [x] Manual Therapy               [] Aquatic Therapy              Electronically signed by:  Last Bustamante, PT, PT, MPT, ATC      11/1/2021, 3:45 PM

## 2021-11-03 ENCOUNTER — HOSPITAL ENCOUNTER (OUTPATIENT)
Dept: PHYSICAL THERAPY | Age: 56
Setting detail: THERAPIES SERIES
Discharge: HOME | End: 2021-11-03
Payer: COMMERCIAL

## 2021-11-03 PROCEDURE — 97140 MANUAL THERAPY 1/> REGIONS: CPT

## 2021-11-03 PROCEDURE — 97110 THERAPEUTIC EXERCISES: CPT

## 2021-11-03 NOTE — FLOWSHEET NOTE
Outpatient Physical Therapy  [place]           [x] Phone: [phone]   Fax: [phone]  Charlotte Reese           [] Phone: [phone]   Fax: [phone]        Physical Therapy Daily Treatment Note  Date:  11/3/2021    Patient Name:  Nichol Almazan    :  1965  MRN: [id number]  Restrictions/Precautions: Other position/activity restrictions: 7 weeks postop 21  Diagnosis:   Diagnosis: L shoulder RTC-R, bicep tenotomy, carpal tunnel release  Date of Injury/Surgery: 21  Treatment Diagnosis: Treatment Diagnosis: L shoulder and wrist stiffness, weakness, pain    Insurance/Certification information: PT Insurance Information: [employer] no PT coverage, 2° BCBS 30 PCY   Referring Physician:  Referring Practitioner: Romel Ortiz  Next Doctor Visit:    Plan of care signed (Y/N):  yes  Outcome Measure:  DASH 70%  Visit# / total visits:   POC  Pain level: 3/10 shoulder       Goals:     Patient goals : able to raise arm and get strength back in hand and arm  Short term goals  Time Frame for Short term goals: 4 weeks  Short term goal 1: Pt will be able to report at least 25% reduction in pain Met   Short term goal 2: Pt will be able to begin light strengthening w/o pain Progressing   Short term goal 3: Pt will report improved sleep, at least 3-4 hours at a time and in bed 1-2nights/week Met   Long term goals  Time Frame for Long term goals : 8 week  Long term goal 1: Pt will be independent w/ HEP and able to continue to progress on his own  Long term goal 2: Pt will be able to reach [place] at least 140° L UE for return to prior activity Progressing   Long term goal 3: Pt will have improved DASH score by 20% or more  Long term goal 4: Pt will have strength in L UE that is at least 90% of R UE for prior activity    Summary of Evaluation: Assessment: Pt is a 54 yo male who presents 7 weeks postop L RTC repair (large tear) w/ DCE, Bicep tenotomy, and carpal tunnel release.   He has Hx prior R shoulder surgery and R carpal tunnel needs done also. He demonstrates limited ROM w/ decreased strength, pain and decreased use of L UE w/ swellig in hand/wrist still as well. These limitations are affecting his ability to perform his usual activity and limiting sleep. He will benefit from PT to help restore ROM, strength and function. Prior to 2021 he had intemittant L shoulder pain w/ limiting activity. 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 Wrist feels the same. Any changes in Ambulatory Summary Sheet? None        Objective:  COVID screening questions were asked and patient attested that there had been no contact or symptoms      Stiff end range PROM in all planes. Mild shoulder hike present with chest press with band.        Exercises: (No more than 4 columns)   Exercise/Equipment 10/29/2021 #11 11/1/21  #12 11/3/2021 # 13            WARM UP      LISANDRA Scaption  --     UBE 2'/2' @ 120 2'/2' @ 120 2'/2' @ [address] work  See below  See below  See below          [place] press up AROM 2*10 No cane, 1# 10x2  1# 2*10   Cane flexion  AROM 2*10 No cane, 1# 10x2  1# 2*10   Circles in supine with arm at 90° 2*10 ea dir  1# ABC 1x 1# 1* ABC   Table slide flex  Core wheel incline table 2*10 ea Wheel on wall 10x2 Wheel on wall 2*10   S/L ER  20* 20x  20*   S/L abduction  20* 20x  20*   Prone extension  20* 1# 20x 1# 20*   Pone row  20* 1# 20x 1# 20*         STANDING      scap retract arms down  Elbows bent  GTB 20* ea GTB 20x ea way  GTB 20* ea   Ball on wall   Straight wrist 20x ea way Straight wrist (hand in fist) 20* ea dir    Chest press TB  Next  GTB 20*                          PROPRIOCEPTION                                    MODALITIES      Vaso shoulder      CP to wrist           Other Therapeutic Activities/Education:     Home Exercise Program:  Issued, practiced and pt demo ability to perform 9/9/2021 Manual Treatments:  PROM all planes L shoulder to elmer,  [facility] mobs inferior and posterior  Modalities: Patient declined ice today       Communication with other providers:  POC set for cosign 9/9/21, see PN 10/13/21      Assessment: Claudeen Brooke tolerated today's session well despite reports of increased soreness upon arrival. He is making slow but consistent progress towards his goals. Main limitation currently is end range stiffness and general shoulder weakness along with wrist pain. End session pain: 2/10      Plan for Next Session:   continue ROM for shoulder and wrist, puruse OT order for wrist prn, advance to elmer, ice for pain and swelling.       Time In / Time Out:  1730/1800    Timed Code/Total Treatment Minutes:    27' 1 man 13' 1 TE 15'    Next Progress Note due:  See PN  10/13/21      Plan of Care Interventions:  [x] Therapeutic Exercise  [x] Modalities:  [x] Therapeutic Activity     [] Ultrasound  [] Estim  [] Gait Training      [] Cervical Traction [] Lumbar Traction  [x] Neuromuscular Re-education    [x] Cold/hotpack [] Iontophoresis   [x] Instruction in HEP      [x] Vasopneumatic   [] Dry Needling    [x] Manual Therapy               [] Aquatic Therapy              Electronically signed by:  Glenys Bang PTA        11/3/2021, 9:17 AM

## 2021-11-15 ENCOUNTER — HOSPITAL ENCOUNTER (OUTPATIENT)
Dept: PHYSICAL THERAPY | Age: 56
Setting detail: THERAPIES SERIES
Discharge: HOME | End: 2021-11-15
Payer: COMMERCIAL

## 2021-11-15 PROCEDURE — 97140 MANUAL THERAPY 1/> REGIONS: CPT

## 2021-11-15 PROCEDURE — 97110 THERAPEUTIC EXERCISES: CPT

## 2021-11-15 NOTE — FLOWSHEET NOTE
Outpatient Physical Therapy  [place]           [x] Phone: [phone]   Fax: [phone]  [place]           [] Phone: [phone]   Fax: [phone]        Physical Therapy Daily Treatment Note  Date:  11/15/2021    Patient Name:  Katarzyna Ferreira    :  1965  MRN: [id number]  Restrictions/Precautions: Other position/activity restrictions: 7 weeks postop 21  Diagnosis:   Diagnosis: L shoulder RTC-R, bicep tenotomy, carpal tunnel release  Date of Injury/Surgery: 21  Treatment Diagnosis: Treatment Diagnosis: L shoulder and wrist stiffness, weakness, pain    Insurance/Certification information: PT Insurance Information: [employer] no PT coverage, 2° BCBS 30 PCY   Referring Physician:  Referring Practitioner: Kerry Vences  Next Doctor Visit:    Plan of care signed (Y/N):  yes  Outcome Measure:  DASH 70%  Visit# / total visits:    POC  Pain level: 210 shoulder       Goals:     Patient goals : able to raise arm and get strength back in hand and arm  Short term goals  Time Frame for Short term goals: 4 weeks  Short term goal 1: Pt will be able to report at least 25% reduction in pain Met   Short term goal 2: Pt will be able to begin light strengthening w/o pain Progressing   Short term goal 3: Pt will report improved sleep, at least 3-4 hours at a time and in bed 1-2nights/week Met   Long term goals  Time Frame for Long term goals : 8 week  Long term goal 1: Pt will be independent w/ HEP and able to continue to progress on his own  Long term goal 2: Pt will be able to reach [place] at least 140° L UE for return to prior activity Progressing   Long term goal 3: Pt will have improved DASH score by 20% or more  Long term goal 4: Pt will have strength in L UE that is at least 90% of R UE for prior activity    Summary of Evaluation: Assessment: Pt is a 54 yo male who presents 7 weeks postop L RTC repair (large tear) w/ DCE, Bicep tenotomy, and carpal tunnel release.   He has Hx prior R shoulder surgery and R carpal tunnel needs done also. He demonstrates limited ROM w/ decreased strength, pain and decreased use of L UE w/ swellig in hand/wrist still as well. These limitations are affecting his ability to perform his usual activity and limiting sleep. He will benefit from PT to help restore ROM, strength and function. Prior to 2021 he had intemittant L shoulder pain w/ limiting activity. Subjective:  Pt notes that he fell on Saturday when his knee gave out and he landed on his shoulder. Notes that he did not notice any difference or increase in pain that night or the next day. Any changes in Ambulatory Summary Sheet?   None      Objective:  COVID screening questions were asked and patient attested that there had been no contact or symptoms    L shoulder AROM:  Flex: 147 deg  Abd: 126 deg  IR: 64 deg  ER: 62 deg    Exercises: (No more than 4 columns)   Exercise/Equipment 11/1/21  #12 11/3/2021 # 13 11/15/21 #14            WARM UP      PULLEY Scaption       UBE 2'/2' @ 120 2'/2' @ 80 2'/2'         TABLE      Manual work  See below  See below  See below         [employer] press up No cane, 1# 10x2  1# 2*10 2# 2x10   Cane flexion  No cane, 1# 10x2  1# 2*10 2# 2x10   Circles in supine with arm at 90° 1# ABC 1x 1# 1* ABC 2# ABC   Table slide flex  Wheel on wall 10x2 Wheel on wall 2*10 Wheel on wall  x10 flex  x10 scap   S/L ER  20x  20* 2x10 1#   S/L abduction  20x  20* x10 2#, x10 1#   Prone extension  1# 20x 1# 20*    Pone row  1# 20x 1# 20*          STANDING      scap retract arms down  Elbows bent  GTB 20x ea way  GTB 20* ea GTB 2x10   Ball on wall  Straight wrist 20x ea way Straight wrist (hand in fist) 20* ea dir     Chest press TB Next  GTB 20*    Shoulder ext   GTB 2x10                    PROPRIOCEPTION      Body blade   Horizontal 2x15\"  Vertical 2x15\"                           MODALITIES      Vaso shoulder   Denied    CP to wrist           Other Therapeutic Activities/Education:HEP  Home Exercise Program:  Issued, practiced and pt demo ability to perform 9/9/2021    Manual Treatments:  PROM all planes L shoulder to elmer,  [address] mobs grade III-IV and posterior  Modalities: Patient declined ice today     Communication with other providers:  POC set for cosign 9/9/21, see PN 10/13/21    Assessment: Pt tolerated today's treatment without any adverse reactions or complications this date. Pt did well this date with new added exercises and no increase in pain. Pt would continue to benefit from skilled therapy interventions to address remaining impairments, improve mobility and strength and progress toward goal completion while reducing risk for re-injury or further decline. End session pain: 2/10    Plan for Next Session:   continue ROM for shoulder and wrist, puruse OT order for wrist prn, advance to elmer, ice for pain and swelling.     Time In / Time Out:   1601 - 1639    Timed Code/Total Treatment Minutes:  38': 9' MT x1, 29' TE x2    Next Progress Note due:  See PN  10/13/21    Plan of Care Interventions:  [x] Therapeutic Exercise  [x] Modalities:  [x] Therapeutic Activity     [] Ultrasound  [] Estim  [] Gait Training      [] Cervical Traction [] Lumbar Traction  [x] Neuromuscular Re-education    [x] Cold/hotpack [] Iontophoresis   [x] Instruction in HEP      [x] Vasopneumatic   [] Dry Needling    [x] Manual Therapy               [] Aquatic Therapy              Electronically signed by:  Betsy Fish, PT, DPT   11/15/2021, 4:01 PM

## 2021-11-17 ENCOUNTER — HOSPITAL ENCOUNTER (OUTPATIENT)
Dept: PHYSICAL THERAPY | Age: 56
Setting detail: THERAPIES SERIES
Discharge: HOME | End: 2021-11-17
Payer: COMMERCIAL

## 2021-11-17 PROCEDURE — 97530 THERAPEUTIC ACTIVITIES: CPT

## 2021-11-17 PROCEDURE — 97110 THERAPEUTIC EXERCISES: CPT

## 2021-11-17 NOTE — FLOWSHEET NOTE
Outpatient Physical Therapy  [place]           [x] Phone: [phone]   Fax: [phone]  [place]           [] Phone: [phone]   Fax: [phone]        Physical Therapy Daily Treatment Note  Date:  2021    Patient Name:  Jesus Roca    :  1965  MRN: [id number]  Restrictions/Precautions: Other position/activity restrictions: 7 weeks postop 21  Diagnosis:   Diagnosis: L shoulder RTC-R, bicep tenotomy, carpal tunnel release  Date of Injury/Surgery: 21  Treatment Diagnosis: Treatment Diagnosis: L shoulder and wrist stiffness, weakness, pain    Insurance/Certification information: PT Insurance Information: [employer] no PT coverage, 2° [employer] 30 PCY   Referring Physician:  Referring Practitioner: Alaina Romo  Next Doctor Visit:    Plan of care signed (Y/N):  yes  Outcome Measure:  DASH 70%  Visit# / total visits:   15/16 POC  Pain level: 210 shoulder     Goals:     Patient goals : able to raise arm and get strength back in hand and arm  Short term goals  Time Frame for Short term goals: 4 weeks  Short term goal 1: Pt will be able to report at least 25% reduction in pain Met   Short term goal 2: Pt will be able to begin light strengthening w/o pain Progressing   Short term goal 3: Pt will report improved sleep, at least 3-4 hours at a time and in bed 1-2nights/week Met   Long term goals  Time Frame for Long term goals : 8 week  Long term goal 1: Pt will be independent w/ HEP and able to continue to progress on his own  Long term goal 2: Pt will be able to reach [place] at least 140° L UE for return to prior activity Progressing   Long term goal 3: Pt will have improved DASH score by 20% or more  Long term goal 4: Pt will have strength in L UE that is at least 90% of R UE for prior activity    Summary of Evaluation: Assessment: Pt is a 56 yo male who presents 7 weeks postop L RTC repair (large tear) w/ DCE, Bicep tenotomy, and carpal tunnel release.   He has Hx prior R shoulder surgery and R carpal tunnel needs done also. He demonstrates limited ROM w/ decreased strength, pain and decreased use of L UE w/ swellig in hand/wrist still as well. These limitations are affecting his ability to perform his usual activity and limiting sleep. He will benefit from PT to help restore ROM, strength and function. Prior to 2021 he had intemittant L shoulder pain w/ limiting activity. Subjective:  Pt reports 4/10 upon arrival and does not recall a reason for the increased pain today. Had good muscle soreness following the previous session, that resolved within a day. Any changes in Ambulatory Summary Sheet?   None      Objective:  COVID screening questions were asked and patient attested that there had been no contact or symptoms    L shoulder AROM:  Flex: 147 deg  Abd: 126 deg  IR: 64 deg  ER: 62 deg    Exercises: (No more than 4 columns)   Exercise/Equipment 11/1/21  #12 11/3/2021 # 13 11/15/21 #14 11/17/21 #15             WARM UP       PULLEY Scaption        UBE 2'/2' @ 120 2'/2' @ 120 2'/2' 2'/2'          TABLE       Manual work  See below  See below  See below           Cane press up No cane, 1# 10x2  1# 2*10 2# 2x10 2# 2x10    Cane flexion  No cane, 1# 10x2  1# 2*10 2# 2x10 2# 2x10   Circles in supine with arm at 90° 1# ABC 1x 1# 1* ABC 2# ABC 2# ABC    Table slide flex  Wheel on wall 10x2 Wheel on wall 2*10 Wheel on wall  x10 flex  x10 scap Wheel on wall   x10 flex  x10 scap   S/L ER  20x  20* 2x10 1# 2x10 1#   S/L abduction  20x  20* x10 2#, x10 1# 2x10 1#   Prone extension  1# 20x 1# 20*     Pone row  1# 20x 1# 20*            STANDING       scap retract arms down  Elbows bent  GTB 20x ea way  GTB 20* ea GTB 2x10 GTB 2x10   Ball on wall  Straight wrist 20x ea way Straight wrist (hand in fist) 20* ea dir      Chest press TB Next  GTB 20*  GTB 2x10   Shoulder ext   GTB 2x10 GTB 2x10   TB ER    RTB 2x10 w/ towel under elbow                PROPRIOCEPTION       Body blade   Horizontal 2x15\"  Vertical 2x15\" Horizontal 2x15'  Vertical 2x15'                               MODALITIES       Vaso shoulder   Denied  declined   CP to wrist            Other Therapeutic Activities/Education:HEP  Home Exercise Program:  Issued, practiced and pt demo ability to perform 9/9/2021    Manual Treatments:    Modalities: Patient declined ice today     Communication with other providers:  POC set for cosign 9/9/21, see PN 10/13/21    Assessment: Pt tolerated today's treatment without any adverse reactions or complications this date. Able to tolerate all increased resistance from the previous session except 2 lbs on SL abduction due to increased weakness today. Patient will be returning to Dr. Neyda Bob next week for follow-up. May need to schedule with primary therapist at following sessions if continuing with visits. End session pain:  2/10     Plan for Next Session:   continue ROM for shoulder and wrist, puruse OT order for wrist prn, advance to [place], ice for pain and swelling.     Time In / Time Out:   1810-4154    Timed Code/Total Treatment Minutes:   45'   (2) TE   (1) TA    Next Progress Note due:  See PN  10/13/21    Plan of Care Interventions:  [x] Therapeutic Exercise  [x] Modalities:  [x] Therapeutic Activity     [] Ultrasound  [] Estim  [] Gait Training      [] Cervical Traction [] Lumbar Traction  [x] Neuromuscular Re-education    [x] Cold/hotpack [] Iontophoresis   [x] Instruction in HEP      [x] Vasopneumatic   [] Dry Needling    [x] Manual Therapy               [] Aquatic Therapy              Electronically signed by:  Aleena Evans, PT, DPT, CSCS   11/17/2021, 8:35 AM

## 2021-11-18 ENCOUNTER — OFFICE VISIT (OUTPATIENT)
Dept: ORTHOPEDIC SURGERY | Age: 56
End: 2021-11-18
Payer: COMMERCIAL

## 2021-11-18 VITALS
HEART RATE: 79 BPM | RESPIRATION RATE: 16 BRPM | OXYGEN SATURATION: 92 % | BODY MASS INDEX: 32.28 KG/M2 | HEIGHT: 68 IN | WEIGHT: 213 LBS

## 2021-11-18 DIAGNOSIS — M17.12 PRIMARY OSTEOARTHRITIS OF LEFT KNEE: Primary | ICD-10-CM

## 2021-11-18 DIAGNOSIS — M65.332 ACQUIRED TRIGGER FINGER OF LEFT MIDDLE FINGER: ICD-10-CM

## 2021-11-18 DIAGNOSIS — Z98.890 S/P ARTHROSCOPY OF LEFT SHOULDER: ICD-10-CM

## 2021-11-18 DIAGNOSIS — Z98.890 S/P CARPAL TUNNEL RELEASE: ICD-10-CM

## 2021-11-18 PROCEDURE — 20610 DRAIN/INJ JOINT/BURSA W/O US: CPT | Performed by: ORTHOPAEDIC SURGERY

## 2021-11-18 PROCEDURE — 99214 OFFICE O/P EST MOD 30 MIN: CPT | Performed by: ORTHOPAEDIC SURGERY

## 2021-11-18 ASSESSMENT — ENCOUNTER SYMPTOMS
WHEEZING: 0
VOMITING: 0
COLOR CHANGE: 0
SHORTNESS OF BREATH: 0
CHEST TIGHTNESS: 0
EYE PAIN: 0
EYE REDNESS: 0

## 2021-11-18 NOTE — PROGRESS NOTES
11/18/2021   Chief Complaint   Patient presents with    Post-Op Check     left shoulder arthroscopy 7/21/21    Knee Pain     left        History of Present Illness:                             Olivia Mckeon is a 55 y.o. male returns today for follow-up of his left shoulder arthroscopy and also evaluation of his history and ongoing left knee pain. His shoulder is gradually improving with time and his physical therapy. He is doing home exercise program and improving with his range of motion and strength. His biggest complaint today is ongoing left knee aching pain and stiffness. He has occasional feelings of instability or popping in the knee as well. He has done well in the past with steroid injection. Pt, Ricco Florentino is a 55 y.o. male returning to the office following a left shoulder arthroscopy on 7/21/21. He also presents with pain in the left knee. Medical History  Patient's medications, allergies, past medical, surgical, social and family histories were reviewed and updated as appropriate. Review of Systems   Constitutional: Negative for chills and fever. HENT: Negative for congestion and sneezing. Eyes: Negative for pain and redness. Respiratory: Negative for chest tightness, shortness of breath and wheezing. Cardiovascular: Negative for chest pain and palpitations. Gastrointestinal: Negative for vomiting. Musculoskeletal: Positive for arthralgias. Skin: Negative for color change and rash. Neurological: Negative for weakness and numbness. Psychiatric/Behavioral: Negative for agitation. The patient is not nervous/anxious. Examination:  General Exam:  Vitals: Pulse 79   Resp 16   Ht 5' 8\" (1.727 m)   Wt 213 lb (96.6 kg)   SpO2 92%   BMI 32.39 kg/m²    Physical Exam  Vitals and nursing note reviewed. Constitutional:       Appearance: Normal appearance. HENT:      Head: Normocephalic and atraumatic.    Eyes: No triggering of the middle finger with active range of motion. Well-healed surgical scar from carpal tunnel release. Skin:     General: Skin is warm and dry. Neurological:      Mental Status: He is alert and oriented to person, place, and time. Psychiatric:         Mood and Affect: Mood normal.         Behavior: Behavior normal.              Diagnostic testing:  X-rays reviewed in office, I independently reviewed the films in the office today:     X-ray images of the left knee from 7/5/2021 reviewed by myself and discussed with the patient:  There is evidence of tricompartmental degenerative joint disease most significantly medial compartment with advanced joint space narrowing and prominent osteophyte formation. Mild varus alignment. MRI images of the left knee from 6/1/2020 were reviewed by myself and discussed the patient:  Impression   Radial tear involving the posterior horn medial meniscus near the root with   mild extrusion of the body approximately 3 mm. Tricompartmental osteophytes. Advanced cartilage loss in both the medial and patellofemoral compartments   Small joint effusion.  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       Very small semimembranosus gastrocnemius bursal cyst.       Mild distal quadriceps tendinosis. Office Procedures:  Orders Placed This Encounter   Procedures    AZ ARTHROCENTESIS ASPIR&/INJ MAJOR JT/BURSA W/O US       Assessment and Plan  1. Left knee tricompartmental primary osteoarthritis    2. Left rotator cuff repair    3. Left middle finger trigger finger, improved    4. History of previous left carpal tunnel release. Ongoing right carpal tunnel syndrome    I discussed the degenerative findings of the left knee on x-ray, MRI, and exam with the patient. I have recommended maximizing conservative treatments for the arthritic knee condition. We discussed potential for surgical intervention in the future if conservative management fails.   This consist of a total knee replacement. We discussed the use of steroid injections as needed for severe symptoms. Currently patient is having significant pain on a daily basis and this is impacting activities of daily living and ability to get comfortable at rest.  The patient would like to have an injection performed today. Procedure Note, Left Knee Intraarticular Injection:  The left knee was prepped with alcohol and injected intra-articularly with 80 mg of Kenalog and 4 mL of 1% lidocaine through a 22-gauge needle. Sterile Band-Aid was applied. The patient tolerated it well without complications. I have recommended weight loss and maintaining a healthy weight to decrease the forces across the degenerative knee joint. We discussed the importance of maintaining flexibility and strength at the knee. I have advised the patient to have a home exercise program that includes stretching and low impact activities such as walking, biking, or elliptical machines. We discussed the possibility of physical therapy. The patient would like to defer formal physical therapy at this time. They will call if they would like to have a referral sent. I instructed the patient on the use of over-the-counter anti-inflammatory medications. I recommend he continue building up strength at the shoulder with a home exercise program to increase strengthening and power of the shoulder. Continue stretching for his trigger finger. He will follow-up as needed if he would require a repeat injection to the knee or any concerns regarding his healing shoulder.               Electronically signed by Sindi Ochoa MD on 11/18/2021 at 10:23 AM

## 2021-11-18 NOTE — PROGRESS NOTES
Umm Sosa is a 55 y.o. male returning to the office following a left shoulder arthroscopy on 7/21/21. He also presents with pain in the left knee.

## 2021-11-18 NOTE — PATIENT INSTRUCTIONS
Was given a steroid injection in his left knee  Weightbearing and activities as tolerated  May take Ibuprofen or Motrin as needed  Rest, ice, and elevate as needed  Work on ROM and strengthening exercises as discussed  Follow up as needed

## 2021-11-22 ENCOUNTER — HOSPITAL ENCOUNTER (OUTPATIENT)
Dept: PHYSICAL THERAPY | Age: 56
Discharge: HOME | End: 2021-11-22

## 2021-11-22 NOTE — FLOWSHEET NOTE
Physical Therapy  Cancellation/No-show Note  Patient Name:  Margaret Camacho  :  1965   Date:  2021  Cancelled visits to date: 1  No-shows to date: 2    For today's appointment patient:  [x]  Cancelled  []  Rescheduled appointment  []  No-show     Reason given by patient:  [x]  Patient ill  []  Conflicting appointment  []  No transportation    []  Conflict with work  []  No reason given  []  Other:     Comments:      Electronically signed by:  Rajiv Larsen     2021, 4:04 PM

## 2022-01-25 ENCOUNTER — NURSE ONLY (OUTPATIENT)
Dept: CARDIOLOGY CLINIC | Age: 57
End: 2022-01-25
Payer: COMMERCIAL

## 2022-01-25 ENCOUNTER — OFFICE VISIT (OUTPATIENT)
Dept: CARDIOLOGY CLINIC | Age: 57
End: 2022-01-25
Payer: COMMERCIAL

## 2022-01-25 VITALS
SYSTOLIC BLOOD PRESSURE: 140 MMHG | BODY MASS INDEX: 31.67 KG/M2 | DIASTOLIC BLOOD PRESSURE: 80 MMHG | WEIGHT: 209 LBS | HEIGHT: 68 IN | HEART RATE: 105 BPM | OXYGEN SATURATION: 97 %

## 2022-01-25 DIAGNOSIS — R00.2 PALPITATIONS: Primary | ICD-10-CM

## 2022-01-25 DIAGNOSIS — R00.0 TACHYCARDIA: Primary | ICD-10-CM

## 2022-01-25 DIAGNOSIS — I10 PRIMARY HYPERTENSION: ICD-10-CM

## 2022-01-25 DIAGNOSIS — E78.5 HYPERLIPIDEMIA, UNSPECIFIED HYPERLIPIDEMIA TYPE: ICD-10-CM

## 2022-01-25 PROCEDURE — 93246 EXT ECG>7D<15D RECORDING: CPT | Performed by: INTERNAL MEDICINE

## 2022-01-25 PROCEDURE — 99214 OFFICE O/P EST MOD 30 MIN: CPT | Performed by: NURSE PRACTITIONER

## 2022-01-25 PROCEDURE — 93000 ELECTROCARDIOGRAM COMPLETE: CPT | Performed by: NURSE PRACTITIONER

## 2022-01-25 ASSESSMENT — ENCOUNTER SYMPTOMS
SHORTNESS OF BREATH: 0
ORTHOPNEA: 0

## 2022-01-25 NOTE — LETTER
[facility]  1965  [id number]    Have you had any Chest Pain that is not new? - No      Have you had any Shortness of Breath - No    Have you had any dizziness - Yes  when do you feel dizzy walking   How long does it last .15  minutes   -Pt states he usually gets dizzy often from 6am-7am and it lasts about 10-15mins then goes away, he thought it was because he takes his meds at night. Have you had any palpitations that are not new? - Yes  If Yes DO EKG - Do you feel your heart racing  How long does it last - .1  minutes   -Pt states when he lays down at night for bed he can feel his heart racing and even hears it in his ears.     Do you have any edema - swelling in No        When did you have your last labs drawn 10/1/21    Do you have a surgery or procedure scheduled in the near future - No

## 2022-01-25 NOTE — PROGRESS NOTES
1/25/2022  Primary cardiologist: Dr. Keily De La Garza  is an established 55 y.o.  male here for follow-up on   1. Palpitations    2. Primary hypertension    3. Hyperlipidemia, unspecified hyperlipidemia type            SUBJECTIVE/OBJECTIVE:    HPI :   Solitario Tellez reports feeling fatigued. Notes over last couple weeks his energy has decreased. Feeling more tired than usual.  He also notes episodes of dizziness that occur with walking. Episodes last up to 15 minutes. Notes he routinely occur between 6 and 7 AM.  He also notes episodes of palpitations where he feels his heart is racing. He does not correlate dizziness palpitations together. Episodes of palpitations last approximately 1 minute. Notes when he lays down at nighttime he can feel his heart racing and feels as though he can hear wheezing in his ears. Review of Systems   Constitutional: Positive for malaise/fatigue. Negative for diaphoresis. Cardiovascular: Positive for palpitations. Negative for chest pain, claudication, dyspnea on exertion, irregular heartbeat, leg swelling, near-syncope, orthopnea and paroxysmal nocturnal dyspnea. Respiratory: Negative for shortness of breath. Neurological: Positive for dizziness. Negative for light-headedness. Vitals:    01/25/22 1343 01/25/22 1402 01/25/22 1403   BP: 130/80 126/76 (!) 140/80   Site: Left Upper Arm     Position: Supine Sitting Standing   Cuff Size: Medium Adult     Pulse: 96 106 105   SpO2: 97%     Weight: 209 lb (94.8 kg)     Height: 5' 8\" (1.727 m)       No flowsheet data found. Wt Readings from Last 3 Encounters:   01/25/22 209 lb (94.8 kg)   11/18/21 213 lb (96.6 kg)   10/07/21 213 lb (96.6 kg)     Body mass index is 31.78 kg/m². Physical Exam  Constitutional:       Appearance: Normal appearance. Neck:      Vascular: No carotid bruit. Cardiovascular:      Rate and Rhythm: Normal rate and regular rhythm. Pulses: Normal pulses. Heart sounds: Normal heart sounds. Pulmonary:      Effort: Pulmonary effort is normal.      Breath sounds: Normal breath sounds. Musculoskeletal:      Right lower leg: No edema. Left lower leg: No edema. Skin:     General: Skin is warm and dry. Neurological:      Mental Status: He is alert. Current Outpatient Medications   Medication Sig Dispense Refill    nitroGLYCERIN (NITROSTAT) 0.4 MG SL tablet up to max of 3 total doses. If no relief after 1 dose, call 911. 25 tablet 3    lisinopril (PRINIVIL;ZESTRIL) 20 MG tablet Take 1 tablet by mouth daily 90 tablet 1    ibuprofen (ADVIL;MOTRIN) 800 MG tablet Take 1 tablet by mouth every 8 hours as needed for Pain 90 tablet 1    aspirin 81 MG tablet Take 81 mg by mouth daily       No current facility-administered medications for this visit. All pertinent data reviewed and discussed with patient       ASSESSMENT/PLAN:    Dizziness  Orthostatic blood pressure negative  With increased fatigue  Check exercise treadmill stress test    Palpitations  Has ongoing palpitations 3-4 times per week lasting up to 15 minutes. check 2-week event monitor  EKG SR rate 99      Hypertension   Blood pressure is Controlled  continue with lisinopril   Encouraged a low sodium diet    hyperlipidemia   Results for Noah Grimaldo (MRN [id number])    Ref. Range 10/1/2021 10:28   Cholesterol Latest Ref Range: <200 MG/   HDL Cholesterol Latest Ref Range: >40 MG/DL 45   LDL Direct Latest Ref Range: <100 MG/ (H)   Triglycerides Latest Ref Range: <150 MG/     Near goal-   encouraged healthy eating habits including low fat -low cholesterol diet        Medications reviewed and confirmed with patient     Tests ordered:  Monitor - 2 weeks  ETT       Follow-up  6 months      Signed:  RYANN Velez CNP, 1/25/2022, 2:07 PM    An electronic signature was used to authenticate this note.     Please note this report has been partially produced using speech recognition software and may contain errors related to that system including errors in grammar, punctuation, and spelling, as well as words and phrases that may be inappropriate. If there are any questions or concerns please feel free to contact the dictating provider for clarification.

## 2022-01-25 NOTE — PROGRESS NOTES
2wks holter monitor applied 1/25/22 @ 2:45pm for tachycardia Serial # [id number] . Instructed patient on monitor and proper use. Instructed on diary. When to remove and bring it back. Must leave the holter monitor on  without removing for the duration of time ordered. Answered all questions the patient had. Instructed patient to call [facility] at [phone] with any questions or concerns with the monitor.   Rubens Leach

## 2022-01-25 NOTE — PATIENT INSTRUCTIONS
Please be informed that if you contact our office outside of normal business hours the physician on call cannot help with any scheduling or rescheduling issues, procedure instruction questions or any type of medication issue. We advise you for any urgent/emergency that you go to the nearest emergency room! PLEASE CALL OUR OFFICE DURING NORMAL BUSINESS HOURS    Monday - Friday   8 am to 5 pm    [place] [facility]: [phone]    [place]:  [phone]    **It is YOUR responsibilty to bring medication bottles and/or updated medication list to [address] [place] [address]. This will allow us to better serve you and all your healthcare needs**    Please hold on to these instructions the  will call you within 1-9 business days when we receive authorization from your insurance. Treadmill Stress test    WHAT TO EXPECT:     The exercise stress test is a test used to provide information about how the heart responds to exertion. It involves walking on a treadmill at increasing levels of difficulty, while electrocardiogram, heart rate, and blood pressure are monitored. ? This test will take approximately 1 hours: Please arrive at the office 5-10 min before the scheduled testing time. ? Once you are taken back to the stress lab you will be asked to read and sign a consent form before proceeding with the test. At this time feel free to ask any question that you may have as the procedure is explained to you.   ? You will be attached to the EKG monitoring equipment, your blood pressure will be taken, and you will begin walking on the treadmill. The treadmill starts off slowly and every 3 minutes the treadmill speeds up and the elevation increases. The average person usually walks for a period of 6-8min. PREPARATION FOR TEST:    ? Eat a light meal such as juice and toast at least 2 hours prior to the procedure.    ? AVOID CAFFEINE 24 HOURS PRIOR TO THE TEST: Including coffee, Tea, Alisha and other soft drinks even those labeled  caffeine free or decaffeinated. ? Please wear loose comfortable clothing and comfortable walking shoes. Please wear a short sleeved shirt. ? Please shower or bathe and do not apply powder or lotion to the skin prior to testing, as the electrodes will adhere better giving us a clearer visual EKG recording.    ? DO NOT TAKE BETA-BLOCKERS 24 HOURS PRIOR TO TESTING

## 2022-02-11 ENCOUNTER — PROCEDURE VISIT (OUTPATIENT)
Dept: CARDIOLOGY CLINIC | Age: 57
End: 2022-02-11
Payer: COMMERCIAL

## 2022-02-11 DIAGNOSIS — R00.2 PALPITATIONS: ICD-10-CM

## 2022-02-11 DIAGNOSIS — I10 PRIMARY HYPERTENSION: ICD-10-CM

## 2022-02-11 DIAGNOSIS — E78.5 HYPERLIPIDEMIA, UNSPECIFIED HYPERLIPIDEMIA TYPE: ICD-10-CM

## 2022-02-11 DIAGNOSIS — R94.31 ABNORMAL EKG: ICD-10-CM

## 2022-02-11 PROCEDURE — 93015 CV STRESS TEST SUPVJ I&R: CPT | Performed by: INTERNAL MEDICINE

## 2022-02-14 ENCOUNTER — TELEPHONE (OUTPATIENT)
Dept: CARDIOLOGY CLINIC | Age: 57
End: 2022-02-14

## 2022-02-14 NOTE — TELEPHONE ENCOUNTER
----- Message from RYANN Pham CNP sent at 2/11/2022  4:46 PM EST -----  Please phone results- normal stress test     Spoke to Cite Carey Jean and she said she will let Faviola Ladd know about normal stress test results.    German Rea

## 2022-02-25 ENCOUNTER — APPOINTMENT (OUTPATIENT)
Dept: CT IMAGING | Age: 57
End: 2022-02-25
Payer: COMMERCIAL

## 2022-02-25 ENCOUNTER — HOSPITAL ENCOUNTER (OUTPATIENT)
Age: 57
Setting detail: OBSERVATION
Discharge: HOME | End: 2022-02-26
Attending: EMERGENCY MEDICINE | Admitting: STUDENT IN AN ORGANIZED HEALTH CARE EDUCATION/TRAINING PROGRAM
Payer: COMMERCIAL

## 2022-02-25 ENCOUNTER — APPOINTMENT (OUTPATIENT)
Dept: GENERAL RADIOLOGY | Age: 57
End: 2022-02-25
Payer: COMMERCIAL

## 2022-02-25 DIAGNOSIS — R07.9 CHEST PAIN, UNSPECIFIED TYPE: Primary | ICD-10-CM

## 2022-02-25 LAB
ALBUMIN SERPL-MCNC: 3.9 GM/DL (ref 3.4–5)
ALCOHOL SCREEN SERUM: 0.25 %WT/VOL
ALP BLD-CCNC: 57 IU/L (ref 40–129)
ALT SERPL-CCNC: 14 U/L (ref 10–40)
ANION GAP SERPL CALCULATED.3IONS-SCNC: 13 MMOL/L (ref 4–16)
AST SERPL-CCNC: 14 IU/L (ref 15–37)
BASOPHILS ABSOLUTE: 0.1 K/CU MM
BASOPHILS RELATIVE PERCENT: 1 % (ref 0–1)
BILIRUB SERPL-MCNC: 0.2 MG/DL (ref 0–1)
BUN BLDV-MCNC: 14 MG/DL (ref 6–23)
CALCIUM SERPL-MCNC: 8.1 MG/DL (ref 8.3–10.6)
CHLORIDE BLD-SCNC: 108 MMOL/L (ref 99–110)
CO2: 22 MMOL/L (ref 21–32)
CREAT SERPL-MCNC: 0.8 MG/DL (ref 0.9–1.3)
DIFFERENTIAL TYPE: ABNORMAL
EOSINOPHILS ABSOLUTE: 0.2 K/CU MM
EOSINOPHILS RELATIVE PERCENT: 3.3 % (ref 0–3)
GFR AFRICAN AMERICAN: >60 ML/MIN/1.73M2
GFR NON-AFRICAN AMERICAN: >60 ML/MIN/1.73M2
GLUCOSE BLD-MCNC: 158 MG/DL (ref 70–99)
HCT VFR BLD CALC: 40.7 % (ref 42–52)
HEMOGLOBIN: 13.4 GM/DL (ref 13.5–18)
IMMATURE NEUTROPHIL %: 0.3 % (ref 0–0.43)
LYMPHOCYTES ABSOLUTE: 2.5 K/CU MM
LYMPHOCYTES RELATIVE PERCENT: 34 % (ref 24–44)
MCH RBC QN AUTO: 29.8 PG (ref 27–31)
MCHC RBC AUTO-ENTMCNC: 32.9 % (ref 32–36)
MCV RBC AUTO: 90.4 FL (ref 78–100)
MONOCYTES ABSOLUTE: 0.5 K/CU MM
MONOCYTES RELATIVE PERCENT: 6.3 % (ref 0–4)
NUCLEATED RBC %: 0 %
PDW BLD-RTO: 12 % (ref 11.7–14.9)
PLATELET # BLD: 234 K/CU MM (ref 140–440)
PMV BLD AUTO: 9.2 FL (ref 7.5–11.1)
POTASSIUM SERPL-SCNC: 3.7 MMOL/L (ref 3.5–5.1)
RBC # BLD: 4.5 M/CU MM (ref 4.6–6.2)
SEGMENTED NEUTROPHILS ABSOLUTE COUNT: 4 K/CU MM
SEGMENTED NEUTROPHILS RELATIVE PERCENT: 55.1 % (ref 36–66)
SODIUM BLD-SCNC: 143 MMOL/L (ref 135–145)
TOTAL IMMATURE NEUTOROPHIL: 0.02 K/CU MM
TOTAL NUCLEATED RBC: 0 K/CU MM
TOTAL PROTEIN: 6.3 GM/DL (ref 6.4–8.2)
TROPONIN T: <0.01 NG/ML
WBC # BLD: 7.3 K/CU MM (ref 4–10.5)

## 2022-02-25 PROCEDURE — 85025 COMPLETE CBC W/AUTO DIFF WBC: CPT

## 2022-02-25 PROCEDURE — 6360000004 HC RX CONTRAST MEDICATION: Performed by: EMERGENCY MEDICINE

## 2022-02-25 PROCEDURE — G0480 DRUG TEST DEF 1-7 CLASSES: HCPCS

## 2022-02-25 PROCEDURE — 70450 CT HEAD/BRAIN W/O DYE: CPT

## 2022-02-25 PROCEDURE — 71275 CT ANGIOGRAPHY CHEST: CPT

## 2022-02-25 PROCEDURE — 80053 COMPREHEN METABOLIC PANEL: CPT

## 2022-02-25 PROCEDURE — 93005 ELECTROCARDIOGRAM TRACING: CPT | Performed by: EMERGENCY MEDICINE

## 2022-02-25 PROCEDURE — 99284 EMERGENCY DEPT VISIT MOD MDM: CPT

## 2022-02-25 PROCEDURE — 71045 X-RAY EXAM CHEST 1 VIEW: CPT

## 2022-02-25 PROCEDURE — 84484 ASSAY OF TROPONIN QUANT: CPT

## 2022-02-25 RX ADMIN — IOPAMIDOL 90 ML: 755 INJECTION, SOLUTION INTRAVENOUS at 23:08

## 2022-02-25 ASSESSMENT — PAIN - FUNCTIONAL ASSESSMENT: PAIN_FUNCTIONAL_ASSESSMENT: 0-10

## 2022-02-25 ASSESSMENT — PAIN SCALES - GENERAL: PAINLEVEL_OUTOF10: 5

## 2022-02-26 ENCOUNTER — APPOINTMENT (OUTPATIENT)
Dept: ULTRASOUND IMAGING | Age: 57
End: 2022-02-26
Payer: COMMERCIAL

## 2022-02-26 ENCOUNTER — APPOINTMENT (OUTPATIENT)
Dept: MRI IMAGING | Age: 57
End: 2022-02-26
Payer: COMMERCIAL

## 2022-02-26 VITALS
HEART RATE: 71 BPM | WEIGHT: 207 LBS | TEMPERATURE: 98.4 F | OXYGEN SATURATION: 94 % | BODY MASS INDEX: 31.37 KG/M2 | DIASTOLIC BLOOD PRESSURE: 76 MMHG | SYSTOLIC BLOOD PRESSURE: 130 MMHG | RESPIRATION RATE: 16 BRPM | HEIGHT: 68 IN

## 2022-02-26 PROBLEM — R40.20 LOC (LOSS OF CONSCIOUSNESS) (HCC): Status: ACTIVE | Noted: 2022-02-26

## 2022-02-26 LAB
AMPHETAMINES: NEGATIVE
ANION GAP SERPL CALCULATED.3IONS-SCNC: 12 MMOL/L (ref 4–16)
BARBITURATE SCREEN URINE: NEGATIVE
BASOPHILS ABSOLUTE: 0.1 K/CU MM
BASOPHILS RELATIVE PERCENT: 0.8 % (ref 0–1)
BENZODIAZEPINE SCREEN, URINE: NEGATIVE
BUN BLDV-MCNC: 15 MG/DL (ref 6–23)
CALCIUM SERPL-MCNC: 8.4 MG/DL (ref 8.3–10.6)
CANNABINOID SCREEN URINE: NEGATIVE
CHLORIDE BLD-SCNC: 108 MMOL/L (ref 99–110)
CO2: 23 MMOL/L (ref 21–32)
COCAINE METABOLITE: NEGATIVE
CREAT SERPL-MCNC: 0.7 MG/DL (ref 0.9–1.3)
DIFFERENTIAL TYPE: ABNORMAL
EKG ATRIAL RATE: 87 BPM
EKG DIAGNOSIS: NORMAL
EKG P AXIS: 45 DEGREES
EKG P-R INTERVAL: 172 MS
EKG Q-T INTERVAL: 376 MS
EKG QRS DURATION: 86 MS
EKG QTC CALCULATION (BAZETT): 452 MS
EKG R AXIS: 13 DEGREES
EKG T AXIS: 38 DEGREES
EKG VENTRICULAR RATE: 87 BPM
EOSINOPHILS ABSOLUTE: 0.1 K/CU MM
EOSINOPHILS RELATIVE PERCENT: 2.2 % (ref 0–3)
GFR AFRICAN AMERICAN: >60 ML/MIN/1.73M2
GFR NON-AFRICAN AMERICAN: >60 ML/MIN/1.73M2
GLUCOSE BLD-MCNC: 104 MG/DL (ref 70–99)
HCT VFR BLD CALC: 40.4 % (ref 42–52)
HEMOGLOBIN: 13.4 GM/DL (ref 13.5–18)
IMMATURE NEUTROPHIL %: 0.3 % (ref 0–0.43)
LYMPHOCYTES ABSOLUTE: 1.6 K/CU MM
LYMPHOCYTES RELATIVE PERCENT: 26.9 % (ref 24–44)
MCH RBC QN AUTO: 30.1 PG (ref 27–31)
MCHC RBC AUTO-ENTMCNC: 33.2 % (ref 32–36)
MCV RBC AUTO: 90.8 FL (ref 78–100)
MONOCYTES ABSOLUTE: 0.6 K/CU MM
MONOCYTES RELATIVE PERCENT: 9.6 % (ref 0–4)
NUCLEATED RBC %: 0 %
OPIATES, URINE: NEGATIVE
OXYCODONE: NEGATIVE
PDW BLD-RTO: 12.3 % (ref 11.7–14.9)
PHENCYCLIDINE, URINE: NEGATIVE
PLATELET # BLD: 209 K/CU MM (ref 140–440)
PMV BLD AUTO: 9.3 FL (ref 7.5–11.1)
POTASSIUM SERPL-SCNC: 4.5 MMOL/L (ref 3.5–5.1)
PROLACTIN: 19.4 NG/ML
RBC # BLD: 4.45 M/CU MM (ref 4.6–6.2)
SEGMENTED NEUTROPHILS ABSOLUTE COUNT: 3.6 K/CU MM
SEGMENTED NEUTROPHILS RELATIVE PERCENT: 60.2 % (ref 36–66)
SODIUM BLD-SCNC: 143 MMOL/L (ref 135–145)
TOTAL IMMATURE NEUTOROPHIL: 0.02 K/CU MM
TOTAL NUCLEATED RBC: 0 K/CU MM
TROPONIN T: <0.01 NG/ML
WBC # BLD: 5.9 K/CU MM (ref 4–10.5)

## 2022-02-26 PROCEDURE — 84484 ASSAY OF TROPONIN QUANT: CPT

## 2022-02-26 PROCEDURE — 70551 MRI BRAIN STEM W/O DYE: CPT

## 2022-02-26 PROCEDURE — 85025 COMPLETE CBC W/AUTO DIFF WBC: CPT

## 2022-02-26 PROCEDURE — 84146 ASSAY OF PROLACTIN: CPT

## 2022-02-26 PROCEDURE — G0378 HOSPITAL OBSERVATION PER HR: HCPCS

## 2022-02-26 PROCEDURE — 80048 BASIC METABOLIC PNL TOTAL CA: CPT

## 2022-02-26 PROCEDURE — 2580000003 HC RX 258: Performed by: STUDENT IN AN ORGANIZED HEALTH CARE EDUCATION/TRAINING PROGRAM

## 2022-02-26 PROCEDURE — 93880 EXTRACRANIAL BILAT STUDY: CPT

## 2022-02-26 PROCEDURE — 99213 OFFICE O/P EST LOW 20 MIN: CPT | Performed by: INTERNAL MEDICINE

## 2022-02-26 PROCEDURE — 6370000000 HC RX 637 (ALT 250 FOR IP): Performed by: STUDENT IN AN ORGANIZED HEALTH CARE EDUCATION/TRAINING PROGRAM

## 2022-02-26 PROCEDURE — 99205 OFFICE O/P NEW HI 60 MIN: CPT | Performed by: STUDENT IN AN ORGANIZED HEALTH CARE EDUCATION/TRAINING PROGRAM

## 2022-02-26 PROCEDURE — 80307 DRUG TEST PRSMV CHEM ANLYZR: CPT

## 2022-02-26 PROCEDURE — 36415 COLL VENOUS BLD VENIPUNCTURE: CPT

## 2022-02-26 PROCEDURE — 93010 ELECTROCARDIOGRAM REPORT: CPT | Performed by: INTERNAL MEDICINE

## 2022-02-26 RX ORDER — SODIUM CHLORIDE 9 MG/ML
25 INJECTION, SOLUTION INTRAVENOUS PRN
Status: DISCONTINUED | OUTPATIENT
Start: 2022-02-26 | End: 2022-02-26 | Stop reason: HOSPADM

## 2022-02-26 RX ORDER — LANOLIN ALCOHOL/MO/W.PET/CERES
100 CREAM (GRAM) TOPICAL DAILY
Status: DISCONTINUED | OUTPATIENT
Start: 2022-02-26 | End: 2022-02-26 | Stop reason: HOSPADM

## 2022-02-26 RX ORDER — ONDANSETRON 4 MG/1
4 TABLET, ORALLY DISINTEGRATING ORAL EVERY 8 HOURS PRN
Status: DISCONTINUED | OUTPATIENT
Start: 2022-02-26 | End: 2022-02-26 | Stop reason: HOSPADM

## 2022-02-26 RX ORDER — M-VIT,TX,IRON,MINS/CALC/FOLIC 27MG-0.4MG
1 TABLET ORAL DAILY
Status: DISCONTINUED | OUTPATIENT
Start: 2022-02-26 | End: 2022-02-26 | Stop reason: HOSPADM

## 2022-02-26 RX ORDER — LISINOPRIL 20 MG/1
20 TABLET ORAL DAILY
Status: DISCONTINUED | OUTPATIENT
Start: 2022-02-26 | End: 2022-02-26 | Stop reason: HOSPADM

## 2022-02-26 RX ORDER — SODIUM CHLORIDE 0.9 % (FLUSH) 0.9 %
5-40 SYRINGE (ML) INJECTION PRN
Status: DISCONTINUED | OUTPATIENT
Start: 2022-02-26 | End: 2022-02-26 | Stop reason: HOSPADM

## 2022-02-26 RX ORDER — ACETAMINOPHEN 650 MG/1
650 SUPPOSITORY RECTAL EVERY 6 HOURS PRN
Status: DISCONTINUED | OUTPATIENT
Start: 2022-02-26 | End: 2022-02-26 | Stop reason: HOSPADM

## 2022-02-26 RX ORDER — ACETAMINOPHEN 325 MG/1
650 TABLET ORAL EVERY 6 HOURS PRN
Status: DISCONTINUED | OUTPATIENT
Start: 2022-02-26 | End: 2022-02-26 | Stop reason: HOSPADM

## 2022-02-26 RX ORDER — ASPIRIN 81 MG/1
81 TABLET, CHEWABLE ORAL DAILY
Status: DISCONTINUED | OUTPATIENT
Start: 2022-02-26 | End: 2022-02-26 | Stop reason: HOSPADM

## 2022-02-26 RX ORDER — ONDANSETRON 2 MG/ML
4 INJECTION INTRAMUSCULAR; INTRAVENOUS EVERY 6 HOURS PRN
Status: DISCONTINUED | OUTPATIENT
Start: 2022-02-26 | End: 2022-02-26 | Stop reason: HOSPADM

## 2022-02-26 RX ORDER — SODIUM CHLORIDE 0.9 % (FLUSH) 0.9 %
5-40 SYRINGE (ML) INJECTION EVERY 12 HOURS SCHEDULED
Status: DISCONTINUED | OUTPATIENT
Start: 2022-02-26 | End: 2022-02-26 | Stop reason: HOSPADM

## 2022-02-26 RX ORDER — LORAZEPAM 2 MG/ML
1 INJECTION INTRAMUSCULAR EVERY 5 MIN PRN
Status: DISCONTINUED | OUTPATIENT
Start: 2022-02-26 | End: 2022-02-26 | Stop reason: HOSPADM

## 2022-02-26 RX ORDER — POLYETHYLENE GLYCOL 3350 17 G/17G
17 POWDER, FOR SOLUTION ORAL DAILY PRN
Status: DISCONTINUED | OUTPATIENT
Start: 2022-02-26 | End: 2022-02-26 | Stop reason: HOSPADM

## 2022-02-26 RX ADMIN — Medication 100 MG: at 09:30

## 2022-02-26 RX ADMIN — ASPIRIN 81 MG: 81 TABLET, CHEWABLE ORAL at 09:30

## 2022-02-26 RX ADMIN — SODIUM CHLORIDE, PRESERVATIVE FREE 10 ML: 5 INJECTION INTRAVENOUS at 09:32

## 2022-02-26 RX ADMIN — MULTIPLE VITAMINS W/ MINERALS TAB 1 TABLET: TAB at 09:30

## 2022-02-26 RX ADMIN — LISINOPRIL 20 MG: 20 TABLET ORAL at 09:30

## 2022-02-26 ASSESSMENT — PAIN SCALES - GENERAL
PAINLEVEL_OUTOF10: 0

## 2022-02-26 ASSESSMENT — ENCOUNTER SYMPTOMS
BLOOD IN STOOL: 0
NAUSEA: 1
COUGH: 0
ABDOMINAL PAIN: 0
WHEEZING: 0
COLOR CHANGE: 0
CONSTIPATION: 0
SHORTNESS OF BREATH: 1
DIARRHEA: 0
SORE THROAT: 0
RHINORRHEA: 0
CHEST TIGHTNESS: 1
VOMITING: 0

## 2022-02-26 NOTE — CONSULTS
CARDIOLOGY CONSULT NOTE   Reason for consultation:  Syncope    Referring physician:  Josie Jimenez DO     Primary care physician: Librado Peters DO      Dear  Dr. Josie Jimenez DO   Thanks for the consult. Chief Complaints :  Chief Complaint   Patient presents with    Chest Pain        History of present illness:Valorie is a 65 y. o.year old who presents after syncope episode after having dinner and had some alcohol and dinner as he was returning home with friends as she was sitting in the car wife says that he squeezed her hand held his chest and then passed out for some time did not notice any seizures or convulsions like activity. He is back to baseline and would like to be discharged. He recently had echo and treadmill stress test as outpatient which were all normal he does have history of coronary artery disease currently denies any chest pain. He has previous history of having passing out events in the setting of alcohol use. He says he normally does not drink regularly but when he drinks it has been drinking  For the last 6 weeks he had normal Holter stress test and echo    Past medical history:    has a past medical history of Arthritis, Bilateral carpal tunnel syndrome, CAD (coronary artery disease), ECHO, H/O echocardiogram, Hyperlipidemia, Hypertension, Medial meniscus tear, Prediabetes, and Wears glasses. Past surgical history:   has a past surgical history that includes shoulder surgery (Right, 2006); Appendectomy (age 13); Hand surgery (Right, 2002); Shoulder arthroscopy (Left, 7/21/2021); and Carpal tunnel release (Left, 7/21/2021). Social History:   reports that he has never smoked. He quit smokeless tobacco use about 2 years ago. His smokeless tobacco use included chew. He reports current alcohol use of about 15.0 standard drinks of alcohol per week. He reports that he does not use drugs.   Family history:   no family history of CAD, STROKE of DM at early age    No Known Allergies    aspirin chewable tablet 81 mg, Daily  lisinopril (PRINIVIL;ZESTRIL) tablet 20 mg, Daily  sodium chloride flush 0.9 % injection 5-40 mL, 2 times per day  sodium chloride flush 0.9 % injection 5-40 mL, PRN  0.9 % sodium chloride infusion, PRN  enoxaparin (LOVENOX) injection 40 mg, Daily  ondansetron (ZOFRAN-ODT) disintegrating tablet 4 mg, Q8H PRN   Or  ondansetron (ZOFRAN) injection 4 mg, Q6H PRN  polyethylene glycol (GLYCOLAX) packet 17 g, Daily PRN  acetaminophen (TYLENOL) tablet 650 mg, Q6H PRN   Or  acetaminophen (TYLENOL) suppository 650 mg, Q6H PRN  LORazepam (ATIVAN) injection 1 mg, Q5 Min PRN  thiamine tablet 100 mg, Daily  therapeutic multivitamin-minerals 1 tablet, Daily      Current Facility-Administered Medications   Medication Dose Route Frequency Provider Last Rate Last Admin    aspirin chewable tablet 81 mg  81 mg Oral Daily Liu Carlos, DO   81 mg at 02/26/22 0930    lisinopril (PRINIVIL;ZESTRIL) tablet 20 mg  20 mg Oral Daily Liu Carlos, DO   20 mg at 02/26/22 0930    sodium chloride flush 0.9 % injection 5-40 mL  5-40 mL IntraVENous 2 times per day Liu Carlos, DO   10 mL at 02/26/22 0932    sodium chloride flush 0.9 % injection 5-40 mL  5-40 mL IntraVENous PRN Delroy Manuele, DO        0.9 % sodium chloride infusion  25 mL IntraVENous PRN Delroy Fabienneele, DO        enoxaparin (LOVENOX) injection 40 mg  40 mg SubCUTAneous Daily Delroy Manuele, DO        ondansetron (ZOFRAN-ODT) disintegrating tablet 4 mg  4 mg Oral Q8H PRN Delroy Manuele, DO        Or    ondansetron (ZOFRAN) injection 4 mg  4 mg IntraVENous Q6H PRN Delroy Manuele, DO        polyethylene glycol (GLYCOLAX) packet 17 g  17 g Oral Daily PRN Noe Carlos, DO        acetaminophen (TYLENOL) tablet 650 mg  650 mg Oral Q6H PRN Noe Carlos, DO        Or    acetaminophen (TYLENOL) suppository 650 mg  650 mg Rectal Q6H PRN Delroy Manuele, DO        LORazepam (ATIVAN) injection 1 mg  1 mg IntraVENous Q5 Min PRN Norris Romberg DO Nancy        thiamine tablet 100 mg  100 mg Oral Daily Jenae Sleepmonica DO   100 mg at 02/26/22 0930    therapeutic multivitamin-minerals 1 tablet  1 tablet Oral Daily Jenea Sleeper, DO   1 tablet at 02/26/22 0930     Review of Systems:   Constitutional: No Fever or Weight Loss   Eyes: No Decreased Vision  ENT: No Headaches, Hearing Loss or Vertigo  Cardiovascular: As per HPI  Respiratory: As per HPI  Gastrointestinal: No abdominal pain, appetite loss, blood in stools, constipation, diarrhea or heartburn  Genitourinary: No dysuria, trouble voiding, or hematuria  Musculoskeletal:  No gait disturbance, weakness or joint complaints  Integumentary: No rash or pruritis  Neurological: No TIA or stroke symptoms  Psychiatric: No anxiety or depression  Endocrine: No malaise, fatigue or temperature intolerance  Hematologic/Lymphatic: No bleeding problems, blood clots or swollen lymph nodes  Allergic/Immunologic: No nasal congestion or hives  All systems negative except as marked. Physical Examination:    Vitals:    02/26/22 0432 02/26/22 0510 02/26/22 0517 02/26/22 0832   BP: 108/64 113/62 113/62 130/76   Pulse: 84 75 75 71   Resp: 25 18  16   Temp:  98.4 °F (36.9 °C)  98.4 °F (36.9 °C)   TempSrc:  Oral  Oral   SpO2:  94%  94%   Weight:       Height:           General Appearance:  No distress, conversant    Constitutional:  Well developed, Well nourished, No acute distress, Non-toxic appearance. HENT:  Normocephalic, Atraumatic, Bilateral external ears normal, Oropharynx moist, No oral exudates, Nose normal. Neck- Normal range of motion, No tenderness, Supple, No stridor,no apical-carotid delay  Lymphatics : no palpable lymph nodes  Eyes:  PERRL, EOMI, Conjunctiva normal, No discharge. Respiratory:  Normal breath sounds, No respiratory distress, No wheezing, No chest tenderness. ,no use of accessory muscles, crackles Absent   Cardiovascular: (PMI) apex non displaced,no lifts no thrills, ankle swelling Absent , 1+, s1 and s2 audible,Murmur. Absent , JVD not noted    Abdomen /GI:  Bowel sounds normal, Soft, No tenderness, No masses, No gross visceromegaly   :  No costovertebral angle tenderness   Musculoskeletal:  No edema, no tenderness, no deformities. Back- no tenderness  Integument:  Well hydrated, no rash   Lymphatic:  No lymphadenopathy noted   Neurologic:  Alert & oriented x 3, CN 2-12 normal, normal motor function, normal sensory function, no focal deficits noted           Medical decision making and Data review:    Lab Review   Recent Labs     02/26/22  0816   WBC 5.9   HGB 13.4*   HCT 40.4*         Recent Labs     02/26/22  0816      K 4.5      CO2 23   BUN 15   CREATININE 0.7*     Recent Labs     02/25/22 2200   AST 14*   ALT 14   BILITOT 0.2   ALKPHOS 57     Recent Labs     02/25/22 2200 02/26/22  0816   TROPONINT <0.010 <0.010       No results for input(s): PROBNP in the last 72 hours. Lab Results   Component Value Date    INR 0.89 10/04/2015    PROTIME 10.2 10/04/2015       EKG: (reviewed by myself)    ECHO:(reviewed by myself)    Chest Xray:(reviewed by myself)  CT HEAD WO CONTRAST    Result Date: 2/25/2022  EXAMINATION: CT OF THE HEAD WITHOUT CONTRAST  2/25/2022 11:07 pm TECHNIQUE: CT of the head was performed without the administration of intravenous contrast. Dose modulation, iterative reconstruction, and/or weight based adjustment of the mA/kV was utilized to reduce the radiation dose to as low as reasonably achievable. COMPARISON: 11/15/2020 HISTORY: ORDERING SYSTEM PROVIDED HISTORY: unresponsive episodes? TECHNOLOGIST PROVIDED HISTORY: Has a \"code stroke\" or \"stroke alert\" been called? ->No Reason for exam:->unresponsive episodes? Decision Support Exception - unselect if not a suspected or confirmed emergency medical condition->Emergency Medical Condition ([place]) Reason for Exam: unresponsive episodes?  FINDINGS: BRAIN/VENTRICLES: There is no acute intracranial hemorrhage, mass effect or midline shift. No abnormal extra-axial fluid collection. The gray-white differentiation is maintained without evidence of an acute infarct. There is no evidence of hydrocephalus. ORBITS: The visualized portion of the orbits demonstrate no acute abnormality. SINUSES: The visualized paranasal sinuses and mastoid air cells demonstrate no acute abnormality. SOFT TISSUES/SKULL:  No acute abnormality of the visualized skull or soft tissues. No acute intracranial abnormality. XR CHEST PORTABLE    Result Date: 2/25/2022  EXAMINATION: ONE XRAY VIEW OF THE CHEST 2/25/2022 10:53 pm COMPARISON: November 15, 2020 HISTORY: ORDERING SYSTEM PROVIDED HISTORY: chest pain TECHNOLOGIST PROVIDED HISTORY: Reason for exam:->chest pain Reason for Exam: chest pain Additional signs and symptoms: none FINDINGS: No lines or tubes. Normal cardiomediastinal silhouette. The lungs are clear without focal consolidation or pleural effusion. No suspicious pulmonary nodules. No pulmonary edema. No pneumothorax. No acute osseous abnormality. 1. No acute cardiopulmonary disease. VL DUP CAROTID BILATERAL    Result Date: 2/26/2022  EXAMINATION: ULTRASOUND EVALUATION OF THE CAROTID ARTERIES 2/26/2022 TECHNIQUE: Duplex ultrasound using B-mode/gray scaled imaging, Doppler spectral analysis and color flow Doppler was obtained of the carotid arteries. COMPARISON: None. HISTORY: ORDERING SYSTEM PROVIDED HISTORY: syncope TECHNOLOGIST PROVIDED HISTORY: Reason for exam:->syncope Reason for Exam: syncope FINDINGS: RIGHT: The right common carotid artery demonstrates peak systolic velocities of 411, 103 cm/sec in the proximal and distal segments respectively. The right internal carotid artery demonstrates the systolic velocities of 76, 94, 70 cm/sec in the proximal, mid and distal segments respectively. The external carotid artery is patent. The vertebral artery demonstrates normal antegrade flow.  No evidence of focal atherosclerotic plaque. ICA/CCA ratio of 0.8 LEFT: The left common carotid artery demonstrates peak systolic velocities of 848, 97 cm/sec in the proximal and distal segments respectively. The left internal carotid artery demonstrates the systolic velocities of 75, 71, 58 cm/sec in the proximal, mid and distal segments respectively. The external carotid artery is patent. The vertebral artery demonstrates normal antegrade flow. No evidence of focal atherosclerotic plaque. ICA/CCA ratio of 0.7     The right internal carotid artery demonstrates 0-50% stenosis. The left internal carotid artery demonstrates 0-50% stenosis. Bilateral vertebral arteries are patent with flow in the normal direction. CTA CHEST W CONTRAST    Result Date: 2/25/2022  EXAMINATION: CTA OF THE CHEST 2/25/2022 11:07 pm TECHNIQUE: CTA of the chest was performed after the administration of intravenous contrast.  Multiplanar reformatted images are provided for review. MIP images are provided for review. Dose modulation, iterative reconstruction, and/or weight based adjustment of the mA/kV was utilized to reduce the radiation dose to as low as reasonably achievable. COMPARISON: Chest x-ray 02/25/2022 HISTORY: ORDERING SYSTEM PROVIDED HISTORY: chest pain, unresponsive episodes TECHNOLOGIST PROVIDED HISTORY: Reason for exam:->chest pain, unresponsive episodes Decision Support Exception - unselect if not a suspected or confirmed emergency medical condition->Emergency Medical Condition (MA) Reason for Exam: chest pain, unresponsive episodes FINDINGS: Pulmonary Arteries: Study is mildly motion degraded. Pulmonary arteries are adequately opacified for evaluation. No evidence of intraluminal filling defect to suggest pulmonary embolism. Main pulmonary artery is normal in caliber. Mediastinum: No evidence of mediastinal lymphadenopathy. The heart and pericardium demonstrate no acute abnormality. There is no acute abnormality of the thoracic aorta. Lungs/pleura:  The lungs are without acute process. No focal consolidation or pulmonary edema. No evidence of pleural effusion or pneumothorax. Upper Abdomen: The stomach is distended by particulate food material.  Mild fatty infiltration of the liver. Soft Tissues/Bones: No acute bone or soft tissue abnormality. No evidence of pulmonary embolism or acute pulmonary abnormality. MRI BRAIN WO CONTRAST    Result Date: 2/26/2022  EXAMINATION: MRI OF THE BRAIN WITHOUT CONTRAST  2/26/2022 8:58 am TECHNIQUE: Multiplanar multisequence MRI of the brain was performed without the administration of intravenous contrast. COMPARISON: 02/25/2022. HISTORY: ORDERING SYSTEM PROVIDED HISTORY: syncope vs seizure. Structural cause? TECHNOLOGIST PROVIDED HISTORY: Reason for exam:->syncope vs seizure. Structural cause? Decision Support Exception - unselect if not a suspected or confirmed emergency medical condition->Emergency Medical Condition (MA) Reason for Exam: syncope vs seizure FINDINGS: INTRACRANIAL STRUCTURES/VENTRICLES: No areas of restricted diffusion. The cerebral and cerebellar parenchyma demonstrate normal volume and signal intensity. No abnormal extra-axial fluid collections. The ventricles are normal in size. Motion degraded examination. There are no areas of blooming artifact noted on the gradient echo sequences to suggest sequela of acute or chronic hemorrhage. ORBITS: The visualized portion of the orbits demonstrate no acute abnormality. SINUSES: There is scattered trace paranasal sinus disease. The mastoid air cells are well aerated. BONES/SOFT TISSUES: The bone marrow signal intensity and craniocervical junction are unremarkable. Pituitary gland is normal in appearance. 1. Unremarkable MRI of the brain. All labs, medications and tests reviewed by myself including data  from outside source , patient and available family . Continue all other medications of all above medical condition listed as is. Impression:  Principal Problem:    LOC (loss of consciousness) ([facility] [address]  Resolved Problems:    * No resolved hospital problems. *      Assessment: 65 y. o.year old with PMH of  has a past medical history of Arthritis, Bilateral carpal tunnel syndrome, CAD (coronary artery disease), ECHO, H/O echocardiogram, Hyperlipidemia, Hypertension, Medial meniscus tear, Prediabetes, and Wears glasses. Plan and Recommendations:    Encouraged to quit drinking possible alcohol-related seizures? Currently not withdrawing  He does have history of coronary artery disease but recent stress test and echo were normal as outpatient within the last 6 weeks and symptoms are presentation is atypical  Consider outpatient rhythm monitor if he has more symptoms versus loop recorder follow-up with EPS service  We will see in office thank you  DVT prophylaxis if no contraindication  6. Dyslipidemia: continue statins             Thank you  much for consult and giving us the opportunity in contributing in the care of this patient. Please feel free to call me for any questions.        Tere Berman MD, 2/26/2022 12:24 PM

## 2022-02-26 NOTE — ED PROVIDER NOTES
Triage Chief Complaint:   Chest Pain    [place]:  Claudette Lawson is a 65 y.o. male that presents with chest pain. He states that this evening he went out to dinner, had a few drinks. He was on the way home, sitting in the backseat when he had onset of chest pain. States it was mid to left-sided, felt like a piercing sensation, radiated to the left arm. States he felt short of breath and nauseous. Significant other states that she looked over and he was clutching his chest and was not responding to her. He states he does not really recall that, but states when he became more coherent that his chest pain started to resolve. States that he took baby aspirin and then nitroglycerin which resolved the chest pain. Reports that he has had cardiac work-up in the past, had stress test 3 weeks ago. States he recently had an event monitor. ROS:  General:  No fevers, no chills, no weakness  Eyes:  no vision change. ENT:  No sore throat, no nasal congestion  Cardiovascular:  + chest pain, + palpitations  Respiratory:  + shortness of breath, no cough, no wheezing  Gastrointestinal:  No pain, no nausea, no vomiting, no diarrhea  Musculoskeletal:  No muscle pain, no joint pain  Skin:  No rash, no pruritis, no easy bruising  Neurologic:  No speech problems, no headache, no weakness, numbness or tingling. Psychiatric:  No anxiety  Extremities:  no edema, no muscle pain    Past Medical History:   Diagnosis Date    Arthritis     Bilateral carpal tunnel syndrome     CAD (coronary artery disease)     ECHO 07/14/2021    EF 55-60%. Mild Pulmonic regurgitation is present, No evidence of pericardial effusion.     H/O echocardiogram 04/28/2016    EF 55% WNL- Stage 1 dysfunction     Hyperlipidemia     Hypertension     Medial meniscus tear     Left knee    Prediabetes     pt denies any hx of diabetes    Wears glasses     to read     Past Surgical History:   Procedure Laterality Date    APPENDECTOMY  age 16    CARPAL TUNNEL RELEASE Left 7/21/2021    LEFTCARPAL TUNNEL RELEASE performed by Jose Mckenna MD at [address] Right 2002    SHOULDER ARTHROSCOPY Left 7/21/2021    LEFT SHOULDER ARTHROSCOPY ROTATOR CUFF REPAIR, SUBACROMIAL DECOMPRESSION performed by Jose Mckenna MD at [address] Right 2006     Family History   Problem Relation Age of Onset    Cancer Mother         lung    Heart Disease Father     Heart Attack Sister     Stroke Sister      Social History     Socioeconomic History    Marital status:      Spouse name: Not on file    Number of children: Not on file    Years of education: Not on file    Highest education level: Not on file   Occupational History    Not on file   Tobacco Use    Smoking status: Never Smoker    Smokeless tobacco: Former User     Types: Chew   Vaping Use    Vaping Use: Never used   Substance and Sexual Activity    Alcohol use: Yes     Alcohol/week: 15.0 standard drinks     Types: 12 Cans of beer, 3 Shots of liquor per week     Comment: 10 beers, 4 shots    Drug use: No    Sexual activity: Not on file   Other Topics Concern    Not on file   Social History Narrative    Not on file     Social Determinants of Health     Financial Resource Strain: Low Risk     Difficulty of Paying Living Expenses: Not hard at all   Food Insecurity: No Food Insecurity    Worried About Running Out of Food in the Last Year: Never true    [place] of Food in the Last Year: Never true   Transportation Needs:     Lack of Transportation (Medical): Not on file    Lack of Transportation (Non-Medical):  Not on file   Physical Activity:     Days of Exercise per Week: Not on file    Minutes of Exercise per Session: Not on file   Stress:     Feeling of Stress : Not on file   Social Connections:     Frequency of Communication with Friends and Family: Not on file    Frequency of Social Gatherings with Friends and Family: Not on file    Attends Jewish Services: Not on file  Active Member of Clubs or Organizations: Not on file    Attends Club or Organization Meetings: Not on file    Marital Status: Not on file   Intimate Partner Violence:     Fear of Current or Ex-Partner: Not on file    Emotionally Abused: Not on file    Physically Abused: Not on file    Sexually Abused: Not on file   Housing Stability:     Unable to Pay for Housing in the Last Year: Not on file    Number of [place] in the Last Year: Not on file    Unstable Housing in the Last Year: Not on file     No current facility-administered medications for this encounter. Current Outpatient Medications   Medication Sig Dispense Refill    nitroGLYCERIN (NITROSTAT) 0.4 MG SL tablet up to max of 3 total doses. If no relief after 1 dose, call 911. 25 tablet 3    lisinopril (PRINIVIL;ZESTRIL) 20 MG tablet Take 1 tablet by mouth daily 90 tablet 1    ibuprofen (ADVIL;MOTRIN) 800 MG tablet Take 1 tablet by mouth every 8 hours as needed for Pain 90 tablet 1    aspirin 81 MG tablet Take 81 mg by mouth daily       No Known Allergies    Nursing Notes Reviewed    Physical Exam:  ED Triage Vitals [02/25/22 2156]   Enc Vitals Group      /68      Pulse 86      Resp 18      Temp 98.1 °F (36.7 °C)      Temp Source Oral      SpO2 96 %      Weight 207 lb (93.9 kg)      Height 5' 8\" (1.727 m)      Head Circumference       Peak Flow       Pain Score       Pain Loc       Pain Edu? Excl. in [address]? General appearance:  Awake, alert, in no acute distress. Skin:  Warm. Dry. Normal color, no cyanosis. Eye:  Extraocular movements intact. PERRL bilaterally. HENT: Normocephalic, atraumtic. Oral mucosa moist.  Neck:  Supple. Trachea midline. Extremity:  No edema. Normal ROM. No calf tenderness. Heart:  Regular rate and rhythm, normal S1 & S2, no extra heart sounds. Respiratory:  Lungs clear to auscultation bilaterally. Respirations nonlabored. Abdominal:  Soft. Nontender. Non distended. Neurological:  Alert and oriented times 3. No focal neuro deficits. Psychiatric:  Appropriate affect. Cooperative. I have reviewed and interpreted all of the currently available lab results from this visit (if applicable):  Results for orders placed or performed during the hospital encounter of 02/25/22   CBC with Auto Differential   Result Value Ref Range    WBC 7.3 4.0 - 10.5 K/CU MM    RBC 4.50 (L) 4.6 - 6.2 M/CU MM    Hemoglobin 13.4 (L) 13.5 - 18.0 GM/DL    Hematocrit 40.7 (L) 42 - 52 %    MCV 90.4 78 - 100 FL    MCH 29.8 27 - 31 PG    MCHC 32.9 32.0 - 36.0 %    RDW 12.0 11.7 - 14.9 %    Platelets 745 437 - 158 K/CU MM    MPV 9.2 7.5 - 11.1 FL    Differential Type AUTOMATED DIFFERENTIAL     Segs Relative 55.1 36 - 66 %    Lymphocytes % 34.0 24 - 44 %    Monocytes % 6.3 (H) 0 - 4 %    Eosinophils % 3.3 (H) 0 - 3 %    Basophils % 1.0 0 - 1 %    Segs Absolute 4.0 K/CU MM    Lymphocytes Absolute 2.5 K/CU MM    Monocytes Absolute 0.5 K/CU MM    Eosinophils Absolute 0.2 K/CU MM    Basophils Absolute 0.1 K/CU MM    Nucleated RBC % 0.0 %    Total Nucleated RBC 0.0 K/CU MM    Total Immature Neutrophil 0.02 K/CU MM    Immature Neutrophil % 0.3 0 - 0.43 %          EKG (if obtained): (All EKG's are interpreted by myself in the absence of a cardiologist) This EKG was interpreted by me. Rate is 88, rhythm is sinus. KY and QT intervals are within normal limits. There is no ST segment or T wave changes. This EKG was compared to previous EKG from date 1/25/22. This EKG was interpreted by me. Rate is 87, rhythm is sinus. KY and QT intervals are within normal limits. There is no ST segment or T wave changes. This EKG was compared to previous EKG from same date.     Chart review shows recent radiographs:  CT HEAD WO CONTRAST    Result Date: 2/25/2022  EXAMINATION: CT OF THE HEAD WITHOUT CONTRAST  2/25/2022 11:07 pm TECHNIQUE: CT of the head was performed without the administration of intravenous contrast. Dose modulation, iterative reconstruction, and/or weight based adjustment of the mA/kV was utilized to reduce the radiation dose to as low as reasonably achievable. COMPARISON: 11/15/2020 HISTORY: ORDERING SYSTEM PROVIDED HISTORY: unresponsive episodes? TECHNOLOGIST PROVIDED HISTORY: Has a \"code stroke\" or \"stroke alert\" been called? ->No Reason for exam:->unresponsive episodes? Decision Support Exception - unselect if not a suspected or confirmed emergency medical condition->Emergency Medical Condition (MA) Reason for Exam: unresponsive episodes? FINDINGS: BRAIN/VENTRICLES: There is no acute intracranial hemorrhage, mass effect or midline shift. No abnormal extra-axial fluid collection. The gray-white differentiation is maintained without evidence of an acute infarct. There is no evidence of hydrocephalus. ORBITS: The visualized portion of the orbits demonstrate no acute abnormality. SINUSES: The visualized paranasal sinuses and mastoid air cells demonstrate no acute abnormality. SOFT TISSUES/SKULL:  No acute abnormality of the visualized skull or soft tissues. No acute intracranial abnormality. XR CHEST PORTABLE    Result Date: 2/25/2022  EXAMINATION: ONE XRAY VIEW OF THE CHEST 2/25/2022 10:53 pm COMPARISON: November 15, 2020 HISTORY: ORDERING SYSTEM PROVIDED HISTORY: chest pain TECHNOLOGIST PROVIDED HISTORY: Reason for exam:->chest pain Reason for Exam: chest pain Additional signs and symptoms: none FINDINGS: No lines or tubes. Normal cardiomediastinal silhouette. The lungs are clear without focal consolidation or pleural effusion. No suspicious pulmonary nodules. No pulmonary edema. No pneumothorax. No acute osseous abnormality. 1. No acute cardiopulmonary disease.      CTA CHEST W CONTRAST    Result Date: 2/25/2022  EXAMINATION: CTA OF THE CHEST 2/25/2022 11:07 pm TECHNIQUE: CTA of the chest was performed after the administration of intravenous contrast.  Multiplanar reformatted images are provided for review. MIP images are provided for review. Dose modulation, iterative reconstruction, and/or weight based adjustment of the mA/kV was utilized to reduce the radiation dose to as low as reasonably achievable. COMPARISON: Chest x-ray 02/25/2022 HISTORY: ORDERING SYSTEM PROVIDED HISTORY: chest pain, unresponsive episodes TECHNOLOGIST PROVIDED HISTORY: Reason for exam:->chest pain, unresponsive episodes Decision Support Exception - unselect if not a suspected or confirmed emergency medical condition->Emergency Medical Condition (MA) Reason for Exam: chest pain, unresponsive episodes FINDINGS: Pulmonary Arteries: Study is mildly motion degraded. Pulmonary arteries are adequately opacified for evaluation. No evidence of intraluminal filling defect to suggest pulmonary embolism. Main pulmonary artery is normal in caliber. Mediastinum: No evidence of mediastinal lymphadenopathy. The heart and pericardium demonstrate no acute abnormality. There is no acute abnormality of the thoracic aorta. Lungs/pleura: The lungs are without acute process. No focal consolidation or pulmonary edema. No evidence of pleural effusion or pneumothorax. Upper Abdomen: The stomach is distended by particulate food material.  Mild fatty infiltration of the liver. Soft Tissues/Bones: No acute bone or soft tissue abnormality. No evidence of pulmonary embolism or acute pulmonary abnormality. MDM:  Patient presented with chest pain. Presentation does not appear consistent with aortic dissection or pulmonary embolus. Given history and presentation cardiac workup initiated. Patient had labs, EKG, x-ray and troponin ordered. Patient was placed on monitor. Patient's chest x-ray is read by radiology as negative for acute abnormality, patient's first troponin is within the normal range. Patient significant other reported he had an unresponsive episode while in the car.   I was concerned about arrhythmia given the reports of unresponsiveness with chest pain. This occurred while in the emergency department, I did witness the episode, patient had no alterations on telemetry during this time and awoke immediately with any sternal rubbing him. I did add CT head and CTA of the chest due to this. These showed no acute abnormalities. Given his chest pain and story of syncope versus unresponsiveness we will plan for admission to the hospital for further evaluation and care.       Clinical Impression:  Chest pain, alcohol intoxication, unresponsive episode      (Please note that portions of this note may have been completed with a voice recognition program. Efforts were made to edit the dictations but occasionally words are mis-transcribed.)      Bessie Andrew DO  02/25/22 [id number]

## 2022-02-26 NOTE — DISCHARGE SUMMARY
V2.0  Discharge Summary    Name:  Adore Wayne /Age/Sex: 1965 (65 y.o. male)   Admit Date: 2022  Discharge Date: 22    MRN & CSN:  [id number] & [id number] Encounter Date and Time 22 11:10 AM EST    Attending:  No att. providers found Discharging Provider: René Kelly PA-C       Hospital Course:   Adore Wayne is a 65 y.o. male with past medical history of alcohol use disorder, HTN, HLD, prediabetes, obesity who presents with LOC (loss of consciousness) ([facility] [address]     Problems addressed during this hospitalization:      Near syncope, episode of altered consciousness   - CT head/CTA head and neck with no acute process  - CXR with no acute process   - carotid US with no significant stenosis   - MRI brain with no acute process  - neurology consulted, no further work-up indicated  - cardiology consulted, no further work-up indicated   - episode likely related to alcohol use, recommended cessation   -On day of discharge, patient asymptomatic and hemodynamically stable, discharged home with instructions for outpatient follow-up with PCP     Chest pain   - recent extensive work-up including normal stress test, cardiac event monitor with significant events  - echo 2021 with EF 55%, mild LVH  - troponin trend negative   - conitnue ASA   -Has significant family history of premature CAD  -Cardiology followed, no further work-up indicated given recent extensive cardiac work-up  -Chest pain-free on discharge     Alcohol intoxication   - BAL 0.25   -No signs or symptoms of alcohol withdrawal on discharge  -Recommended cessation     Hypertension  -Well-controlled  -Continue home lisinopril on discharge    This patient was seen and examined autonomously. An attending hospitalist was available for questions and consultation as needed. The patient expressed appropriate understanding of, and agreement with the discharge recommendations, medications, and plan.      Consults this admission:  IP CONSULT TO HOSPITALIST  IP CONSULT TO NEUROLOGY  IP CONSULT TO CARDIOLOGY    Discharge Diagnosis:   LOC (loss of consciousness) ([address]    Discharge Instruction:   Follow up appointments: PCP  Primary care physician: Ravinder Jacobsen DO within 2 weeks  Diet: regular diet   Activity: activity as tolerated  Disposition: Discharged to:   [x]Home, []C, []SNF, []Acute Rehab, []Hospice   Condition on discharge: Stable  Labs and Tests to be Followed up as an outpatient by PCP or Specialist:     Discharge Medications:        Medication List      ASK your doctor about these medications    aspirin 81 MG tablet     ibuprofen 800 MG tablet  Commonly known as: ADVIL;MOTRIN  Take 1 tablet by mouth every 8 hours as needed for Pain     lisinopril 20 MG tablet  Commonly known as: PRINIVIL;ZESTRIL  Take 1 tablet by mouth daily     nitroGLYCERIN 0.4 MG SL tablet  Commonly known as: NITROSTAT  up to max of 3 total doses. If no relief after 1 dose, call 911. Objective Findings at Discharge:   /76   Pulse 71   Temp 98.4 °F (36.9 °C) (Oral)   Resp 16   Ht 5' 8\" (1.727 m)   Wt 207 lb (93.9 kg)   SpO2 94%   BMI 31.47 kg/m²       Physical Exam:   General: NAD  Eyes: EOMI  ENT: neck supple  Cardiovascular: Regular rate. Respiratory: Clear to auscultation bilaterally, respirations even and unlabored on room air  Gastrointestinal: Abdomen soft, non tender  Genitourinary: no suprapubic tenderness  Musculoskeletal: No edema  Skin: warm, dry  Neuro: Alert. No focal deficits. Psych: Mood appropriate. Labs and Imaging   CT HEAD WO CONTRAST    Result Date: 2/25/2022  EXAMINATION: CT OF THE HEAD WITHOUT CONTRAST  2/25/2022 11:07 pm TECHNIQUE: CT of the head was performed without the administration of intravenous contrast. Dose modulation, iterative reconstruction, and/or weight based adjustment of the mA/kV was utilized to reduce the radiation dose to as low as reasonably achievable.  COMPARISON: 11/15/2020 HISTORY: [address] PROVIDED HISTORY: unresponsive episodes? TECHNOLOGIST PROVIDED HISTORY: Has a \"code stroke\" or \"stroke alert\" been called? ->No Reason for exam:->unresponsive episodes? Decision Support Exception - unselect if not a suspected or confirmed emergency medical condition->Emergency Medical Condition (MA) Reason for Exam: unresponsive episodes? FINDINGS: BRAIN/VENTRICLES: There is no acute intracranial hemorrhage, mass effect or midline shift. No abnormal extra-axial fluid collection. The gray-white differentiation is maintained without evidence of an acute infarct. There is no evidence of hydrocephalus. ORBITS: The visualized portion of the orbits demonstrate no acute abnormality. SINUSES: The visualized paranasal sinuses and mastoid air cells demonstrate no acute abnormality. SOFT TISSUES/SKULL:  No acute abnormality of the visualized skull or soft tissues. No acute intracranial abnormality. XR CHEST PORTABLE    Result Date: 2/25/2022  EXAMINATION: ONE XRAY VIEW OF THE CHEST 2/25/2022 10:53 pm COMPARISON: November 15, 2020 HISTORY: ORDERING SYSTEM PROVIDED HISTORY: chest pain TECHNOLOGIST PROVIDED HISTORY: Reason for exam:->chest pain Reason for Exam: chest pain Additional signs and symptoms: none FINDINGS: No lines or tubes. Normal cardiomediastinal silhouette. The lungs are clear without focal consolidation or pleural effusion. No suspicious pulmonary nodules. No pulmonary edema. No pneumothorax. No acute osseous abnormality. 1. No acute cardiopulmonary disease. VL DUP CAROTID BILATERAL    Result Date: 2/26/2022  EXAMINATION: ULTRASOUND EVALUATION OF THE CAROTID ARTERIES 2/26/2022 TECHNIQUE: Duplex ultrasound using B-mode/gray scaled imaging, Doppler spectral analysis and color flow Doppler was obtained of the carotid arteries. COMPARISON: None.  HISTORY: ORDERING SYSTEM PROVIDED HISTORY: syncope TECHNOLOGIST PROVIDED HISTORY: Reason for exam:->syncope Reason for Exam: syncope FINDINGS: RIGHT: The right common carotid artery demonstrates peak systolic velocities of 260, 103 cm/sec in the proximal and distal segments respectively. The right internal carotid artery demonstrates the systolic velocities of 76, 94, 70 cm/sec in the proximal, mid and distal segments respectively. The external carotid artery is patent. The vertebral artery demonstrates normal antegrade flow. No evidence of focal atherosclerotic plaque. ICA/CCA ratio of 0.8 LEFT: The left common carotid artery demonstrates peak systolic velocities of 262, 97 cm/sec in the proximal and distal segments respectively. The left internal carotid artery demonstrates the systolic velocities of 75, 71, 58 cm/sec in the proximal, mid and distal segments respectively. The external carotid artery is patent. The vertebral artery demonstrates normal antegrade flow. No evidence of focal atherosclerotic plaque. ICA/CCA ratio of 0.7     The right internal carotid artery demonstrates 0-50% stenosis. The left internal carotid artery demonstrates 0-50% stenosis. Bilateral vertebral arteries are patent with flow in the normal direction. CTA CHEST W CONTRAST    Result Date: 2/25/2022  EXAMINATION: CTA OF THE CHEST 2/25/2022 11:07 pm TECHNIQUE: CTA of the chest was performed after the administration of intravenous contrast.  Multiplanar reformatted images are provided for review. MIP images are provided for review. Dose modulation, iterative reconstruction, and/or weight based adjustment of the mA/kV was utilized to reduce the radiation dose to as low as reasonably achievable.  COMPARISON: Chest x-ray 02/25/2022 HISTORY: ORDERING SYSTEM PROVIDED HISTORY: chest pain, unresponsive episodes TECHNOLOGIST PROVIDED HISTORY: Reason for exam:->chest pain, unresponsive episodes Decision Support Exception - unselect if not a suspected or confirmed emergency medical condition->Emergency Medical Condition (MA) Reason for Exam: chest pain, unresponsive episodes FINDINGS: Pulmonary Arteries: Study is mildly motion degraded. Pulmonary arteries are adequately opacified for evaluation. No evidence of intraluminal filling defect to suggest pulmonary embolism. Main pulmonary artery is normal in caliber. Mediastinum: No evidence of mediastinal lymphadenopathy. The heart and pericardium demonstrate no acute abnormality. There is no acute abnormality of the thoracic aorta. Lungs/pleura: The lungs are without acute process. No focal consolidation or pulmonary edema. No evidence of pleural effusion or pneumothorax. Upper Abdomen: The stomach is distended by particulate food material.  Mild fatty infiltration of the liver. Soft Tissues/Bones: No acute bone or soft tissue abnormality. No evidence of pulmonary embolism or acute pulmonary abnormality. MRI BRAIN WO CONTRAST    Result Date: 2/26/2022  EXAMINATION: MRI OF THE BRAIN WITHOUT CONTRAST  2/26/2022 8:58 am TECHNIQUE: Multiplanar multisequence MRI of the brain was performed without the administration of intravenous contrast. COMPARISON: 02/25/2022. HISTORY: ORDERING SYSTEM PROVIDED HISTORY: syncope vs seizure. Structural cause? TECHNOLOGIST PROVIDED HISTORY: Reason for exam:->syncope vs seizure. Structural cause? Decision Support Exception - unselect if not a suspected or confirmed emergency medical condition->Emergency Medical Condition (MA) Reason for Exam: syncope vs seizure FINDINGS: INTRACRANIAL STRUCTURES/VENTRICLES: No areas of restricted diffusion. The cerebral and cerebellar parenchyma demonstrate normal volume and signal intensity. No abnormal extra-axial fluid collections. The ventricles are normal in size. Motion degraded examination. There are no areas of blooming artifact noted on the gradient echo sequences to suggest sequela of acute or chronic hemorrhage. ORBITS: The visualized portion of the orbits demonstrate no acute abnormality. SINUSES: There is scattered trace paranasal sinus disease.   The mastoid air cells are well aerated. BONES/SOFT TISSUES: The bone marrow signal intensity and craniocervical junction are unremarkable. Pituitary gland is normal in appearance. 1. Unremarkable MRI of the brain. CBC:   Recent Labs     02/25/22 2200 02/26/22  0816   WBC 7.3 5.9   HGB 13.4* 13.4*    209     BMP:    Recent Labs     02/25/22 2200 02/26/22  0816    143   K 3.7 4.5    108   CO2 22 23   BUN 14 15   CREATININE 0.8* 0.7*   GLUCOSE 158* 104*     Hepatic:   Recent Labs     02/25/22 2200   AST 14*   ALT 14   BILITOT 0.2   ALKPHOS 57     Lipids:   Lab Results   Component Value Date    CHOL 180 10/01/2021    HDL 45 10/01/2021    TRIG 102 10/01/2021     Hemoglobin A1C:   Lab Results   Component Value Date    LABA1C 5.8 10/01/2021     TSH: No results found for: TSH  Troponin:   Lab Results   Component Value Date    TROPONINT <0.010 02/26/2022    TROPONINT <0.010 02/25/2022    TROPONINT <0.010 11/15/2020     Lactic Acid: No results for input(s): LACTA in the last 72 hours. BNP: No results for input(s): PROBNP in the last 72 hours.   UA:  Lab Results   Component Value Date    NITRU NEGATIVE 07/14/2021    COLORU YELLOW 07/14/2021    WBCUA 1 07/14/2021    RBCUA <1 07/14/2021    MUCUS RARE 07/14/2021    TRICHOMONAS NONE SEEN 07/14/2021    BACTERIA NEGATIVE 07/14/2021    CLARITYU CLEAR 07/14/2021    SPECGRAV 1.017 07/14/2021    LEUKOCYTESUR NEGATIVE 07/14/2021    UROBILINOGEN NEGATIVE 07/14/2021    BILIRUBINUR NEGATIVE 07/14/2021    BLOODU NEGATIVE 07/14/2021    KETUA NEGATIVE 07/14/2021     Urine Cultures: No results found for: LABURIN  Blood Cultures: No results found for: BC  No results found for: BLOODCULT2  Organism: No results found for: ORG    Time Spent Discharging patient 35 minutes    Electronically signed by René Kelly PA-C on 2/26/2022 at 11:10 AM

## 2022-02-26 NOTE — ED TRIAGE NOTES
Patient presents to ED via EMS for L upper chest pain. Patient reports sudden onset approx. 45 minutes before arrival. Patient reports having cardiac hx but unsure of diagnosis; reports seeing someone at the [facility]. Patient currently rating pain 4/10, dull. EMS administered 324mg ASA and 1 nitro in route w/out relief.

## 2022-02-26 NOTE — H&P
History and Physical      Name:  Rocky Lackey /Age/Sex: 1965  (65 y.o. male)   MRN & CSN:  [id number] & [id number] Encounter Date/Time: 2022 4:49 AM EST   Location:  ED25/ED-25 PCP: Nany Underwood [address] [place] [address] Day: 2    Assessment and Plan:   Rocky Lackey is a 65 y.o. male with a pmh of alcohol use disorder, HTN, HLD, prediabetes, and obesity, who presents with LOC (loss of consciousness) [facility]    Hospital Problems           Last Modified POA    * (Principal) LOC (loss of consciousness) ([facility] [address] 2022 Yes          1. Loss of consciousness  · Admit to observation unit with telemetry  · Question alcohol induced seizure   · CT head: No acute intracranial abnormality  · CTA chest with contrast: No evidence of pulmonary embolus or acute pulmonary abnormality  · Bilateral carotid ultrasound and MRI in a.m. to rule out structural abnormalities  · Recent cardiac work-up reviewed as below  · Fall precautions and seizure precautions  · Check orthostatic blood pressure and pulse  · Ativan as needed seizure  · Consult neurology, appreciate recommendations    2. Chest pain  · Patient recently had an extensive work-up including exercise stress test which was negative for ischemia/arrhythmia, cardiac event monitor which was reviewed, and echocardiogram on 2021  · Troponin <0.010 in ED, cycle troponin x2 q4  · Continue home ASA and lisinopril  · SL nitro prn  · Nasal cannula oxygen prn  · Can consider cardiology consult if elevation in troponin or persistent chest pain    3. Alcohol intoxication  · Blood alcohol level 0.25  · Question if this lowered patient's seizure threshold. Patient not likely in withdrawals  · CIWA protocol without Ativan patient will not likely be in withdrawals given current blood alcohol level. If patient has prolonged stay would recommend adding Ativan per CIWA protocol    Other chronic medical conditions:   HTN  HLD  Prediabetes  Obesity    Diet ADULT DIET;  Regular   DVT Prophylaxis [x] Lovenox, []  Heparin, [] SCDs, [] Ambulation,  [] Eliquis, [] Xarelto   Code Status Full Code     History from:     patient, spouse    History of Present Illness:     Chief Complaint: LOC (loss of consciousness) ([facility] [address]  Maria Elena Hilton is a 65 y.o. male with pmh of alcohol use disorder, HTN, HLD, prediabetes, and obesity, who presents with loss of consciousness and chest pain. Patient and wife state that after they were finished eating at a restaurant with friends patient was noted to clutched chest, not breathing, and had a blank stare on his face. Patient stated \"I do not really remember what happened when I blacked out but I do remember that I have breathed out and could not breathe back in.\"  Patient and wife endorse that this is happened in the past.  Patient's wife endorsed that he did seem confused for approximately 1 minute after regaining consciousness. Wife denied abnormal movements  were not present. Episode duration was 1 minute. Premonitory symptoms include none. After episodes he has duration of postictal state 1 minute. Seizures appear to be precipitated by no precipitation factors noted. Symptoms associated with seizures are none. Patient does not have a history of head trauma. He does not have a history of CVA. He does have a history of alcohol abuse. He does not have a history of drug abuse. He does not have a history of developmental delay. He does not have a family history of seizure disorder. ED provider witnessed one event and noted no change on telemetry monitor, blood pressure, or heart rate. Patient also complained of chest pain. Onset was 1 day ago, with resolved course since that time. The patient describes the pain as constant, pressure like in nature, radiates to the left shoulder. Patient rates pain as a 8/10 in intensity. Associated symptoms are nausea and dyspnea. Aggravating factors are none. Alleviating factors are: none.   Patient recently had an extensive outpatient work-up including exercise stress test, event monitor, and an echocardiogram within the last year. Otherwise patient denies fever, chills, headache, cough, abdominal pain, emesis, melena, hematochezia, dysuria, frequency, or urgency. Discussed case with ED provider     Review of Systems:   Review of Systems   Constitutional: Negative for appetite change, diaphoresis, fatigue and fever. HENT: Negative for congestion, rhinorrhea and sore throat. Eyes: Negative for visual disturbance. Respiratory: Positive for chest tightness and shortness of breath. Negative for cough and wheezing. Cardiovascular: Positive for chest pain. Negative for palpitations and leg swelling. Gastrointestinal: Positive for nausea. Negative for abdominal pain, blood in stool, constipation, diarrhea and vomiting. Genitourinary: Negative for dysuria, frequency, hematuria and urgency. Musculoskeletal: Negative for arthralgias. Skin: Negative for color change and rash. Neurological: Positive for syncope. Negative for dizziness, seizures, weakness, numbness and headaches. Psychiatric/Behavioral: Positive for confusion. Objective:   No intake or output data in the 24 hours ending 02/26/22 0449   Vitals:   Vitals:    02/26/22 0102 02/26/22 0133 02/26/22 0202 02/26/22 0232   BP: 110/63 109/68 114/63 114/64   Pulse: 88 88 92 92   Resp: 25 24 22 23   Temp:       TempSrc:       SpO2: 98%      Weight:       Height:           Medications Prior to Admission     Prior to Admission medications    Medication Sig Start Date End Date Taking? Authorizing Provider   nitroGLYCERIN (NITROSTAT) 0.4 MG SL tablet up to max of 3 total doses.  If no relief after 1 dose, call 911. 9/28/21   Braydon Jorge, DO   lisinopril (PRINIVIL;ZESTRIL) 20 MG tablet Take 1 tablet by mouth daily 9/28/21   Braydon Jorge, DO   ibuprofen (ADVIL;MOTRIN) 800 MG tablet Take 1 tablet by mouth every 8 hours as needed for Pain 7/21/21   Catia Swift MD aspirin 81 MG tablet Take 81 mg by mouth daily    Historical Provider, MD       Physical Exam:    Physical Exam  Vitals and nursing note reviewed. Constitutional:       General: He is awake. He is not in acute distress. Appearance: Normal appearance. He is obese. He is not ill-appearing, toxic-appearing or diaphoretic. Interventions: He is not intubated. HENT:      Head: Atraumatic. Right Ear: External ear normal.      Left Ear: External ear normal.      Nose: Nose normal. No rhinorrhea. Mouth/Throat:      Mouth: Mucous membranes are moist.      Tongue: Tongue does not deviate from midline. Pharynx: Uvula midline. Eyes:      Extraocular Movements: Extraocular movements intact. Pupils: Pupils are equal, round, and reactive to light. Cardiovascular:      Rate and Rhythm: Normal rate and regular rhythm. Pulses: Normal pulses. Heart sounds: Normal heart sounds. No murmur heard. No gallop. Pulmonary:      Effort: Pulmonary effort is normal. No tachypnea or respiratory distress. He is not intubated. Breath sounds: Normal breath sounds. No wheezing, rhonchi or rales. Abdominal:      General: Bowel sounds are normal. There is no distension. Palpations: Abdomen is soft. Tenderness: There is no abdominal tenderness. There is no guarding or rebound. Musculoskeletal:         General: Normal range of motion. Cervical back: Neck supple. Right lower leg: No edema. Left lower leg: No edema. Skin:     General: Skin is warm and dry. Capillary Refill: Capillary refill takes less than 2 seconds. Neurological:      General: No focal deficit present. Mental Status: He is alert and oriented to person, place, and time. Mental status is at baseline. Cranial Nerves: Cranial nerves are intact. No cranial nerve deficit, dysarthria or facial asymmetry. Sensory: Sensation is intact. No sensory deficit.       Motor: Motor function is intact. No weakness, tremor, seizure activity or pronator drift. Coordination: Coordination is intact. Finger-Nose-Finger Test normal.   Psychiatric:         Speech: He is communicative. Speech is not slurred. Behavior: Behavior is cooperative. Past Medical History:   PMHx   Past Medical History:   Diagnosis Date    Arthritis     Bilateral carpal tunnel syndrome     CAD (coronary artery disease)     ECHO 07/14/2021    EF 55-60%. Mild Pulmonic regurgitation is present, No evidence of pericardial effusion.  H/O echocardiogram 04/28/2016    EF 55% WNL- Stage 1 dysfunction     Hyperlipidemia     Hypertension     Medial meniscus tear     Left knee    Prediabetes     pt denies any hx of diabetes    Wears glasses     to read     PSHX:  has a past surgical history that includes shoulder surgery (Right, 2006); Appendectomy (age 13); Hand surgery (Right, 2002); Shoulder arthroscopy (Left, 7/21/2021); and Carpal tunnel release (Left, 7/21/2021). Allergies: No Known Allergies  Fam HX: Reviewed family history includes Cancer in his mother; Heart Attack in his sister; Heart Disease in his father; Stroke in his sister. Soc HX:   Social History     Socioeconomic History    Marital status:      Spouse name: None    Number of children: None    Years of education: None    Highest education level: None   Occupational History    None   Tobacco Use    Smoking status: Never Smoker    Smokeless tobacco: Former User     Types: Chew   Vaping Use    Vaping Use: Never used   Substance and Sexual Activity    Alcohol use:  Yes     Alcohol/week: 15.0 standard drinks     Types: 12 Cans of beer, 3 Shots of liquor per week     Comment: 10 beers, 4 shots    Drug use: No    Sexual activity: None   Other Topics Concern    None   Social History Narrative    None     Social Determinants of Health     Financial Resource Strain: Low Risk     Difficulty of Paying Living Expenses: Not hard at all   Food Insecurity: No Food Insecurity    Worried About Running Out of Food in the Last Year: Never true    Ran Out of Food in the Last Year: Never true   Transportation Needs:     Lack of Transportation (Medical): Not on file    Lack of Transportation (Non-Medical):  Not on file   Physical Activity:     Days of Exercise per Week: Not on file    Minutes of Exercise per Session: Not on file   Stress:     Feeling of Stress : Not on file   Social Connections:     Frequency of Communication with Friends and Family: Not on file    Frequency of Social Gatherings with Friends and Family: Not on file    Attends Yarsani Services: Not on file    Active Member of Clubs or Organizations: Not on file    Attends Club or Organization Meetings: Not on file    Marital Status: Not on file   Intimate Partner Violence:     Fear of Current or Ex-Partner: Not on file    Emotionally Abused: Not on file    Physically Abused: Not on file    Sexually Abused: Not on file   Housing Stability:     Unable to Pay for Housing in the Last Year: Not on file    Number of Places Lived in the Last Year: Not on file    Unstable Housing in the Last Year: Not on file       Medications:   Medications:    aspirin  81 mg Oral Daily    lisinopril  20 mg Oral Daily    sodium chloride flush  5-40 mL IntraVENous 2 times per day    enoxaparin  40 mg SubCUTAneous Daily    thiamine  100 mg Oral Daily    multivitamin  1 tablet Oral Daily      Infusions:    sodium chloride       PRN Meds: sodium chloride flush, 5-40 mL, PRN  sodium chloride, 25 mL, PRN  ondansetron, 4 mg, Q8H PRN   Or  ondansetron, 4 mg, Q6H PRN  polyethylene glycol, 17 g, Daily PRN  acetaminophen, 650 mg, Q6H PRN   Or  acetaminophen, 650 mg, Q6H PRN  LORazepam, 1 mg, Q5 Min PRN        Labs      CBC:   Recent Labs     02/25/22  2200   WBC 7.3   HGB 13.4*        BMP:    Recent Labs     02/25/22  2200      K 3.7      CO2 22   BUN 14   CREATININE 0.8*   GLUCOSE 158*     Hepatic:   Recent Labs     02/25/22 2200   AST 14*   ALT 14   BILITOT 0.2   ALKPHOS 57     Lipids:   Lab Results   Component Value Date    CHOL 180 10/01/2021    HDL 45 10/01/2021    TRIG 102 10/01/2021     Hemoglobin A1C:   Lab Results   Component Value Date    LABA1C 5.8 10/01/2021     TSH: No results found for: TSH  Troponin:   Lab Results   Component Value Date    TROPONINT <0.010 02/25/2022    TROPONINT <0.010 11/15/2020    TROPONINT <0.010 11/02/2019     Lactic Acid: No results for input(s): LACTA in the last 72 hours. BNP: No results for input(s): PROBNP in the last 72 hours. UA:  Lab Results   Component Value Date    NITRU NEGATIVE 07/14/2021    COLORU YELLOW 07/14/2021    WBCUA 1 07/14/2021    RBCUA <1 07/14/2021    MUCUS RARE 07/14/2021    TRICHOMONAS NONE SEEN 07/14/2021    BACTERIA NEGATIVE 07/14/2021    CLARITYU CLEAR 07/14/2021    SPECGRAV 1.017 07/14/2021    LEUKOCYTESUR NEGATIVE 07/14/2021    UROBILINOGEN NEGATIVE 07/14/2021    BILIRUBINUR NEGATIVE 07/14/2021    BLOODU NEGATIVE 07/14/2021    KETUA NEGATIVE 07/14/2021     Urine Cultures: No results found for: Pat Álvarez  Blood Cultures: No results found for: BC  No results found for: BLOODCULT2  Organism: No results found for: ORG    Imaging/Diagnostics Last 24 Hours   CT HEAD WO CONTRAST    Result Date: 2/25/2022  No acute intracranial abnormality. XR CHEST PORTABLE    Result Date: 2/25/2022  1. No acute cardiopulmonary disease. CTA CHEST W CONTRAST    Result Date: 2/25/2022  No evidence of pulmonary embolism or acute pulmonary abnormality. Personally reviewed lab work and imaging. This dictation was created with voice recognition software. While attempts have been made to review the dictation as it is transcribed, on occasion the spoken word can be misinterpreted by the technology leading to omissions or inappropriate words, phrases or sentences.      Electronically signed by Lorie Johnson DO on 2/26/2022 at 4:49

## 2022-02-26 NOTE — PROGRESS NOTES
V2.0  [facility] Hospitalist Progress Note      Name:  Rocky Lackey /Age/Sex: 1965  (65 y.o. male)   MRN & CSN:  [id number] & [id number] Encounter Date/Time: 2022 7:10 AM EST    Location:  -A PCP: Nany Underwood [address] [place] [address] Day: 2    Assessment and Plan:   Rocky Lackey is a 65 y.o. male with past medical history of alcohol use disorder, HTN, HLD, prediabetes, obesity who presents with LOC (loss of consciousness) ([facility] [address]     Near syncope, episode of altered consciousness   - CT head/CTA head and neck with no acute process  - CXR with no acute process   - carotid US with no significant stenosis   - unclear if possible seizure related to alcohol use or other form of syncope, reports prodromal chest pain   - MRI brain pending  - UDS pending   - seizure precautions, PRN Ativan   - neurology consulted, appreciate recs     Chest pain   - recent extensive work-up including normal stress test, cardiac event monitor  - echo 2021 with EF 55%, mild LVH  - troponin trend negative   - conitnue ASA   -Has significant family history of premature CAD  -Given has recently been following with cardiology, will consult, appreciate recs    Alcohol intoxication   - BAL 0.25   -Reports occasional binge drinking, does not drink every day, no history of withdrawal  - could have lowered patient's seizure threshold   - CIWA without Ativan, no current symptoms of withdrawal  -recommend cessation    Hypertension  -Well-controlled  -Continue home lisinopril    This patient was seen autonomously. A hospitalist was available for questions and consultation as needed. Diet ADULT DIET; Regular   DVT Prophylaxis [] Lovenox, []  Heparin, [] SCDs, [x] Ambulation,  [] Eliquis, [] Xarelto   Code Status Full Code   Disposition Patient requires continued admission due to near syncope work-up. Hopeful to possibly discharge later today.     Surrogate Decision Maker/ POA      Subjective:     Chief Complaint: Chest Pain     Patient glycol, 17 g, Daily PRN  acetaminophen, 650 mg, Q6H PRN   Or  acetaminophen, 650 mg, Q6H PRN  LORazepam, 1 mg, Q5 Min PRN        Labs      Recent Results (from the past 24 hour(s))   CBC with Auto Differential    Collection Time: 02/25/22 10:00 PM   Result Value Ref Range    WBC 7.3 4.0 - 10.5 K/CU MM    RBC 4.50 (L) 4.6 - 6.2 M/CU MM    Hemoglobin 13.4 (L) 13.5 - 18.0 GM/DL    Hematocrit 40.7 (L) 42 - 52 %    MCV 90.4 78 - 100 FL    MCH 29.8 27 - 31 PG    MCHC 32.9 32.0 - 36.0 %    RDW 12.0 11.7 - 14.9 %    Platelets 456 812 - 032 K/CU MM    MPV 9.2 7.5 - 11.1 FL    Differential Type AUTOMATED DIFFERENTIAL     Segs Relative 55.1 36 - 66 %    Lymphocytes % 34.0 24 - 44 %    Monocytes % 6.3 (H) 0 - 4 %    Eosinophils % 3.3 (H) 0 - 3 %    Basophils % 1.0 0 - 1 %    Segs Absolute 4.0 K/CU MM    Lymphocytes Absolute 2.5 K/CU MM    Monocytes Absolute 0.5 K/CU MM    Eosinophils Absolute 0.2 K/CU MM    Basophils Absolute 0.1 K/CU MM    Nucleated RBC % 0.0 %    Total Nucleated RBC 0.0 K/CU MM    Total Immature Neutrophil 0.02 K/CU MM    Immature Neutrophil % 0.3 0 - 0.43 %   Comprehensive Metabolic Panel    Collection Time: 02/25/22 10:00 PM   Result Value Ref Range    Sodium 143 135 - 145 MMOL/L    Potassium 3.7 3.5 - 5.1 MMOL/L    Chloride 108 99 - 110 mMol/L    CO2 22 21 - 32 MMOL/L    BUN 14 6 - 23 MG/DL    CREATININE 0.8 (L) 0.9 - 1.3 MG/DL    Glucose 158 (H) 70 - 99 MG/DL    Calcium 8.1 (L) 8.3 - 10.6 MG/DL    Albumin 3.9 3.4 - 5.0 GM/DL    Total Protein 6.3 (L) 6.4 - 8.2 GM/DL    Total Bilirubin 0.2 0.0 - 1.0 MG/DL    ALT 14 10 - 40 U/L    AST 14 (L) 15 - 37 IU/L    Alkaline Phosphatase 57 40 - 129 IU/L    GFR Non-African American >60 >60 mL/min/1.73m2    GFR African American >60 >60 mL/min/1.73m2    Anion Gap 13 4 - 16   Troponin    Collection Time: 02/25/22 10:00 PM   Result Value Ref Range    Troponin T <0.010 <0.01 NG/ML   Ethanol    Collection Time: 02/25/22 10:00 PM   Result Value Ref Range    Alcohol Scrn 0.25 (H) <0.01 %WT/VOL        Imaging/Diagnostics Last 24 Hours   CT HEAD WO CONTRAST    Result Date: 2/25/2022  EXAMINATION: CT OF THE HEAD WITHOUT CONTRAST  2/25/2022 11:07 pm TECHNIQUE: CT of the head was performed without the administration of intravenous contrast. Dose modulation, iterative reconstruction, and/or weight based adjustment of the mA/kV was utilized to reduce the radiation dose to as low as reasonably achievable. COMPARISON: 11/15/2020 HISTORY: ORDERING SYSTEM PROVIDED HISTORY: unresponsive episodes? TECHNOLOGIST PROVIDED HISTORY: Has a \"code stroke\" or \"stroke alert\" been called? ->No Reason for exam:->unresponsive episodes? Decision Support Exception - unselect if not a suspected or confirmed emergency medical condition->Emergency Medical Condition (MA) Reason for Exam: unresponsive episodes? FINDINGS: BRAIN/VENTRICLES: There is no acute intracranial hemorrhage, mass effect or midline shift. No abnormal extra-axial fluid collection. The gray-white differentiation is maintained without evidence of an acute infarct. There is no evidence of hydrocephalus. ORBITS: The visualized portion of the orbits demonstrate no acute abnormality. SINUSES: The visualized paranasal sinuses and mastoid air cells demonstrate no acute abnormality. SOFT TISSUES/SKULL:  No acute abnormality of the visualized skull or soft tissues. No acute intracranial abnormality. XR CHEST PORTABLE    Result Date: 2/25/2022  EXAMINATION: ONE XRAY VIEW OF THE CHEST 2/25/2022 10:53 pm COMPARISON: November 15, 2020 HISTORY: ORDERING SYSTEM PROVIDED HISTORY: chest pain TECHNOLOGIST PROVIDED HISTORY: Reason for exam:->chest pain Reason for Exam: chest pain Additional signs and symptoms: none FINDINGS: No lines or tubes. Normal cardiomediastinal silhouette. The lungs are clear without focal consolidation or pleural effusion. No suspicious pulmonary nodules. No pulmonary edema. No pneumothorax. No acute osseous abnormality.      1. No acute cardiopulmonary disease. CTA CHEST W CONTRAST    Result Date: 2/25/2022  EXAMINATION: CTA OF THE CHEST 2/25/2022 11:07 pm TECHNIQUE: CTA of the chest was performed after the administration of intravenous contrast.  Multiplanar reformatted images are provided for review. MIP images are provided for review. Dose modulation, iterative reconstruction, and/or weight based adjustment of the mA/kV was utilized to reduce the radiation dose to as low as reasonably achievable. COMPARISON: Chest x-ray 02/25/2022 HISTORY: ORDERING SYSTEM PROVIDED HISTORY: chest pain, unresponsive episodes TECHNOLOGIST PROVIDED HISTORY: Reason for exam:->chest pain, unresponsive episodes Decision Support Exception - unselect if not a suspected or confirmed emergency medical condition->Emergency Medical Condition (MA) Reason for Exam: chest pain, unresponsive episodes FINDINGS: Pulmonary Arteries: Study is mildly motion degraded. Pulmonary arteries are adequately opacified for evaluation. No evidence of intraluminal filling defect to suggest pulmonary embolism. Main pulmonary artery is normal in caliber. Mediastinum: No evidence of mediastinal lymphadenopathy. The heart and pericardium demonstrate no acute abnormality. There is no acute abnormality of the thoracic aorta. Lungs/pleura: The lungs are without acute process. No focal consolidation or pulmonary edema. No evidence of pleural effusion or pneumothorax. Upper Abdomen: The stomach is distended by particulate food material.  Mild fatty infiltration of the liver. Soft Tissues/Bones: No acute bone or soft tissue abnormality. No evidence of pulmonary embolism or acute pulmonary abnormality.        Electronically signed by Carin Richter PA-C on 2/26/2022 at 7:10 AM

## 2022-02-26 NOTE — CONSULTS
Neurology Service Consult Note  [facility]  Patient Name: Sharon Carbajal  : 1965        Subjective:   Reason for consult: \"I fell off yesterday after had chest pain\"  65 y.o. right-handed male with past medical history listed below presenting to [facility] after patient was coming home from dinner, he was sitting in the car with his wife, he had had a couple drinks at dinner, he states that he had a crushing chest pain, he states his wife looked over he was clutching his chest, and that time. He felt off, he felt disoriented he felt he was not fully with it, he never fully lost consciousness, he had no tongue biting, no bowel or bladder incontinence, his eyes not rolled in the back of his head, he states that his speech was still clear and he could get words out he was not unilaterally weak, he just felt off in general.  Upon arrival to the emergency room his blood alcohol level was elevated, but blood pressure was stable and not hypoglycemic. Symptoms resolved quickly, he was back to baseline within minutes. He has never had episode like this prior to arrival and he has not repeated since being admitted. Patient's never suffered from stroke. No history of seizure. No recent illnesses with fever. No recent head injuries. Past Medical History:   Diagnosis Date    Arthritis     Bilateral carpal tunnel syndrome     CAD (coronary artery disease)     ECHO 2021    EF 55-60%. Mild Pulmonic regurgitation is present, No evidence of pericardial effusion.     H/O echocardiogram 2016    EF 55% WNL- Stage 1 dysfunction     Hyperlipidemia     Hypertension     Medial meniscus tear     Left knee    Prediabetes     pt denies any hx of diabetes    Wears glasses     to read    :   Past Surgical History:   Procedure Laterality Date    APPENDECTOMY  age 13   [facility] CARPAL TUNNEL RELEASE Left 2021    LEFTCARPAL TUNNEL RELEASE performed by Sixto Crisostomo MD at [address] Right     SHOULDER ARTHROSCOPY Left 7/21/2021    LEFT SHOULDER ARTHROSCOPY ROTATOR CUFF REPAIR, SUBACROMIAL DECOMPRESSION performed by Venessa Faulkner MD at [address] Right 2006     Medications:  Scheduled Meds:   aspirin  81 mg Oral Daily    lisinopril  20 mg Oral Daily    sodium chloride flush  5-40 mL IntraVENous 2 times per day    enoxaparin  40 mg SubCUTAneous Daily    thiamine  100 mg Oral Daily    multivitamin  1 tablet Oral Daily     Continuous Infusions:   sodium chloride       PRN Meds:.sodium chloride flush, sodium chloride, ondansetron **OR** ondansetron, polyethylene glycol, acetaminophen **OR** acetaminophen, LORazepam    No Known Allergies  Social History     Socioeconomic History    Marital status:      Spouse name: Not on file    Number of children: Not on file    Years of education: Not on file    Highest education level: Not on file   Occupational History    Not on file   Tobacco Use    Smoking status: Never Smoker    Smokeless tobacco: Former User     Types: Chew   Vaping Use    Vaping Use: Never used   Substance and Sexual Activity    Alcohol use: Yes     Alcohol/week: 15.0 standard drinks     Types: 12 Cans of beer, 3 Shots of liquor per week     Comment: 10 beers, 4 shots    Drug use: No    Sexual activity: Not on file   Other Topics Concern    Not on file   Social History Narrative    Not on file     Social Determinants of Health     Financial Resource Strain: Low Risk     Difficulty of Paying Living Expenses: Not hard at all   Food Insecurity: No Food Insecurity    Worried About Running Out of Food in the Last Year: Never true    Kassidy of Food in the Last Year: Never true   Transportation Needs:     Lack of Transportation (Medical): Not on file    Lack of Transportation (Non-Medical):  Not on file   Physical Activity:     Days of Exercise per Week: Not on file    Minutes of Exercise per Session: Not on file   Stress:     Feeling of Stress : Not on file Social Connections:     Frequency of Communication with Friends and Family: Not on file    Frequency of Social Gatherings with Friends and Family: Not on file    Attends Zoroastrian Services: Not on file    Active Member of [address] [place] [address] [employer] or Organizations: Not on file    Attends Club or Organization Meetings: Not on file    Marital Status: Not on file   Intimate Partner Violence:     Fear of Current or Ex-Partner: Not on file    Emotionally Abused: Not on file    Physically Abused: Not on file    Sexually Abused: Not on file   Housing Stability:     Unable to Pay for Housing in the Last Year: Not on file    Number of [place] in the Last Year: Not on file    Unstable Housing in the Last Year: Not on file      Family History   Problem Relation Age of Onset    Cancer Mother         lung    Heart Disease Father     Heart Attack Sister     Stroke Sister          ROS (10 systems)  In addition to that documented in the HPI above, the additional ROS was obtained:  Constitutional: Denies fevers or chills  Eyes: Denies vision changes  ENMT: Denies sore throat  CV: Denies chest pain  Resp: Denies SOB  GI: Denies vomiting or diarrhea  : Denies painful urination  MSK: Denies recent trauma  Skin: Denies new rashes  Neuro: Denies new numbness or tingling or weakness  Endocrine: Denies unexpected weight loss  Heme: Denies bleeding disorders    Physical Exam:       [unfilled]   Wt Readings from Last 3 Encounters:   02/25/22 207 lb (93.9 kg)   01/25/22 209 lb (94.8 kg)   11/18/21 213 lb (96.6 kg)     Temp Readings from Last 3 Encounters:   02/26/22 98.4 °F (36.9 °C) (Oral)   09/28/21 98.4 °F (36.9 °C)   07/21/21 97.2 °F (36.2 °C)     BP Readings from Last 3 Encounters:   02/26/22 130/76   01/25/22 (!) 140/80   09/28/21 130/80     Pulse Readings from Last 3 Encounters:   02/26/22 71   01/25/22 105   11/18/21 79        Gen: A&O x 4, NAD, cooperative  HEENT: NC/AT, EOMI, PERRL, mmm, neck supple, no meningeal signs; Heart: regular   Lungs: no distress  Ext: no edema, no calf tenderness b/l  Psych: normal mood and affect  Skin: no rashes or lesions    NEUROLOGIC EXAM:    Mental Status: A&O to self, location, month and year, NAD, speech clear, language fluent, repetition and naming intact, follows commands appropriately    Cranial Nerve Exam:   CN II-XII:  PERRL, VFF, no nystagmus, no gaze paresis, sensation V1-V3 intact b/l, muscles of facial expression symmetric; hearing intact to conversational tone, palate elevates symmetrically, shoulder elevation symmetric and tongue protrudes midline with movement side to side. Motor Exam:       Strength 5/5 UE's/LE's b/l  Tone and bulk normal   No pronator drift    Deep Tendon Reflexes: 2/4 biceps, triceps, brachioradialis, patellar, and achilles b/l; flexor plantar responses b/l    Sensation: Intact light touch/temp UE's/LE's b/l    Coordination/Cerebellum:       Tremors--none  Finger-to-Nose: no dysmetria b/l    Gait and stance:      Gait: No ataxia      LABS:     Recent Labs     02/25/22  2200 02/26/22  0816   WBC 7.3 5.9    143   K 3.7 4.5    108   CO2 22 23   BUN 14 15   CREATININE 0.8* 0.7*   GLUCOSE 158* 104*         IMAGING:      MRI brain:  Impression:     1. Unremarkable MRI of the brain. Personally reviewed    ASSESSMENT/PLAN:     3 62-year-old male with complaints of transient altered mental status, description of events do not meet central etiology criteria, no suspicion for seizure, no identification of stroke on MRI, exam is nonfocal nonlateralizing, no further neurological recommendations at this time he stable for discharge from our point of view. Thank you for allowing us to participate in the care of your patient. If there are any questions regarding evaluation please feel free to contact us.      RYANN Valentine CNP, 2/26/2022     ------------------------------------    Attending Note:  I have rounded on this patient with Idris Bahena Melina Sunshine CNP. I have reviewed the chart and we have discussed this case in detail. The patient was seen and examined by myself. Pertinent labs and imaging have been personally reviewed. Our findings and impressions were discussed with the patient. I concur with the Nurse Practioner's assessment and plan. Patient seen and examined at bedside with his significant other present in the room. He denies any seizure-like activity at the time of the event. He does report feeling panicked when he began to have a chest pain which he feels contributed to his altered mentation. I have very low clinical suspicion for seizure or stroke/TIA as the etiology. MRI brain subsequently unremarkable. Patient is okay for discharge from a neurologic standpoint. Neurology to sign off at this time.     Ashley Purdy DO 2/26/2022 12:25 PM

## 2022-03-02 PROCEDURE — 93248 EXT ECG>7D<15D REV&INTERPJ: CPT | Performed by: INTERNAL MEDICINE

## 2022-03-04 ENCOUNTER — OFFICE VISIT (OUTPATIENT)
Dept: INTERNAL MEDICINE CLINIC | Age: 57
End: 2022-03-04
Payer: COMMERCIAL

## 2022-03-04 VITALS
BODY MASS INDEX: 32.43 KG/M2 | OXYGEN SATURATION: 99 % | DIASTOLIC BLOOD PRESSURE: 70 MMHG | WEIGHT: 214 LBS | HEIGHT: 68 IN | SYSTOLIC BLOOD PRESSURE: 120 MMHG | TEMPERATURE: 98.1 F | HEART RATE: 80 BPM

## 2022-03-04 DIAGNOSIS — R07.9 CHEST PAIN, UNSPECIFIED TYPE: ICD-10-CM

## 2022-03-04 DIAGNOSIS — Z12.5 SCREENING FOR PROSTATE CANCER: ICD-10-CM

## 2022-03-04 DIAGNOSIS — R55 NEAR SYNCOPE: Primary | ICD-10-CM

## 2022-03-04 DIAGNOSIS — Z12.11 SCREENING FOR COLON CANCER: ICD-10-CM

## 2022-03-04 DIAGNOSIS — I10 PRIMARY HYPERTENSION: ICD-10-CM

## 2022-03-04 DIAGNOSIS — R53.83 FATIGUE, UNSPECIFIED TYPE: ICD-10-CM

## 2022-03-04 LAB
PROSTATE SPECIFIC ANTIGEN: 1.17 NG/ML (ref 0–4)
TSH REFLEX FT4: 1.21 UIU/ML (ref 0.27–4.2)

## 2022-03-04 PROCEDURE — 1111F DSCHRG MED/CURRENT MED MERGE: CPT | Performed by: FAMILY MEDICINE

## 2022-03-04 PROCEDURE — 36415 COLL VENOUS BLD VENIPUNCTURE: CPT | Performed by: FAMILY MEDICINE

## 2022-03-04 PROCEDURE — 99214 OFFICE O/P EST MOD 30 MIN: CPT | Performed by: FAMILY MEDICINE

## 2022-03-04 NOTE — PROGRESS NOTES
Post-Discharge Transitional Care  Follow Up      Rocky Lackey   YOB: 1965    Date of Office Visit:  3/4/2022  Date of Hospital Admission: 2/25/22  Date of Hospital Discharge: 2/26/22  Risk of hospital readmission (high >=14%. Medium >=10%) :No data recorded    Care management risk score Rising risk (score 2-5) and Complex Care (Scores >=6): 0     Non face to face  following discharge, date last encounter closed (first attempt may have been earlier): *No documented post hospital discharge outreach found in the last 14 days    Call initiated 2 business days of discharge: *No response recorded in the last 14 days    ASSESSMENT/PLAN:   Near syncope  Primary hypertension  -     ID DISCHARGE MEDS RECONCILED W/ CURRENT OUTPATIENT MED LIST  Chest pain, unspecified type  Fatigue, unspecified type  -     TSH with Reflex to FT4  -     ID DISCHARGE MEDS RECONCILED W/ CURRENT OUTPATIENT MED LIST  Screening for colon cancer  -     Yandel Akers MD, Gastroenterology, [place]  Screening for prostate cancer  -     PSA Screening    Keep f/u with cardiology  Avoid Etoh  Labs and imaging reviewed as listed below  Persist RTO or call  Medical Decision Making: moderate complexity  Return in about 6 months (around 9/4/2022) for HTN. On this date 3/4/2022 I have spent 30 minutes reviewing previous notes, test results and face to face with the patient discussing the diagnosis and importance of compliance with the treatment plan as well as documenting on the day of the visit. Subjective:   HPI:  Follow up of Hospital problems/diagnosis(es): Near syncope, Chest pain, Alcohol intoxication, HTN    Pt states he was drinking Etoh as it was his birthday and her passed out. He states this has happened before. He states he does not routinely drink   Doing better, but he is tired  +Fatigue. No snoring. No current chest pain.  He follows with cardiology  He would like to see GI for screening (Temporal)   Ht 5' 8\" (1.727 m)   Wt 214 lb (97.1 kg)   SpO2 99%   BMI 32.54 kg/m²   General Appearance: alert and oriented to person, place and time, well developed and well- nourished, in no acute distress  Skin: warm and dry, no rash or erythema  Head: normocephalic and atraumatic  Eyes: extraocular eye movements intact, conjunctivae normal  ENT: tympanic membrane, external ear and ear canal normal bilaterally  Neck: supple and no cervical lymphadenopathy  Pulmonary/Chest: clear to auscultation bilaterally- no wheezes, rales or rhonchi, normal air movement, no respiratory distress  Cardiovascular: normal rate, regular rhythm, normal S1 and S2,  no carotid bruits  Abdomen: soft, non-tender, non-distended, normal bowel sounds  Extremities: no  edema  Musculoskeletal: normal range of motion, no joint swelling, deformity or tenderness  Neurologic:no cranial nerve deficit, coordination and speech normal      An electronic signature was used to authenticate this note.   --Carri Milks, DO

## 2022-03-07 ENCOUNTER — TELEPHONE (OUTPATIENT)
Dept: GASTROENTEROLOGY | Age: 57
End: 2022-03-07

## 2022-03-07 NOTE — TELEPHONE ENCOUNTER
Attempted to call pt. Regarding a referral for a colon screening. Phone # in pt.  Chart is not a valid #

## 2022-03-08 ENCOUNTER — TELEPHONE (OUTPATIENT)
Dept: CARDIOLOGY CLINIC | Age: 57
End: 2022-03-08

## 2022-03-08 NOTE — TELEPHONE ENCOUNTER
Left message advising patient of event monitor results.     Event monitor demonstrates sinus rhythm   Infrequent ventricular and supraventricular ectopy noted  No episode of sustained ventricular ectopy seen

## 2022-03-12 ENCOUNTER — APPOINTMENT (OUTPATIENT)
Dept: GENERAL RADIOLOGY | Age: 57
End: 2022-03-12
Payer: COMMERCIAL

## 2022-03-12 ENCOUNTER — HOSPITAL ENCOUNTER (EMERGENCY)
Age: 57
Discharge: HOME | End: 2022-03-13
Payer: COMMERCIAL

## 2022-03-12 VITALS
HEIGHT: 68 IN | SYSTOLIC BLOOD PRESSURE: 136 MMHG | TEMPERATURE: 97.6 F | HEART RATE: 95 BPM | BODY MASS INDEX: 32.43 KG/M2 | OXYGEN SATURATION: 95 % | DIASTOLIC BLOOD PRESSURE: 82 MMHG | WEIGHT: 214 LBS | RESPIRATION RATE: 16 BRPM

## 2022-03-12 DIAGNOSIS — S50.812A ABRASION OF LEFT FOREARM, INITIAL ENCOUNTER: ICD-10-CM

## 2022-03-12 DIAGNOSIS — M25.512 LEFT SHOULDER PAIN, UNSPECIFIED CHRONICITY: Primary | ICD-10-CM

## 2022-03-12 DIAGNOSIS — M25.552 LEFT HIP PAIN: ICD-10-CM

## 2022-03-12 DIAGNOSIS — M25.562 ACUTE PAIN OF LEFT KNEE: ICD-10-CM

## 2022-03-12 PROCEDURE — 73502 X-RAY EXAM HIP UNI 2-3 VIEWS: CPT

## 2022-03-12 PROCEDURE — 73090 X-RAY EXAM OF FOREARM: CPT

## 2022-03-12 PROCEDURE — 90471 IMMUNIZATION ADMIN: CPT | Performed by: PHYSICIAN ASSISTANT

## 2022-03-12 PROCEDURE — 73564 X-RAY EXAM KNEE 4 OR MORE: CPT

## 2022-03-12 PROCEDURE — 90715 TDAP VACCINE 7 YRS/> IM: CPT | Performed by: PHYSICIAN ASSISTANT

## 2022-03-12 PROCEDURE — 6370000000 HC RX 637 (ALT 250 FOR IP): Performed by: PHYSICIAN ASSISTANT

## 2022-03-12 PROCEDURE — 73030 X-RAY EXAM OF SHOULDER: CPT

## 2022-03-12 PROCEDURE — 6360000002 HC RX W HCPCS: Performed by: PHYSICIAN ASSISTANT

## 2022-03-12 PROCEDURE — 99285 EMERGENCY DEPT VISIT HI MDM: CPT

## 2022-03-12 PROCEDURE — 73130 X-RAY EXAM OF HAND: CPT

## 2022-03-12 RX ORDER — TIZANIDINE 2 MG/1
2 TABLET ORAL 3 TIMES DAILY PRN
Qty: 30 TABLET | Refills: 0 | Status: SHIPPED | OUTPATIENT
Start: 2022-03-12 | End: 2022-09-09

## 2022-03-12 RX ORDER — DIAPER,BRIEF,INFANT-TODD,DISP
EACH MISCELLANEOUS ONCE
Status: COMPLETED | OUTPATIENT
Start: 2022-03-12 | End: 2022-03-12

## 2022-03-12 RX ORDER — IBUPROFEN 600 MG/1
600 TABLET ORAL ONCE
Status: COMPLETED | OUTPATIENT
Start: 2022-03-12 | End: 2022-03-12

## 2022-03-12 RX ADMIN — BACITRACIN ZINC: 500 OINTMENT TOPICAL at 21:08

## 2022-03-12 RX ADMIN — TETANUS TOXOID, REDUCED DIPHTHERIA TOXOID AND ACELLULAR PERTUSSIS VACCINE, ADSORBED 0.5 ML: 5; 2.5; 8; 8; 2.5 SUSPENSION INTRAMUSCULAR at 23:43

## 2022-03-12 RX ADMIN — IBUPROFEN 600 MG: 600 TABLET, FILM COATED ORAL at 21:08

## 2022-03-12 RX ADMIN — BACITRACIN ZINC: 500 OINTMENT TOPICAL at 23:44

## 2022-03-12 ASSESSMENT — PAIN SCALES - GENERAL
PAINLEVEL_OUTOF10: 8
PAINLEVEL_OUTOF10: 8

## 2022-03-12 ASSESSMENT — PAIN DESCRIPTION - PAIN TYPE: TYPE: ACUTE PAIN

## 2022-03-12 ASSESSMENT — PAIN DESCRIPTION - ORIENTATION: ORIENTATION: LEFT

## 2022-03-12 ASSESSMENT — PAIN DESCRIPTION - LOCATION: LOCATION: ARM;KNEE;HIP

## 2022-03-12 ASSESSMENT — PAIN - FUNCTIONAL ASSESSMENT: PAIN_FUNCTIONAL_ASSESSMENT: 0-10

## 2022-03-13 ASSESSMENT — ENCOUNTER SYMPTOMS
COUGH: 0
SHORTNESS OF BREATH: 0
EYE PAIN: 0
DIARRHEA: 0
BACK PAIN: 0
ABDOMINAL PAIN: 0
RHINORRHEA: 0
VOMITING: 0
NAUSEA: 0

## 2022-03-13 NOTE — ED NOTES
Reviewed discharge information. Patient verbalized understanding of paperwork, medication, and care instructions. Patient denied any questions.      Michael Woodard RN  03/13/22 [id number]

## 2022-03-13 NOTE — ED NOTES
XR HAND LEFT (MIN 3 VIEWS)    Status: Final result       Order Providers    Authorizing Billing   Dyana Babinski, PA-C Patricia Patten, MD            Signed by    Signed Date/Time Phone Pager   Duane Reynolds 3/12/2022 10:22 [phone]      Reading Providers    Read Date Phone Pager   Duane Reynolds Sat Mar 12, 2022 10:22 [phone]        XR HAND LEFT (MIN 3 VIEWS): Patient Communication     Released  Seen     Radiation Dose Estimates    No radiation information found for this patient  Narrative   EXAMINATION:   THREE XRAY VIEWS OF THE LEFT SHOULDER; THREE XRAY VIEWS OF THE LEFT HAND; TWO   XRAY VIEWS OF THE LEFT FOREARM       3/12/2022 9:04 pm       COMPARISON:   None.       HISTORY:   ORDERING SYSTEM PROVIDED HISTORY: fall trauma   TECHNOLOGIST PROVIDED HISTORY:   Reason for exam:->fall trauma   Reason for Exam: fall trauma   Additional signs and symptoms: fall trauma   Relevant Medical/Surgical History: fall trauma       FINDINGS:   Left shoulder: There is no acute fracture or suspect osseous lesion.  No   dislocation or subluxation.  Mild degenerative changes of the   acromioclavicular and glenohumeral joints.  No focal soft tissue swelling.       Left forearm: There is no acute fracture or suspect osseous lesion. Alignment of the elbow appears maintained.  There are mild degenerative   changes of the elbow.  No focal soft tissue swelling is evident.       Left hand: No acute fracture.  Moderate degenerative changes of the 3rd and   5th distal interphalangeal joints.  No joint subluxation or dislocation.  No   focal soft tissue swelling.  Chronic corticated ossicle adjacent to the tip   of the ulnar styloid process consistent with sequela of prior injury.           Impression   1. No acute osseous abnormality of the left shoulder. 2. No acute osseous abnormality of the left forearm. 3. No acute osseous abnormality of the left hand.           Meera Polk RN  03/12/22 [id number]

## 2022-03-13 NOTE — ED NOTES
XR HIP 2-3 VW W PELVIS LEFT    Status: Final result       Order Providers    Authorizing Billing   MALAIKA Cha MD            Signed by    Signed Date/Time Phone Pager   Noemi Jarrett 3/12/2022  9:51 [phone]      Reading Providers    Read Date Phone Pager   Noemi Jarrett Sat Mar 12, 2022  9:51 [phone]        XR HIP 2-3 VW W PELVIS LEFT: Patient Communication     Released  Seen     Radiation Dose Estimates    No radiation information found for this patient  Narrative   EXAMINATION:   FOUR XRAY VIEWS OF THE LEFT KNEE; ONE XRAY VIEW OF THE PELVIS AND TWO XRAY   VIEWS LEFT HIP       3/12/2022 9:04 pm       COMPARISON:   Left hip radiograph July 5, 2021; left knee radiograph July 5, 2021       HISTORY:   ORDERING SYSTEM PROVIDED HISTORY: fall trauma   TECHNOLOGIST PROVIDED HISTORY:   Reason for exam:->fall trauma   Reason for Exam: fall trauma   Additional signs and symptoms: fall trauma   Relevant Medical/Surgical History: fall trauma       FINDINGS:   Pelvis and left hip: Three views of the pelvis and left hip were reviewed. No acute fracture identified.  Mild osteoarthritis of the bilateral hips. Anatomic alignment of the hips.  Well corticated ossicle adjacent to the   greater trochanter on the left.       Left knee: Four views of the left knee were reviewed.  No acute fracture   identified.  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  These a fight   at the quadriceps insertion on the patella.  Small knee effusion.           Impression   Pelvis/left hip:       1. No acute osseous abnormality of the pelvis or left hip identified. Left knee:       1. No acute osseous abnormality of the left knee identified. 2. Small left knee effusion.           Junie Johansen RN  03/12/22 [id number]

## 2022-03-13 NOTE — ED PROVIDER NOTES
As PA-in-triage, I performed a medical screening history and physical exam on this patient. HISTORY OF PRESENT ILLNESS  Olman Stoddard is a 65 y.o. male presents following mechanical trip and fall this evening falling on his left side. He sustained abrasions to the left knee, left lower forearm and left hand. No head injury or loss of consciousness. Has been ambulatory since time of injury. Has had recent left shoulder rotator cuff surgery. Is complaining of left wrist, hip and shoulder pain from fall. [facility] PHYSICAL EXAM  /82   Pulse 95   Temp 97.6 °F (36.4 °C) (Oral)   Resp 16   Ht 5' 8\" (1.727 m)   Wt 214 lb (97.1 kg)   SpO2 95%   BMI 32.54 kg/m²     On exam, the patient appears well-hydrated, well-nourished, and in no acute distress. Mucous membranes are moist. Speech is clear. Breathing is unlabored. Skin is dry. Mental status is normal. The patient has normal gait, moves all extremities, and is without facial droop. No gross bony deformities of the left upper or lower extremity. Scattered abrasions noted to the bilateral hands, left knee and left forearm      Imaging, medications and wound care ordered at triage. Please see ED provider's note for patient complete ED evaluation, labs/imaging interpretation and final disposition/clinical impression.          Tristan Neff PA-C  03/12/22 [id number]

## 2022-03-13 NOTE — ED PROVIDER NOTES
**ADVANCED PRACTICE PROVIDER, I HAVE EVALUATED THIS PATIENT**        [address] ENCOUNTER      Pt Name: Marlon Ramirez  DWEZIO:[id number]  Daydaygftimothy 1965  Date of evaluation: 3/12/2022  Provider: Ron Quintero PA-C      Chief Complaint:    Chief Complaint   Patient presents with   Meryl Root     left arm, shoulder, left knee and hip         Nursing Notes, Past Medical Hx, Past Surgical Hx, Social Hx, Allergies, and Family Hx were all reviewed and agreed with or any disagreements were addressed in the HPI.    HPI: (Location, Duration, Timing, Severity, Quality, Assoc Sx, Context, Modifying factors)    Chief Complaint of fall, left shoulder, left hip, left knee, left forearm    This is a  65 y.o. male who presents concerns for injuries after mechanical fall he sustained just prior to arrival.  He states he was outside his home taking the trash out, tripped accidentally over some yard work borders. He describes injury to his left knee forearm, and left shoulder as well as the left hip. He does mention a history of recent rotator cuff repair several months ago, has plans for knee surgery as well due to some chronic knee pain. He denies any head injury neck pain back pain loss of consciousness near syncopal symptoms, chest pain, recent illness  PastMedical/Surgical History:      Diagnosis Date    Arthritis     Bilateral carpal tunnel syndrome     CAD (coronary artery disease)     ECHO 07/14/2021    EF 55-60%. Mild Pulmonic regurgitation is present, No evidence of pericardial effusion.     H/O echocardiogram 04/28/2016    EF 55% WNL- Stage 1 dysfunction     Hyperlipidemia     Hypertension     Medial meniscus tear     Left knee    Prediabetes     pt denies any hx of diabetes    Wears glasses     to read         Procedure Laterality Date    APPENDECTOMY  age 13    CARPAL TUNNEL RELEASE Left 7/21/2021    LEFTCARPAL TUNNEL RELEASE performed by Nima Ramsay MD at [address] Right 2002    SHOULDER ARTHROSCOPY Left 7/21/2021    LEFT SHOULDER ARTHROSCOPY ROTATOR CUFF REPAIR, SUBACROMIAL DECOMPRESSION performed by Nima Ramsay MD at [address] Right 2006       Medications:  Previous Medications    ASPIRIN 81 MG TABLET    Take 81 mg by mouth daily    IBUPROFEN (ADVIL;MOTRIN) 800 MG TABLET    Take 1 tablet by mouth every 8 hours as needed for Pain    LISINOPRIL (PRINIVIL;ZESTRIL) 20 MG TABLET    Take 1 tablet by mouth daily    NITROGLYCERIN (NITROSTAT) 0.4 MG SL TABLET    up to max of 3 total doses. If no relief after 1 dose, call 911. Review of Systems:  (2-9 systems needed)  Review of Systems   Constitutional: Negative for chills and fever. HENT: Negative for congestion and rhinorrhea. Eyes: Negative for pain and visual disturbance. Respiratory: Negative for cough and shortness of breath. Cardiovascular: Negative for chest pain and leg swelling. Gastrointestinal: Negative for abdominal pain, diarrhea, nausea and vomiting. Genitourinary: Negative for dysuria and hematuria. Musculoskeletal: Positive for arthralgias (shoulder pain-chronic). Negative for back pain and myalgias. Skin: Negative for rash and wound. Neurological: Negative for dizziness and light-headedness. \"Positives and Pertinent negatives as per HPI\"    Physical Exam:  Physical Exam  Vitals and nursing note reviewed. Constitutional:       Appearance: Normal appearance. He is well-developed. He is not ill-appearing or diaphoretic. HENT:      Head: Normocephalic and atraumatic. Right Ear: External ear normal.      Left Ear: External ear normal.      Nose: Nose normal.   Eyes:      General:         Right eye: No discharge. Left eye: No discharge. Pulmonary:      Effort: Pulmonary effort is normal. No respiratory distress. Breath sounds: No stridor.    Musculoskeletal:      Left shoulder: No swelling, deformity, bony tenderness or crepitus. Decreased range of motion (mild). Normal strength. Right elbow: Normal.      Left elbow: Normal.      Left forearm: No deformity or lacerations (abrasions. without evidence of fb or debris). Right wrist: No swelling, bony tenderness or snuff box tenderness. Left wrist: No snuff box tenderness. Left hand: No lacerations (abrasions) or bony tenderness. Normal range of motion. Cervical back: Normal range of motion and neck supple. Left hip: Tenderness present. No lacerations, bony tenderness or crepitus. Normal range of motion. Normal strength. Right knee: No swelling. Left knee: No lacerations (abrasion). Normal range of motion. Tenderness present. Right lower leg: Normal.      Left lower leg: Normal.      Right ankle: Normal.      Left ankle: Normal.   Skin:     General: Skin is warm and dry. Coloration: Skin is not pale. Neurological:      General: No focal deficit present. Mental Status: He is alert and oriented to person, place, and time. Psychiatric:         Mood and Affect: Mood normal.         Behavior: Behavior normal.         MEDICAL DECISION MAKING    Vitals:    Vitals:    03/12/22 2037 03/12/22 2047   BP:  136/82   Pulse:  95   Resp:  16   Temp:  97.6 °F (36.4 °C)   TempSrc:  Oral   SpO2:  95%   Weight: 214 lb (97.1 kg)    Height: 5' 8\" (1.727 m)        LABS:Labs Reviewed - No data to display     Remainder of labs reviewed and were negative at this time or not returned at the time of this note. RADIOLOGY:   Non-plain film images such as CT, Ultrasound and MRI are read by the radiologist. Nirav Churchill PA-C have directly visualized the radiologic plain film image(s) with the below findings:      Interpretation per the Radiologist below, if available at the time of this note:    XR KNEE RIGHT (MIN 4 VIEWS)   Final Result   No acute osseous abnormality of the right knee.          XR KNEE LEFT (MIN 4 VIEWS)   Final Result   Pelvis/left hip:      1. No acute osseous abnormality of the pelvis or left hip identified. Left knee:      1. No acute osseous abnormality of the left knee identified. 2. Small left knee effusion. XR HIP 2-3 VW W PELVIS LEFT   Final Result   Pelvis/left hip:      1. No acute osseous abnormality of the pelvis or left hip identified. Left knee:      1. No acute osseous abnormality of the left knee identified. 2. Small left knee effusion. XR SHOULDER LEFT (MIN 2 VIEWS)   Final Result   1. No acute osseous abnormality of the left shoulder. 2. No acute osseous abnormality of the left forearm. 3. No acute osseous abnormality of the left hand. XR HAND LEFT (MIN 3 VIEWS)   Final Result   1. No acute osseous abnormality of the left shoulder. 2. No acute osseous abnormality of the left forearm. 3. No acute osseous abnormality of the left hand. XR RADIUS ULNA LEFT (2 VIEWS)   Final Result   1. No acute osseous abnormality of the left shoulder. 2. No acute osseous abnormality of the left forearm. 3. No acute osseous abnormality of the left hand. XR RADIUS ULNA LEFT (2 VIEWS)    Result Date: 3/12/2022  EXAMINATION: THREE XRAY VIEWS OF THE LEFT SHOULDER; THREE XRAY VIEWS OF THE LEFT HAND; TWO XRAY VIEWS OF THE LEFT FOREARM 3/12/2022 9:04 pm COMPARISON: None. HISTORY: ORDERING SYSTEM PROVIDED HISTORY: fall trauma TECHNOLOGIST PROVIDED HISTORY: Reason for exam:->fall trauma Reason for Exam: fall trauma Additional signs and symptoms: fall trauma Relevant Medical/Surgical History: fall trauma FINDINGS: Left shoulder: There is no acute fracture or suspect osseous lesion. No dislocation or subluxation. Mild degenerative changes of the acromioclavicular and glenohumeral joints. No focal soft tissue swelling. Left forearm: There is no acute fracture or suspect osseous lesion. Alignment of the elbow appears maintained.   There are mild degenerative changes of the elbow. No focal soft tissue swelling is evident. Left hand: No acute fracture. Moderate degenerative changes of the 3rd and 5th distal interphalangeal joints. No joint subluxation or dislocation. No focal soft tissue swelling. Chronic corticated ossicle adjacent to the tip of the ulnar styloid process consistent with sequela of prior injury. 1. No acute osseous abnormality of the left shoulder. 2. No acute osseous abnormality of the left forearm. 3. No acute osseous abnormality of the left hand. XR HAND LEFT (MIN 3 VIEWS)    Result Date: 3/12/2022  EXAMINATION: THREE XRAY VIEWS OF THE LEFT SHOULDER; THREE XRAY VIEWS OF THE LEFT HAND; TWO XRAY VIEWS OF THE LEFT FOREARM 3/12/2022 9:04 pm COMPARISON: None. HISTORY: ORDERING SYSTEM PROVIDED HISTORY: fall trauma TECHNOLOGIST PROVIDED HISTORY: Reason for exam:->fall trauma Reason for Exam: fall trauma Additional signs and symptoms: fall trauma Relevant Medical/Surgical History: fall trauma FINDINGS: Left shoulder: There is no acute fracture or suspect osseous lesion. No dislocation or subluxation. Mild degenerative changes of the acromioclavicular and glenohumeral joints. No focal soft tissue swelling. Left forearm: There is no acute fracture or suspect osseous lesion. Alignment of the elbow appears maintained. There are mild degenerative changes of the elbow. No focal soft tissue swelling is evident. Left hand: No acute fracture. Moderate degenerative changes of the 3rd and 5th distal interphalangeal joints. No joint subluxation or dislocation. No focal soft tissue swelling. Chronic corticated ossicle adjacent to the tip of the ulnar styloid process consistent with sequela of prior injury. 1. No acute osseous abnormality of the left shoulder. 2. No acute osseous abnormality of the left forearm. 3. No acute osseous abnormality of the left hand.      XR KNEE LEFT (MIN 4 VIEWS)    Result Date: 3/12/2022  EXAMINATION: FOUR XRAY VIEWS OF THE LEFT KNEE; ONE XRAY VIEW OF THE PELVIS AND TWO XRAY VIEWS LEFT HIP 3/12/2022 9:04 pm COMPARISON: Left hip radiograph July 5, 2021; left knee radiograph July 5, 2021 HISTORY: ORDERING SYSTEM PROVIDED HISTORY: fall trauma TECHNOLOGIST PROVIDED HISTORY: Reason for exam:->fall trauma Reason for Exam: fall trauma Additional signs and symptoms: fall trauma Relevant Medical/Surgical History: fall trauma FINDINGS: Pelvis and left hip: Three views of the pelvis and left hip were reviewed. No acute fracture identified. Mild osteoarthritis of the bilateral hips. Anatomic alignment of the hips. Well corticated ossicle adjacent to the greater trochanter on the left. Left knee: Four views of the left knee were reviewed. No acute fracture identified. 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 These a fight at the quadriceps insertion on the patella. Small knee effusion. Pelvis/left hip: 1. No acute osseous abnormality of the pelvis or left hip identified. Left knee: 1. No acute osseous abnormality of the left knee identified. 2. Small left knee effusion. XR KNEE RIGHT (MIN 4 VIEWS)    Result Date: 3/12/2022  EXAMINATION: FOUR XRAY VIEWS OF THE RIGHT KNEE 3/12/2022 9:04 pm COMPARISON: 03/16/2019 HISTORY: ORDERING SYSTEM PROVIDED HISTORY: fall trauma TECHNOLOGIST PROVIDED HISTORY: Reason for exam:->fall trauma Reason for Exam: fall trauma Additional signs and symptoms: fall trauma Relevant Medical/Surgical History: fall trauma FINDINGS: There is no acute fracture or suspect osseous lesion. Alignment is maintained. There is minimal patellar and tibial spine spurring. Mild medial compartment joint space narrowing is present. No focal soft tissue swelling or joint effusion is evident. No acute osseous abnormality of the right knee.      CT HEAD WO CONTRAST    Result Date: 2/25/2022  EXAMINATION: CT OF THE HEAD WITHOUT CONTRAST  2/25/2022 11:07 pm TECHNIQUE: CT of the head was performed without the administration of intravenous contrast. Dose modulation, iterative reconstruction, and/or weight based adjustment of the mA/kV was utilized to reduce the radiation dose to as low as reasonably achievable. COMPARISON: 11/15/2020 HISTORY: ORDERING SYSTEM PROVIDED HISTORY: unresponsive episodes? TECHNOLOGIST PROVIDED HISTORY: Has a \"code stroke\" or \"stroke alert\" been called? ->No Reason for exam:->unresponsive episodes? Decision Support Exception - unselect if not a suspected or confirmed emergency medical condition->Emergency Medical Condition (MA) Reason for Exam: unresponsive episodes? FINDINGS: BRAIN/VENTRICLES: There is no acute intracranial hemorrhage, mass effect or midline shift. No abnormal extra-axial fluid collection. The gray-white differentiation is maintained without evidence of an acute infarct. There is no evidence of hydrocephalus. ORBITS: The visualized portion of the orbits demonstrate no acute abnormality. SINUSES: The visualized paranasal sinuses and mastoid air cells demonstrate no acute abnormality. SOFT TISSUES/SKULL:  No acute abnormality of the visualized skull or soft tissues. No acute intracranial abnormality. XR SHOULDER LEFT (MIN 2 VIEWS)    Result Date: 3/12/2022  EXAMINATION: THREE XRAY VIEWS OF THE LEFT SHOULDER; THREE XRAY VIEWS OF THE LEFT HAND; TWO XRAY VIEWS OF THE LEFT FOREARM 3/12/2022 9:04 pm COMPARISON: None. HISTORY: ORDERING SYSTEM PROVIDED HISTORY: fall trauma TECHNOLOGIST PROVIDED HISTORY: Reason for exam:->fall trauma Reason for Exam: fall trauma Additional signs and symptoms: fall trauma Relevant Medical/Surgical History: fall trauma FINDINGS: Left shoulder: There is no acute fracture or suspect osseous lesion. No dislocation or subluxation. Mild degenerative changes of the acromioclavicular and glenohumeral joints. No focal soft tissue swelling. Left forearm: There is no acute fracture or suspect osseous lesion.  Alignment of the elbow appears maintained. There are mild degenerative changes of the elbow. No focal soft tissue swelling is evident. Left hand: No acute fracture. Moderate degenerative changes of the 3rd and 5th distal interphalangeal joints. No joint subluxation or dislocation. No focal soft tissue swelling. Chronic corticated ossicle adjacent to the tip of the ulnar styloid process consistent with sequela of prior injury. 1. No acute osseous abnormality of the left shoulder. 2. No acute osseous abnormality of the left forearm. 3. No acute osseous abnormality of the left hand. XR CHEST PORTABLE    Result Date: 2/25/2022  EXAMINATION: ONE XRAY VIEW OF THE CHEST 2/25/2022 10:53 pm COMPARISON: November 15, 2020 HISTORY: ORDERING SYSTEM PROVIDED HISTORY: chest pain TECHNOLOGIST PROVIDED HISTORY: Reason for exam:->chest pain Reason for Exam: chest pain Additional signs and symptoms: none FINDINGS: No lines or tubes. Normal cardiomediastinal silhouette. The lungs are clear without focal consolidation or pleural effusion. No suspicious pulmonary nodules. No pulmonary edema. No pneumothorax. No acute osseous abnormality. 1. No acute cardiopulmonary disease. VL DUP CAROTID BILATERAL    Result Date: 2/26/2022  EXAMINATION: ULTRASOUND EVALUATION OF THE CAROTID ARTERIES 2/26/2022 TECHNIQUE: Duplex ultrasound using B-mode/gray scaled imaging, Doppler spectral analysis and color flow Doppler was obtained of the carotid arteries. COMPARISON: None. HISTORY: ORDERING SYSTEM PROVIDED HISTORY: syncope TECHNOLOGIST PROVIDED HISTORY: Reason for exam:->syncope Reason for Exam: syncope FINDINGS: RIGHT: The right common carotid artery demonstrates peak systolic velocities of 049, 103 cm/sec in the proximal and distal segments respectively. The right internal carotid artery demonstrates the systolic velocities of 76, 94, 70 cm/sec in the proximal, mid and distal segments respectively. The external carotid artery is patent.   The vertebral artery demonstrates normal antegrade flow. No evidence of focal atherosclerotic plaque. ICA/CCA ratio of 0.8 LEFT: The left common carotid artery demonstrates peak systolic velocities of 936, 97 cm/sec in the proximal and distal segments respectively. The left internal carotid artery demonstrates the systolic velocities of 75, 71, 58 cm/sec in the proximal, mid and distal segments respectively. The external carotid artery is patent. The vertebral artery demonstrates normal antegrade flow. No evidence of focal atherosclerotic plaque. ICA/CCA ratio of 0.7     The right internal carotid artery demonstrates 0-50% stenosis. The left internal carotid artery demonstrates 0-50% stenosis. Bilateral vertebral arteries are patent with flow in the normal direction. CTA CHEST W CONTRAST    Result Date: 2/25/2022  EXAMINATION: CTA OF THE CHEST 2/25/2022 11:07 pm TECHNIQUE: CTA of the chest was performed after the administration of intravenous contrast.  Multiplanar reformatted images are provided for review. MIP images are provided for review. Dose modulation, iterative reconstruction, and/or weight based adjustment of the mA/kV was utilized to reduce the radiation dose to as low as reasonably achievable. COMPARISON: Chest x-ray 02/25/2022 HISTORY: ORDERING SYSTEM PROVIDED HISTORY: chest pain, unresponsive episodes TECHNOLOGIST PROVIDED HISTORY: Reason for exam:->chest pain, unresponsive episodes Decision Support Exception - unselect if not a suspected or confirmed emergency medical condition->Emergency Medical Condition (MA) Reason for Exam: chest pain, unresponsive episodes FINDINGS: Pulmonary Arteries: Study is mildly motion degraded. Pulmonary arteries are adequately opacified for evaluation. No evidence of intraluminal filling defect to suggest pulmonary embolism. Main pulmonary artery is normal in caliber. Mediastinum: No evidence of mediastinal lymphadenopathy.   The heart and pericardium demonstrate no acute abnormality. There is no acute abnormality of the thoracic aorta. Lungs/pleura: The lungs are without acute process. No focal consolidation or pulmonary edema. No evidence of pleural effusion or pneumothorax. Upper Abdomen: The stomach is distended by particulate food material.  Mild fatty infiltration of the liver. Soft Tissues/Bones: No acute bone or soft tissue abnormality. No evidence of pulmonary embolism or acute pulmonary abnormality. MRI BRAIN WO CONTRAST    Result Date: 2/26/2022  EXAMINATION: MRI OF THE BRAIN WITHOUT CONTRAST  2/26/2022 8:58 am TECHNIQUE: Multiplanar multisequence MRI of the brain was performed without the administration of intravenous contrast. COMPARISON: 02/25/2022. HISTORY: ORDERING SYSTEM PROVIDED HISTORY: syncope vs seizure. Structural cause? TECHNOLOGIST PROVIDED HISTORY: Reason for exam:->syncope vs seizure. Structural cause? Decision Support Exception - unselect if not a suspected or confirmed emergency medical condition->Emergency Medical Condition (MA) Reason for Exam: syncope vs seizure FINDINGS: INTRACRANIAL STRUCTURES/VENTRICLES: No areas of restricted diffusion. The cerebral and cerebellar parenchyma demonstrate normal volume and signal intensity. No abnormal extra-axial fluid collections. The ventricles are normal in size. Motion degraded examination. There are no areas of blooming artifact noted on the gradient echo sequences to suggest sequela of acute or chronic hemorrhage. ORBITS: The visualized portion of the orbits demonstrate no acute abnormality. SINUSES: There is scattered trace paranasal sinus disease. The mastoid air cells are well aerated. BONES/SOFT TISSUES: The bone marrow signal intensity and craniocervical junction are unremarkable. Pituitary gland is normal in appearance. 1. Unremarkable MRI of the brain.      XR HIP 2-3 VW W PELVIS LEFT    Result Date: 3/12/2022  EXAMINATION: FOUR XRAY VIEWS OF THE LEFT KNEE; ONE XRAY VIEW OF THE PELVIS AND TWO XRAY VIEWS LEFT HIP 3/12/2022 9:04 pm COMPARISON: Left hip radiograph July 5, 2021; left knee radiograph July 5, 2021 HISTORY: ORDERING SYSTEM PROVIDED HISTORY: fall trauma TECHNOLOGIST PROVIDED HISTORY: Reason for exam:->fall trauma Reason for Exam: fall trauma Additional signs and symptoms: fall trauma Relevant Medical/Surgical History: fall trauma FINDINGS: Pelvis and left hip: Three views of the pelvis and left hip were reviewed. No acute fracture identified. Mild osteoarthritis of the bilateral hips. Anatomic alignment of the hips. Well corticated ossicle adjacent to the greater trochanter on the left. Left knee: Four views of the left knee were reviewed. No acute fracture identified. 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 These a fight at the quadriceps insertion on the patella. Small knee effusion. Pelvis/left hip: 1. No acute osseous abnormality of the pelvis or left hip identified. Left knee: 1. No acute osseous abnormality of the left knee identified. 2. Small left knee effusion. MEDICAL DECISION MAKING / ED COURSE:      PROCEDURES:   Procedures    None    Patient was given:  Medications   tetanus-diphth-acell pertussis (BOOSTRIX) injection 0.5 mL (has no administration in time range)   bacitracin zinc ointment (has no administration in time range)   bacitracin zinc ointment ( Topical Given 3/12/22 2108)   ibuprofen (ADVIL;MOTRIN) tablet 600 mg (600 mg Oral Given 3/12/22 248)     50-year-old male presents with above HPI. Initial x-ray order set by triage provider. Follows ibuprofen. He did say he had some improved with ibuprofen. Right knee x-rays unremarkable. Left knee x-ray shows small knee effusion but otherwise no acute osseous abnormalities. On examination, he has a small abrasion, no laxity. No evidence of infectious process. Left hip x-ray also no acute fractures or dislocations. He does have palpable distal pulses.   Is able to range his knee, and flex his hip. Left shoulder forearm, hand x-ray shows no acute abnormality. Some road rash abrasions over his mid forearm. No snuffbox tenderness. Normal distal pulses. Patient is describing mechanical fall without any loss of consciousness. No snuff box tenderness in his wrist.  No concern for occult fracture. He is alert and oriented head face atraumatic. No midline cervical thoracic or lumbar pain. He has a history of rotator cuff repair, his range of motion of his shoulder is limited before he fell, x-ray showing no acute fractures I do feel he safe to follow-up as a outpatient for reevaluation of his shoulder pain. His lower extremity exam shows some mild abrasions and tenderness but no noted deformities. Sensation is intact. Again likely contusion, potential for some soft tissue injury, however patient is amatory, no laxity on the exam, again safe to follow-up as an outpatient. His tetanus was updated. Wounds cleansed and irrigated and dressed. He is agreeable for ibuprofen for pain relief icing, and return precautions. Patient safe for discharge  The patient tolerated their visit well. I evaluated the patient. The physician was available for consultation as needed. The patient and / or the family were informed of the results of any tests, a time was given to answer questions, a plan was proposed and they agreed with plan. CLINICAL IMPRESSION:  1. Left shoulder pain, unspecified chronicity    2. Abrasion of left forearm, initial encounter    3. Left hip pain    4. Acute pain of left knee        DISPOSITION        PATIENT REFERRED TO:  No follow-up provider specified.     DISCHARGE MEDICATIONS:  New Prescriptions    No medications on file       DISCONTINUED MEDICATIONS:  Discontinued Medications    No medications on file              (Please note the MDM and HPI sections of this note were completed with a voice recognition program.  Efforts were made to edit the dictations but occasionally words are mis-transcribed.)    Electronically signed, Lalo Ch PA-C,          Lalo Ch PA-C  03/13/22 0842

## 2022-03-24 ENCOUNTER — PREP FOR PROCEDURE (OUTPATIENT)
Dept: GASTROENTEROLOGY | Age: 57
End: 2022-03-24

## 2022-03-24 ENCOUNTER — OFFICE VISIT (OUTPATIENT)
Dept: GASTROENTEROLOGY | Age: 57
End: 2022-03-24

## 2022-03-24 VITALS
OXYGEN SATURATION: 96 % | HEART RATE: 75 BPM | HEIGHT: 68 IN | WEIGHT: 211.6 LBS | TEMPERATURE: 97.8 F | SYSTOLIC BLOOD PRESSURE: 122 MMHG | BODY MASS INDEX: 32.07 KG/M2 | DIASTOLIC BLOOD PRESSURE: 66 MMHG

## 2022-03-24 DIAGNOSIS — Z12.11 COLON CANCER SCREENING: Primary | ICD-10-CM

## 2022-03-24 PROCEDURE — S0285 CNSLT BEFORE SCREEN COLONOSC: HCPCS | Performed by: NURSE PRACTITIONER

## 2022-03-24 RX ORDER — SODIUM CHLORIDE 0.9 % (FLUSH) 0.9 %
5-40 SYRINGE (ML) INJECTION PRN
Status: CANCELLED | OUTPATIENT
Start: 2022-03-24

## 2022-03-24 RX ORDER — POLYETHYLENE GLYCOL 3350, SODIUM SULFATE, SODIUM CHLORIDE, POTASSIUM CHLORIDE, ASCORBIC ACID, SODIUM ASCORBATE 140-9-5.2G
KIT ORAL
Qty: 1 EACH | Refills: 0 | Status: SHIPPED | OUTPATIENT
Start: 2022-03-24 | End: 2022-10-31

## 2022-03-24 RX ORDER — SIMETHICONE 80 MG
TABLET,CHEWABLE ORAL
Qty: 3 TABLET | Refills: 0 | Status: SHIPPED | OUTPATIENT
Start: 2022-03-24 | End: 2022-09-09

## 2022-03-24 RX ORDER — BISACODYL 5 MG
TABLET, DELAYED RELEASE (ENTERIC COATED) ORAL
Qty: 4 TABLET | Refills: 0 | Status: SHIPPED | OUTPATIENT
Start: 2022-03-24 | End: 2022-03-24 | Stop reason: ALTCHOICE

## 2022-03-24 RX ORDER — SODIUM CHLORIDE 9 MG/ML
25 INJECTION, SOLUTION INTRAVENOUS PRN
Status: CANCELLED | OUTPATIENT
Start: 2022-03-24

## 2022-03-24 RX ORDER — POLYETHYLENE GLYCOL 3350 17 G/17G
238 POWDER, FOR SOLUTION ORAL ONCE
Qty: 238 G | Refills: 0 | Status: SHIPPED | OUTPATIENT
Start: 2022-03-24 | End: 2022-03-24 | Stop reason: ALTCHOICE

## 2022-03-24 RX ORDER — SODIUM CHLORIDE 0.9 % (FLUSH) 0.9 %
5-40 SYRINGE (ML) INJECTION EVERY 12 HOURS SCHEDULED
Status: CANCELLED | OUTPATIENT
Start: 2022-03-24

## 2022-03-24 RX ORDER — SODIUM CHLORIDE, SODIUM LACTATE, POTASSIUM CHLORIDE, CALCIUM CHLORIDE 600; 310; 30; 20 MG/100ML; MG/100ML; MG/100ML; MG/100ML
INJECTION, SOLUTION INTRAVENOUS CONTINUOUS
Status: CANCELLED | OUTPATIENT
Start: 2022-03-24

## 2022-03-24 ASSESSMENT — ENCOUNTER SYMPTOMS
ABDOMINAL PAIN: 0
NAUSEA: 0
DIARRHEA: 0
EYE DISCHARGE: 0
SHORTNESS OF BREATH: 0
COLOR CHANGE: 0
EYE PAIN: 0
WHEEZING: 0
CONSTIPATION: 0
VOMITING: 0
BACK PAIN: 0
BLOOD IN STOOL: 0
COUGH: 0

## 2022-03-24 NOTE — PATIENT INSTRUCTIONS
Patient Education        Colonoscopy: Before Your Procedure  What is a colonoscopy? A colonoscopy is a test that lets a doctor look inside your colon. The doctor uses a thin, lighted tube called a colonoscope to look for problems. These include small growths called polyps, cancer, or bleeding. During the test, the doctor can take samples of tissue that can be checked for cancer or other problems. This is called a biopsy. The doctor can also take out polyps. Before the test, you will need to stop eating solid foods. You also will be given instructions on how to clean out your colon. This helps your doctor be able to see inside your colon during the test.  How do you prepare for the procedure? Procedures can be stressful. This information will help you understand what you can expect. And it will help you safely prepare for your procedure. Preparing for the procedure    · Be sure you have someone to take you home. Anesthesia and pain medicine will make it unsafe for you to drive or get home on your own. · Understand exactly what procedure is planned, along with the risks, benefits, and other options.     · Tell your doctor ALL the medicines, vitamins, supplements, and herbal remedies you take. Some may increase the risk of problems during your procedure. Your doctor will tell you if you should stop taking any of them before the procedure and how soon to do it.     · If you take aspirin or some other blood thinner, ask your doctor if you should stop taking it before your procedure. Make sure that you understand exactly what your doctor wants you to do. These medicines increase the risk of bleeding.     · Make sure your doctor and the hospital have a copy of your advance directive. If you don't have one, you may want to prepare one. It lets others know your health care wishes. It's a good thing to have before any type of surgery or procedure.    Before the procedure    · Follow your doctor's directions about when to stop eating solid foods and drink only clear liquids. You can drink water, clear juices, clear broths, flavored ice pops, and gelatin (such as Jell-O). Do not eat or drink anything red or purple. This includes grape juice and grape-flavored ice pops. It also includes fruit punch and cherry gelatin.     · Drink the \"colon prep\" liquid as your doctor tells you. You will want to stay home, because the liquid will make you go to the bathroom a lot. Your stools will be loose and watery. It's very important to drink all of the liquid. If you have problems drinking it, call your doctor.     · Do not eat any solid foods after you drink the colon prep.     · Stop drinking clear liquids for a few hours before the test. Your doctor will tell you how many hours this will be. What happens on the day of the procedure? · Follow the instructions exactly about when to stop eating and drinking. If you don't, your procedure may be canceled. If your doctor told you to take your medicines on the day of the procedure, take them with only a sip of water.     · Take a bath or shower before you come in for your procedure. Do not apply lotions, perfumes, deodorants, or nail polish.     · Take off all jewelry and piercings. And take out contact lenses, if you wear them. At the doctor's office or hospital   · Bring a picture ID.     · You will be kept comfortable and safe by your anesthesia provider. The anesthesia may make you sleep.     · You will lie on your back or your side with your knees drawn up toward your belly. The doctor will gently put a gloved finger into your anus. Then the doctor puts the scope in and moves it into your colon. The scope goes in easily because it is lubricated.     · The doctor may also use small tools to take tissue samples for a biopsy or to remove polyps. This does not hurt.     · The test usually takes 30 to 45 minutes. But it may take longer. It depends on what is found and what is done. When should you call your doctor? · You have questions or concerns.     · You don't understand how to prepare for your procedure.     · You are having trouble with the bowel prep.     · You become ill before the procedure (such as fever, flu, or a cold).     · You need to reschedule or have changed your mind about having the procedure. Where can you learn more? Go to [email or web address]://[email or web address]. org and sign in to your [employer] account. Enter C315 in the [facility] box to learn more about \"Colonoscopy: Before Your Procedure. \"     If you do not have an account, please click on the \"Sign Up Now\" link. Current as of: September 8, 2021               Content Version: 13.1  © 2006-2021 Healthwise, Incorporated. Care instructions adapted under license by [facility] ([facility]). If you have questions about a medical condition or this instruction, always ask your healthcare professional. [place] [address] any warranty or liability for your use of this information.

## 2022-03-24 NOTE — PROGRESS NOTES
Wali Dallas 65 y.o. male was seen by PEGGY Davalos on 03/24/22     Wt Readings from Last 3 Encounters:   03/24/22 211 lb 9.6 oz (96 kg)   03/12/22 214 lb (97.1 kg)   03/04/22 214 lb (97.1 kg)       HPI  Wali Dallas is a pleasant 65 y.o.  male who presents today for screening colonoscopy. He has a past medical history of arthritis, bilateral carpal tunnel syndrome, CAD (coronary artery disease), ECHO, h/o echocardiogram, hyperlipidemia, hypertension, medial meniscus tear, prediabetes, and wears glasses. He has never had a colonoscopy. He denies changes in his bowel pattern. His typical bowel pattern is daily to twice daily with soft brown formed stools. No diarrhea or constipation. No blood in his stools or melena. No excess belching or flatulence. His appetite is good without early satiety. His weight is stable. No nausea or vomiting. No abdominal pain, bloating or distention. No heartburn or acid reflux. No nocturnal awakenings with acid reflux. No dysphagia or pain with swallowing. No family history of stomach or colon cancer. ROS  Review of Systems   Constitutional: Negative for chills, diaphoresis, fever and unexpected weight change. HENT: Negative for ear pain, hearing loss and tinnitus. Eyes: Negative for pain, discharge and visual disturbance. Respiratory: Negative for cough, shortness of breath and wheezing. Cardiovascular: Negative for chest pain, palpitations and leg swelling. Gastrointestinal: Negative for abdominal pain, blood in stool, constipation, diarrhea, nausea and vomiting. Endocrine: Negative for cold intolerance and heat intolerance. Genitourinary: Negative for dysuria, frequency, hematuria and urgency. Musculoskeletal: Negative for back pain, myalgias and neck pain. Skin: Negative for color change, pallor and rash. Allergic/Immunologic: Negative for environmental allergies and food allergies.    Neurological: Negative for dizziness, seizures, weakness and headaches. Hematological: Does not bruise/bleed easily. Psychiatric/Behavioral: Negative for dysphoric mood and sleep disturbance. The patient is not nervous/anxious. Allergies  No Known Allergies    Medications  Current Outpatient Medications   Medication Sig Dispense Refill    tiZANidine (ZANAFLEX) 2 MG tablet Take 1 tablet by mouth 3 times daily as needed (pain) 30 tablet 0    nitroGLYCERIN (NITROSTAT) 0.4 MG SL tablet up to max of 3 total doses. If no relief after 1 dose, call 911. 25 tablet 3    lisinopril (PRINIVIL;ZESTRIL) 20 MG tablet Take 1 tablet by mouth daily 90 tablet 1    ibuprofen (ADVIL;MOTRIN) 800 MG tablet Take 1 tablet by mouth every 8 hours as needed for Pain 90 tablet 1    aspirin 81 MG tablet Take 81 mg by mouth daily       No current facility-administered medications for this visit. Past medical history:   He has a past medical history of Arthritis, Bilateral carpal tunnel syndrome, CAD (coronary artery disease), ECHO, H/O echocardiogram, Hyperlipidemia, Hypertension, Medial meniscus tear, Prediabetes, and Wears glasses. Past surgical history:  He has a past surgical history that includes shoulder surgery (Right, 2006); Appendectomy (age 13); Hand surgery (Right, 2002); Shoulder arthroscopy (Left, 7/21/2021); and Carpal tunnel release (Left, 7/21/2021). Social History:  He reports that he has never smoked. He quit smokeless tobacco use about 2 years ago. His smokeless tobacco use included chew. He reports current alcohol use of about 15.0 standard drinks of alcohol per week. He reports that he does not use drugs. Family history:  His family history includes Cancer in his mother; Heart Attack in his sister; Heart Disease in his father; Stroke in his sister.     Objective    Vitals:    03/24/22 1332   BP: 122/66   Pulse: 75   Temp: 97.8 °F (36.6 °C)   SpO2: 96%        Physical exam    Physical Exam  Constitutional:       General: He is not in acute distress. Appearance: He is well-developed. He is obese. He is not ill-appearing, toxic-appearing or diaphoretic. HENT:      Head: Normocephalic and atraumatic. Nose: Nose normal.      Mouth/Throat:      Mouth: Mucous membranes are moist.   Cardiovascular:      Rate and Rhythm: Normal rate and regular rhythm. Pulses: Normal pulses. Heart sounds: Normal heart sounds. No murmur heard. No gallop. Pulmonary:      Effort: Pulmonary effort is normal. No respiratory distress. Breath sounds: Normal breath sounds. No stridor. No wheezing or rhonchi. Abdominal:      General: Bowel sounds are normal. There is no distension. Palpations: Abdomen is soft. There is no mass. Tenderness: There is no abdominal tenderness. Hernia: No hernia is present. Musculoskeletal:         General: Normal range of motion. Cervical back: Neck supple. Skin:     General: Skin is warm and dry. Neurological:      Mental Status: He is alert and oriented to person, place, and time.    Psychiatric:         Mood and Affect: Mood normal.         Office Visit on 03/04/2022   Component Date Value Ref Range Status    TSH Reflex FT4 03/04/2022 1.21  0.27 - 4.20 uIU/mL Final    PSA 03/04/2022 1.17  0.00 - 4.00 ng/mL Final   Admission on 02/25/2022, Discharged on 02/26/2022   Component Date Value Ref Range Status    WBC 02/25/2022 7.3  4.0 - 10.5 K/CU MM Final    RBC 02/25/2022 4.50* 4.6 - 6.2 M/CU MM Final    Hemoglobin 02/25/2022 13.4* 13.5 - 18.0 GM/DL Final    Hematocrit 02/25/2022 40.7* 42 - 52 % Final    MCV 02/25/2022 90.4  78 - 100 FL Final    MCH 02/25/2022 29.8  27 - 31 PG Final    MCHC 02/25/2022 32.9  32.0 - 36.0 % Final    RDW 02/25/2022 12.0  11.7 - 14.9 % Final    Platelets 39/92/6582 234  140 - 440 K/CU MM Final    MPV 02/25/2022 9.2  7.5 - 11.1 FL Final    Differential Type 02/25/2022 AUTOMATED DIFFERENTIAL   Final    Segs Relative 02/25/2022 55.1  36 - 66 % Final    Final    QRS Duration 02/25/2022 86  ms Final    Q-T Interval 02/25/2022 376  ms Final    QTc Calculation (Bazett) 02/25/2022 452  ms Final    P Axis 02/25/2022 45  degrees Final    R Axis 02/25/2022 13  degrees Final    T Axis 02/25/2022 38  degrees Final    Diagnosis 02/25/2022    Final                    Value:Normal sinus rhythm  Anteroseptal infarct (cited on or before 18-FEB-2017)  Abnormal ECG  When compared with ECG of 25-FEB-2022 21:55,  No significant change was found  Confirmed by MARCO A Berry ([id number]) on 2/26/2022 6:03:59 PM      Alcohol Scrn 02/25/2022 0.25* <0.01 %WT/VOL Final    Cannabinoid Scrn, Ur 02/26/2022 NEGATIVE  NEGATIVE Final    Amphetamines 02/26/2022 NEGATIVE  NEGATIVE Final    Cocaine Metabolite 02/26/2022 NEGATIVE  NEGATIVE Final    Benzodiazepine Screen, Urine 02/26/2022 NEGATIVE  NEGATIVE Final    Barbiturate Screen, Ur 02/26/2022 NEGATIVE  NEGATIVE Final    Opiates, Urine 02/26/2022 NEGATIVE  NEGATIVE Final    Phencyclidine, Urine 02/26/2022 NEGATIVE  NEGATIVE Final    Oxycodone 02/26/2022 NEGATIVE  NEGATIVE Final    Comment:         THRESHOLD CONCENTRATIONS (mg/dL)  AMPHT               1000  NEFTALI,OPIA             300  BZO,BAR              200  PCP                   25  THC                   50  OXY                  100          * UNCONFIRMED POSITIVES MAY  NOT MEET FORENSIC REQUIREMENTS.  Troponin T 02/26/2022 <0.010  <0.01 NG/ML Final    Comment:         Patients with high levels of Biotin oral intake  (ie >5 mg/day) may have falsely decreased Troponin  levels. Samples collected within 8 hours of Biotin  intake may require addtional information for diagnosis.       Prolactin 02/26/2022 19.4  ng/ml Final    Comment: (NOTE)  REFERENCE RANGE:    MEN:    4.04-15.2ng/mL    WOMEN:  4.79-23.3ng/mL   (NON-PREGNANT)      Sodium 02/26/2022 143  135 - 145 MMOL/L Final    Potassium 02/26/2022 4.5  3.5 - 5.1 MMOL/L Final    Chloride 02/26/2022 108  99 - 110 mMol/L Final    CO2 02/26/2022 23  21 - 32 MMOL/L Final    Anion Gap 02/26/2022 12  4 - 16 Final    BUN 02/26/2022 15  6 - 23 MG/DL Final    CREATININE 02/26/2022 0.7* 0.9 - 1.3 MG/DL Final    Glucose 02/26/2022 104* 70 - 99 MG/DL Final    Calcium 02/26/2022 8.4  8.3 - 10.6 MG/DL Final    GFR Non- 02/26/2022 >60  >60 mL/min/1.73m2 Final    GFR  02/26/2022 >60  >60 mL/min/1.73m2 Final    WBC 02/26/2022 5.9  4.0 - 10.5 K/CU MM Final    RBC 02/26/2022 4.45* 4.6 - 6.2 M/CU MM Final    Hemoglobin 02/26/2022 13.4* 13.5 - 18.0 GM/DL Final    Hematocrit 02/26/2022 40.4* 42 - 52 % Final    MCV 02/26/2022 90.8  78 - 100 FL Final    MCH 02/26/2022 30.1  27 - 31 PG Final    MCHC 02/26/2022 33.2  32.0 - 36.0 % Final    RDW 02/26/2022 12.3  11.7 - 14.9 % Final    Platelets 27/20/3264 209  140 - 440 K/CU MM Final    MPV 02/26/2022 9.3  7.5 - 11.1 FL Final    Differential Type 02/26/2022 AUTOMATED DIFFERENTIAL   Final    Segs Relative 02/26/2022 60.2  36 - 66 % Final    Lymphocytes % 02/26/2022 26.9  24 - 44 % Final    Monocytes % 02/26/2022 9.6* 0 - 4 % Final    Eosinophils % 02/26/2022 2.2  0 - 3 % Final    Basophils % 02/26/2022 0.8  0 - 1 % Final    Segs Absolute 02/26/2022 3.6  K/CU MM Final    Lymphocytes Absolute 02/26/2022 1.6  K/CU MM Final    Monocytes Absolute 02/26/2022 0.6  K/CU MM Final    Eosinophils Absolute 02/26/2022 0.1  K/CU MM Final    Basophils Absolute 02/26/2022 0.1  K/CU MM Final    Nucleated RBC % 02/26/2022 0.0  % Final    Total Nucleated RBC 02/26/2022 0.0  K/CU MM Final    Total Immature Neutrophil 02/26/2022 0.02  K/CU MM Final    Immature Neutrophil % 02/26/2022 0.3  0 - 0.43 % Final       Assessment and Plan:  1. Will plan for a colonoscopy with MAC anesthesia. The patient was informed of the risks and benefits of the procedure. 2.  Further recommendations for follow-up will be determined after the colonoscopy has been completed.

## 2022-03-25 ENCOUNTER — TELEPHONE (OUTPATIENT)
Dept: GASTROENTEROLOGY | Age: 57
End: 2022-03-25

## 2022-04-01 ENCOUNTER — TELEPHONE (OUTPATIENT)
Dept: GASTROENTEROLOGY | Age: 57
End: 2022-04-01

## 2022-04-01 NOTE — TELEPHONE ENCOUNTER
Pt was notified of procedure location change from [facility] to [facility], understanding was expressed, and all questions were answered.

## 2022-04-13 NOTE — PROGRESS NOTES
Patient will arrive at 1100 at [facility] on 4/19/2022 for his procedure at 1300. 1. Do not eat or drink anything after midnight - unless instructed by your doctor prior to surgery. This includes                   no water, chewing gum or mints. 2. Follow your directions as prescribed by the doctor for your procedure and medications. 3. Check with your Doctor regarding stopping vitamins, supplements, blood thinners (Plavix, Coumadin, Lovenox, Effient, Pradaxa, Xarelto, Fragmin or                   other blood thinners) and follow their instructions. 4. Do not smoke, and do not drink any alcoholic beverages 24 hours prior to surgery. This includes NA Beer. 5. You may brush your teeth and gargle the morning of surgery. DO NOT SWALLOW WATER   6. You MUST make arrangements for a responsible adult to take you home after your surgery and be able to check on you every couple                   hours for the day. You will not be allowed to leave alone or drive yourself home. It is strongly suggested someone stay with you the first 24                   hrs. Your surgery will be cancelled if you do not have a ride home. 7. Please wear simple, loose fitting clothing to the hospital.  [place] not bring valuables (money, credit cards, checkbooks, etc.) Do not wear any                   makeup (including no eye makeup) or nail polish on your fingers or toes. 8. DO NOT wear any jewelry or piercings on day of surgery. All body piercing jewelry must be removed. 9. If you have dentures, they will be removed before going to the OR; we will provide you a container. If you wear contact lenses or glasses,                  they will be removed; please bring a case for them. 10. If you  have a Living Will and Durable Power of  for Healthcare, please bring in a copy.            11. Please bring picture ID,  insurance card, paperwork from the doctors office    (H & P, Consent, & card

## 2022-04-17 ENCOUNTER — ANESTHESIA EVENT (OUTPATIENT)
Dept: ENDOSCOPY | Age: 57
End: 2022-04-17
Payer: COMMERCIAL

## 2022-04-18 NOTE — ANESTHESIA PRE PROCEDURE
Department of Anesthesiology  Preprocedure Note       Name:  Gianluca Garrett   Age:  65 y.o.  :  1965                                          MRN:  [id number]         Date:  2022      Surgeon: Yu Cali):  Laron Gonzalez MD    Procedure: Procedure(s):  COLORECTAL CANCER SCREENING, NOT HIGH RISK    Medications prior to admission:   Prior to Admission medications    Medication Sig Start Date End Date Taking? Authorizing Provider   B Complex Vitamins (VITAMIN B COMPLEX PO) Take by mouth daily   Yes Historical Provider, MD   Multiple Vitamin (MULTI VITAMIN MENS PO) Take by mouth daily   Yes Historical Provider, MD   PLENVU 140 g SOLR AS DIRECTED FOR COLONOSCOPY PREP 3/24/22   RYANN Ahn CNP   simethicone (MYLICON) 80 MG chewable tablet AS DIRECTED FOR COLONOSCOPY PREP 3/24/22   RYANN Ahn CNP   tiZANidine (ZANAFLEX) 2 MG tablet Take 1 tablet by mouth 3 times daily as needed (pain) 3/12/22   Arthur Valles PA-C   nitroGLYCERIN (NITROSTAT) 0.4 MG SL tablet up to max of 3 total doses. If no relief after 1 dose, call 911. 21   Victor Hugo Presley,    lisinopril (PRINIVIL;ZESTRIL) 20 MG tablet Take 1 tablet by mouth daily 21   Victor Hugo Presley DO   ibuprofen (ADVIL;MOTRIN) 800 MG tablet Take 1 tablet by mouth every 8 hours as needed for Pain 21   Mariama Tam MD   aspirin 81 MG tablet Take 81 mg by mouth daily    Historical Provider, MD       Current medications:    No current facility-administered medications for this encounter.      Current Outpatient Medications   Medication Sig Dispense Refill    B Complex Vitamins (VITAMIN B COMPLEX PO) Take by mouth daily      Multiple Vitamin (MULTI VITAMIN MENS PO) Take by mouth daily      PLENVU 140 g SOLR AS DIRECTED FOR COLONOSCOPY PREP 1 each 0    simethicone (MYLICON) 80 MG chewable tablet AS DIRECTED FOR COLONOSCOPY PREP 3 tablet 0    tiZANidine (ZANAFLEX) 2 MG tablet Take 1 tablet by mouth 3 times daily as needed (pain) 30 tablet 0    nitroGLYCERIN (NITROSTAT) 0.4 MG SL tablet up to max of 3 total doses. If no relief after 1 dose, call 911. 25 tablet 3    lisinopril (PRINIVIL;ZESTRIL) 20 MG tablet Take 1 tablet by mouth daily 90 tablet 1    ibuprofen (ADVIL;MOTRIN) 800 MG tablet Take 1 tablet by mouth every 8 hours as needed for Pain 90 tablet 1    aspirin 81 MG tablet Take 81 mg by mouth daily         Allergies:  No Known Allergies    Problem List:    Patient Active Problem List   Diagnosis Code    Chest pain R07.9    Hypertension I10    Hyperlipidemia E78.5    Prediabetes R73.03    Medial meniscus tear S83.249A    Bilateral carpal tunnel syndrome G56.03    Sprain of left rotator cuff capsule S43.422A    Carpal tunnel syndrome on left G56.02    Acquired trigger finger of left middle finger M65.332    Primary osteoarthritis of left knee M17.12    LOC (loss of consciousness) (HCC) R40.20       Past Medical History:        Diagnosis Date    Arthritis     Bilateral carpal tunnel syndrome     CAD (coronary artery disease)     ECHO 07/14/2021    EF 55-60%. Mild Pulmonic regurgitation is present, No evidence of pericardial effusion.     H/O echocardiogram 04/28/2016    EF 55% WNL- Stage 1 dysfunction     Hyperlipidemia     Hypertension     Medial meniscus tear     Left knee    Prediabetes     pt denies any hx of diabetes    Wears glasses     to read       Past Surgical History:        Procedure Laterality Date    APPENDECTOMY  age 13   [place] CARPAL TUNNEL RELEASE Left 7/21/2021    LEFTCARPAL TUNNEL RELEASE performed by Lucy Awad MD at [address] Right 2002    SHOULDER ARTHROSCOPY Left 7/21/2021    LEFT SHOULDER ARTHROSCOPY ROTATOR CUFF REPAIR, SUBACROMIAL DECOMPRESSION performed by Lucy Awad MD at [address] Right 2006       Social History:    Social History     Tobacco Use    Smoking status: Never Smoker    Smokeless tobacco: Former User     Types: Chew   Substance Use Topics    Alcohol use: Yes     Alcohol/week: 15.0 standard drinks     Types: 12 Cans of beer, 3 Shots of liquor per week     Comment: 10 beers, 4 shots                                Counseling given: Not Answered      Vital Signs (Current):   Vitals:    04/13/22 0816   Weight: 195 lb (88.5 kg)   Height: 5' 9\" (1.753 m)                                              BP Readings from Last 3 Encounters:   03/24/22 122/66   03/12/22 136/82   03/04/22 120/70       NPO Status:                                                                                 BMI:   Wt Readings from Last 3 Encounters:   03/24/22 211 lb 9.6 oz (96 kg)   03/12/22 214 lb (97.1 kg)   03/04/22 214 lb (97.1 kg)     Body mass index is 28.8 kg/m². CBC:   Lab Results   Component Value Date    WBC 5.9 02/26/2022    RBC 4.45 02/26/2022    HGB 13.4 02/26/2022    HCT 40.4 02/26/2022    MCV 90.8 02/26/2022    RDW 12.3 02/26/2022     02/26/2022       CMP:   Lab Results   Component Value Date     02/26/2022    K 4.5 02/26/2022     02/26/2022    CO2 23 02/26/2022    BUN 15 02/26/2022    CREATININE 0.7 02/26/2022    GFRAA >60 02/26/2022    AGRATIO 1.8 11/13/2020    LABGLOM >60 02/26/2022    GLUCOSE 104 02/26/2022    PROT 6.3 02/25/2022    CALCIUM 8.4 02/26/2022    BILITOT 0.2 02/25/2022    ALKPHOS 57 02/25/2022    AST 14 02/25/2022    ALT 14 02/25/2022       POC Tests: No results for input(s): POCGLU, POCNA, POCK, POCCL, POCBUN, POCHEMO, POCHCT in the last 72 hours.     Coags:   Lab Results   Component Value Date    PROTIME 10.2 10/04/2015    INR 0.89 10/04/2015    APTT 24.9 10/04/2015       HCG (If Applicable): No results found for: PREGTESTUR, PREGSERUM, HCG, HCGQUANT     ABGs: No results found for: PHART, PO2ART, KWE0RGN, UJE1OXM, BEART, I2SJPTGR     Type & Screen (If Applicable):  No results found for: LABABO, LABRH    Drug/Infectious Status (If Applicable):  No results found for: HIV, HEPCAB    COVID-19 Screening (If Applicable): Lab Results   Component Value Date    COVID19 NOT DETECTED 07/16/2021           Anesthesia Evaluation  Patient summary reviewed  Airway:         Dental:          Pulmonary:                              Cardiovascular:    (+) hypertension:, CAD:, hyperlipidemia         Beta Blocker:  Not on Beta Blocker      ROS comment: Stress test 2/2022:  Summary   Overall Impression:   Good exercise capacity. 9 METs work load. Physiological BP response to exercise. ETT negative for ischemia / Arrhythmia. Echo 7/2021:   Summary   Limited study due to patients body habitus. Left ventricular function and size is normal, EF is estimated at 55-60%. Mild left ventricular hypertrophy. E/A reversal; indeterminate diastolic function. No regional wall motion abnormalities were detected. Mild pulmonic regurgitation present. No significant valvular disease noted. No evidence of pericardial effusion. Neuro/Psych:   Negative Neuro/Psych ROS              GI/Hepatic/Renal:   (+) bowel prep,           Endo/Other: Negative Endo/Other ROS                    Abdominal:             Vascular: negative vascular ROS. Other Findings:             Anesthesia Plan      MAC     ASA 3       Induction: intravenous. RYANN Vieyra - CRNA   4/17/2022         Pre Anesthesia Assessment complete.  Chart reviewed on 4/17/2022

## 2022-04-19 ENCOUNTER — ANESTHESIA (OUTPATIENT)
Dept: ENDOSCOPY | Age: 57
End: 2022-04-19
Payer: COMMERCIAL

## 2022-04-19 ENCOUNTER — HOSPITAL ENCOUNTER (OUTPATIENT)
Age: 57
Setting detail: OUTPATIENT SURGERY
Discharge: HOME | End: 2022-04-19
Attending: SPECIALIST | Admitting: SPECIALIST
Payer: COMMERCIAL

## 2022-04-19 VITALS
HEART RATE: 75 BPM | TEMPERATURE: 98.3 F | DIASTOLIC BLOOD PRESSURE: 64 MMHG | BODY MASS INDEX: 28.88 KG/M2 | OXYGEN SATURATION: 97 % | WEIGHT: 195 LBS | SYSTOLIC BLOOD PRESSURE: 115 MMHG | RESPIRATION RATE: 16 BRPM | HEIGHT: 69 IN

## 2022-04-19 VITALS
RESPIRATION RATE: 16 BRPM | SYSTOLIC BLOOD PRESSURE: 89 MMHG | TEMPERATURE: 98.6 F | OXYGEN SATURATION: 100 % | DIASTOLIC BLOOD PRESSURE: 58 MMHG

## 2022-04-19 PROCEDURE — 3700000000 HC ANESTHESIA ATTENDED CARE: Performed by: SPECIALIST

## 2022-04-19 PROCEDURE — 7100000010 HC PHASE II RECOVERY - FIRST 15 MIN: Performed by: SPECIALIST

## 2022-04-19 PROCEDURE — 6360000002 HC RX W HCPCS: Performed by: NURSE ANESTHETIST, CERTIFIED REGISTERED

## 2022-04-19 PROCEDURE — 45378 DIAGNOSTIC COLONOSCOPY: CPT | Performed by: SPECIALIST

## 2022-04-19 PROCEDURE — 3609027000 HC COLONOSCOPY: Performed by: SPECIALIST

## 2022-04-19 PROCEDURE — 2580000003 HC RX 258: Performed by: NURSE PRACTITIONER

## 2022-04-19 PROCEDURE — 7100000011 HC PHASE II RECOVERY - ADDTL 15 MIN: Performed by: SPECIALIST

## 2022-04-19 PROCEDURE — 3700000001 HC ADD 15 MINUTES (ANESTHESIA): Performed by: SPECIALIST

## 2022-04-19 RX ORDER — SODIUM CHLORIDE 9 MG/ML
25 INJECTION, SOLUTION INTRAVENOUS PRN
Status: DISCONTINUED | OUTPATIENT
Start: 2022-04-19 | End: 2022-04-19 | Stop reason: HOSPADM

## 2022-04-19 RX ORDER — SODIUM CHLORIDE 0.9 % (FLUSH) 0.9 %
5-40 SYRINGE (ML) INJECTION PRN
Status: DISCONTINUED | OUTPATIENT
Start: 2022-04-19 | End: 2022-04-19 | Stop reason: HOSPADM

## 2022-04-19 RX ORDER — PROPOFOL 10 MG/ML
INJECTION, EMULSION INTRAVENOUS PRN
Status: DISCONTINUED | OUTPATIENT
Start: 2022-04-19 | End: 2022-04-19 | Stop reason: SDUPTHER

## 2022-04-19 RX ORDER — SODIUM CHLORIDE 0.9 % (FLUSH) 0.9 %
5-40 SYRINGE (ML) INJECTION EVERY 12 HOURS SCHEDULED
Status: DISCONTINUED | OUTPATIENT
Start: 2022-04-19 | End: 2022-04-19 | Stop reason: HOSPADM

## 2022-04-19 RX ORDER — SODIUM CHLORIDE, SODIUM LACTATE, POTASSIUM CHLORIDE, CALCIUM CHLORIDE 600; 310; 30; 20 MG/100ML; MG/100ML; MG/100ML; MG/100ML
INJECTION, SOLUTION INTRAVENOUS CONTINUOUS
Status: DISCONTINUED | OUTPATIENT
Start: 2022-04-19 | End: 2022-04-19 | Stop reason: HOSPADM

## 2022-04-19 RX ORDER — LIDOCAINE HYDROCHLORIDE 20 MG/ML
INJECTION, SOLUTION INTRAVENOUS PRN
Status: DISCONTINUED | OUTPATIENT
Start: 2022-04-19 | End: 2022-04-19 | Stop reason: SDUPTHER

## 2022-04-19 RX ADMIN — PROPOFOL 320 MG: 10 INJECTION, EMULSION INTRAVENOUS at 14:15

## 2022-04-19 RX ADMIN — SODIUM CHLORIDE, POTASSIUM CHLORIDE, SODIUM LACTATE AND CALCIUM CHLORIDE: 600; 310; 30; 20 INJECTION, SOLUTION INTRAVENOUS at 11:26

## 2022-04-19 RX ADMIN — PROPOFOL 80 MG: 10 INJECTION, EMULSION INTRAVENOUS at 14:13

## 2022-04-19 RX ADMIN — LIDOCAINE HYDROCHLORIDE 100 MG: 20 INJECTION, SOLUTION INTRAVENOUS at 14:13

## 2022-04-19 ASSESSMENT — PAIN - FUNCTIONAL ASSESSMENT: PAIN_FUNCTIONAL_ASSESSMENT: 0-10

## 2022-04-19 ASSESSMENT — PAIN SCALES - GENERAL: PAINLEVEL_OUTOF10: 0

## 2022-04-19 NOTE — H&P
Original H &P in soft chart. I have examined the patient immediately before the procedure and there is no change in the previous history and physical exam, which has been reviewed. There is no history of sleep apnea, snoring, or stridor. There has been no  previous adverse experience with sedation/anesthesia. There is no increased risk for aspiration of gastric contents. The patient has been instructed that all resuscitative measures (during the operative and immediate perioperative period) will be instituted in the unlikely event that they will be needed. The patient has no pertinent past surgical or family history other than listed in the original H&P. The patient was counseled about the risks of milvia Covid-19 during their perioperative period and any recovery window from their procedure. The patient was made aware that milvia Covid-19  may worsen their prognosis for recovering from their procedure  and lend to a higher morbidity and/or mortality risk. All material risks, benefits, and reasonable alternatives including postponing the procedure were discussed. The patient does wish to proceed with the procedure at this time.     ASA Class: 3  AIRWAY Class: 1

## 2022-04-19 NOTE — PROGRESS NOTES
1447 patient arrived back to [facility]. Report given to this nurse from [address], RN. Patient A&O x4. Beverage of choice offered to patient. Call light in reach and bed in lowest position. 1525 IV removed. 1528 Discharge instructions given. [id number] Patient sitting on side of bed getting dressed. 1540 Patient escorted to car via wheelchair where wife Parish Hammonds is taking the patient home.

## 2022-04-19 NOTE — PROGRESS NOTES
REPORT RECEIVED FROM INEZ CHAVIS IN [facility]. PREOP QUESTIONS, NPO STATUS AND ALLERGIES VERIFIED WITH PT. H/P AND CONSENT COMPLETED. DENIES TAKING BLOOD THINNERS OR BETA BLOCKERS. WIFE TINY AT BEDSIDE.

## 2022-04-19 NOTE — ANESTHESIA PRE PROCEDURE
Department of Anesthesiology  Preprocedure Note       Name:  Facundo Tapia   Age:  65 y.o.  :  1965                                          MRN:  [id number]         Date:  2022      Surgeon: Toni Abad):  Jayson Norman MD    Procedure: Procedure(s):  COLORECTAL CANCER SCREENING, NOT HIGH RISK    Medications prior to admission:   Prior to Admission medications    Medication Sig Start Date End Date Taking? Authorizing Provider   B Complex Vitamins (VITAMIN B COMPLEX PO) Take by mouth daily   Yes Historical Provider, MD   Multiple Vitamin (MULTI VITAMIN MENS PO) Take by mouth daily   Yes Historical Provider, MD   PLENVU 140 g SOLR AS DIRECTED FOR COLONOSCOPY PREP 3/24/22   Adilson Padronse, APRN - CNP   simethicone (MYLICON) 80 MG chewable tablet AS DIRECTED FOR COLONOSCOPY PREP 3/24/22   Adilson Glimpse, APRN - CNP   tiZANidine (ZANAFLEX) 2 MG tablet Take 1 tablet by mouth 3 times daily as needed (pain) 3/12/22   Carlos Veliz PA-C   nitroGLYCERIN (NITROSTAT) 0.4 MG SL tablet up to max of 3 total doses.  If no relief after 1 dose, call 911. 21   Skylar Nose, DO   lisinopril (PRINIVIL;ZESTRIL) 20 MG tablet Take 1 tablet by mouth daily 21   Skylar Nose, DO   ibuprofen (ADVIL;MOTRIN) 800 MG tablet Take 1 tablet by mouth every 8 hours as needed for Pain 21   Ramos Villalobos MD   aspirin 81 MG tablet Take 81 mg by mouth daily    Historical Provider, MD       Current medications:    Current Facility-Administered Medications   Medication Dose Route Frequency Provider Last Rate Last Admin    0.9 % sodium chloride infusion  25 mL IntraVENous PRN Adilson Glimpse, APRN - CNP        lactated ringers infusion   IntraVENous Continuous Adilson Glimpse, APRN - CNP 75 mL/hr at 22 1126 New Bag at 22 1126    sodium chloride flush 0.9 % injection 5-40 mL  5-40 mL IntraVENous 2 times per day Adilson Glimpse, APRN - CNP        sodium chloride flush 0.9 % injection 5-40 mL 5-40 mL IntraVENous PRN Tenna Necessary, APRN - CNP           Allergies:  No Known Allergies    Problem List:    Patient Active Problem List   Diagnosis Code    Chest pain R07.9    Hypertension I10    Hyperlipidemia E78.5    Prediabetes R73.03    Medial meniscus tear S83.249A    Bilateral carpal tunnel syndrome G56.03    Sprain of left rotator cuff capsule S43.422A    Carpal tunnel syndrome on left G56.02    Acquired trigger finger of left middle finger M65.332    Primary osteoarthritis of left knee M17.12    LOC (loss of consciousness) ([address] R40.20       Past Medical History:        Diagnosis Date    Arthritis     Bilateral carpal tunnel syndrome     CAD (coronary artery disease)     ECHO 07/14/2021    EF 55-60%. Mild Pulmonic regurgitation is present, No evidence of pericardial effusion.  H/O echocardiogram 04/28/2016    EF 55% WNL- Stage 1 dysfunction     Hyperlipidemia     Hypertension     Medial meniscus tear     Left knee    Prediabetes     pt denies any hx of diabetes    Wears glasses     to read       Past Surgical History:        Procedure Laterality Date    APPENDECTOMY  age 13   [facility] Medal CARPAL TUNNEL RELEASE Left 7/21/2021    LEFTCARPAL TUNNEL RELEASE performed by Julianne Wong MD at [address] Right 2002    SHOULDER ARTHROSCOPY Left 7/21/2021    LEFT SHOULDER ARTHROSCOPY ROTATOR CUFF REPAIR, SUBACROMIAL DECOMPRESSION performed by Julianne Wong MD at [address] Right 2006       Social History:    Social History     Tobacco Use    Smoking status: Never Smoker    Smokeless tobacco: Former User     Types: Chew   Substance Use Topics    Alcohol use:  Yes     Alcohol/week: 15.0 standard drinks     Types: 12 Cans of beer, 3 Shots of liquor per week     Comment: 10 beers, 4 shots                                Counseling given: Not Answered      Vital Signs (Current):   Vitals:    04/13/22 0816 04/19/22 1114   BP:  129/79   Pulse:  80   Resp:  16   Temp: 36.4 °C (97.5 °F)   TempSrc:  Temporal   SpO2:  98%   Weight: 195 lb (88.5 kg)    Height: 5' 9\" (1.753 m)                                               BP Readings from Last 3 Encounters:   04/19/22 129/79   03/24/22 122/66   03/12/22 136/82       NPO Status: Time of last liquid consumption: 0900                        Time of last solid consumption: 0800                        Date of last liquid consumption: 04/19/22                        Date of last solid food consumption: 04/18/22    BMI:   Wt Readings from Last 3 Encounters:   04/13/22 195 lb (88.5 kg)   03/24/22 211 lb 9.6 oz (96 kg)   03/12/22 214 lb (97.1 kg)     Body mass index is 28.8 kg/m². CBC:   Lab Results   Component Value Date    WBC 5.9 02/26/2022    RBC 4.45 02/26/2022    HGB 13.4 02/26/2022    HCT 40.4 02/26/2022    MCV 90.8 02/26/2022    RDW 12.3 02/26/2022     02/26/2022       CMP:   Lab Results   Component Value Date     02/26/2022    K 4.5 02/26/2022     02/26/2022    CO2 23 02/26/2022    BUN 15 02/26/2022    CREATININE 0.7 02/26/2022    GFRAA >60 02/26/2022    AGRATIO 1.8 11/13/2020    LABGLOM >60 02/26/2022    GLUCOSE 104 02/26/2022    PROT 6.3 02/25/2022    CALCIUM 8.4 02/26/2022    BILITOT 0.2 02/25/2022    ALKPHOS 57 02/25/2022    AST 14 02/25/2022    ALT 14 02/25/2022       POC Tests: No results for input(s): POCGLU, POCNA, POCK, POCCL, POCBUN, POCHEMO, POCHCT in the last 72 hours.     Coags:   Lab Results   Component Value Date    PROTIME 10.2 10/04/2015    INR 0.89 10/04/2015    APTT 24.9 10/04/2015       HCG (If Applicable): No results found for: PREGTESTUR, PREGSERUM, HCG, HCGQUANT     ABGs: No results found for: PHART, PO2ART, IBU1JSD, FER0WMJ, BEART, X1NBHZDK     Type & Screen (If Applicable):  No results found for: LABABO, LABRH    Drug/Infectious Status (If Applicable):  No results found for: HIV, HEPCAB    COVID-19 Screening (If Applicable):   Lab Results   Component Value Date    COVID19 NOT DETECTED 07/16/2021           Anesthesia Evaluation  Patient summary reviewed no history of anesthetic complications:   Airway: Mallampati: II  TM distance: >3 FB   Neck ROM: full  Mouth opening: > = 3 FB Dental: normal exam         Pulmonary:normal exam                               Cardiovascular:  Exercise tolerance: good (>4 METS),   (+) hypertension:, CAD:, hyperlipidemia         Beta Blocker:  Not on Beta Blocker      ROS comment: Stress test 2/2022:  Summary   Overall Impression:   Good exercise capacity. 9 METs work load. Physiological BP response to exercise. ETT negative for ischemia / Arrhythmia. Echo 7/2021:   Summary   Limited study due to patients body habitus. Left ventricular function and size is normal, EF is estimated at 55-60%. Mild left ventricular hypertrophy. E/A reversal; indeterminate diastolic function. No regional wall motion abnormalities were detected. Mild pulmonic regurgitation present. No significant valvular disease noted. No evidence of pericardial effusion. Neuro/Psych:   Negative Neuro/Psych ROS              GI/Hepatic/Renal:   (+) bowel prep,           Endo/Other: Negative Endo/Other ROS                    Abdominal:             Vascular: negative vascular ROS. Other Findings:             Anesthesia Plan      MAC     ASA 3       Induction: intravenous. MIPS: prophylactic pharmacologic antiemetic agents not administered perioperatively for documented reasons. Anesthetic plan and risks discussed with patient.

## 2022-04-19 NOTE — ANESTHESIA POSTPROCEDURE EVALUATION
Department of Anesthesiology  Postprocedure Note    Patient: Nico Aguilar  MRN: [id number]  YOB: 1965  Date of evaluation: 4/19/2022  Time:  2:39 PM     Procedure Summary     Date: 04/19/22 Room / Location: [address] [place] [address]    Anesthesia Start: 8797 Anesthesia Stop: 1191    Procedure: COLONOSCOPY DIAGNOSTIC (N/A ) Diagnosis:       Colon cancer screening      (Colon cancer screening [Z12.11])    Surgeons: Saulo Katz MD Responsible Provider: Venu Green MD    Anesthesia Type: MAC ASA Status: 3          Anesthesia Type: MAC    Romana Phase I:  10    Romana Phase II:  10    Last vitals: Reviewed and per EMR flowsheets.        Anesthesia Post Evaluation    Patient location during evaluation: bedside  Patient participation: complete - patient participated  Level of consciousness: awake and alert  Pain score: 0  Airway patency: patent  Nausea & Vomiting: no nausea and no vomiting  Complications: no  Cardiovascular status: hemodynamically stable  Respiratory status: room air and spontaneous ventilation  Hydration status: stable

## 2022-05-02 ENCOUNTER — TELEPHONE (OUTPATIENT)
Dept: INTERNAL MEDICINE CLINIC | Age: 57
End: 2022-05-02

## 2022-05-02 DIAGNOSIS — I10 ESSENTIAL HYPERTENSION: ICD-10-CM

## 2022-05-02 RX ORDER — LISINOPRIL 20 MG/1
TABLET ORAL
Qty: 90 TABLET | Refills: 1 | Status: SHIPPED | OUTPATIENT
Start: 2022-05-02 | End: 2022-09-09 | Stop reason: SDUPTHER

## 2022-07-05 ENCOUNTER — OFFICE VISIT (OUTPATIENT)
Dept: ORTHOPEDIC SURGERY | Age: 57
End: 2022-07-05
Payer: COMMERCIAL

## 2022-07-05 VITALS
WEIGHT: 200 LBS | HEART RATE: 76 BPM | OXYGEN SATURATION: 98 % | HEIGHT: 68 IN | DIASTOLIC BLOOD PRESSURE: 75 MMHG | BODY MASS INDEX: 30.31 KG/M2 | SYSTOLIC BLOOD PRESSURE: 139 MMHG | RESPIRATION RATE: 13 BRPM

## 2022-07-05 DIAGNOSIS — M17.12 PRIMARY OSTEOARTHRITIS OF LEFT KNEE: Primary | ICD-10-CM

## 2022-07-05 PROCEDURE — 20610 DRAIN/INJ JOINT/BURSA W/O US: CPT | Performed by: ORTHOPAEDIC SURGERY

## 2022-07-05 ASSESSMENT — ENCOUNTER SYMPTOMS
WHEEZING: 0
EYE PAIN: 0
CHEST TIGHTNESS: 0
EYE REDNESS: 0
SHORTNESS OF BREATH: 0
VOMITING: 0
COLOR CHANGE: 0

## 2022-07-05 NOTE — PROGRESS NOTES
Patient returns to the office with left knee pain. Pt stated he has a steroid injection in November and had significant relief until about a month ago. Pt sated he is having consistent pain on the medial and lateral aspects of his knee. Pt stated the pain is increased with increased activities and throughout the day he will notice an increased pain and stiffness. Pt has tried OTC medications with mild relief.
Examination:  General Exam:  Vitals: /75   Pulse 76   Resp 13   Ht 5' 8\" (1.727 m)   Wt 200 lb (90.7 kg)   SpO2 98%   BMI 30.41 kg/m²    Physical Exam     Left Lower Extremity:  There is moderate tenderness to palpation throughout the knee most significant along the medial joint line and lateral patellofemoral joint. There is a mild effusion present and mild global swelling at the knee anteriorly. Moderate restricted range of motion at the knee with approximately 5 degrees short of full extension and knee flexion up to 110 degrees with pain at the extremes of motion. There is mild crepitation at the knee during active range of motion. There is normal knee alignment. There is 5 out of 5 strength with knee flexion and extension. There is no instability to varus or valgus stress testing or anterior and posterior drawer testing. Sensation is intact to light touch throughout the lower extremity. There is a moderately positive Candace's test with tenderness to palpation and pain along the medial joint line. Skin is intact. Pulses intact    No pain with active range of motion of the hip. Strength and range of motion of the hip are intact. No tenderness to palpation at the hip. Left upper extremity:  Painless active range of motion present at the shoulder. Rotator cuff is functioning well in all planes. No limitations with active range of motion    Diagnostic testing:    X-rays reviewed in office, I independently reviewed the films in the office today:   MRI LEFT KNEE 6/1/2020  Impression   Radial tear involving the posterior horn medial meniscus near the root with   mild extrusion of the body approximately 3 mm.       Small joint effusion.  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       Very small semimembranosus gastrocnemius bursal cyst.       Mild distal quadriceps tendinosis.           ED XR LEFT KNEE 5/21/2020      Impression   No acute osseous abnormality. Henny Purdy is high clinical

## 2022-07-05 NOTE — PATIENT INSTRUCTIONS
Continue weight-bearing as tolerated. Continue range of motion exercises as instructed. Ice and elevate as needed. Tylenol or Motrin for pain. Steroid injection given into the left knee today. Follow up as needed.

## 2022-09-09 ENCOUNTER — OFFICE VISIT (OUTPATIENT)
Dept: INTERNAL MEDICINE CLINIC | Age: 57
End: 2022-09-09

## 2022-09-09 VITALS
HEIGHT: 68 IN | WEIGHT: 198.6 LBS | HEART RATE: 89 BPM | DIASTOLIC BLOOD PRESSURE: 82 MMHG | BODY MASS INDEX: 30.1 KG/M2 | OXYGEN SATURATION: 99 % | SYSTOLIC BLOOD PRESSURE: 122 MMHG

## 2022-09-09 DIAGNOSIS — H00.012 HORDEOLUM EXTERNUM OF RIGHT LOWER EYELID: ICD-10-CM

## 2022-09-09 DIAGNOSIS — S00.31XA ABRASION OF NOSE, INITIAL ENCOUNTER: ICD-10-CM

## 2022-09-09 DIAGNOSIS — I10 ESSENTIAL HYPERTENSION: Primary | ICD-10-CM

## 2022-09-09 PROCEDURE — 99213 OFFICE O/P EST LOW 20 MIN: CPT | Performed by: FAMILY MEDICINE

## 2022-09-09 RX ORDER — ERYTHROMYCIN 5 MG/G
OINTMENT OPHTHALMIC
Qty: 3.5 G | Refills: 0 | Status: SHIPPED | OUTPATIENT
Start: 2022-09-09 | End: 2022-10-31

## 2022-09-09 RX ORDER — LISINOPRIL 20 MG/1
TABLET ORAL
Qty: 90 TABLET | Refills: 1 | Status: SHIPPED | OUTPATIENT
Start: 2022-09-09

## 2022-09-09 ASSESSMENT — PATIENT HEALTH QUESTIONNAIRE - PHQ9
SUM OF ALL RESPONSES TO PHQ9 QUESTIONS 1 & 2: 0
SUM OF ALL RESPONSES TO PHQ QUESTIONS 1-9: 0
SUM OF ALL RESPONSES TO PHQ QUESTIONS 1-9: 0
2. FEELING DOWN, DEPRESSED OR HOPELESS: 0
SUM OF ALL RESPONSES TO PHQ QUESTIONS 1-9: 0
1. LITTLE INTEREST OR PLEASURE IN DOING THINGS: 0
SUM OF ALL RESPONSES TO PHQ QUESTIONS 1-9: 0

## 2022-09-09 ASSESSMENT — ENCOUNTER SYMPTOMS
COUGH: 0
SHORTNESS OF BREATH: 0
ABDOMINAL PAIN: 0
NAUSEA: 0
BACK PAIN: 0

## 2022-09-09 NOTE — PROGRESS NOTES
Silverio Michelle (:  1965) is a 65 y.o. male,established patient, here for evaluation of the following chief complaint(s):  Follow-up (Bump on the nose, comes and goes) and Hypertension         ASSESSMENT/PLAN:  1. Essential hypertension  - lisinopril (PRINIVIL;ZESTRIL) 20 MG tablet; TAKE 1 TABLET BY MOUTH EVERY DAY  Dispense: 90 tablet; Refill: 1    2. Abrasion of nose, initial encounter  Apply antibiotic ointment to the area    3. Hordeolum externum of right lower eyelid  -Start:  - erythromycin (ROMYCIN) 5 MG/GM ophthalmic ointment; Place 1 ribbon of ointment on the affected area three times a day for 7 days  Dispense: 3.5 g; Refill: 0  ADR's explained    Persist RTO or call  Return for Follow up in 5 1/2 months for annual exam/ lab.        Lab Results   Component Value Date    WBC 5.9 2022    HGB 13.4 (L) 2022    HCT 40.4 (L) 2022    MCV 90.8 2022     2022     Lab Results   Component Value Date    CHOL 180 10/01/2021     Lab Results   Component Value Date    TRIG 102 10/01/2021     Lab Results   Component Value Date    HDL 45 10/01/2021     Lab Results   Component Value Date    LDLDIRECT 114 (H) 10/01/2021     Lab Results   Component Value Date    LABA1C 5.8 10/01/2021     Lab Results   Component Value Date     10/01/2021     Lab Results   Component Value Date     2022    K 4.5 2022     2022    CO2 23 2022    BUN 15 2022    CREATININE 0.7 (L) 2022    GLUCOSE 104 (H) 2022    CALCIUM 8.4 2022    PROT 6.3 (L) 2022    LABALBU 3.9 2022    BILITOT 0.2 2022    ALKPHOS 57 2022    AST 14 (L) 2022    ALT 14 2022    LABGLOM >60 2022    GFRAA >60 2022    AGRATIO 1.8 2020    GLOB 2.5 2020     Lab Results   Component Value Date    TSHFT4 1.21 2022     Lab Results   Component Value Date    PSA 1.17 2022         Subjective Reported on 9/9/2022)      PLENVU 140 g SOLR AS DIRECTED FOR COLONOSCOPY PREP 1 each 0     No current facility-administered medications for this visit. Vitals:    09/09/22 1033   BP: 122/82   Site: Left Upper Arm   Position: Sitting   Cuff Size: Medium Adult   Pulse: 89   SpO2: 99%   Weight: 198 lb 9.6 oz (90.1 kg)   Height: 5' 8\" (1.727 m)     Objective   Physical Exam  Vitals reviewed. Constitutional:       General: He is not in acute distress. Eyes:      Extraocular Movements: Extraocular movements intact. Comments: R lower eyelid with stye   Cardiovascular:      Rate and Rhythm: Normal rate and regular rhythm. Pulmonary:      Effort: Pulmonary effort is normal. No respiratory distress. Breath sounds: Normal breath sounds. Abdominal:      Palpations: Abdomen is soft. Tenderness: There is no abdominal tenderness. Musculoskeletal:      Cervical back: Neck supple. Right lower leg: No edema. Left lower leg: No edema. Skin:     Findings: Lesion (abrasion on the L side of nose) present. Neurological:      Mental Status: He is alert and oriented to person, place, and time. An electronic signature was used to authenticate this note. --Jefferson Needs, DO     This dictation was generated by voice recognition computer software. Although all attempts are made to edit the dictation for accuracy, there may be errors in the transcription that are not intended.

## 2022-10-31 ENCOUNTER — OFFICE VISIT (OUTPATIENT)
Dept: CARDIOLOGY CLINIC | Age: 57
End: 2022-10-31
Payer: COMMERCIAL

## 2022-10-31 VITALS
BODY MASS INDEX: 30.43 KG/M2 | HEIGHT: 68 IN | HEART RATE: 82 BPM | DIASTOLIC BLOOD PRESSURE: 82 MMHG | SYSTOLIC BLOOD PRESSURE: 130 MMHG | WEIGHT: 200.8 LBS

## 2022-10-31 DIAGNOSIS — I10 PRIMARY HYPERTENSION: Primary | ICD-10-CM

## 2022-10-31 DIAGNOSIS — E78.5 HYPERLIPIDEMIA, UNSPECIFIED HYPERLIPIDEMIA TYPE: ICD-10-CM

## 2022-10-31 PROCEDURE — 99214 OFFICE O/P EST MOD 30 MIN: CPT | Performed by: NURSE PRACTITIONER

## 2022-10-31 PROCEDURE — 3078F DIAST BP <80 MM HG: CPT | Performed by: NURSE PRACTITIONER

## 2022-10-31 PROCEDURE — 3074F SYST BP LT 130 MM HG: CPT | Performed by: NURSE PRACTITIONER

## 2022-10-31 ASSESSMENT — ENCOUNTER SYMPTOMS
SHORTNESS OF BREATH: 0
ORTHOPNEA: 0

## 2022-10-31 NOTE — PROGRESS NOTES
10/31/2022  Primary cardiologist: Dr. Ambrocio Sorto  is an established 65 y.o.  male here for follow-up on   1. Primary hypertension    2. Hyperlipidemia, unspecified hyperlipidemia type            SUBJECTIVE/OBJECTIVE:    HPI : Amy Kim is a 52-year-old with a history of hypertension, hyperlipidemia, and syncope    Yusef Beckett reports he is feeling well. He denies episodes of chest pain, shortness of breath or palpitations. Continue to keep self active at work to 6 hours/week. He has had no further episodes of syncope    Review of Systems   Constitutional: Negative for diaphoresis and malaise/fatigue. Cardiovascular:  Negative for chest pain, claudication, dyspnea on exertion, irregular heartbeat, leg swelling, near-syncope, orthopnea, palpitations and paroxysmal nocturnal dyspnea. Respiratory:  Negative for shortness of breath. Neurological:  Negative for dizziness and light-headedness. Vitals:    10/31/22 1602   BP: 130/82   Site: Right Upper Arm   Position: Sitting   Cuff Size: Medium Adult   Pulse: 82   Weight: 200 lb 12.8 oz (91.1 kg)   Height: 5' 8\" (1.727 m)     No flowsheet data found. Wt Readings from Last 3 Encounters:   10/31/22 200 lb 12.8 oz (91.1 kg)   09/09/22 198 lb 9.6 oz (90.1 kg)   07/05/22 200 lb (90.7 kg)     Body mass index is 30.53 kg/m². Physical Exam  Constitutional:       Appearance: Normal appearance. HENT:      Head: Normocephalic. Eyes:      Extraocular Movements: Extraocular movements intact. Neck:      Vascular: No carotid bruit. Cardiovascular:      Rate and Rhythm: Normal rate and regular rhythm. Pulses: Normal pulses. Heart sounds: Normal heart sounds. No murmur heard. Pulmonary:      Effort: Pulmonary effort is normal.      Breath sounds: Normal breath sounds. No rales. Abdominal:      General: There is no distension. Tenderness: There is no abdominal tenderness. Musculoskeletal:      Right lower leg: No edema. Left lower leg: No edema. Skin:     General: Skin is warm and dry. Capillary Refill: Capillary refill takes less than 2 seconds. Neurological:      General: No focal deficit present. Mental Status: He is alert. Current Outpatient Medications   Medication Sig Dispense Refill    lisinopril (PRINIVIL;ZESTRIL) 20 MG tablet TAKE 1 TABLET BY MOUTH EVERY DAY 90 tablet 1    ibuprofen (ADVIL;MOTRIN) 800 MG tablet Take 1 tablet by mouth every 8 hours as needed for Pain 90 tablet 1    aspirin 81 MG tablet Take 81 mg by mouth daily      erythromycin (ROMYCIN) 5 MG/GM ophthalmic ointment Place 1 ribbon of ointment on the affected area three times a day for 7 days (Patient not taking: Reported on 10/31/2022) 3.5 g 0    B Complex Vitamins (VITAMIN B COMPLEX PO) Take by mouth daily (Patient not taking: No sig reported)      Multiple Vitamin (MULTI VITAMIN MENS PO) Take by mouth daily (Patient not taking: No sig reported)      PLENVU 140 g SOLR AS DIRECTED FOR COLONOSCOPY PREP (Patient not taking: Reported on 10/31/2022) 1 each 0    nitroGLYCERIN (NITROSTAT) 0.4 MG SL tablet up to max of 3 total doses. If no relief after 1 dose, call 911. (Patient not taking: Reported on 10/31/2022) 25 tablet 3     No current facility-administered medications for this visit. All pertinent data reviewed and discussed with patient       ASSESSMENT/PLAN:        Hypertension   Blood pressure is Controlled  continue with lisinopril   Encouraged a low sodium diet    hyperlipidemia   Lipids noted from 10/01/2021   which is improved from previus 147  encouraged healthy eating habits including low fat -low cholesterol diet   Moderate intensity exercise. Medications reviewed and confirmed with patient     Tests ordered: none     Follow-up  1 year     Signed:  RYANN Aguilar CNP, 10/31/2022, 4:18 PM    An electronic signature was used to authenticate this note.     Please note this report has been partially produced using speech recognition software and may contain errors related to that system including errors in grammar, punctuation, and spelling, as well as words and phrases that may be inappropriate. If there are any questions or concerns please feel free to contact the dictating provider for clarification.

## 2022-10-31 NOTE — PATIENT INSTRUCTIONS
Please be informed that if you contact our office outside of normal business hours the physician on call cannot help with any scheduling or rescheduling issues, procedure instruction questions or any type of medication issue. We advise you for any urgent/emergency that you go to the nearest emergency room! PLEASE CALL OUR OFFICE DURING NORMAL BUSINESS HOURS    Monday - Friday   8 am to 5 pm    [place] [address]: [phone]    [place]:  [phone]      **It is YOUR responsibilty to bring medication bottles and/or updated medication list to [address] [place] [address]. This will allow us to better serve you and all your healthcare needs**      Thank you for allowing us to care for you today! We want to ensure we can follow your treatment plan and we strive to give you the best outcomes and experience possible. If you ever have a life threatening emergency and call 911 - for an ambulance (EMS)   Our providers can only care for you at:   [facility] or [facility]. Even if you have someone take you or you drive yourself we can only care for you in a [address] [facility] facility. Our providers are not setup at the other healthcare locations!

## 2022-11-30 ENCOUNTER — OFFICE VISIT (OUTPATIENT)
Dept: INTERNAL MEDICINE CLINIC | Age: 57
End: 2022-11-30
Payer: COMMERCIAL

## 2022-11-30 VITALS
WEIGHT: 203 LBS | HEART RATE: 90 BPM | HEIGHT: 68 IN | OXYGEN SATURATION: 98 % | DIASTOLIC BLOOD PRESSURE: 74 MMHG | BODY MASS INDEX: 30.77 KG/M2 | SYSTOLIC BLOOD PRESSURE: 126 MMHG

## 2022-11-30 DIAGNOSIS — R10.32 ABDOMINAL PAIN, LLQ: ICD-10-CM

## 2022-11-30 DIAGNOSIS — R19.09 LUMP IN THE GROIN: Primary | ICD-10-CM

## 2022-11-30 PROCEDURE — 99213 OFFICE O/P EST LOW 20 MIN: CPT | Performed by: FAMILY MEDICINE

## 2022-11-30 PROCEDURE — 3074F SYST BP LT 130 MM HG: CPT | Performed by: FAMILY MEDICINE

## 2022-11-30 PROCEDURE — 3078F DIAST BP <80 MM HG: CPT | Performed by: FAMILY MEDICINE

## 2022-11-30 SDOH — ECONOMIC STABILITY: FOOD INSECURITY: WITHIN THE PAST 12 MONTHS, YOU WORRIED THAT YOUR FOOD WOULD RUN OUT BEFORE YOU GOT MONEY TO BUY MORE.: SOMETIMES TRUE

## 2022-11-30 SDOH — ECONOMIC STABILITY: FOOD INSECURITY: WITHIN THE PAST 12 MONTHS, THE FOOD YOU BOUGHT JUST DIDN'T LAST AND YOU DIDN'T HAVE MONEY TO GET MORE.: SOMETIMES TRUE

## 2022-11-30 ASSESSMENT — ENCOUNTER SYMPTOMS
COUGH: 0
NAUSEA: 0
ABDOMINAL PAIN: 1
SHORTNESS OF BREATH: 0

## 2022-11-30 ASSESSMENT — SOCIAL DETERMINANTS OF HEALTH (SDOH): HOW HARD IS IT FOR YOU TO PAY FOR THE VERY BASICS LIKE FOOD, HOUSING, MEDICAL CARE, AND HEATING?: NOT VERY HARD

## 2022-11-30 NOTE — PROGRESS NOTES
Rhonda Hardy (:  1965) is a 65 y.o. male,established patient, here for evaluation of the following chief complaint(s):  Hernia (Noticed the lump in Oct of this year - groin area. Pt also has pain on left side of back radiating to left side of abd.)         ASSESSMENT/PLAN:  1. Lump in the groin  - CT ABDOMEN PELVIS W IV CONTRAST Additional Contrast? None; Future    2. Abdominal pain, LLQ  - CT ABDOMEN PELVIS W IV CONTRAST Additional Contrast? None; Future  - Comprehensive Metabolic Panel; Future  - CBC with Auto Differential; Future  - Urinalysis with Reflex to Culture; Future    Patient would like to schedule the CT. He does not want Stat  Return if symptoms persist RTO. Worse to ED.        Lab Results   Component Value Date    WBC 5.9 2022    HGB 13.4 (L) 2022    HCT 40.4 (L) 2022    MCV 90.8 2022     2022       Lab Results   Component Value Date    LABA1C 5.8 10/01/2021     Lab Results   Component Value Date     10/01/2021     Lab Results   Component Value Date     2022    K 4.5 2022     2022    CO2 23 2022    BUN 15 2022    CREATININE 0.7 (L) 2022    GLUCOSE 104 (H) 2022    CALCIUM 8.4 2022    PROT 6.3 (L) 2022    LABALBU 3.9 2022    BILITOT 0.2 2022    ALKPHOS 57 2022    AST 14 (L) 2022    ALT 14 2022    LABGLOM >60 2022    GFRAA >60 2022    AGRATIO 1.8 2020    GLOB 2.5 2020       Subjective   SUBJECTIVE/OBJECTIVE:    HISTORY OF PRESENT ILLNESS:  This is a 65 y.o. male here for the following:  Patient Active Problem List    Diagnosis Date Noted    LOC (loss of consciousness) ([address] 2022    Primary osteoarthritis of left knee 2021    Acquired trigger finger of left middle finger 10/11/2021    Carpal tunnel syndrome on left     Sprain of left rotator cuff capsule 2021    Bilateral carpal tunnel syndrome     Prediabetes 11/14/2020    Medial meniscus tear     Hypertension     Hyperlipidemia     Chest pain        -L groin lump noticed in October. Junito Holliday He picks up heavy parts at work and was concerned for a hernia. No trouble urinating. No bowel problems, nausea, vomiting, or rectal bleeding. He can occasionally feel pain/ ache on the L flank radiating to the abdomen. Last C-scope 4/2022 he has diverticulosis and hemorrhoids. He is stable      Review of Systems   Constitutional:  Negative for diaphoresis and fever. Respiratory:  Negative for cough and shortness of breath. Cardiovascular:  Negative for chest pain and palpitations. Gastrointestinal:  Positive for abdominal pain (LLQ discomfort). Negative for nausea. Lump in groin   Genitourinary:  Negative for difficulty urinating. Neurological:  Negative for dizziness and headaches. No Known Allergies  Current Outpatient Medications   Medication Sig Dispense Refill    lisinopril (PRINIVIL;ZESTRIL) 20 MG tablet TAKE 1 TABLET BY MOUTH EVERY DAY 90 tablet 1    ibuprofen (ADVIL;MOTRIN) 800 MG tablet Take 1 tablet by mouth every 8 hours as needed for Pain 90 tablet 1    aspirin 81 MG tablet Take 81 mg by mouth daily       No current facility-administered medications for this visit. Vitals:    11/30/22 1625   BP: 126/74   Site: Left Upper Arm   Position: Sitting   Cuff Size: Large Adult   Pulse: 90   SpO2: 98%   Weight: 203 lb (92.1 kg)   Height: 5' 8\" (1.727 m)     Objective   Physical Exam  Vitals reviewed. Constitutional:       General: He is not in acute distress. Eyes:      Extraocular Movements: Extraocular movements intact. Cardiovascular:      Rate and Rhythm: Normal rate and regular rhythm. Pulmonary:      Effort: Pulmonary effort is normal. No respiratory distress. Breath sounds: Normal breath sounds. Abdominal:      General: There is no distension. Palpations: Abdomen is soft. Tenderness:  There is abdominal tenderness (discomfort in LLQ). There is no right CVA tenderness, left CVA tenderness, guarding or rebound. Hernia: A hernia (L groin lump) is present. Musculoskeletal:      Cervical back: Neck supple. Right lower leg: No edema. Left lower leg: No edema. Neurological:      Mental Status: He is alert and oriented to person, place, and time. Psychiatric:         Mood and Affect: Mood normal.              An electronic signature was used to authenticate this note. --Naeem Kim DO     This dictation was generated by voice recognition computer software. Although all attempts are made to edit the dictation for accuracy, there may be errors in the transcription that are not intended.

## 2022-12-16 ENCOUNTER — HOSPITAL ENCOUNTER (OUTPATIENT)
Dept: CT IMAGING | Age: 57
Discharge: HOME | End: 2022-12-16
Payer: COMMERCIAL

## 2022-12-16 ENCOUNTER — HOSPITAL ENCOUNTER (OUTPATIENT)
Age: 57
Discharge: HOME | End: 2022-12-16
Payer: COMMERCIAL

## 2022-12-16 DIAGNOSIS — R19.09 LUMP IN THE GROIN: ICD-10-CM

## 2022-12-16 DIAGNOSIS — R10.32 ABDOMINAL PAIN, LLQ: ICD-10-CM

## 2022-12-16 LAB
ALBUMIN SERPL-MCNC: 5.3 GM/DL (ref 3.4–5)
ALP BLD-CCNC: 52 IU/L (ref 40–128)
ALT SERPL-CCNC: 19 U/L (ref 10–40)
ANION GAP SERPL CALCULATED.3IONS-SCNC: 12 MMOL/L (ref 4–16)
AST SERPL-CCNC: 21 IU/L (ref 15–37)
BASOPHILS ABSOLUTE: 0.1 K/CU MM
BASOPHILS RELATIVE PERCENT: 1.1 % (ref 0–1)
BILIRUB SERPL-MCNC: 0.6 MG/DL (ref 0–1)
BUN BLDV-MCNC: 16 MG/DL (ref 6–23)
CALCIUM SERPL-MCNC: 9.4 MG/DL (ref 8.3–10.6)
CHLORIDE BLD-SCNC: 101 MMOL/L (ref 99–110)
CO2: 26 MMOL/L (ref 21–32)
CREAT SERPL-MCNC: 0.8 MG/DL (ref 0.9–1.3)
DIFFERENTIAL TYPE: ABNORMAL
EOSINOPHILS ABSOLUTE: 0.1 K/CU MM
EOSINOPHILS RELATIVE PERCENT: 1.4 % (ref 0–3)
GFR SERPL CREATININE-BSD FRML MDRD: >60 ML/MIN/1.73M2
GLUCOSE BLD-MCNC: 99 MG/DL (ref 70–99)
HCT VFR BLD CALC: 45.4 % (ref 42–52)
HEMOGLOBIN: 14.9 GM/DL (ref 13.5–18)
IMMATURE NEUTROPHIL %: 0.3 % (ref 0–0.43)
LYMPHOCYTES ABSOLUTE: 1.5 K/CU MM
LYMPHOCYTES RELATIVE PERCENT: 21.6 % (ref 24–44)
MCH RBC QN AUTO: 29.5 PG (ref 27–31)
MCHC RBC AUTO-ENTMCNC: 32.8 % (ref 32–36)
MCV RBC AUTO: 89.9 FL (ref 78–100)
MONOCYTES ABSOLUTE: 0.6 K/CU MM
MONOCYTES RELATIVE PERCENT: 7.9 % (ref 0–4)
NUCLEATED RBC %: 0 %
PDW BLD-RTO: 12.1 % (ref 11.7–14.9)
PLATELET # BLD: 208 K/CU MM (ref 140–440)
PMV BLD AUTO: 9.3 FL (ref 7.5–11.1)
POTASSIUM SERPL-SCNC: 4.4 MMOL/L (ref 3.5–5.1)
RBC # BLD: 5.05 M/CU MM (ref 4.6–6.2)
SEGMENTED NEUTROPHILS ABSOLUTE COUNT: 4.7 K/CU MM
SEGMENTED NEUTROPHILS RELATIVE PERCENT: 67.7 % (ref 36–66)
SODIUM BLD-SCNC: 139 MMOL/L (ref 135–145)
TOTAL IMMATURE NEUTOROPHIL: 0.02 K/CU MM
TOTAL NUCLEATED RBC: 0 K/CU MM
TOTAL PROTEIN: 7.5 GM/DL (ref 6.4–8.2)
WBC # BLD: 7 K/CU MM (ref 4–10.5)

## 2022-12-16 PROCEDURE — 74177 CT ABD & PELVIS W/CONTRAST: CPT

## 2022-12-16 PROCEDURE — 6360000004 HC RX CONTRAST MEDICATION: Performed by: FAMILY MEDICINE

## 2022-12-16 PROCEDURE — 36415 COLL VENOUS BLD VENIPUNCTURE: CPT

## 2022-12-16 PROCEDURE — 85025 COMPLETE CBC W/AUTO DIFF WBC: CPT

## 2022-12-16 PROCEDURE — 80053 COMPREHEN METABOLIC PANEL: CPT

## 2022-12-16 RX ORDER — 0.9 % SODIUM CHLORIDE 0.9 %
10 VIAL (ML) INJECTION
Status: COMPLETED | OUTPATIENT
Start: 2022-12-16 | End: 2022-12-16

## 2022-12-16 RX ADMIN — Medication 10 ML: at 11:26

## 2022-12-16 RX ADMIN — IOPAMIDOL 18 ML: 755 INJECTION, SOLUTION INTRAVENOUS at 11:25

## 2022-12-16 RX ADMIN — IOPAMIDOL 75 ML: 755 INJECTION, SOLUTION INTRAVENOUS at 11:26

## 2023-03-03 ENCOUNTER — OFFICE VISIT (OUTPATIENT)
Dept: INTERNAL MEDICINE CLINIC | Age: 58
End: 2023-03-03

## 2023-03-03 VITALS
SYSTOLIC BLOOD PRESSURE: 120 MMHG | DIASTOLIC BLOOD PRESSURE: 78 MMHG | OXYGEN SATURATION: 98 % | HEIGHT: 68 IN | BODY MASS INDEX: 30.55 KG/M2 | WEIGHT: 201.6 LBS | HEART RATE: 83 BPM

## 2023-03-03 DIAGNOSIS — G89.29 CHRONIC RIGHT SHOULDER PAIN: ICD-10-CM

## 2023-03-03 DIAGNOSIS — Z12.5 SCREENING FOR PROSTATE CANCER: ICD-10-CM

## 2023-03-03 DIAGNOSIS — R73.03 PREDIABETES: ICD-10-CM

## 2023-03-03 DIAGNOSIS — I10 ESSENTIAL HYPERTENSION: Primary | ICD-10-CM

## 2023-03-03 DIAGNOSIS — G56.01 CARPAL TUNNEL SYNDROME OF RIGHT WRIST: ICD-10-CM

## 2023-03-03 DIAGNOSIS — M25.511 CHRONIC RIGHT SHOULDER PAIN: ICD-10-CM

## 2023-03-03 DIAGNOSIS — E78.5 HYPERLIPIDEMIA, UNSPECIFIED HYPERLIPIDEMIA TYPE: ICD-10-CM

## 2023-03-03 DIAGNOSIS — R19.09 LUMP IN THE GROIN: ICD-10-CM

## 2023-03-03 RX ORDER — LISINOPRIL 20 MG/1
TABLET ORAL
Qty: 90 TABLET | Refills: 1 | Status: SHIPPED | OUTPATIENT
Start: 2023-03-03

## 2023-03-03 RX ORDER — IBUPROFEN 800 MG/1
800 TABLET ORAL EVERY 8 HOURS PRN
Qty: 90 TABLET | Refills: 1 | Status: SHIPPED | OUTPATIENT
Start: 2023-03-03

## 2023-03-03 SDOH — ECONOMIC STABILITY: HOUSING INSECURITY
IN THE LAST 12 MONTHS, WAS THERE A TIME WHEN YOU DID NOT HAVE A STEADY PLACE TO SLEEP OR SLEPT IN A SHELTER (INCLUDING NOW)?: NO

## 2023-03-03 SDOH — ECONOMIC STABILITY: FOOD INSECURITY: WITHIN THE PAST 12 MONTHS, THE FOOD YOU BOUGHT JUST DIDN'T LAST AND YOU DIDN'T HAVE MONEY TO GET MORE.: NEVER TRUE

## 2023-03-03 SDOH — ECONOMIC STABILITY: FOOD INSECURITY: WITHIN THE PAST 12 MONTHS, YOU WORRIED THAT YOUR FOOD WOULD RUN OUT BEFORE YOU GOT MONEY TO BUY MORE.: NEVER TRUE

## 2023-03-03 SDOH — ECONOMIC STABILITY: INCOME INSECURITY: HOW HARD IS IT FOR YOU TO PAY FOR THE VERY BASICS LIKE FOOD, HOUSING, MEDICAL CARE, AND HEATING?: NOT HARD AT ALL

## 2023-03-03 ASSESSMENT — ENCOUNTER SYMPTOMS
SHORTNESS OF BREATH: 0
NAUSEA: 0
ABDOMINAL PAIN: 0
BACK PAIN: 0
COUGH: 0

## 2023-03-03 ASSESSMENT — PATIENT HEALTH QUESTIONNAIRE - PHQ9
SUM OF ALL RESPONSES TO PHQ QUESTIONS 1-9: 0
SUM OF ALL RESPONSES TO PHQ QUESTIONS 1-9: 0
1. LITTLE INTEREST OR PLEASURE IN DOING THINGS: 0
SUM OF ALL RESPONSES TO PHQ9 QUESTIONS 1 & 2: 0
2. FEELING DOWN, DEPRESSED OR HOPELESS: 0
SUM OF ALL RESPONSES TO PHQ QUESTIONS 1-9: 0
SUM OF ALL RESPONSES TO PHQ QUESTIONS 1-9: 0

## 2023-03-03 NOTE — PROGRESS NOTES
Valorie Galvan (:  1965) is a 58 y.o. male,established patient, here for evaluation of the following chief complaint(s):  Numbness (Hands L >R), Hypertension, and Hyperlipidemia         ASSESSMENT/PLAN:  1. Essential hypertension  - lisinopril (PRINIVIL;ZESTRIL) 20 MG tablet; TAKE 1 TABLET BY MOUTH EVERY DAY  Dispense: 90 tablet; Refill: 1  -Urinalysis with Reflex to culture    2. Lump in the groin  Will schedule US follow up    3. Chronic right shoulder pain  - ibuprofen (ADVIL;MOTRIN) 800 MG tablet; Take 1 tablet by mouth every 8 hours as needed for Pain  Dispense: 90 tablet; Refill: 1    4. Hyperlipidemia, unspecified hyperlipidemia type  The patient is asked to make an attempt to improve diet and exercise patterns   -Lipid Panel    5. Carpal tunnel syndrome of right wrist  - ibuprofen (ADVIL;MOTRIN) 800 MG tablet; Take 1 tablet by mouth every 8 hours as needed for Pain  Dispense: 90 tablet; Refill: 1    6. Prediabetes  -Hemoglobin A1C    7. Screening for prostate cancer  -PSA    Labs reviewed below  Keep f/u with orthopedics and other specialists  On this date 3/3/2023 I have spent 30 minutes reviewing previous notes, test results and face to face with the patient discussing the diagnosis and importance of compliance with the treatment plan as well as documenting on the day of the visit.    Return in about 5 months (around 8/3/2023) for HTN, Prediabetes.       23 CT Abdomen Pelvis W IV Contrast  Impression   1. No evidence of inguinal hernia or pelvic lymphadenopathy.   2. Cholelithiasis but no acute cholecystitis.   3. Mild diverticulosis but no acute diverticulitis.   4. Minimal fat containing periumbilical hernia which appears uncomplicated     Lab Results   Component Value Date    WBC 7.0 2022    HGB 14.9 2022    HCT 45.4 2022    MCV 89.9 2022     2022       Lab Results   Component Value Date     2022    K 4.4 2022     2022     CO2 26 12/16/2022    BUN 16 12/16/2022    CREATININE 0.8 (L) 12/16/2022    GLUCOSE 99 12/16/2022    CALCIUM 9.4 12/16/2022    PROT 7.5 12/16/2022    LABALBU 5.3 (H) 12/16/2022    BILITOT 0.6 12/16/2022    ALKPHOS 52 12/16/2022    AST 21 12/16/2022    ALT 19 12/16/2022    LABGLOM >60 12/16/2022    GFRAA >60 02/26/2022    AGRATIO 1.8 11/13/2020    GLOB 2.5 11/13/2020     Lab Results   Component Value Date    TSHFT4 1.21 03/04/2022       Subjective   SUBJECTIVE/OBJECTIVE:    HISTORY OF PRESENT ILLNESS:  This is a 63 y.o. male here for the following:  Patient Active Problem List    Diagnosis Date Noted    LOC (loss of consciousness) ([facility] [address] 02/26/2022    Primary osteoarthritis of left knee 11/18/2021    Acquired trigger finger of left middle finger 10/11/2021    Carpal tunnel syndrome on left     Sprain of left rotator cuff capsule 06/20/2021    Bilateral carpal tunnel syndrome     Prediabetes 11/14/2020    Medial meniscus tear     Hypertension     Hyperlipidemia     Chest pain       Patient  has b/l hand numbness-chronic. He had R carpal tunnel repair, but can still have numbness  L carpal tunnel repair to be scheduled  Chronic R  shoulder pain. Decreased ROM  He has a soft lump, ?lipoma of the L groin. CT ab/pelvis did not reveal a hernia  HTN- controlled on lisinopril 20  HLD- managing with his diet  Prediabetes    Review of Systems   Constitutional:  Negative for diaphoresis and fever. Respiratory:  Negative for cough and shortness of breath. Cardiovascular:  Negative for chest pain and palpitations. Gastrointestinal:  Negative for abdominal pain and nausea. Genitourinary:  Negative for difficulty urinating. Musculoskeletal:  Positive for arthralgias. Negative for back pain. Neurological:  Positive for numbness. Negative for dizziness and headaches.      The 10-year ASCVD risk score (Corry LI, et al., 2019) is: 6.7%    Values used to calculate the score:      Age: 63 years      Sex: Male      Is Non- : No      Diabetic: No      Tobacco smoker: No      Systolic Blood Pressure: 932 mmHg      Is BP treated: Yes      HDL Cholesterol: 54 mg/dL      Total Cholesterol: 204 mg/dL    No Known Allergies  Current Outpatient Medications   Medication Sig Dispense Refill    lisinopril (PRINIVIL;ZESTRIL) 20 MG tablet TAKE 1 TABLET BY MOUTH EVERY DAY 90 tablet 1    ibuprofen (ADVIL;MOTRIN) 800 MG tablet Take 1 tablet by mouth every 8 hours as needed for Pain 90 tablet 1    aspirin 81 MG tablet Take 81 mg by mouth daily       No current facility-administered medications for this visit. Vitals:    03/03/23 1035   BP: 120/78   Site: Left Upper Arm   Position: Sitting   Cuff Size: Large Adult   Pulse: 83   SpO2: 98%   Weight: 201 lb 9.6 oz (91.4 kg)   Height: 5' 8\" (1.727 m)     Objective   Physical Exam  Vitals reviewed. Constitutional:       General: He is not in acute distress. Eyes:      Extraocular Movements: Extraocular movements intact. Cardiovascular:      Rate and Rhythm: Normal rate and regular rhythm. Pulmonary:      Effort: Pulmonary effort is normal. No respiratory distress. Breath sounds: Normal breath sounds. Abdominal:      Palpations: Abdomen is soft. Tenderness: There is no abdominal tenderness. Musculoskeletal:         General: Tenderness (shoulder R with ROM) present. Cervical back: Neck supple. Right lower leg: No edema. Left lower leg: No edema. Skin:     Findings: Lesion (Soft lump in the left groin) present. Neurological:      Mental Status: He is alert and oriented to person, place, and time. Sensory: Sensory deficit (fingers) present. Psychiatric:         Mood and Affect: Mood normal.              An electronic signature was used to authenticate this note. --Itzel Quzeada DO     This dictation was generated by voice recognition computer software.   Although all attempts are made to edit the dictation for accuracy, there may be errors in the transcription that are not intended.

## 2023-03-10 ENCOUNTER — NURSE ONLY (OUTPATIENT)
Dept: INTERNAL MEDICINE CLINIC | Age: 58
End: 2023-03-10
Payer: COMMERCIAL

## 2023-03-10 DIAGNOSIS — I10 ESSENTIAL HYPERTENSION: Primary | ICD-10-CM

## 2023-03-10 DIAGNOSIS — Z12.5 SCREENING FOR PROSTATE CANCER: ICD-10-CM

## 2023-03-10 DIAGNOSIS — R73.03 PREDIABETES: ICD-10-CM

## 2023-03-10 DIAGNOSIS — E78.5 HYPERLIPIDEMIA, UNSPECIFIED HYPERLIPIDEMIA TYPE: ICD-10-CM

## 2023-03-10 LAB
BILIRUBIN URINE: NEGATIVE
BLOOD, URINE: NEGATIVE
CLARITY: CLEAR
COLOR: YELLOW
GLUCOSE URINE: NEGATIVE MG/DL
KETONES, URINE: NEGATIVE MG/DL
LEUKOCYTE ESTERASE, URINE: NEGATIVE
MICROSCOPIC EXAMINATION: NORMAL
NITRITE, URINE: NEGATIVE
PH UA: 5.5 (ref 5–8)
PROTEIN UA: NEGATIVE MG/DL
SPECIFIC GRAVITY UA: 1.02 (ref 1–1.03)
URINE REFLEX TO CULTURE: NORMAL
URINE TYPE: NORMAL
UROBILINOGEN, URINE: 0.2 E.U./DL

## 2023-03-10 PROCEDURE — 81003 URINALYSIS AUTO W/O SCOPE: CPT | Performed by: FAMILY MEDICINE

## 2023-03-10 PROCEDURE — 99999 PR OFFICE/OUTPT VISIT,PROCEDURE ONLY: CPT | Performed by: FAMILY MEDICINE

## 2023-03-10 PROCEDURE — 36415 COLL VENOUS BLD VENIPUNCTURE: CPT | Performed by: FAMILY MEDICINE

## 2023-03-11 LAB
CHOLESTEROL, TOTAL: 204 MG/DL (ref 0–199)
ESTIMATED AVERAGE GLUCOSE: 108.3 MG/DL
HBA1C MFR BLD: 5.4 %
HDLC SERPL-MCNC: 54 MG/DL (ref 40–60)
LDL CHOLESTEROL CALCULATED: 129 MG/DL
PROSTATE SPECIFIC ANTIGEN: 1.67 NG/ML (ref 0–4)
TRIGL SERPL-MCNC: 105 MG/DL (ref 0–150)
VLDLC SERPL CALC-MCNC: 21 MG/DL

## 2023-03-13 ENCOUNTER — TELEPHONE (OUTPATIENT)
Dept: INTERNAL MEDICINE CLINIC | Age: 58
End: 2023-03-13

## 2023-03-13 DIAGNOSIS — R19.09 GROIN LUMP: Primary | ICD-10-CM

## 2023-03-16 ENCOUNTER — OFFICE VISIT (OUTPATIENT)
Dept: ORTHOPEDIC SURGERY | Age: 58
End: 2023-03-16

## 2023-03-16 VITALS — BODY MASS INDEX: 30.48 KG/M2 | HEART RATE: 84 BPM | HEIGHT: 68 IN | WEIGHT: 201.1 LBS

## 2023-03-16 DIAGNOSIS — M17.11 PRIMARY OSTEOARTHRITIS OF RIGHT KNEE: Primary | ICD-10-CM

## 2023-03-16 DIAGNOSIS — G56.01 RIGHT CARPAL TUNNEL SYNDROME: ICD-10-CM

## 2023-03-16 NOTE — PROGRESS NOTES
Patient seen in office today for: Right wrist (CTS syndrome) and left knee pain    DOI:  DOS:  Date of last injection:     Patient denies // reports decreased ROM. Patient reports /10 pain. RICE and medication are effective to alleviate pain and reduce swelling.    Pain also alleviated by:   Pain worsened by:     Patient starts // continues // completed physical therapy     Patient is interested in injection today
JOINT/BURSA       Assessment and Plan  1. Right knee primary osteoarthritis    2. Right carpal tunnel syndrome    I discussed the degenerative findings of the right knee on x-ray and exam with the patient. I have recommended maximizing conservative treatments for the arthritic knee condition. We discussed the use of steroid injections as needed for severe symptoms. Currently patient is having significant pain on a daily basis and this is impacting activities of daily living and ability to get comfortable at rest.  The patient would like to have an injection performed today. Procedure Note, Right Knee Intraarticular Injection:  The right knee was prepped with alcohol and injected intra-articularly with 80 mg of Kenalog and 4 mL of 1% lidocaine through a 22-gauge needle. Sterile Band-Aid was applied. The patient tolerated it well without complications. I have recommended weight loss and maintaining a healthy weight to decrease the forces across the degenerative knee joint. We discussed the importance of maintaining flexibility and strength at the knee. I have advised the patient to have a home exercise program that includes stretching and low impact activities such as walking, biking, or elliptical machines. We discussed the possibility of physical therapy. The patient would like to defer formal physical therapy at this time. They will call if they would like to have a referral sent. I instructed the patient on the use of over-the-counter anti-inflammatory medications. His right hand as physical exam findings and his history is consistent with worsening carpal tunnel syndrome. We discussed conservative treatments with activity modification, stretching, and bracing. Follow-up as needed for further evaluation of his right carpal tunnel syndrome. He is responded well in the past to surgery on his left and I do expect he will benefit from right carpal tunnel release.   He would like to

## 2023-03-16 NOTE — PATIENT INSTRUCTIONS
Continue to weight bear as tolerated  Continue range of motion  Ice and elevate as needed  Tylenol or Motrin for pain  Injection given today in the office   Rest for 24-48 hours        We are committed to providing you the best care possible. If you receive a survey after visiting one of our offices, please take time to share your experience concerning your physician office visit. These surveys are confidential and no health information about you is shared. We are eager to improve for you and we are counting on your feedback to help make that happen.

## 2023-03-17 ENCOUNTER — HOSPITAL ENCOUNTER (OUTPATIENT)
Dept: ULTRASOUND IMAGING | Age: 58
Discharge: HOME | End: 2023-03-17
Payer: COMMERCIAL

## 2023-03-17 DIAGNOSIS — R19.09 GROIN LUMP: ICD-10-CM

## 2023-03-17 PROCEDURE — 76882 US LMTD JT/FCL EVL NVASC XTR: CPT

## 2023-03-21 ENCOUNTER — TELEPHONE (OUTPATIENT)
Dept: INTERNAL MEDICINE CLINIC | Age: 58
End: 2023-03-21

## 2023-03-25 ASSESSMENT — ENCOUNTER SYMPTOMS
EYE REDNESS: 0
COLOR CHANGE: 0
CHEST TIGHTNESS: 0
VOMITING: 0
EYE PAIN: 0
WHEEZING: 0
SHORTNESS OF BREATH: 0

## 2023-05-30 ENCOUNTER — HOSPITAL ENCOUNTER (EMERGENCY)
Age: 58
Discharge: HOME | End: 2023-05-30
Attending: EMERGENCY MEDICINE
Payer: COMMERCIAL

## 2023-05-30 ENCOUNTER — APPOINTMENT (OUTPATIENT)
Dept: GENERAL RADIOLOGY | Age: 58
End: 2023-05-30
Payer: COMMERCIAL

## 2023-05-30 VITALS
BODY MASS INDEX: 29.5 KG/M2 | TEMPERATURE: 97.8 F | RESPIRATION RATE: 24 BRPM | OXYGEN SATURATION: 95 % | DIASTOLIC BLOOD PRESSURE: 66 MMHG | HEART RATE: 76 BPM | WEIGHT: 194 LBS | SYSTOLIC BLOOD PRESSURE: 92 MMHG

## 2023-05-30 DIAGNOSIS — M17.10 ARTHRITIS OF KNEE: ICD-10-CM

## 2023-05-30 DIAGNOSIS — R07.9 CHEST PAIN, UNSPECIFIED TYPE: Primary | ICD-10-CM

## 2023-05-30 DIAGNOSIS — S80.02XA CONTUSION OF LEFT KNEE, INITIAL ENCOUNTER: ICD-10-CM

## 2023-05-30 DIAGNOSIS — F10.920 ACUTE ALCOHOLIC INTOXICATION WITHOUT COMPLICATION (HCC): ICD-10-CM

## 2023-05-30 LAB
ALBUMIN SERPL-MCNC: 4.2 GM/DL (ref 3.4–5)
ALCOHOL SCREEN SERUM: 0.17 %WT/VOL
ALP BLD-CCNC: 55 IU/L (ref 40–129)
ALT SERPL-CCNC: 16 U/L (ref 10–40)
ANION GAP SERPL CALCULATED.3IONS-SCNC: 15 MMOL/L (ref 4–16)
AST SERPL-CCNC: 21 IU/L (ref 15–37)
BASOPHILS ABSOLUTE: 0.1 K/CU MM
BASOPHILS RELATIVE PERCENT: 1.1 % (ref 0–1)
BILIRUB SERPL-MCNC: 0.2 MG/DL (ref 0–1)
BILIRUBIN DIRECT: 0.2 MG/DL (ref 0–0.3)
BILIRUBIN, INDIRECT: 0 MG/DL (ref 0–0.7)
BUN SERPL-MCNC: 19 MG/DL (ref 6–23)
CALCIUM SERPL-MCNC: 8.4 MG/DL (ref 8.3–10.6)
CHLORIDE BLD-SCNC: 103 MMOL/L (ref 99–110)
CO2: 20 MMOL/L (ref 21–32)
CREAT SERPL-MCNC: 0.9 MG/DL (ref 0.9–1.3)
DIFFERENTIAL TYPE: ABNORMAL
EKG ATRIAL RATE: 91 BPM
EKG DIAGNOSIS: NORMAL
EKG P AXIS: 40 DEGREES
EKG P-R INTERVAL: 152 MS
EKG Q-T INTERVAL: 376 MS
EKG QRS DURATION: 114 MS
EKG QTC CALCULATION (BAZETT): 462 MS
EKG R AXIS: -32 DEGREES
EKG T AXIS: 33 DEGREES
EKG VENTRICULAR RATE: 91 BPM
EOSINOPHILS ABSOLUTE: 0.2 K/CU MM
EOSINOPHILS RELATIVE PERCENT: 2.6 % (ref 0–3)
GFR SERPL CREATININE-BSD FRML MDRD: >60 ML/MIN/1.73M2
GLUCOSE SERPL-MCNC: 108 MG/DL (ref 70–99)
HCT VFR BLD CALC: 39.5 % (ref 42–52)
HEMOGLOBIN: 13 GM/DL (ref 13.5–18)
IMMATURE NEUTROPHIL %: 0.4 % (ref 0–0.43)
LIPASE: 50 IU/L (ref 13–60)
LYMPHOCYTES ABSOLUTE: 2.6 K/CU MM
LYMPHOCYTES RELATIVE PERCENT: 35.1 % (ref 24–44)
MCH RBC QN AUTO: 30 PG (ref 27–31)
MCHC RBC AUTO-ENTMCNC: 32.9 % (ref 32–36)
MCV RBC AUTO: 91.2 FL (ref 78–100)
MONOCYTES ABSOLUTE: 0.5 K/CU MM
MONOCYTES RELATIVE PERCENT: 6.9 % (ref 0–4)
NUCLEATED RBC %: 0 %
PDW BLD-RTO: 12.2 % (ref 11.7–14.9)
PLATELET # BLD: 226 K/CU MM (ref 140–440)
PMV BLD AUTO: 9.2 FL (ref 7.5–11.1)
POTASSIUM SERPL-SCNC: 3.8 MMOL/L (ref 3.5–5.1)
RBC # BLD: 4.33 M/CU MM (ref 4.6–6.2)
SEGMENTED NEUTROPHILS ABSOLUTE COUNT: 4 K/CU MM
SEGMENTED NEUTROPHILS RELATIVE PERCENT: 53.9 % (ref 36–66)
SODIUM BLD-SCNC: 138 MMOL/L (ref 135–145)
TOTAL IMMATURE NEUTOROPHIL: 0.03 K/CU MM
TOTAL NUCLEATED RBC: 0 K/CU MM
TOTAL PROTEIN: 6.6 GM/DL (ref 6.4–8.2)
TROPONIN T: <0.01 NG/ML
WBC # BLD: 7.4 K/CU MM (ref 4–10.5)

## 2023-05-30 PROCEDURE — 80053 COMPREHEN METABOLIC PANEL: CPT

## 2023-05-30 PROCEDURE — G0480 DRUG TEST DEF 1-7 CLASSES: HCPCS

## 2023-05-30 PROCEDURE — 83690 ASSAY OF LIPASE: CPT

## 2023-05-30 PROCEDURE — 99285 EMERGENCY DEPT VISIT HI MDM: CPT

## 2023-05-30 PROCEDURE — 84484 ASSAY OF TROPONIN QUANT: CPT

## 2023-05-30 PROCEDURE — 71045 X-RAY EXAM CHEST 1 VIEW: CPT

## 2023-05-30 PROCEDURE — 93005 ELECTROCARDIOGRAM TRACING: CPT | Performed by: EMERGENCY MEDICINE

## 2023-05-30 PROCEDURE — 73562 X-RAY EXAM OF KNEE 3: CPT

## 2023-05-30 PROCEDURE — 85025 COMPLETE CBC W/AUTO DIFF WBC: CPT

## 2023-05-30 PROCEDURE — 93010 ELECTROCARDIOGRAM REPORT: CPT | Performed by: INTERNAL MEDICINE

## 2023-05-30 PROCEDURE — 82248 BILIRUBIN DIRECT: CPT

## 2023-05-30 ASSESSMENT — PAIN SCALES - GENERAL: PAINLEVEL_OUTOF10: 4

## 2023-05-30 ASSESSMENT — PAIN DESCRIPTION - DESCRIPTORS: DESCRIPTORS: POUNDING

## 2023-05-30 ASSESSMENT — PAIN DESCRIPTION - LOCATION: LOCATION: CHEST

## 2023-05-30 ASSESSMENT — HEART SCORE: ECG: 0

## 2023-05-30 ASSESSMENT — PAIN - FUNCTIONAL ASSESSMENT: PAIN_FUNCTIONAL_ASSESSMENT: 0-10

## 2023-05-30 NOTE — ED NOTES
Pt discharged from ED at this time with all necessary paperwork. All questions answered at this time and discharge instructions reviewed. Pt ambulates to waiting room.        Sophia Triplett RN  05/30/23 [id number]

## 2023-05-30 NOTE — ED TRIAGE NOTES
Patient here for chest pains. History of heart attack last year. Took 324 aspirin and EMS gave one nitro.

## 2023-05-30 NOTE — ED PROVIDER NOTES
CHIEF COMPLAINT    Chief Complaint   Patient presents with    Chest Pain     HPI  Glenn Harrison is a 63 y.o. male with history of hypertension, hyperlipidemia, coronary artery disease who presents to the ED via EMS with complaints of chest pain. Family members were at bedside help provide history. Patient was with a group of friends and family and around a bonfire for much of the day. He apparently had episode tonight where he lost his balance and fell onto his left knee. After this when friends were checking on him he began to hold his chest and stated he was having some chest pain. Pain is described as a pulsing and throbbing sensation throughout the anterior chest.  Nothing makes it better or worse. He was given aspirin nitroglycerin by medics. He has remote history of coronary artery disease. He had cardiac stress testing performed in February 2022 which was without any ischemic changes. His chest pain does not radiate. Visitors at bedside also states the patient was spacing out after hitting his knee. He did not strike head or lose consciousness. Patient is also complained of left knee pain from falling onto his knee. He states he has chronic pain in that knee but the pain seems to be worse after this fall. Pain is constant and does not radiate. Denies numbness, tingling, shortness of breath, nausea, vomiting, dizziness, lightheadedness. REVIEW OF SYSTEMS  Constitutional: No fever, chills  Eye: No visual changes  HENT: No earache or sore throat. Resp: No SOB or productive cough. Cardio: Complains of chest pain  GI: No abdominal pain, nausea, vomiting, constipation or diarrhea. No melena. : No dysuria, urgency or frequency. Endocrine: No heat intolerance, no cold intolerance, no polydipsia   Lymphatics: No adenopathy  Musculoskeletal: Complains of left knee pain  Neuro: No headaches. Psych: No homicidal or suicidal thoughts  Skin: No rash, No itching. ?  ?   PAST MEDICAL HISTORY  Past

## 2023-06-08 ENCOUNTER — HOSPITAL ENCOUNTER (EMERGENCY)
Age: 58
Discharge: HOME | End: 2023-06-09
Attending: EMERGENCY MEDICINE
Payer: COMMERCIAL

## 2023-06-08 ENCOUNTER — APPOINTMENT (OUTPATIENT)
Dept: GENERAL RADIOLOGY | Age: 58
End: 2023-06-08
Payer: COMMERCIAL

## 2023-06-08 DIAGNOSIS — I10 ESSENTIAL HYPERTENSION: ICD-10-CM

## 2023-06-08 DIAGNOSIS — R07.9 CHEST PAIN, UNSPECIFIED TYPE: Primary | ICD-10-CM

## 2023-06-08 LAB
ALBUMIN SERPL-MCNC: 4.5 GM/DL (ref 3.4–5)
ALP BLD-CCNC: 68 IU/L (ref 40–129)
ALT SERPL-CCNC: 15 U/L (ref 10–40)
ANION GAP SERPL CALCULATED.3IONS-SCNC: 13 MMOL/L (ref 4–16)
AST SERPL-CCNC: 18 IU/L (ref 15–37)
BASOPHILS ABSOLUTE: 0.1 K/CU MM
BASOPHILS RELATIVE PERCENT: 0.8 % (ref 0–1)
BILIRUB SERPL-MCNC: 0.3 MG/DL (ref 0–1)
BUN SERPL-MCNC: 11 MG/DL (ref 6–23)
CALCIUM SERPL-MCNC: 9 MG/DL (ref 8.3–10.6)
CHLORIDE BLD-SCNC: 102 MMOL/L (ref 99–110)
CO2: 27 MMOL/L (ref 21–32)
CREAT SERPL-MCNC: 0.9 MG/DL (ref 0.9–1.3)
DIFFERENTIAL TYPE: ABNORMAL
EOSINOPHILS ABSOLUTE: 0.2 K/CU MM
EOSINOPHILS RELATIVE PERCENT: 2.3 % (ref 0–3)
GFR SERPL CREATININE-BSD FRML MDRD: >60 ML/MIN/1.73M2
GLUCOSE SERPL-MCNC: 141 MG/DL (ref 70–99)
HCT VFR BLD CALC: 43.4 % (ref 42–52)
HEMOGLOBIN: 14.2 GM/DL (ref 13.5–18)
IMMATURE NEUTROPHIL %: 0.4 % (ref 0–0.43)
LIPASE: 63 IU/L (ref 13–60)
LYMPHOCYTES ABSOLUTE: 1.9 K/CU MM
LYMPHOCYTES RELATIVE PERCENT: 25.9 % (ref 24–44)
MAGNESIUM: 2.1 MG/DL (ref 1.8–2.4)
MCH RBC QN AUTO: 29.9 PG (ref 27–31)
MCHC RBC AUTO-ENTMCNC: 32.7 % (ref 32–36)
MCV RBC AUTO: 91.4 FL (ref 78–100)
MONOCYTES ABSOLUTE: 0.5 K/CU MM
MONOCYTES RELATIVE PERCENT: 6.9 % (ref 0–4)
NUCLEATED RBC %: 0 %
PDW BLD-RTO: 11.9 % (ref 11.7–14.9)
PLATELET # BLD: 261 K/CU MM (ref 140–440)
PMV BLD AUTO: 9.1 FL (ref 7.5–11.1)
POTASSIUM SERPL-SCNC: 3.9 MMOL/L (ref 3.5–5.1)
RBC # BLD: 4.75 M/CU MM (ref 4.6–6.2)
SEGMENTED NEUTROPHILS ABSOLUTE COUNT: 4.6 K/CU MM
SEGMENTED NEUTROPHILS RELATIVE PERCENT: 63.7 % (ref 36–66)
SODIUM BLD-SCNC: 142 MMOL/L (ref 135–145)
TOTAL IMMATURE NEUTOROPHIL: 0.03 K/CU MM
TOTAL NUCLEATED RBC: 0 K/CU MM
TOTAL PROTEIN: 7 GM/DL (ref 6.4–8.2)
TROPONIN T: <0.01 NG/ML
WBC # BLD: 7.3 K/CU MM (ref 4–10.5)

## 2023-06-08 PROCEDURE — 83690 ASSAY OF LIPASE: CPT

## 2023-06-08 PROCEDURE — 80053 COMPREHEN METABOLIC PANEL: CPT

## 2023-06-08 PROCEDURE — 6370000000 HC RX 637 (ALT 250 FOR IP): Performed by: EMERGENCY MEDICINE

## 2023-06-08 PROCEDURE — 84484 ASSAY OF TROPONIN QUANT: CPT

## 2023-06-08 PROCEDURE — 71045 X-RAY EXAM CHEST 1 VIEW: CPT

## 2023-06-08 PROCEDURE — 85025 COMPLETE CBC W/AUTO DIFF WBC: CPT

## 2023-06-08 PROCEDURE — 83735 ASSAY OF MAGNESIUM: CPT

## 2023-06-08 PROCEDURE — 93005 ELECTROCARDIOGRAM TRACING: CPT | Performed by: EMERGENCY MEDICINE

## 2023-06-08 PROCEDURE — 6360000002 HC RX W HCPCS: Performed by: EMERGENCY MEDICINE

## 2023-06-08 RX ORDER — ONDANSETRON 2 MG/ML
4 INJECTION INTRAMUSCULAR; INTRAVENOUS ONCE
Status: DISCONTINUED | OUTPATIENT
Start: 2023-06-08 | End: 2023-06-09 | Stop reason: HOSPADM

## 2023-06-08 RX ORDER — ASPIRIN 81 MG/1
324 TABLET, CHEWABLE ORAL ONCE
Status: COMPLETED | OUTPATIENT
Start: 2023-06-08 | End: 2023-06-08

## 2023-06-08 RX ORDER — 0.9 % SODIUM CHLORIDE 0.9 %
1000 INTRAVENOUS SOLUTION INTRAVENOUS ONCE
Status: DISCONTINUED | OUTPATIENT
Start: 2023-06-08 | End: 2023-06-09 | Stop reason: HOSPADM

## 2023-06-08 RX ORDER — MORPHINE SULFATE 4 MG/ML
4 INJECTION, SOLUTION INTRAMUSCULAR; INTRAVENOUS ONCE
Status: DISCONTINUED | OUTPATIENT
Start: 2023-06-08 | End: 2023-06-09 | Stop reason: HOSPADM

## 2023-06-08 RX ADMIN — ASPIRIN 324 MG: 81 TABLET, CHEWABLE ORAL at 21:22

## 2023-06-08 ASSESSMENT — PAIN DESCRIPTION - PAIN TYPE: TYPE: ACUTE PAIN

## 2023-06-08 ASSESSMENT — PAIN DESCRIPTION - LOCATION: LOCATION: CHEST

## 2023-06-08 ASSESSMENT — HEART SCORE: ECG: 0

## 2023-06-08 ASSESSMENT — PAIN DESCRIPTION - ORIENTATION: ORIENTATION: MID

## 2023-06-09 VITALS
WEIGHT: 195 LBS | HEART RATE: 83 BPM | OXYGEN SATURATION: 97 % | SYSTOLIC BLOOD PRESSURE: 135 MMHG | BODY MASS INDEX: 29.55 KG/M2 | TEMPERATURE: 97.9 F | RESPIRATION RATE: 21 BRPM | HEIGHT: 68 IN | DIASTOLIC BLOOD PRESSURE: 99 MMHG

## 2023-06-09 LAB
EKG ATRIAL RATE: 106 BPM
EKG ATRIAL RATE: 90 BPM
EKG DIAGNOSIS: NORMAL
EKG DIAGNOSIS: NORMAL
EKG P AXIS: 31 DEGREES
EKG P AXIS: 35 DEGREES
EKG P-R INTERVAL: 142 MS
EKG P-R INTERVAL: 158 MS
EKG Q-T INTERVAL: 340 MS
EKG Q-T INTERVAL: 366 MS
EKG QRS DURATION: 110 MS
EKG QRS DURATION: 98 MS
EKG QTC CALCULATION (BAZETT): 447 MS
EKG QTC CALCULATION (BAZETT): 451 MS
EKG R AXIS: -26 DEGREES
EKG R AXIS: -43 DEGREES
EKG T AXIS: 50 DEGREES
EKG T AXIS: 52 DEGREES
EKG VENTRICULAR RATE: 106 BPM
EKG VENTRICULAR RATE: 90 BPM
TROPONIN T: <0.01 NG/ML

## 2023-06-09 NOTE — ED PROVIDER NOTES
CARE RECEIVED FROM: Dr. Jl Giraldo  I reviewed the flor elements of the history, physical exam and initial treatment plan at the bedside. ANCILLARY DATA:  I reviewed the images. Radiologist interpretation:   XR CHEST PORTABLE   Final Result   No acute cardiopulmonary findings. Labs Reviewed   CBC WITH AUTO DIFFERENTIAL - Abnormal; Notable for the following components:       Result Value    Monocytes % 6.9 (*)     All other components within normal limits   COMPREHENSIVE METABOLIC PANEL - Abnormal; Notable for the following components:    Glucose 141 (*)     All other components within normal limits   LIPASE - Abnormal; Notable for the following components:    Lipase 63 (*)     All other components within normal limits   TROPONIN   MAGNESIUM   TROPONIN     EKG: Per my interpretation demonstrates normal sinus rhythm at a rate of 90 bpm.  Left axis deviation. Normal intervals. No acute ST segment changes. MEDICAL DECISION MAKING / PLAN:  This is a 72-year-old male with history of hypertension, hyperlipidemia, and coronary artery disease that presented to the emergency department with complaints of chest pain. At time of signout the patient had initial EKG and troponin obtained which were reassuring with repeat troponin and EKG pending. Patient had been discussed with Dr. Swati Keith radiology who felt that if repeat EKG and troponin were reassuring patient can be discharged home. Repeat EKG is without dynamic changes. Repeat troponin remains nonelevated. At this time I feel patient is appropriate for discharge home given reassuring evaluation here. Return precautions provided. FINAL IMPRESSION:  1. Chest pain, unspecified type    2. Essential hypertension      ? Electronically signed by:  [address], DO, 6/8/2023        [address], DO  06/09/23 [id number]

## 2023-06-09 NOTE — ED NOTES
Pt discharged from ED at this time with all necessary paperwork. All questions answered at this time and discharge instructions reviewed. Pt ambulates to waiting room.        Lucio Islas RN  06/09/23 [id number]

## 2023-06-09 NOTE — DISCHARGE INSTRUCTIONS
Follow-up with your cardiologist in 1 to 2 days for reevaluation. Call for an appointment. Follow-up with with primary care physician for reevaluation. Call for an appointment  Return to the emergency department immediately pain fever chills nausea vomiting dizzy lightheadedness or any worsening symptoms.

## 2023-06-09 NOTE — ED PROVIDER NOTES
Emergency Department Encounter    Patient: Kerry Laughlin  MRN: [id number]  : 1965  Date of Evaluation: 2023  ED Provider:  Piter Nina DO    Triage Chief Complaint:   Chest Pain    [place]:  Kerry Laughlin is a 63 y.o. male with history of hypertension hyperlipidemia coronary artery disease arthritis that presents to the emergency department from home with concerns of chest pain. Patient states he was arguing with his son this evening. Patient states he really gone to the beach back heated argument multiple times. He states he started having left chest pain radiating down his left arm. States pressure tightness heaviness. States it lasted 15 to 20 minutes. He states pain is 7 out of 10 on the pain scale. Patient states he did get nauseous no short of breath. He states he denies any medication. He states he has his wife to bring him to the ER. He states no falls injuries trauma no physical altercation. He states he felt his heart rate going up. He states he does see cardiologist Dr. Laine Palacio and last stress test was 2022 which was normal.  He states no congestive heart failure no heart stents no heart catheterization. States he does have hypertension hyperlipidemia. Patient states his chest pain has subsided he does feel like his heart is racing he has soreness in the left arm. Patient states otherwise no fevers chills sore throat or earache. He states he had intermittent cough and runny nose for last couple days. Patient denies any numbness and tingling weakness in extremities or swelling in extremities. Patient here for evaluation.     ROS - see HPI, below listed is current ROS at time of my eval:  General:  No fevers, no chills, no weakness  Eyes:  No recent vison changes, no discharge  ENT:  No sore throat, no nasal congestion, no hearing changes  Cardiovascular: Positive for chest pain, no palpitations  Respiratory:  No shortness of breath, no cough, no

## 2023-06-18 ENCOUNTER — APPOINTMENT (OUTPATIENT)
Dept: CT IMAGING | Age: 58
End: 2023-06-18
Payer: COMMERCIAL

## 2023-06-18 ENCOUNTER — HOSPITAL ENCOUNTER (EMERGENCY)
Age: 58
Discharge: HOME | End: 2023-06-18
Attending: EMERGENCY MEDICINE
Payer: COMMERCIAL

## 2023-06-18 ENCOUNTER — APPOINTMENT (OUTPATIENT)
Dept: GENERAL RADIOLOGY | Age: 58
End: 2023-06-18
Payer: COMMERCIAL

## 2023-06-18 VITALS
OXYGEN SATURATION: 100 % | DIASTOLIC BLOOD PRESSURE: 70 MMHG | HEART RATE: 87 BPM | RESPIRATION RATE: 25 BRPM | SYSTOLIC BLOOD PRESSURE: 109 MMHG | TEMPERATURE: 98.3 F
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
## 2023-06-18 DIAGNOSIS — F10.920 ACUTE ALCOHOLIC INTOXICATION WITHOUT COMPLICATION (HCC): ICD-10-CM

## 2023-06-18 DIAGNOSIS — R40.20 LOSS OF CONSCIOUSNESS (HCC): Primary | ICD-10-CM

## 2023-06-18 DIAGNOSIS — R07.89 CHEST PRESSURE: ICD-10-CM

## 2023-06-18 LAB
ALBUMIN SERPL-MCNC: 4.1 GM/DL (ref 3.4–5)
ALCOHOL SCREEN SERUM: 0.17 %WT/VOL
ALP BLD-CCNC: 49 IU/L (ref 40–128)
ALT SERPL-CCNC: 18 U/L (ref 10–40)
ANION GAP SERPL CALCULATED.3IONS-SCNC: 16 MMOL/L (ref 4–16)
AST SERPL-CCNC: 22 IU/L (ref 15–37)
BASOPHILS ABSOLUTE: 0.1 K/CU MM
BASOPHILS RELATIVE PERCENT: 1 % (ref 0–1)
BILIRUB SERPL-MCNC: 0.2 MG/DL (ref 0–1)
BUN SERPL-MCNC: 16 MG/DL (ref 6–23)
CALCIUM SERPL-MCNC: 8.5 MG/DL (ref 8.3–10.6)
CHLORIDE BLD-SCNC: 103 MMOL/L (ref 99–110)
CO2: 20 MMOL/L (ref 21–32)
CREAT SERPL-MCNC: 0.9 MG/DL (ref 0.9–1.3)
DIFFERENTIAL TYPE: ABNORMAL
EOSINOPHILS ABSOLUTE: 0.2 K/CU MM
EOSINOPHILS RELATIVE PERCENT: 2.7 % (ref 0–3)
GFR SERPL CREATININE-BSD FRML MDRD: >60 ML/MIN/1.73M2
GLUCOSE SERPL-MCNC: 114 MG/DL (ref 70–99)
HCT VFR BLD CALC: 40.9 % (ref 42–52)
HEMOGLOBIN: 12.8 GM/DL (ref 13.5–18)
IMMATURE NEUTROPHIL %: 0.6 % (ref 0–0.43)
LACTATE: 2.4 MMOL/L (ref 0.5–1.9)
LYMPHOCYTES ABSOLUTE: 2.7 K/CU MM
LYMPHOCYTES RELATIVE PERCENT: 31.6 % (ref 24–44)
MAGNESIUM: 2.1 MG/DL (ref 1.8–2.4)
MCH RBC QN AUTO: 29.4 PG (ref 27–31)
MCHC RBC AUTO-ENTMCNC: 31.3 % (ref 32–36)
MCV RBC AUTO: 94 FL (ref 78–100)
MONOCYTES ABSOLUTE: 0.5 K/CU MM
MONOCYTES RELATIVE PERCENT: 6 % (ref 0–4)
NUCLEATED RBC %: 0 %
PDW BLD-RTO: 12.3 % (ref 11.7–14.9)
PLATELET # BLD: 205 K/CU MM (ref 140–440)
PMV BLD AUTO: 8.8 FL (ref 7.5–11.1)
POTASSIUM SERPL-SCNC: 3.9 MMOL/L (ref 3.5–5.1)
RBC # BLD: 4.35 M/CU MM (ref 4.6–6.2)
SEGMENTED NEUTROPHILS ABSOLUTE COUNT: 5 K/CU MM
SEGMENTED NEUTROPHILS RELATIVE PERCENT: 58.1 % (ref 36–66)
SODIUM BLD-SCNC: 139 MMOL/L (ref 135–145)
TOTAL IMMATURE NEUTOROPHIL: 0.05 K/CU MM
TOTAL NUCLEATED RBC: 0 K/CU MM
TOTAL PROTEIN: 6.7 GM/DL (ref 6.4–8.2)
TROPONIN T: <0.01 NG/ML
WBC # BLD: 8.6 K/CU MM (ref 4–10.5)

## 2023-06-18 PROCEDURE — 80053 COMPREHEN METABOLIC PANEL: CPT

## 2023-06-18 PROCEDURE — 71045 X-RAY EXAM CHEST 1 VIEW: CPT

## 2023-06-18 PROCEDURE — G0480 DRUG TEST DEF 1-7 CLASSES: HCPCS

## 2023-06-18 PROCEDURE — 99285 EMERGENCY DEPT VISIT HI MDM: CPT

## 2023-06-18 PROCEDURE — 70450 CT HEAD/BRAIN W/O DYE: CPT

## 2023-06-18 PROCEDURE — 83605 ASSAY OF LACTIC ACID: CPT

## 2023-06-18 PROCEDURE — 84484 ASSAY OF TROPONIN QUANT: CPT

## 2023-06-18 PROCEDURE — 93005 ELECTROCARDIOGRAM TRACING: CPT | Performed by: EMERGENCY MEDICINE

## 2023-06-18 PROCEDURE — 83735 ASSAY OF MAGNESIUM: CPT

## 2023-06-18 PROCEDURE — 85025 COMPLETE CBC W/AUTO DIFF WBC: CPT

## 2023-06-18 ASSESSMENT — PAIN SCALES - GENERAL: PAINLEVEL_OUTOF10: 4

## 2023-06-18 ASSESSMENT — PAIN DESCRIPTION - DESCRIPTORS: DESCRIPTORS: HEAVINESS

## 2023-06-18 ASSESSMENT — LIFESTYLE VARIABLES
HOW MANY STANDARD DRINKS CONTAINING ALCOHOL DO YOU HAVE ON A TYPICAL DAY: 5 OR 6
HOW OFTEN DO YOU HAVE A DRINK CONTAINING ALCOHOL: MONTHLY OR LESS

## 2023-06-18 ASSESSMENT — PAIN - FUNCTIONAL ASSESSMENT: PAIN_FUNCTIONAL_ASSESSMENT: 0-10

## 2023-06-18 ASSESSMENT — PAIN DESCRIPTION - LOCATION: LOCATION: CHEST

## 2023-06-20 LAB
EKG ATRIAL RATE: 93 BPM
EKG DIAGNOSIS: NORMAL
EKG P AXIS: 44 DEGREES
EKG P-R INTERVAL: 154 MS
EKG Q-T INTERVAL: 364 MS
EKG QRS DURATION: 98 MS
EKG QTC CALCULATION (BAZETT): 452 MS
EKG R AXIS: -28 DEGREES
EKG T AXIS: 50 DEGREES
EKG VENTRICULAR RATE: 93 BPM

## 2023-06-20 PROCEDURE — 93010 ELECTROCARDIOGRAM REPORT: CPT | Performed by: INTERNAL MEDICINE

## 2023-08-17 ENCOUNTER — OFFICE VISIT (OUTPATIENT)
Dept: ORTHOPEDIC SURGERY | Age: 58
End: 2023-08-17

## 2023-08-17 VITALS — RESPIRATION RATE: 16 BRPM | TEMPERATURE: 98.4 F | HEART RATE: 66 BPM | OXYGEN SATURATION: 98 %

## 2023-08-17 DIAGNOSIS — M17.12 PRIMARY OSTEOARTHRITIS OF LEFT KNEE: Primary | ICD-10-CM

## 2023-08-17 RX ORDER — TRIAMCINOLONE ACETONIDE 40 MG/ML
80 INJECTION, SUSPENSION INTRA-ARTICULAR; INTRAMUSCULAR ONCE
Status: COMPLETED | OUTPATIENT
Start: 2023-08-17 | End: 2023-08-17

## 2023-08-17 RX ADMIN — TRIAMCINOLONE ACETONIDE 80 MG: 40 INJECTION, SUSPENSION INTRA-ARTICULAR; INTRAMUSCULAR at 16:32

## 2023-08-17 ASSESSMENT — ENCOUNTER SYMPTOMS
WHEEZING: 0
VOMITING: 0
EYE PAIN: 0
SHORTNESS OF BREATH: 0
COLOR CHANGE: 0
CHEST TIGHTNESS: 0
EYE REDNESS: 0

## 2023-08-17 NOTE — PROGRESS NOTES
Patient seen in office today for:    DOI:  DOS:  Date of last injection:     Patient reports 9/10 pain. RICE and medication are effective to alleviate pain and reduce swelling. Pain also alleviated by:   Pain worsened by: Patient reports painful ROM. Patient ___________ physical therapy     Patient is interested in injection today    Patient interested in speaking to provider about surgical option. Sutures removed in office today. Incision is well approximated, clean, dry and intact. Patient tolerated well. No foul odor or drainage. Xrays performed in office today. EMG performed 0/0/00, impression below. MRI performed 0/0/00, impression below.     Profession:
testing:  X-ray images were reviewed by myself and discussed with the patient:  X-ray images of the left knee from 5/30/2023 were reviewed by myself and discussed with the patient today:  FINDINGS:  No acute fracture or dislocation at the left knee. Hypertrophy of the tibial  spines and mild spurring along the knee from osteoarthritis. There is also  enthesopathy at the patella. No significant joint effusion. No radiopaque foreign bodies around the knee. IMPRESSION:  No acute fracture or dislocation at the left knee. Stable tricompartmental osteoarthritic changes are stable. Severe joint space narrowing and prominent osteophyte present in medial compartment    Office Procedures:  Orders Placed This Encounter   Procedures    20610 - [place] DRAIN/INJECT LARGE JOINT/BURSA       Assessment and Plan  1. Left knee advanced primary osteoarthritis    2. Right knee mild primary osteoarthritis    3. Right carpal tunnel syndrome    4. Right shoulder pain    5. History of left carpal tunnel release and left rotator cuff repair    I discussed the degenerative findings of the left knee on x-ray and exam with the patient. I have recommended maximizing conservative treatments for the arthritic knee condition. We discussed the use of steroid injections as needed for severe symptoms. Currently patient is having significant pain on a daily basis and this is impacting activities of daily living and ability to get comfortable at rest.  The patient would like to have an injection performed today. Procedure Note, Left Knee Intraarticular Injection:    Multiple treatment options were discussed. Joint injection was recommended. Details of the procedure, potential risks, and potential benefits were discussed. Patient's questions were answered. Patient elected to proceed with procedure.     The left knee was prepped with alcohol and injected intra-articularly with 80 mg of Kenalog and 4 mL of 1% lidocaine through a

## 2023-08-17 NOTE — PATIENT INSTRUCTIONS
Continue to weight bear as tolerated  Continue range of motion  Ice and elevate as needed  Tylenol or Motrin for pain  Injection given into the left knee   No high impact activities for a least 24-48 hours  Follow up as needed for knee    We are committed to providing you the best care possible. If you receive a survey after visiting one of our offices, please take time to share your experience concerning your physician office visit. These surveys are confidential and no health information about you is shared. We are eager to improve for you and we are counting on your feedback to help make that happen.

## 2023-08-29 ENCOUNTER — OFFICE VISIT (OUTPATIENT)
Dept: ORTHOPEDIC SURGERY | Age: 58
End: 2023-08-29

## 2023-08-29 VITALS — WEIGHT: 195 LBS | BODY MASS INDEX: 29.55 KG/M2 | HEIGHT: 68 IN | RESPIRATION RATE: 15 BRPM

## 2023-08-29 DIAGNOSIS — M19.011 OSTEOARTHRITIS, LOCALIZED, SHOULDER, RIGHT: Primary | ICD-10-CM

## 2023-08-29 DIAGNOSIS — M12.811 ROTATOR CUFF ARTHROPATHY, RIGHT: ICD-10-CM

## 2023-08-29 DIAGNOSIS — G56.01 CARPAL TUNNEL SYNDROME, RIGHT: ICD-10-CM

## 2023-08-29 DIAGNOSIS — M17.12 ARTHRITIS OF LEFT KNEE: ICD-10-CM

## 2023-08-29 ASSESSMENT — ENCOUNTER SYMPTOMS
SHORTNESS OF BREATH: 0
EYE PAIN: 0
COLOR CHANGE: 0
VOMITING: 0
EYE REDNESS: 0
WHEEZING: 0
CHEST TIGHTNESS: 0

## 2023-08-29 NOTE — PATIENT INSTRUCTIONS
Continue weight-bearing as tolerated. Continue range of motion exercises as instructed. Ice and elevate as needed. Tylenol or Motrin for pain. Central scheduling [phone] will be calling you to schedule your MRI. If you do not hear from them with in a week give them a call.

## 2023-08-29 NOTE — PROGRESS NOTES
Patient presents to the office today for evaluation of the right shoulder. Pt states pain in the shoulder has been ongoing and getting worse over the last 6 months.  He does have loss of ROM and will have a difficult time lifting things about his chest. He does have some numbness and tingling in the right hand
Medical History:   Diagnosis Date    Arthritis     Bilateral carpal tunnel syndrome     CAD (coronary artery disease)     ECHO 07/14/2021    EF 55-60%. Mild Pulmonic regurgitation is present, No evidence of pericardial effusion. H/O echocardiogram 04/28/2016    EF 55% WNL- Stage 1 dysfunction     Hyperlipidemia     Hypertension     Medial meniscus tear     Left knee    Prediabetes     pt denies any hx of diabetes    Wears glasses     to read     Past Surgical History:   Procedure Laterality Date    APPENDECTOMY  age 13    CARPAL TUNNEL RELEASE Left 7/21/2021    LEFTCARPAL TUNNEL RELEASE performed by Reyes Powell MD at [address] N/A 4/19/2022    COLONOSCOPY DIAGNOSTIC performed by Shree Rosario MD at [address] Right 2002    SHOULDER ARTHROSCOPY Left 7/21/2021    LEFT SHOULDER ARTHROSCOPY ROTATOR CUFF REPAIR, SUBACROMIAL DECOMPRESSION performed by Reyes Powell MD at [address] Right 2006     Family History   Problem Relation Age of Onset    Cancer Mother         lung    Heart Disease Father     Heart Attack Sister     Stroke Sister      Social History     Socioeconomic History    Marital status:    Tobacco Use    Smoking status: Never    Smokeless tobacco: Former     Types: Chew     Quit date: 09/2019   Vaping Use    Vaping Use: Never used   Substance and Sexual Activity    Alcohol use:  Yes     Alcohol/week: 15.0 standard drinks     Types: 12 Cans of beer, 3 Shots of liquor per week     Comment: 10 beers, 4 shots    Drug use: No     Social Determinants of Health     Financial Resource Strain: Low Risk     Difficulty of Paying Living Expenses: Not hard at all   Food Insecurity: No Food Insecurity    Worried About Running Out of Food in the Last Year: Never true    Ran Out of Food in the Last Year: Never true   Transportation Needs: Unknown    Lack of Transportation (Non-Medical): No   Housing Stability: Unknown    Unstable Housing in the Last Year: No

## 2023-09-08 ENCOUNTER — OFFICE VISIT (OUTPATIENT)
Dept: INTERNAL MEDICINE CLINIC | Age: 58
End: 2023-09-08
Payer: COMMERCIAL

## 2023-09-08 VITALS
HEIGHT: 68 IN | BODY MASS INDEX: 30.34 KG/M2 | SYSTOLIC BLOOD PRESSURE: 126 MMHG | HEART RATE: 85 BPM | OXYGEN SATURATION: 99 % | WEIGHT: 200.2 LBS | DIASTOLIC BLOOD PRESSURE: 80 MMHG

## 2023-09-08 DIAGNOSIS — I10 ESSENTIAL HYPERTENSION: Primary | ICD-10-CM

## 2023-09-08 DIAGNOSIS — G89.29 CHRONIC RIGHT SHOULDER PAIN: ICD-10-CM

## 2023-09-08 DIAGNOSIS — M17.12 PRIMARY OSTEOARTHRITIS OF LEFT KNEE: ICD-10-CM

## 2023-09-08 DIAGNOSIS — M25.511 CHRONIC RIGHT SHOULDER PAIN: ICD-10-CM

## 2023-09-08 PROCEDURE — 99213 OFFICE O/P EST LOW 20 MIN: CPT | Performed by: FAMILY MEDICINE

## 2023-09-08 PROCEDURE — 3078F DIAST BP <80 MM HG: CPT | Performed by: FAMILY MEDICINE

## 2023-09-08 PROCEDURE — 3074F SYST BP LT 130 MM HG: CPT | Performed by: FAMILY MEDICINE

## 2023-09-08 ASSESSMENT — ENCOUNTER SYMPTOMS
NAUSEA: 0
COUGH: 0
ABDOMINAL PAIN: 0
SHORTNESS OF BREATH: 0

## 2023-09-08 NOTE — PROGRESS NOTES
Fatemeh Acrher (:  1965) is a 63 y.o. male,established patient, here for evaluation of the following chief complaint(s):  Follow-up and Hypertension         ASSESSMENT/PLAN:  1. Essential hypertension    -Decrease lisinopril to 10 mg  Monitor BP    2. Primary osteoarthritis of left knee    3. Chronic right shoulder pain  Patient to have a MRI    Keep f/u with orthopedics  Return in about 4 months (around 2024) for HTN.        Lab Results   Component Value Date    WBC 8.6 2023    HGB 12.8 (L) 2023    HCT 40.9 (L) 2023    MCV 94.0 2023     2023     Lab Results   Component Value Date    CHOL 204 (H) 03/10/2023     Lab Results   Component Value Date    TRIG 105 03/10/2023     Lab Results   Component Value Date    HDL 54 03/10/2023     Lab Results   Component Value Date    LDLCALC 129 (H) 03/10/2023    LDLDIRECT 114 (H) 10/01/2021         Lab Results   Component Value Date     2023    K 3.9 2023     2023    CO2 20 (L) 2023    BUN 16 2023    CREATININE 0.9 2023    GLUCOSE 114 (H) 2023    CALCIUM 8.5 2023    PROT 6.7 2023    LABALBU 4.1 2023    BILITOT 0.2 2023    ALKPHOS 49 2023    AST 22 2023    ALT 18 2023    LABGLOM >60 2023    GFRAA >60 2022    AGRATIO 1.8 2020    GLOB 2.5 2020           Subjective   SUBJECTIVE/OBJECTIVE:    HISTORY OF PRESENT ILLNESS:  This is a 63 y.o. male here for the following:  Patient Active Problem List    Diagnosis Date Noted    Right carpal tunnel syndrome 2023    Primary osteoarthritis of right knee 2023    LOC (loss of consciousness) ([address]) 2022    Primary osteoarthritis of left knee 2021    Acquired trigger finger of left middle finger 10/11/2021    Carpal tunnel syndrome on left     Sprain of left rotator cuff capsule 2021    Bilateral carpal tunnel syndrome     Prediabetes 2020    Medial

## 2023-09-13 ENCOUNTER — HOSPITAL ENCOUNTER (OUTPATIENT)
Dept: MRI IMAGING | Age: 58
Discharge: HOME | End: 2023-09-13
Attending: ORTHOPAEDIC SURGERY
Payer: COMMERCIAL

## 2023-09-13 DIAGNOSIS — M19.011 OSTEOARTHRITIS, LOCALIZED, SHOULDER, RIGHT: ICD-10-CM

## 2023-09-13 DIAGNOSIS — M12.811 ROTATOR CUFF ARTHROPATHY, RIGHT: ICD-10-CM

## 2023-09-13 PROCEDURE — 73221 MRI JOINT UPR EXTREM W/O DYE: CPT

## 2023-09-28 ENCOUNTER — OFFICE VISIT (OUTPATIENT)
Dept: ORTHOPEDIC SURGERY | Age: 58
End: 2023-09-28

## 2023-09-28 VITALS — HEART RATE: 91 BPM | OXYGEN SATURATION: 99 % | TEMPERATURE: 99.4 F | RESPIRATION RATE: 17 BRPM

## 2023-09-28 DIAGNOSIS — M12.811 ROTATOR CUFF ARTHROPATHY, RIGHT: ICD-10-CM

## 2023-09-28 DIAGNOSIS — M75.121 NONTRAUMATIC COMPLETE TEAR OF RIGHT ROTATOR CUFF: ICD-10-CM

## 2023-09-28 DIAGNOSIS — M19.011 OSTEOARTHRITIS, LOCALIZED, SHOULDER, RIGHT: Primary | ICD-10-CM

## 2023-09-28 RX ORDER — TRIAMCINOLONE ACETONIDE 40 MG/ML
80 INJECTION, SUSPENSION INTRA-ARTICULAR; INTRAMUSCULAR ONCE
Status: COMPLETED | OUTPATIENT
Start: 2023-09-28 | End: 2023-09-28

## 2023-09-28 RX ADMIN — TRIAMCINOLONE ACETONIDE 80 MG: 40 INJECTION, SUSPENSION INTRA-ARTICULAR; INTRAMUSCULAR at 16:32

## 2023-09-28 NOTE — PROGRESS NOTES
Patient seen in office today for:    DOI:  DOS:  Date of last injection:     Patient reports /10 pain. RICE and medication are effective to alleviate pain and reduce swelling. Pain also alleviated by:   Pain worsened by: Patient reports painful ROM. MRI performed 9/13/2023, impression below. IMPRESSION:  Complete full-thickness tear of the supraspinatus and infraspinatus tendons  with medial tendon retraction the level of the glenohumeral joint line. Complete full-thickness tear of the subscapularis tendon with medial tendon  retraction of the torn tendon fibers to the level of the medial aspect of the  humeral head. Moderate to severe atrophy of the infraspinatus muscle. Moderate atrophy of  the supraspinatus and subscapularis muscle. Moderate osteoarthritis affecting the acromioclavicular joint and  glenohumeral joint. Nonvisualization of the long head of biceps tendon, suspicious for tearing.     Profession: , powder coating    Right handed

## 2023-09-28 NOTE — PATIENT INSTRUCTIONS
Continue to weight bear as tolerated  Continue range of motion  Ice and elevate as needed  Tylenol or Motrin for pain  Injection given into the right shoulder  No high impact activities for a least 24-48 hours  Follow up in 3 months  We are committed to providing you the best care possible. If you receive a survey after visiting one of our offices, please take time to share your experience concerning your physician office visit. These surveys are confidential and no health information about you is shared. We are eager to improve for you and we are counting on your feedback to help make that happen.

## 2023-10-01 PROBLEM — M19.011 OSTEOARTHRITIS, LOCALIZED, SHOULDER, RIGHT: Status: ACTIVE | Noted: 2023-10-01

## 2023-10-01 PROBLEM — M75.121 NONTRAUMATIC COMPLETE TEAR OF RIGHT ROTATOR CUFF: Status: ACTIVE | Noted: 2023-10-01

## 2023-10-01 PROBLEM — M12.811 ROTATOR CUFF ARTHROPATHY, RIGHT: Status: ACTIVE | Noted: 2023-10-01

## 2023-10-01 ASSESSMENT — ENCOUNTER SYMPTOMS
EYE PAIN: 0
COLOR CHANGE: 0
EYE REDNESS: 0
SHORTNESS OF BREATH: 0
WHEEZING: 0
VOMITING: 0
CHEST TIGHTNESS: 0

## 2023-10-23 ENCOUNTER — TELEPHONE (OUTPATIENT)
Dept: INTERNAL MEDICINE CLINIC | Age: 58
End: 2023-10-23

## 2023-10-23 DIAGNOSIS — I10 ESSENTIAL HYPERTENSION: Primary | ICD-10-CM

## 2023-10-23 RX ORDER — LISINOPRIL 10 MG/1
10 TABLET ORAL DAILY
Qty: 90 TABLET | Refills: 0 | Status: SHIPPED | OUTPATIENT
Start: 2023-10-23

## 2023-11-03 ENCOUNTER — OFFICE VISIT (OUTPATIENT)
Dept: CARDIOLOGY CLINIC | Age: 58
End: 2023-11-03
Payer: COMMERCIAL

## 2023-11-03 VITALS
HEART RATE: 92 BPM | OXYGEN SATURATION: 95 % | HEIGHT: 68 IN | DIASTOLIC BLOOD PRESSURE: 84 MMHG | SYSTOLIC BLOOD PRESSURE: 136 MMHG | BODY MASS INDEX: 31.67 KG/M2 | WEIGHT: 209 LBS

## 2023-11-03 DIAGNOSIS — I10 PRIMARY HYPERTENSION: Primary | ICD-10-CM

## 2023-11-03 DIAGNOSIS — E78.5 HYPERLIPIDEMIA, UNSPECIFIED HYPERLIPIDEMIA TYPE: ICD-10-CM

## 2023-11-03 PROCEDURE — 3075F SYST BP GE 130 - 139MM HG: CPT | Performed by: NURSE PRACTITIONER

## 2023-11-03 PROCEDURE — 3079F DIAST BP 80-89 MM HG: CPT | Performed by: NURSE PRACTITIONER

## 2023-11-03 PROCEDURE — 99213 OFFICE O/P EST LOW 20 MIN: CPT | Performed by: NURSE PRACTITIONER

## 2023-11-03 ASSESSMENT — ENCOUNTER SYMPTOMS
SHORTNESS OF BREATH: 0
ORTHOPNEA: 0

## 2023-11-03 NOTE — PATIENT INSTRUCTIONS
**It is YOUR responsibilty to bring medication bottles and/or updated medication list to [address] [place] [address]. This will allow us to better serve you and all your healthcare needs**   [facility] Locations - No appointment necessary. Sites open Monday to Friday. Call your preferred location for test preparation, business   hours and other information you need. [employer] accepts 's. [address] [place] [address].  WMarissa Giron. [place], [address]   Phone: [phone]    Thank you for allowing us to care for you today! We want to ensure we can follow your treatment plan and we strive to give you the best outcomes and experience possible. If you ever have a life threatening emergency and call 911 - for an ambulance (EMS)   Our providers can only care for you at:   [facility] or [facility]. Even if you have someone take you or you drive yourself we can only care for you in a [address] [place] [address] facility. Our providers are not setup at the other healthcare locations! Please be informed that if you contact our office outside of normal business hours the physician on call cannot help with any scheduling or rescheduling issues, procedure instruction questions or any type of medication issue. We advise you for any urgent/emergency that you go to the nearest emergency room! PLEASE CALL OUR OFFICE DURING NORMAL BUSINESS HOURS    Monday - Friday   8 am to 5 pm    [place]: [address]: [phone]    [place]:  [phone]  We are committed to providing you the best care possible. If you receive a survey after visiting one of our offices, please take time to share your experience concerning your physician office visit. These surveys are confidential and no health information about you is shared. We are eager to improve for you and we are counting on your feedback to help make that happen.

## 2023-12-28 ENCOUNTER — OFFICE VISIT (OUTPATIENT)
Dept: ORTHOPEDIC SURGERY | Age: 58
End: 2023-12-28
Payer: COMMERCIAL

## 2023-12-28 VITALS — TEMPERATURE: 98.1 F | OXYGEN SATURATION: 99 % | RESPIRATION RATE: 17 BRPM | HEART RATE: 90 BPM

## 2023-12-28 DIAGNOSIS — M17.11 PRIMARY OSTEOARTHRITIS OF RIGHT KNEE: ICD-10-CM

## 2023-12-28 DIAGNOSIS — M17.12 PRIMARY OSTEOARTHRITIS OF LEFT KNEE: ICD-10-CM

## 2023-12-28 DIAGNOSIS — M19.011 OSTEOARTHRITIS, LOCALIZED, SHOULDER, RIGHT: Primary | ICD-10-CM

## 2023-12-28 PROCEDURE — 20610 DRAIN/INJ JOINT/BURSA W/O US: CPT | Performed by: ORTHOPAEDIC SURGERY

## 2023-12-28 PROCEDURE — 99213 OFFICE O/P EST LOW 20 MIN: CPT | Performed by: ORTHOPAEDIC SURGERY

## 2023-12-28 RX ORDER — TRIAMCINOLONE ACETONIDE 40 MG/ML
80 INJECTION, SUSPENSION INTRA-ARTICULAR; INTRAMUSCULAR ONCE
Status: COMPLETED | OUTPATIENT
Start: 2023-12-28 | End: 2023-12-28

## 2023-12-28 RX ADMIN — TRIAMCINOLONE ACETONIDE 80 MG: 40 INJECTION, SUSPENSION INTRA-ARTICULAR; INTRAMUSCULAR at 16:21

## 2023-12-28 NOTE — PROGRESS NOTES
Patient seen in office today for:  Right shoulder pain, last inj 9/28/2023    Patient reports 6/10 pain.  RICE and medication are somewhat effective to alleviate pain and reduce swelling.     Pain worsened by: Patient reports painful ROM and weight bearing

## 2023-12-28 NOTE — PATIENT INSTRUCTIONS
Continue weight-bearing as tolerated.  Continue range of motion exercises as instructed.  Ice and elevate as needed.  Tylenol or Motrin for pain.  Injection given in right shoulder  Follow up in 3 months for your right shoulder  See appointment below for your left knee

## 2024-01-01 ENCOUNTER — HOSPITAL ENCOUNTER (EMERGENCY)
Age: 59
Discharge: LEFT BEFORE DISCHARGE | End: 2024-01-01
Attending: STUDENT IN AN ORGANIZED HEALTH CARE EDUCATION/TRAINING PROGRAM

## 2024-01-01 VITALS
RESPIRATION RATE: 16 BRPM | SYSTOLIC BLOOD PRESSURE: 130 MMHG | HEART RATE: 87 BPM | WEIGHT: 202 LBS | TEMPERATURE: 98.3 F | HEIGHT: 68 IN | OXYGEN SATURATION: 94 % | DIASTOLIC BLOOD PRESSURE: 86 MMHG | BODY MASS INDEX: 30.62 KG/M2

## 2024-01-01 ASSESSMENT — PAIN SCALES - GENERAL: PAINLEVEL_OUTOF10: 6

## 2024-01-01 ASSESSMENT — PAIN DESCRIPTION - ORIENTATION: ORIENTATION: LEFT

## 2024-01-01 ASSESSMENT — PAIN - FUNCTIONAL ASSESSMENT: PAIN_FUNCTIONAL_ASSESSMENT: 0-10

## 2024-01-01 ASSESSMENT — PAIN DESCRIPTION - LOCATION: LOCATION: KNEE

## 2024-01-01 NOTE — PROGRESS NOTES
12/28/2023   Chief Complaint   Patient presents with    Shoulder Pain     Right shoulder pain, last inj 9/28/2023        History of Present Illness:                             Valorie Galvan is a 58 y.o. male who returns today for follow-up of his right shoulder.  He had an injection performed about 3 months ago.  He felt that the injection was helpful in reducing pain and inflammation of the shoulder joint but it has subsequently worn off.  He is now having more problems with his job duties especially things that involve lifting pushing pulling or reaching with his right arm.  Pain is diffuse throughout the shoulder and radiates down the lateral aspect of his arm.  No new injuries or falls.    Patient seen in office today for:  Right shoulder pain, last inj 9/28/2023     Patient reports 6/10 pain.  RICE and medication are somewhat effective to alleviate pain and reduce swelling.      Pain worsened by: Patient reports painful ROM and weight bearing       Medical History  Patient's medications, allergies, past medical, surgical, social and family histories were reviewed and updated as appropriate.      Review of Systems                                            Examination:  General Exam:  Vitals: Pulse 90   Temp 98.1 °F (36.7 °C)   Resp 17   SpO2 99%    Physical Exam        Right Upper Extremity:     There is moderate to severe tenderness diffusely throughout the shoulder.  Tenderness to palpation is maximal over the anterior and lateral aspects of the shoulder specifically along the greater tuberosity and proximal biceps tendon.  Active range of motion is limited due to pain.  Forward elevation present to 100 degrees, abduction present to 80 degrees, external rotation 15 degrees.  Passive range of motion is moderately restricted and limited due to pain and apprehension.  Forward elevation 120 degrees, abduction 100 degrees.     Rotator cuff testing is difficult due to pain.  Strength 1/5 forward elevation

## 2024-01-01 NOTE — ED PROVIDER NOTES
I did not see or evaluate this patient.  Patient eloped prior to my evaluation.     Kong Shaw,   01/01/24 0418

## 2024-01-04 ENCOUNTER — OFFICE VISIT (OUTPATIENT)
Dept: ORTHOPEDIC SURGERY | Age: 59
End: 2024-01-04
Payer: COMMERCIAL

## 2024-01-04 VITALS
RESPIRATION RATE: 12 BRPM | WEIGHT: 202 LBS | OXYGEN SATURATION: 99 % | BODY MASS INDEX: 30.62 KG/M2 | HEIGHT: 68 IN | HEART RATE: 93 BPM

## 2024-01-04 DIAGNOSIS — M17.12 PRIMARY OSTEOARTHRITIS OF LEFT KNEE: Primary | ICD-10-CM

## 2024-01-04 DIAGNOSIS — M16.11 ARTHRITIS OF RIGHT HIP: ICD-10-CM

## 2024-01-04 PROCEDURE — 20610 DRAIN/INJ JOINT/BURSA W/O US: CPT | Performed by: ORTHOPAEDIC SURGERY

## 2024-01-04 PROCEDURE — 99214 OFFICE O/P EST MOD 30 MIN: CPT | Performed by: ORTHOPAEDIC SURGERY

## 2024-01-04 RX ORDER — TRIAMCINOLONE ACETONIDE 40 MG/ML
40 INJECTION, SUSPENSION INTRA-ARTICULAR; INTRAMUSCULAR ONCE
Status: COMPLETED | OUTPATIENT
Start: 2024-01-04 | End: 2024-01-04

## 2024-01-04 RX ADMIN — TRIAMCINOLONE ACETONIDE 40 MG: 40 INJECTION, SUSPENSION INTRA-ARTICULAR; INTRAMUSCULAR at 16:25

## 2024-01-04 ASSESSMENT — ENCOUNTER SYMPTOMS
VOMITING: 0
WHEEZING: 0
EYE PAIN: 0
CHEST TIGHTNESS: 0
COLOR CHANGE: 0
EYE REDNESS: 0
SHORTNESS OF BREATH: 0

## 2024-01-04 NOTE — PATIENT INSTRUCTIONS
We will schedule surgery at soonest convenience. If you have any questions regarding your surgery please call our office and ask to speak with Sophia [phone]

## 2024-01-04 NOTE — PROGRESS NOTES
Patient returns to the officce with left knee pain and ready to discuss sx. Pt stated he he has had steroid injections in the past and feels he is ready to have sx, given his injections are becoming more of a short-term relief. Pt stated the pain is localized on the medial aspect of the knee and will radiate to the back of the knee with any extension. Pt denies any recent injuries.   
he would like to have a steroid injection performed to help with pain relief while he is doing his active job duties.    Procedure Note, Left Knee Intra-articular Injection:    Multiple treatment options were discussed.  Joint injection was recommended.  Details of the procedure, potential risks, and potential benefits were discussed.  Patient's questions were answered.  Patient elected to proceed with procedure.    The left knee was prepped with alcohol. A 22 gauge needle was inserted into the knee joint.   The knee was then injected with 40 mg of Kenalog and 4 mL of 1% plain lidocaine.  A sterile Band-Aid was applied.  The patient tolerated the procedure well with no complications.          Electronically signed by Rj Marcano MD on 1/4/2024 at 4:38 PM

## 2024-01-12 ENCOUNTER — OFFICE VISIT (OUTPATIENT)
Dept: INTERNAL MEDICINE CLINIC | Age: 59
End: 2024-01-12
Payer: COMMERCIAL

## 2024-01-12 VITALS
WEIGHT: 206.4 LBS | OXYGEN SATURATION: 99 % | BODY MASS INDEX: 31.28 KG/M2 | SYSTOLIC BLOOD PRESSURE: 120 MMHG | DIASTOLIC BLOOD PRESSURE: 76 MMHG | HEIGHT: 68 IN | HEART RATE: 82 BPM

## 2024-01-12 DIAGNOSIS — I10 ESSENTIAL HYPERTENSION: Primary | ICD-10-CM

## 2024-01-12 DIAGNOSIS — M17.12 PRIMARY OSTEOARTHRITIS OF LEFT KNEE: ICD-10-CM

## 2024-01-12 PROCEDURE — 3078F DIAST BP <80 MM HG: CPT | Performed by: FAMILY MEDICINE

## 2024-01-12 PROCEDURE — 99214 OFFICE O/P EST MOD 30 MIN: CPT | Performed by: FAMILY MEDICINE

## 2024-01-12 PROCEDURE — 3074F SYST BP LT 130 MM HG: CPT | Performed by: FAMILY MEDICINE

## 2024-01-12 RX ORDER — LISINOPRIL 10 MG/1
10 TABLET ORAL DAILY
Qty: 90 TABLET | Refills: 1 | Status: SHIPPED | OUTPATIENT
Start: 2024-01-12

## 2024-01-12 ASSESSMENT — PATIENT HEALTH QUESTIONNAIRE - PHQ9
SUM OF ALL RESPONSES TO PHQ QUESTIONS 1-9: 0
2. FEELING DOWN, DEPRESSED OR HOPELESS: 0
SUM OF ALL RESPONSES TO PHQ QUESTIONS 1-9: 0
1. LITTLE INTEREST OR PLEASURE IN DOING THINGS: 0
SUM OF ALL RESPONSES TO PHQ9 QUESTIONS 1 & 2: 0

## 2024-01-12 NOTE — PROGRESS NOTES
Valorie Galvan (:  1965) is a 58 y.o. male,established patient, here for evaluation of the following chief complaint(s):  Follow-up (Pt HAVING TOTAL KNEE REPLACEMENT IN [facility] /Pt son recently just passed ) and Hypertension         ASSESSMENT/PLAN:  1. Essential hypertension, chronic  - lisinopril (PRINIVIL;ZESTRIL) 10 MG tablet; Take 1 tablet by mouth daily  Dispense: 90 tablet; Refill: 1    2. Primary osteoarthritis of left knee, chronic  Patient will have a TKR    Medications reconciled and discussed with the patient  Return for follow up in 5 1/2 months for HTN.     23 XR Knee Left  MPRESSION:  No acute fracture or dislocation at the left knee.     Osteoarthritic changes are stable.       Lab Results   Component Value Date    CHOL 204 (H) 03/10/2023     Lab Results   Component Value Date    TRIG 105 03/10/2023     Lab Results   Component Value Date    HDL 54 03/10/2023     Lab Results   Component Value Date    LDLCALC 129 (H) 03/10/2023    LDLDIRECT 114 (H) 10/01/2021     Lab Results   Component Value Date    LABA1C 5.4 03/10/2023       Lab Results   Component Value Date     2023    K 3.9 2023     2023    CO2 20 (L) 2023    BUN 16 2023    CREATININE 0.9 2023    GLUCOSE 114 (H) 2023    CALCIUM 8.5 2023    PROT 6.7 2023    LABALBU 4.1 2023    BILITOT 0.2 2023    ALKPHOS 49 2023    AST 22 2023    ALT 18 2023    LABGLOM >60 2023    GFRAA >60 2022    AGRATIO 1.8 2020    GLOB 2.5 2020     Lab Results   Component Value Date    TSHFT4 1.21 2022     Lab Results   Component Value Date/Time    COLORU Yellow 03/10/2023 10:38 AM    LABPH 5.0 2021 11:22 AM    NITRU Negative 03/10/2023 10:38 AM    GLUCOSEU Negative 03/10/2023 10:38 AM    KETUA Negative 03/10/2023 10:38 AM    UROBILINOGEN 0.2 03/10/2023 10:38 AM    BILIRUBINUR Negative 03/10/2023 10:38 AM       Subjective

## 2024-01-16 PROBLEM — S46.219A BICEPS TENDON TEAR: Status: ACTIVE | Noted: 2024-01-16

## 2024-01-17 ENCOUNTER — TELEPHONE (OUTPATIENT)
Dept: CARDIOLOGY CLINIC | Age: 59
End: 2024-01-17

## 2024-01-17 ENCOUNTER — TELEPHONE (OUTPATIENT)
Dept: ORTHOPEDIC SURGERY | Age: 59
End: 2024-01-17

## 2024-01-17 DIAGNOSIS — M25.562 CHRONIC PAIN OF LEFT KNEE: ICD-10-CM

## 2024-01-17 DIAGNOSIS — M17.12 PRIMARY OSTEOARTHRITIS OF LEFT KNEE: Primary | ICD-10-CM

## 2024-01-17 DIAGNOSIS — G89.29 CHRONIC PAIN OF LEFT KNEE: ICD-10-CM

## 2024-01-17 NOTE — TELEPHONE ENCOUNTER
Cardiologist: Dr. Cordero  Surgeon: Dr. Marcano  Surgery: Left total knee arthroplasty  Anesthesia: Spinal/ nerve block  Date: 4/10/2024  FAX# [phone]  Ph# [phone]    Last OV 11/3/2023 w/Soco    Chest pain  In the setting of hypertension along with excessive alcohol intake.  No further cardiac work-up has been negative.  Exercise treadmill stress test in February showed good exercise capacity with 9 METS workload.  Was negative for ischemia or arrhythmia.  Currently chest pain-free.  He has decreased his alcohol consumption  If pain reoccurs we will do stress Cardiolite.  Continue with aspirin     Hypertension  Blood pressure is stable.  Recently had antihypertensives decreased due to low blood pressure.  He is also had his significant weight loss which is intentional.  Continue with his lisinopril 10 mg daily.         Last EKG- 6/18/2023      Stress Test- 2/11/2022  Good exercise capacity. 9 METs work load.   Physiological BP response to exercise.   ETT negative for ischemia / Arrhythmia.        Echo- 7/14/2021  Limited study due to patients body habitus.   Left ventricular function and size is normal, EF is estimated at 55-60%.   Mild left ventricular hypertrophy.   E/A reversal; indeterminate diastolic function.   No regional wall motion abnormalities were detected.   Mild pulmonic regurgitation present.   No significant valvular disease noted.   No evidence of pericardial effusion.          Aspirin

## 2024-01-17 NOTE — TELEPHONE ENCOUNTER
Scheduled patient for:    Left Total Knee Arthroplasty  CPT: 43733  ICD 10: M17.12; M25.562  Surgery Date: 4/10/2024  Surgeon: Krys  Facility: [facility]  Product: [employer]  Anesthesia: Spinal/Nerve Block    Physical Therapy: [place]-order created  CT of Lt knee for [facility]-order created    Clearances sent to:  PCP: Yuan  Cardiac: Consuelo    Insurance: BC/BS  Prior auth started on 1/17/24 via forms and clinicals faxed to bc/[employer] of [place] at [phone]  Pending clinical review

## 2024-01-18 ENCOUNTER — TELEPHONE (OUTPATIENT)
Dept: INTERNAL MEDICINE CLINIC | Age: 59
End: 2024-01-18

## 2024-01-18 NOTE — TELEPHONE ENCOUNTER
Got a fax from from Dr. Marcano Pt is having surgery 4/10/24 and needs a pre-op Appt. Called and left VM with Pt.

## 2024-01-31 ENCOUNTER — TELEPHONE (OUTPATIENT)
Dept: INTERNAL MEDICINE CLINIC | Age: 59
End: 2024-01-31

## 2024-02-13 ENCOUNTER — TELEPHONE (OUTPATIENT)
Dept: INTERNAL MEDICINE CLINIC | Age: 59
End: 2024-02-13

## 2024-02-13 NOTE — TELEPHONE ENCOUNTER
Sent mychart message as I have called Pt and left VM so I sent him a [employer] message to encourage him to make pre-op appt.

## 2024-02-14 ENCOUNTER — APPOINTMENT (OUTPATIENT)
Dept: GENERAL RADIOLOGY | Age: 59
End: 2024-02-14
Payer: COMMERCIAL

## 2024-02-14 ENCOUNTER — HOSPITAL ENCOUNTER (EMERGENCY)
Age: 59
Discharge: HOME | End: 2024-02-14
Payer: COMMERCIAL

## 2024-02-14 VITALS
SYSTOLIC BLOOD PRESSURE: 152 MMHG | WEIGHT: 200 LBS | HEART RATE: 72 BPM | RESPIRATION RATE: 19 BRPM | TEMPERATURE: 98 F | OXYGEN SATURATION: 99 % | DIASTOLIC BLOOD PRESSURE: 81 MMHG | BODY MASS INDEX: 30.41 KG/M2

## 2024-02-14 DIAGNOSIS — M19.041 OSTEOARTHRITIS OF RIGHT HAND, UNSPECIFIED OSTEOARTHRITIS TYPE: Primary | ICD-10-CM

## 2024-02-14 PROCEDURE — 6370000000 HC RX 637 (ALT 250 FOR IP): Performed by: NURSE PRACTITIONER

## 2024-02-14 PROCEDURE — 73110 X-RAY EXAM OF WRIST: CPT

## 2024-02-14 PROCEDURE — 99283 EMERGENCY DEPT VISIT LOW MDM: CPT

## 2024-02-14 RX ORDER — MELOXICAM 15 MG/1
15 TABLET ORAL DAILY
Qty: 14 TABLET | Refills: 0 | Status: SHIPPED | OUTPATIENT
Start: 2024-02-14

## 2024-02-14 RX ORDER — IBUPROFEN 600 MG/1
600 TABLET ORAL ONCE
Status: COMPLETED | OUTPATIENT
Start: 2024-02-14 | End: 2024-02-14

## 2024-02-14 RX ADMIN — IBUPROFEN 600 MG: 600 TABLET, FILM COATED ORAL at 08:41

## 2024-02-14 NOTE — ED PROVIDER NOTES
they will be discharged to follow-up as an outpatient, they understand and agree with the plan, return warnings given.     CONSULTS: None      Patient’s care significantly limited by social determinants of health including: na    Disposition Considerations (tests considered but not done, Shared Decision Making, Pt Expectation of Test or Tx.):     FINAL IMPRESSION      1. Osteoarthritis of right hand, unspecified osteoarthritis type          DISPOSITION/PLAN   DISPOSITION Decision To Discharge 02/14/2024 08:46:32 AM      PATIENT REFERRED TO:  Bebe Bolden  [facility]   [facility] [address] [phone]    Schedule an appointment as soon as possible for a visit in 3 days  follow up      DISCHARGE MEDICATIONS:  New Prescriptions    MELOXICAM (MOBIC) 15 MG TABLET    Take 1 tablet by mouth daily          (Please note that portions of this note were completed with a voice recognition program.  Efforts were made to edit the dictations but occasionally words are mis-transcribed.)    RYANN Almaraz CNP (electronically signed)       Elena Philip APRN - CNP  02/14/24 9253

## 2024-02-20 ENCOUNTER — OFFICE VISIT (OUTPATIENT)
Dept: ORTHOPEDIC SURGERY | Age: 59
End: 2024-02-20
Payer: COMMERCIAL

## 2024-02-20 VITALS
RESPIRATION RATE: 16 BRPM | BODY MASS INDEX: 30.31 KG/M2 | WEIGHT: 200 LBS | HEIGHT: 68 IN | OXYGEN SATURATION: 98 % | HEART RATE: 90 BPM

## 2024-02-20 DIAGNOSIS — M25.531 RIGHT WRIST PAIN: Primary | ICD-10-CM

## 2024-02-20 DIAGNOSIS — M10.9 ARTHRITIS OF RIGHT WRIST DUE TO GOUT: ICD-10-CM

## 2024-02-20 DIAGNOSIS — M18.11 LOCALIZED PRIMARY OSTEOARTHRITIS OF CARPOMETACARPAL JOINT OF RIGHT THUMB: ICD-10-CM

## 2024-02-20 DIAGNOSIS — S66.911A STRAIN OF RIGHT WRIST, INITIAL ENCOUNTER: ICD-10-CM

## 2024-02-20 DIAGNOSIS — M17.12 PRIMARY OSTEOARTHRITIS OF LEFT KNEE: ICD-10-CM

## 2024-02-20 PROCEDURE — 99214 OFFICE O/P EST MOD 30 MIN: CPT | Performed by: ORTHOPAEDIC SURGERY

## 2024-02-20 PROCEDURE — 20605 DRAIN/INJ JOINT/BURSA W/O US: CPT | Performed by: ORTHOPAEDIC SURGERY

## 2024-02-20 RX ORDER — TRIAMCINOLONE ACETONIDE 40 MG/ML
40 INJECTION, SUSPENSION INTRA-ARTICULAR; INTRAMUSCULAR ONCE
Status: COMPLETED | OUTPATIENT
Start: 2024-02-20 | End: 2024-02-20

## 2024-02-20 RX ADMIN — TRIAMCINOLONE ACETONIDE 40 MG: 40 INJECTION, SUSPENSION INTRA-ARTICULAR; INTRAMUSCULAR at 16:09

## 2024-02-20 ASSESSMENT — ENCOUNTER SYMPTOMS
EYE REDNESS: 0
CHEST TIGHTNESS: 0
COLOR CHANGE: 0
VOMITING: 0
WHEEZING: 0
SHORTNESS OF BREATH: 0
EYE PAIN: 0

## 2024-02-20 NOTE — PATIENT INSTRUCTIONS
Continue weight-bearing as tolerated.  Continue range of motion exercises as instructed.  Ice and elevate as needed.  Tylenol or Motrin for pain.  Injection given in right wrist  Follow up as needed

## 2024-02-20 NOTE — PROGRESS NOTES
2/20/2024   Chief Complaint   Patient presents with    Wrist Pain     Right        History of Present Illness:                             Valorie Galvan is a 58 y.o. male who presents today for evaluation of his right wrist pain and swelling and stiffness.  He had acute onset of right wrist pain about a week ago after doing repetitive activities with a nail gun.  He does not recall any severe traumatic injuries but had repetitive strain on his wrist while performing job duties.  His wrist became very painful swollen and tight along with warmth consistent with his history of previous gout flares which have occurred in the past at his big toe.  He has never dealt with gout before in the wrist.  Pain was severe and he is having trouble getting comfortable.  He continues with compressive wraps, rest, and regular use of ibuprofen with no relief of his symptoms.  He was seen in the emergency room and x-rays were negative for acute abnormality.      Patient is a 58 y.o. year old male. Patient is in the office today with right wrist dorsal and ulnar side of the wrist. Patient states that about a week ago patient was using a staple hammer and was hitting the staples into installation and it seemed to increase his pain in his wrist the next day. Patient has been using a ace bandage for support and doesn't seem to really help. Right hand dominant. Denies any falls or injuries.       Medical History  Patient's medications, allergies, past medical, surgical, social and family histories were reviewed and updated as appropriate.    Past Medical History:   Diagnosis Date    Arthritis     Bilateral carpal tunnel syndrome     CAD (coronary artery disease)     ECHO 07/14/2021    EF 55-60%. Mild Pulmonic regurgitation is present, No evidence of pericardial effusion.    H/O echocardiogram 04/28/2016    EF 55% WNL- Stage 1 dysfunction     Hyperlipidemia     Hypertension     Medial meniscus tear     Left knee    Prediabetes     pt

## 2024-02-20 NOTE — PROGRESS NOTES
Patient is a 58 y.o. year old male. Patient is in the office today with right wrist dorsal and ulnar side of the wrist. Patient states that about a week ago patient was using a staple hammer and was hitting the staples into installation and it seemed to increase his pain in his wrist the next day. Patient has been using a ace bandage for support and doesn't seem to really help. Right hand dominant. Denies any falls or injuries.

## 2024-02-21 DIAGNOSIS — M17.12 PRIMARY OSTEOARTHRITIS OF LEFT KNEE: Primary | ICD-10-CM

## 2024-02-22 ENCOUNTER — HOSPITAL ENCOUNTER (OUTPATIENT)
Dept: PHYSICAL THERAPY | Age: 59
Setting detail: THERAPIES SERIES
Discharge: HOME | End: 2024-02-22
Payer: COMMERCIAL

## 2024-02-22 PROCEDURE — 97161 PT EVAL LOW COMPLEX 20 MIN: CPT

## 2024-02-22 PROCEDURE — 97110 THERAPEUTIC EXERCISES: CPT

## 2024-02-22 ASSESSMENT — PAIN DESCRIPTION - PAIN TYPE: TYPE: CHRONIC PAIN

## 2024-02-22 NOTE — PLAN OF CARE
Outpatient Physical Therapy           [place]           [x] Phone: [phone]   Fax: [phone]  [place]           [] Phone: [phone]   Fax: [phone]     To: Rj Marcano MD   From: Maryellen Laws, SPT and Florina Ruiz, PT, DPT    Patient: Valorie Galvan       : 1965  Diagnosis:  Diagnosis: Primary osteoarthritis of the left knee, chornic pain of left knee  Treatment Diagnosis: Chronic L knee pain, impaired L knee range of motion, impaired L LE strength  Date: 2024    Physical Therapy Certification/Re-Certification Form  Dear Dr. Marcano,   The following patient has been evaluated for physical therapy services and for therapy to continue, insurance requires physician review of the treatment plan initially and every 90 days. Please review the attached evaluation and/or summary of the patient's plan of care, and verify that you agree therapy should continue by signing the attached document and sending it back to our office.      Assessment:    Assessment: Pt is a 58-year-old male who presents to therapy for pre-op evaluation of L TKA. Pt has surgery set for 4/10/24 and post-op visit scheduled for 4/15/2024. Upon evaluation, pt presents with mild range of motion deficits and pain with activity at the left knee, affecting their functional mobility tasks. During the session today PT educated, demonstrated and had pt practice proper exercises pre-surgery, proper stair negotiation and proper AD use to prepare for after surgery. Pt to return after surgery for post-op evaluation. Pt would benefit from skilled physcial therapy to address lower extremity weakness, deficits with mobility, and to progress towards current goals to prevent regression or injury.    Patient agrees with established plan of care and assisted in the development of their short term and long term goals. Patient had no adverse reaction with initial treatment and there are no barriers to learning.  Learning preferences

## 2024-02-22 NOTE — FLOWSHEET NOTE
Outpatient Physical Therapy  [place]           [x] Phone: [phone]   Fax: [phone]  [place]           [] Phone: [phone]   Fax: [phone]        Physical Therapy Daily Treatment Note  Date:  2024    Patient Name:  Valorie Galvan    :  1965  MRN: [id number]  Restrictions/Precautions: n/a  Diagnosis:    Diagnosis: Primary osteoarthritis of the left knee, chornic pain of left knee  Date of Injury/Surgery:   Treatment Diagnosis:  Chronic L knee pain, impaired L knee range of motion, impaired L LE strength  Insurance/Certification information: BC[employer] - 60 PCY  Referring Physician:  Rj Marcano MD   PCP: Bebe Bolden DO  Next Doctor Visit:    Plan of care signed (Y/N):  Y sent on   Outcome Measure: LEFS:   Visit# / total visits:     Pain level: 2-3/10   Goals:     Patient goals: Prepare for surgery  Short term goals  Time Frame for Short term goals:   During today's visit   Pt will demonstrate good understanding of exercise progression pre & post-surgery. MET   Pt will demonstrate good understanding of walker use post-surgery. MET   Pt will demonstrate good understanding of ascending/descending stairs after surgery. MET     Long Term Goals  Time Frame for Long Term Goals: Refer to ST[facility]    Summary of Evaluation:  Assessment: Pt is a 58-year-old male who presents to therapy for pre-op evaluation of L TKA. Pt has surgery set for 4/10/24 and post-op visit scheduled for 4/15/2024. Upon evaluation, pt presents with mild range of motion deficits and pain with activity at the left knee, affecting their functional mobility tasks. During the session today PT educated, demonstrated and had pt practice proper exercises pre-surgery, proper stair negotiation and proper AD use to prepare for after surgery. Pt to return after surgery for post-op evaluation. Pt would benefit from skilled physcial therapy to address lower extremity weakness, deficits with mobility, and to

## 2024-02-22 NOTE — PROGRESS NOTES
Rj Marcano MD        Physician Comments: _______________________________________________    [facility].  Please fax to the location listed below. THANK YOU for this referral!    [facility]  [address]  [facility] [address]  Dept: [phone]  Dept Fax: [phone]  Loc: [phone]       POC NOTE

## 2024-03-25 ENCOUNTER — HOSPITAL ENCOUNTER (OUTPATIENT)
Dept: CT IMAGING | Age: 59
Discharge: HOME | End: 2024-03-25
Attending: ORTHOPAEDIC SURGERY
Payer: COMMERCIAL

## 2024-03-25 ENCOUNTER — NURSE ONLY (OUTPATIENT)
Dept: ORTHOPEDIC SURGERY | Age: 59
End: 2024-03-25

## 2024-03-25 DIAGNOSIS — G89.29 CHRONIC PAIN OF LEFT KNEE: ICD-10-CM

## 2024-03-25 DIAGNOSIS — M25.562 CHRONIC PAIN OF LEFT KNEE: ICD-10-CM

## 2024-03-25 DIAGNOSIS — M17.12 PRIMARY OSTEOARTHRITIS OF LEFT KNEE: ICD-10-CM

## 2024-03-25 PROCEDURE — 73700 CT LOWER EXTREMITY W/O DYE: CPT

## 2024-03-25 RX ORDER — SODIUM CHLORIDE, SODIUM LACTATE, POTASSIUM CHLORIDE, CALCIUM CHLORIDE 600; 310; 30; 20 MG/100ML; MG/100ML; MG/100ML; MG/100ML
INJECTION, SOLUTION INTRAVENOUS CONTINUOUS
OUTPATIENT
Start: 2024-04-10

## 2024-03-25 NOTE — PROGRESS NOTES
DORIAN, Left TKA, DOS 4/10/2024    Patient would like to discharge from [facility] with an order for outpatient PT.     All questions and concerns of the patient's have been answered at this time to the best of my ability. Patient is aware he may contact this nurse at her direct number given to him at anytime with questions or concerns moving forward.

## 2024-03-25 NOTE — PROGRESS NOTES
3/25/24 - .Reminded of pre-testing on 3/27/24 @0915.   Bring insurance card, picture ID and a list of your home medications. Check in at the information desk in the lobby upon your arrival. You can eat and take morning medications. Patient verbalized understanding.

## 2024-03-26 ASSESSMENT — KOOS JR
KOOS JR TOTAL INTERVAL SCORE: 44.905
RISING FROM SITTING: SEVERE
TWISING OR PIVOTING ON KNEE: SEVERE
STRAIGHTENING KNEE FULLY: MODERATE
GOING UP OR DOWN STAIRS: MODERATE
BENDING TO THE FLOOR TO PICK UP OBJECT: SEVERE
HOW SEVERE IS YOUR KNEE STIFFNESS AFTER FIRST WAKING IN MORNING: MODERATE
STANDING UPRIGHT: MODERATE

## 2024-03-26 ASSESSMENT — PROMIS GLOBAL HEALTH SCALE
IN GENERAL, HOW WOULD YOU RATE YOUR SATISFACTION WITH YOUR SOCIAL ACTIVITIES AND RELATIONSHIPS [ON A SCALE OF 1 (POOR) TO 5 (EXCELLENT)]?: VERY GOOD
IN THE PAST 7 DAYS, HOW OFTEN HAVE YOU BEEN BOTHERED BY EMOTIONAL PROBLEMS, SUCH AS FEELING ANXIOUS, DEPRESSED, OR IRRITABLE [ON A SCALE FROM 1 (NEVER) TO 5 (ALWAYS)]?: NEVER
IN GENERAL, HOW WOULD YOU RATE YOUR PHYSICAL HEALTH [ON A SCALE OF 1 (POOR) TO 5 (EXCELLENT)]?: GOOD
IN THE PAST 7 DAYS, HOW WOULD YOU RATE YOUR PAIN ON AVERAGE [ON A SCALE FROM 0 (NO PAIN) TO 10 (WORST IMAGINABLE PAIN)]?: 7
TO WHAT EXTENT ARE YOU ABLE TO CARRY OUT YOUR EVERYDAY PHYSICAL ACTIVITIES SUCH AS WALKING, CLIMBING STAIRS, CARRYING GROCERIES, OR MOVING A CHAIR [ON A SCALE OF 1 (NOT AT ALL) TO 5 (COMPLETELY)]?: MODERATELY
WHO IS THE PERSON COMPLETING THE PROMIS V1.1 SURVEY?: SELF
IN GENERAL, WOULD YOU SAY YOUR HEALTH IS...[ON A SCALE OF 1 (POOR) TO 5 (EXCELLENT)]: GOOD
IN GENERAL, HOW WOULD YOU RATE YOUR MENTAL HEALTH, INCLUDING YOUR MOOD AND YOUR ABILITY TO THINK [ON A SCALE OF 1 (POOR) TO 5 (EXCELLENT)]?: VERY GOOD
HOW IS THE PROMIS V1.1 BEING ADMINISTERED?: PAPER
IN GENERAL, PLEASE RATE HOW WELL YOU CARRY OUT YOUR USUAL SOCIAL ACTIVITIES (INCLUDES ACTIVITIES AT HOME, AT WORK, AND IN YOUR COMMUNITY, AND RESPONSIBILITIES AS A PARENT, CHILD, SPOUSE, EMPLOYEE, FRIEND, ETC) [ON A SCALE OF 1 (POOR) TO 5 (EXCELLENT)]?: FAIR
IN GENERAL, WOULD YOU SAY YOUR QUALITY OF LIFE IS...[ON A SCALE OF 1 (POOR) TO 5 (EXCELLENT)]: GOOD
SUM OF RESPONSES TO QUESTIONS 2, 4, 5, & 10: 16
SUM OF RESPONSES TO QUESTIONS 3, 6, 7, & 8: 17
IN THE PAST 7 DAYS, HOW WOULD YOU RATE YOUR FATIGUE ON AVERAGE [ON A SCALE FROM 1 (NONE) TO 5 (VERY SEVERE)]?: MILD

## 2024-03-27 ENCOUNTER — HOSPITAL ENCOUNTER (OUTPATIENT)
Dept: PREADMISSION TESTING | Age: 59
Discharge: HOME | End: 2024-03-27
Payer: COMMERCIAL

## 2024-03-27 ENCOUNTER — HOSPITAL ENCOUNTER (OUTPATIENT)
Age: 59
Discharge: HOME | End: 2024-03-29
Payer: COMMERCIAL

## 2024-03-27 VITALS
RESPIRATION RATE: 20 BRPM | WEIGHT: 200 LBS | HEART RATE: 85 BPM | TEMPERATURE: 97.3 F | HEIGHT: 68 IN | BODY MASS INDEX: 30.31 KG/M2 | SYSTOLIC BLOOD PRESSURE: 142 MMHG | DIASTOLIC BLOOD PRESSURE: 75 MMHG

## 2024-03-27 DIAGNOSIS — Z01.818 PRE-OP TESTING: Primary | ICD-10-CM

## 2024-03-27 DIAGNOSIS — Z01.818 PRE-OP TESTING: ICD-10-CM

## 2024-03-27 LAB
ALBUMIN SERPL-MCNC: 4.4 GM/DL (ref 3.4–5)
ALP BLD-CCNC: 46 IU/L (ref 40–129)
ALT SERPL-CCNC: 16 U/L (ref 10–40)
ANION GAP SERPL CALCULATED.3IONS-SCNC: 11 MMOL/L (ref 7–16)
AST SERPL-CCNC: 16 IU/L (ref 15–37)
BASOPHILS ABSOLUTE: 0.1 K/CU MM
BASOPHILS RELATIVE PERCENT: 0.7 % (ref 0–1)
BILIRUB SERPL-MCNC: 0.7 MG/DL (ref 0–1)
BILIRUBIN URINE: NEGATIVE MG/DL
BLOOD, URINE: NEGATIVE
BUN SERPL-MCNC: 12 MG/DL (ref 6–23)
CALCIUM SERPL-MCNC: 9.1 MG/DL (ref 8.3–10.6)
CHLORIDE BLD-SCNC: 103 MMOL/L (ref 99–110)
CLARITY: CLEAR
CO2: 25 MMOL/L (ref 21–32)
COLOR: YELLOW
COMMENT UA: NORMAL
CREAT SERPL-MCNC: 0.7 MG/DL (ref 0.9–1.3)
DIFFERENTIAL TYPE: ABNORMAL
EOSINOPHILS ABSOLUTE: 0.1 K/CU MM
EOSINOPHILS RELATIVE PERCENT: 1.8 % (ref 0–3)
ERYTHROCYTE SEDIMENTATION RATE: 1 MM/HR (ref 0–20)
GFR SERPL CREATININE-BSD FRML MDRD: >90 ML/MIN/1.73M2
GLUCOSE SERPL-MCNC: 106 MG/DL (ref 70–99)
GLUCOSE, URINE: NEGATIVE MG/DL
HCT VFR BLD CALC: 44 % (ref 42–52)
HEMOGLOBIN: 14 GM/DL (ref 13.5–18)
IMMATURE NEUTROPHIL %: 0.7 % (ref 0–0.43)
KETONES, URINE: NEGATIVE MG/DL
LEUKOCYTE ESTERASE, URINE: NEGATIVE
LYMPHOCYTES ABSOLUTE: 1.5 K/CU MM
LYMPHOCYTES RELATIVE PERCENT: 19.7 % (ref 24–44)
MCH RBC QN AUTO: 29.4 PG (ref 27–31)
MCHC RBC AUTO-ENTMCNC: 31.8 % (ref 32–36)
MCV RBC AUTO: 92.4 FL (ref 78–100)
MONOCYTES ABSOLUTE: 0.6 K/CU MM
MONOCYTES RELATIVE PERCENT: 7.3 % (ref 0–4)
NITRITE URINE, QUANTITATIVE: NEGATIVE
NUCLEATED RBC %: 0 %
PDW BLD-RTO: 12.6 % (ref 11.7–14.9)
PH, URINE: 5.5 (ref 5–8)
PLATELET # BLD: 190 K/CU MM (ref 140–440)
PMV BLD AUTO: 9 FL (ref 7.5–11.1)
POTASSIUM SERPL-SCNC: 4.2 MMOL/L (ref 3.5–5.1)
PROTEIN UA: NEGATIVE MG/DL
RBC # BLD: 4.76 M/CU MM (ref 4.6–6.2)
SEGMENTED NEUTROPHILS ABSOLUTE COUNT: 5.4 K/CU MM
SEGMENTED NEUTROPHILS RELATIVE PERCENT: 69.8 % (ref 36–66)
SODIUM BLD-SCNC: 139 MMOL/L (ref 135–145)
SPECIFIC GRAVITY UA: <1.005 (ref 1–1.03)
TOTAL IMMATURE NEUTOROPHIL: 0.05 K/CU MM
TOTAL NUCLEATED RBC: 0 K/CU MM
TOTAL PROTEIN: 6.8 GM/DL (ref 6.4–8.2)
UROBILINOGEN, URINE: 0.2 MG/DL (ref 0.2–1)
WBC # BLD: 7.7 K/CU MM (ref 4–10.5)

## 2024-03-27 PROCEDURE — 80053 COMPREHEN METABOLIC PANEL: CPT

## 2024-03-27 PROCEDURE — 85652 RBC SED RATE AUTOMATED: CPT

## 2024-03-27 PROCEDURE — 36415 COLL VENOUS BLD VENIPUNCTURE: CPT

## 2024-03-27 PROCEDURE — 87086 URINE CULTURE/COLONY COUNT: CPT

## 2024-03-27 PROCEDURE — 93005 ELECTROCARDIOGRAM TRACING: CPT | Performed by: ORTHOPAEDIC SURGERY

## 2024-03-27 PROCEDURE — 85025 COMPLETE CBC W/AUTO DIFF WBC: CPT

## 2024-03-27 PROCEDURE — 81003 URINALYSIS AUTO W/O SCOPE: CPT

## 2024-03-27 ASSESSMENT — PAIN DESCRIPTION - FREQUENCY: FREQUENCY: CONTINUOUS

## 2024-03-27 ASSESSMENT — PAIN SCALES - GENERAL: PAINLEVEL_OUTOF10: 5

## 2024-03-27 NOTE — PROGRESS NOTES
.Surgery @ [facility] on 4/10/24 you will be called 4/9/24 with times    NOTHING TO EAT OR DRINK AFTER MIDNIGHT DAY OF SURGERY    1. Enter thru the hospital main entrance on day of surgery, check in at the Information Desk. If you arrive prior to 6:00am, enter thru the ER entrance.    2. Follow the directions as prescribed by the doctor for your procedure and medications.         Morning of surgery take: no medications          Stop vitamins, supplements and NSAIDS:  4/5/24    3. Check with your Doctor regarding stopping blood thinners and follow their instructions.    4. Do not smoke, vape or use chewing tobacco morning of surgery. Do not drink any alcoholic beverages 24 hours prior to surgery.       This includes NA Beer. No street drugs 7 days prior to surgery.    5. If you have dentures, contacts of glasses they will be removed before going to the OR; please bring a case.    6. Please bring picture ID, insurance card, paperwork from the doctor’s office (H & P, Consent, & card for implantable devices).    7. Take a shower with an antibacterial soap the night before surgery and the morning of surgery. Do not put anything on your skin      After your morning shower.    8. You will need a responsible adult to drive you home and check on you after surgery.

## 2024-03-28 LAB
CULTURE: NORMAL
EKG ATRIAL RATE: 75 BPM
EKG DIAGNOSIS: NORMAL
EKG P AXIS: 33 DEGREES
EKG P-R INTERVAL: 150 MS
EKG Q-T INTERVAL: 382 MS
EKG QRS DURATION: 82 MS
EKG QTC CALCULATION (BAZETT): 426 MS
EKG R AXIS: 11 DEGREES
EKG T AXIS: -1 DEGREES
EKG VENTRICULAR RATE: 75 BPM
Lab: NORMAL
SPECIMEN: NORMAL

## 2024-04-08 ENCOUNTER — OFFICE VISIT (OUTPATIENT)
Dept: INTERNAL MEDICINE CLINIC | Age: 59
End: 2024-04-08
Payer: COMMERCIAL

## 2024-04-08 VITALS
OXYGEN SATURATION: 96 % | DIASTOLIC BLOOD PRESSURE: 80 MMHG | SYSTOLIC BLOOD PRESSURE: 120 MMHG | HEIGHT: 68 IN | BODY MASS INDEX: 31.34 KG/M2 | WEIGHT: 206.8 LBS | HEART RATE: 93 BPM

## 2024-04-08 DIAGNOSIS — M17.12 PRIMARY OSTEOARTHRITIS OF LEFT KNEE: ICD-10-CM

## 2024-04-08 DIAGNOSIS — Z01.818 PREOPERATIVE EXAMINATION: Primary | ICD-10-CM

## 2024-04-08 DIAGNOSIS — I10 ESSENTIAL HYPERTENSION: ICD-10-CM

## 2024-04-08 DIAGNOSIS — E78.5 HYPERLIPIDEMIA, UNSPECIFIED HYPERLIPIDEMIA TYPE: ICD-10-CM

## 2024-04-08 PROCEDURE — 3079F DIAST BP 80-89 MM HG: CPT | Performed by: FAMILY MEDICINE

## 2024-04-08 PROCEDURE — 99214 OFFICE O/P EST MOD 30 MIN: CPT | Performed by: FAMILY MEDICINE

## 2024-04-08 PROCEDURE — 3074F SYST BP LT 130 MM HG: CPT | Performed by: FAMILY MEDICINE

## 2024-04-08 SDOH — ECONOMIC STABILITY: INCOME INSECURITY: HOW HARD IS IT FOR YOU TO PAY FOR THE VERY BASICS LIKE FOOD, HOUSING, MEDICAL CARE, AND HEATING?: NOT HARD AT ALL

## 2024-04-08 SDOH — ECONOMIC STABILITY: FOOD INSECURITY: WITHIN THE PAST 12 MONTHS, YOU WORRIED THAT YOUR FOOD WOULD RUN OUT BEFORE YOU GOT MONEY TO BUY MORE.: NEVER TRUE

## 2024-04-08 SDOH — ECONOMIC STABILITY: FOOD INSECURITY: WITHIN THE PAST 12 MONTHS, THE FOOD YOU BOUGHT JUST DIDN'T LAST AND YOU DIDN'T HAVE MONEY TO GET MORE.: NEVER TRUE

## 2024-04-08 NOTE — PROGRESS NOTES
Subjective:    Chief Complaint: Preoperative Examination     Valorie Galvan is a 59 y.o. male who presents for a preoperative physical examination.  He is scheduled to have Left Total Knee Arthroplasty done by Dr. Marcano at [facility] on 4/10/24.      He has chronic L knee pain/osteoarthritis. He has been receiving chronic cortisone injections  in his knee. He states they are no longer working  and he would like to proceed with surgery    History of Present Illness:    Patient Active Problem List    Diagnosis Date Noted    Right carpal tunnel syndrome 03/16/2023    Primary osteoarthritis of right knee 03/16/2023    Localized primary osteoarthritis of carpometacarpal joint of right thumb 02/20/2024    Arthritis of right wrist due to gout 02/20/2024    Biceps tendon tear 01/16/2024    Osteoarthritis, localized, shoulder, right 10/01/2023    Rotator cuff arthropathy, right 10/01/2023    Nontraumatic complete tear of right rotator cuff 10/01/2023    LOC (loss of consciousness) ([facility]) 02/26/2022    Primary osteoarthritis of left knee 11/18/2021    Acquired trigger finger of left middle finger 10/11/2021    Carpal tunnel syndrome on left     Sprain of left rotator cuff capsule 06/20/2021    Bilateral carpal tunnel syndrome     Prediabetes 11/14/2020    Medial meniscus tear     Hypertension     Hyperlipidemia     Chest pain      HTN- on Lisinopril 10  HLD- he is trying to manage with his diet  He is holding the aspirin  He denies anesthesia problems. He will have a  nerve block and spinal  for the surgery  No bleeding issues      Past Medical History:   Diagnosis Date    Arthritis     Bilateral carpal tunnel syndrome     CAD (coronary artery disease)     Follows with Dr.    ECHO 07/14/2021    EF 55-60%. Mild Pulmonic regurgitation is present, No evidence of pericardial effusion.    H/O echocardiogram 04/28/2016    EF 55% WNL- Stage 1 dysfunction     Hyperlipidemia     Hypertension     Medial meniscus tear     Left knee

## 2024-04-09 ENCOUNTER — ANESTHESIA EVENT (OUTPATIENT)
Dept: OPERATING ROOM | Age: 59
End: 2024-04-09
Payer: COMMERCIAL

## 2024-04-09 NOTE — ANESTHESIA PRE PROCEDURE
METS)  (+) hypertension:, CAD:, hyperlipidemia      ECG reviewed        Stress test reviewed  Cleared by cardiology     Beta Blocker:  Not on Beta Blocker      ROS comment:   Normal sinus rhythm  Septal infarct (cited on or before 18-FEB-2017)  Abnormal ECG  When compared with ECG of 18-JUN-2023 02:32,  aberrant conduction is no longer present  Serial changes of Septal infarct present  Confirmed by LÓPEZ ZAVALA ([id number]) on 3/28/2024 12:51:52 PM       Summary   Overall Impression:   Good exercise capacity. 9 METs work load.   Physiological BP response to exercise.   ETT negative for ischemia / Arrhythmia.      Signature      ------------------------------------------------------------------   Electronically signed by Jani Briggs MD (Interpreting   physician) on 02/11/2022 at 04:37 PM  Valorie Galvan was evaluated from a cardiac standpoint for his surgery or procedure and based on his history, diagnosis, recent cardiac testing, he is considered a low risk candidate for any terri-operative cardiac complications.     Patients with known CAD with moderate or high risk should have surgical procedures done where they have access to invasive cardiology services if emergently needed.     Antiplatelet/anticoagulant therapy can be held prior to the surgery or procedure at the discretion of the surgeon to be resumed as soon as possible if held.     Please call with any further questions.     Respectfully,      Annie De La Garza/kash     This cardiac clearance is good for 6 months from 1/18/2024 unless new cardiac symptoms arise.   Revision History    Revised by Sophia Ding MA on 1/18/2024 at 8:49 AM (current version)  Revised by Luda Norwood MA on 1/18/2024 at 8:28 AM  Created by Annie Pederson APRN - CNP on 1/18/2024 at 8:27 AM             Neuro/Psych:   (+) psychiatric history:             ROS comment: Etoh  GI/Hepatic/Renal:             Endo/Other: Negative Endo/Other ROS   (+) : arthritis:

## 2024-04-09 NOTE — PROGRESS NOTES
Patient notified of surgery time at [facility] 4/10/24 0915 arrival 0715, NPO instructions and DOS meds reviewed, understanding verbalized

## 2024-04-10 ENCOUNTER — ANESTHESIA (OUTPATIENT)
Dept: OPERATING ROOM | Age: 59
End: 2024-04-10
Payer: COMMERCIAL

## 2024-04-10 ENCOUNTER — APPOINTMENT (OUTPATIENT)
Dept: GENERAL RADIOLOGY | Age: 59
End: 2024-04-10
Attending: ORTHOPAEDIC SURGERY
Payer: COMMERCIAL

## 2024-04-10 ENCOUNTER — HOSPITAL ENCOUNTER (OUTPATIENT)
Age: 59
Setting detail: OBSERVATION
Discharge: HOME | End: 2024-04-11
Attending: ORTHOPAEDIC SURGERY | Admitting: ORTHOPAEDIC SURGERY
Payer: COMMERCIAL

## 2024-04-10 DIAGNOSIS — Z96.652 S/P TOTAL KNEE ARTHROPLASTY, LEFT: Primary | ICD-10-CM

## 2024-04-10 PROBLEM — M17.12 PRIMARY OSTEOARTHRITIS OF LEFT KNEE: Status: ACTIVE | Noted: 2024-04-10

## 2024-04-10 PROCEDURE — 6360000002 HC RX W HCPCS: Performed by: ORTHOPAEDIC SURGERY

## 2024-04-10 PROCEDURE — 2580000003 HC RX 258: Performed by: ORTHOPAEDIC SURGERY

## 2024-04-10 PROCEDURE — 2580000003 HC RX 258: Performed by: ANESTHESIOLOGY

## 2024-04-10 PROCEDURE — 6370000000 HC RX 637 (ALT 250 FOR IP): Performed by: ORTHOPAEDIC SURGERY

## 2024-04-10 PROCEDURE — A4217 STERILE WATER/SALINE, 500 ML: HCPCS | Performed by: ORTHOPAEDIC SURGERY

## 2024-04-10 PROCEDURE — 97530 THERAPEUTIC ACTIVITIES: CPT

## 2024-04-10 PROCEDURE — 2500000003 HC RX 250 WO HCPCS: Performed by: ORTHOPAEDIC SURGERY

## 2024-04-10 PROCEDURE — 97162 PT EVAL MOD COMPLEX 30 MIN: CPT

## 2024-04-10 PROCEDURE — 6360000002 HC RX W HCPCS: Performed by: ANESTHESIOLOGY

## 2024-04-10 PROCEDURE — 6370000000 HC RX 637 (ALT 250 FOR IP): Performed by: ANESTHESIOLOGY

## 2024-04-10 PROCEDURE — 3700000001 HC ADD 15 MINUTES (ANESTHESIA): Performed by: ORTHOPAEDIC SURGERY

## 2024-04-10 PROCEDURE — 73560 X-RAY EXAM OF KNEE 1 OR 2: CPT

## 2024-04-10 PROCEDURE — 64447 NJX AA&/STRD FEMORAL NRV IMG: CPT | Performed by: ANESTHESIOLOGY

## 2024-04-10 PROCEDURE — 3700000000 HC ANESTHESIA ATTENDED CARE: Performed by: ORTHOPAEDIC SURGERY

## 2024-04-10 PROCEDURE — 7100000011 HC PHASE II RECOVERY - ADDTL 15 MIN: Performed by: ORTHOPAEDIC SURGERY

## 2024-04-10 PROCEDURE — 3600000009 HC SURGERY ROBOT BASE: Performed by: ORTHOPAEDIC SURGERY

## 2024-04-10 PROCEDURE — 6360000002 HC RX W HCPCS: Performed by: NURSE ANESTHETIST, CERTIFIED REGISTERED

## 2024-04-10 PROCEDURE — C1713 ANCHOR/SCREW BN/BN,TIS/BN: HCPCS | Performed by: ORTHOPAEDIC SURGERY

## 2024-04-10 PROCEDURE — 2720000010 HC SURG SUPPLY STERILE: Performed by: ORTHOPAEDIC SURGERY

## 2024-04-10 PROCEDURE — G0378 HOSPITAL OBSERVATION PER HR: HCPCS

## 2024-04-10 PROCEDURE — 7100000001 HC PACU RECOVERY - ADDTL 15 MIN: Performed by: ORTHOPAEDIC SURGERY

## 2024-04-10 PROCEDURE — S2900 ROBOTIC SURGICAL SYSTEM: HCPCS | Performed by: ORTHOPAEDIC SURGERY

## 2024-04-10 PROCEDURE — C1776 JOINT DEVICE (IMPLANTABLE): HCPCS | Performed by: ORTHOPAEDIC SURGERY

## 2024-04-10 PROCEDURE — 2709999900 HC NON-CHARGEABLE SUPPLY: Performed by: ORTHOPAEDIC SURGERY

## 2024-04-10 PROCEDURE — 7100000010 HC PHASE II RECOVERY - FIRST 15 MIN: Performed by: ORTHOPAEDIC SURGERY

## 2024-04-10 PROCEDURE — 7100000000 HC PACU RECOVERY - FIRST 15 MIN: Performed by: ORTHOPAEDIC SURGERY

## 2024-04-10 PROCEDURE — 3600000019 HC SURGERY ROBOT ADDTL 15MIN: Performed by: ORTHOPAEDIC SURGERY

## 2024-04-10 RX ORDER — SODIUM CHLORIDE 0.9 % (FLUSH) 0.9 %
5-40 SYRINGE (ML) INJECTION EVERY 12 HOURS SCHEDULED
Status: DISCONTINUED | OUTPATIENT
Start: 2024-04-10 | End: 2024-04-10 | Stop reason: HOSPADM

## 2024-04-10 RX ORDER — OXYCODONE HYDROCHLORIDE AND ACETAMINOPHEN 5; 325 MG/1; MG/1
2 TABLET ORAL EVERY 4 HOURS PRN
Status: DISCONTINUED | OUTPATIENT
Start: 2024-04-10 | End: 2024-04-11 | Stop reason: HOSPADM

## 2024-04-10 RX ORDER — TRANEXAMIC ACID 10 MG/ML
1000 INJECTION, SOLUTION INTRAVENOUS
Status: COMPLETED | OUTPATIENT
Start: 2024-04-10 | End: 2024-04-10

## 2024-04-10 RX ORDER — ONDANSETRON 2 MG/ML
4 INJECTION INTRAMUSCULAR; INTRAVENOUS
Status: DISCONTINUED | OUTPATIENT
Start: 2024-04-10 | End: 2024-04-10 | Stop reason: HOSPADM

## 2024-04-10 RX ORDER — BUPIVACAINE HYDROCHLORIDE 7.5 MG/ML
INJECTION, SOLUTION INTRASPINAL
Status: COMPLETED | OUTPATIENT
Start: 2024-04-10 | End: 2024-04-10

## 2024-04-10 RX ORDER — OXYCODONE HYDROCHLORIDE AND ACETAMINOPHEN 5; 325 MG/1; MG/1
1 TABLET ORAL EVERY 4 HOURS PRN
Status: DISCONTINUED | OUTPATIENT
Start: 2024-04-10 | End: 2024-04-11 | Stop reason: HOSPADM

## 2024-04-10 RX ORDER — LIDOCAINE HYDROCHLORIDE 20 MG/ML
INJECTION, SOLUTION INTRAVENOUS PRN
Status: DISCONTINUED | OUTPATIENT
Start: 2024-04-10 | End: 2024-04-10 | Stop reason: SDUPTHER

## 2024-04-10 RX ORDER — SODIUM CHLORIDE 0.9 % (FLUSH) 0.9 %
5-40 SYRINGE (ML) INJECTION PRN
Status: DISCONTINUED | OUTPATIENT
Start: 2024-04-10 | End: 2024-04-11 | Stop reason: HOSPADM

## 2024-04-10 RX ORDER — PHENYLEPHRINE HCL IN 0.9% NACL 1 MG/10 ML
SYRINGE (ML) INTRAVENOUS PRN
Status: DISCONTINUED | OUTPATIENT
Start: 2024-04-10 | End: 2024-04-10 | Stop reason: SDUPTHER

## 2024-04-10 RX ORDER — OXYCODONE HYDROCHLORIDE AND ACETAMINOPHEN 5; 325 MG/1; MG/1
2 TABLET ORAL EVERY 4 HOURS PRN
Qty: 50 TABLET | Refills: 0 | Status: SHIPPED | OUTPATIENT
Start: 2024-04-10 | End: 2024-04-17

## 2024-04-10 RX ORDER — MIDAZOLAM HYDROCHLORIDE 1 MG/ML
INJECTION INTRAMUSCULAR; INTRAVENOUS PRN
Status: DISCONTINUED | OUTPATIENT
Start: 2024-04-10 | End: 2024-04-10 | Stop reason: SDUPTHER

## 2024-04-10 RX ORDER — ROPIVACAINE HYDROCHLORIDE 5 MG/ML
INJECTION, SOLUTION EPIDURAL; INFILTRATION; PERINEURAL
Status: COMPLETED
Start: 2024-04-10 | End: 2024-04-10

## 2024-04-10 RX ORDER — OXYCODONE HYDROCHLORIDE 5 MG/1
5 TABLET ORAL ONCE
Status: COMPLETED | OUTPATIENT
Start: 2024-04-10 | End: 2024-04-10

## 2024-04-10 RX ORDER — ROPIVACAINE HYDROCHLORIDE 5 MG/ML
INJECTION, SOLUTION EPIDURAL; INFILTRATION; PERINEURAL
Status: COMPLETED | OUTPATIENT
Start: 2024-04-10 | End: 2024-04-10

## 2024-04-10 RX ORDER — SODIUM CHLORIDE, SODIUM LACTATE, POTASSIUM CHLORIDE, CALCIUM CHLORIDE 600; 310; 30; 20 MG/100ML; MG/100ML; MG/100ML; MG/100ML
INJECTION, SOLUTION INTRAVENOUS CONTINUOUS
Status: DISCONTINUED | OUTPATIENT
Start: 2024-04-10 | End: 2024-04-11 | Stop reason: HOSPADM

## 2024-04-10 RX ORDER — ACETAMINOPHEN 500 MG
1000 TABLET ORAL ONCE
Status: COMPLETED | OUTPATIENT
Start: 2024-04-10 | End: 2024-04-10

## 2024-04-10 RX ORDER — GLYCOPYRROLATE 0.2 MG/ML
INJECTION INTRAMUSCULAR; INTRAVENOUS PRN
Status: DISCONTINUED | OUTPATIENT
Start: 2024-04-10 | End: 2024-04-10 | Stop reason: SDUPTHER

## 2024-04-10 RX ORDER — CEPHALEXIN 500 MG/1
500 CAPSULE ORAL 3 TIMES DAILY
Qty: 10 CAPSULE | Refills: 0 | Status: SHIPPED | OUTPATIENT
Start: 2024-04-10

## 2024-04-10 RX ORDER — BUPIVACAINE HYDROCHLORIDE AND EPINEPHRINE 5; 5 MG/ML; UG/ML
INJECTION, SOLUTION EPIDURAL; INTRACAUDAL; PERINEURAL
Status: COMPLETED | OUTPATIENT
Start: 2024-04-10 | End: 2024-04-10

## 2024-04-10 RX ORDER — MAGNESIUM HYDROXIDE 1200 MG/15ML
LIQUID ORAL CONTINUOUS PRN
Status: DISCONTINUED | OUTPATIENT
Start: 2024-04-10 | End: 2024-04-10 | Stop reason: HOSPADM

## 2024-04-10 RX ORDER — SODIUM CHLORIDE 0.9 % (FLUSH) 0.9 %
5-40 SYRINGE (ML) INJECTION EVERY 12 HOURS SCHEDULED
Status: DISCONTINUED | OUTPATIENT
Start: 2024-04-10 | End: 2024-04-11 | Stop reason: HOSPADM

## 2024-04-10 RX ORDER — ASPIRIN 325 MG
325 TABLET ORAL 2 TIMES DAILY
Status: DISCONTINUED | OUTPATIENT
Start: 2024-04-10 | End: 2024-04-11 | Stop reason: HOSPADM

## 2024-04-10 RX ORDER — SODIUM CHLORIDE 9 MG/ML
INJECTION, SOLUTION INTRAVENOUS PRN
Status: DISCONTINUED | OUTPATIENT
Start: 2024-04-10 | End: 2024-04-10 | Stop reason: HOSPADM

## 2024-04-10 RX ORDER — SODIUM CHLORIDE, SODIUM LACTATE, POTASSIUM CHLORIDE, CALCIUM CHLORIDE 600; 310; 30; 20 MG/100ML; MG/100ML; MG/100ML; MG/100ML
INJECTION, SOLUTION INTRAVENOUS CONTINUOUS
Status: DISCONTINUED | OUTPATIENT
Start: 2024-04-10 | End: 2024-04-10 | Stop reason: HOSPADM

## 2024-04-10 RX ORDER — ACETAMINOPHEN 325 MG/1
650 TABLET ORAL EVERY 4 HOURS PRN
Status: DISCONTINUED | OUTPATIENT
Start: 2024-04-10 | End: 2024-04-11 | Stop reason: HOSPADM

## 2024-04-10 RX ORDER — ASPIRIN 325 MG
325 TABLET ORAL 2 TIMES DAILY
Qty: 30 TABLET | Refills: 0 | Status: SHIPPED | OUTPATIENT
Start: 2024-04-10

## 2024-04-10 RX ORDER — KETOROLAC TROMETHAMINE 30 MG/ML
INJECTION, SOLUTION INTRAMUSCULAR; INTRAVENOUS
Status: COMPLETED | OUTPATIENT
Start: 2024-04-10 | End: 2024-04-10

## 2024-04-10 RX ORDER — DOCUSATE SODIUM 100 MG/1
100 CAPSULE, LIQUID FILLED ORAL DAILY PRN
Qty: 30 CAPSULE | Refills: 0 | Status: SHIPPED | OUTPATIENT
Start: 2024-04-10

## 2024-04-10 RX ORDER — NALOXONE HYDROCHLORIDE 0.4 MG/ML
INJECTION, SOLUTION INTRAMUSCULAR; INTRAVENOUS; SUBCUTANEOUS PRN
Status: DISCONTINUED | OUTPATIENT
Start: 2024-04-10 | End: 2024-04-10 | Stop reason: HOSPADM

## 2024-04-10 RX ORDER — ONDANSETRON 2 MG/ML
4 INJECTION INTRAMUSCULAR; INTRAVENOUS EVERY 6 HOURS PRN
Status: DISCONTINUED | OUTPATIENT
Start: 2024-04-10 | End: 2024-04-11 | Stop reason: HOSPADM

## 2024-04-10 RX ORDER — ONDANSETRON 2 MG/ML
INJECTION INTRAMUSCULAR; INTRAVENOUS PRN
Status: DISCONTINUED | OUTPATIENT
Start: 2024-04-10 | End: 2024-04-10 | Stop reason: SDUPTHER

## 2024-04-10 RX ORDER — SODIUM CHLORIDE 0.9 % (FLUSH) 0.9 %
5-40 SYRINGE (ML) INJECTION PRN
Status: DISCONTINUED | OUTPATIENT
Start: 2024-04-10 | End: 2024-04-10 | Stop reason: HOSPADM

## 2024-04-10 RX ORDER — ONDANSETRON 4 MG/1
4 TABLET, ORALLY DISINTEGRATING ORAL EVERY 8 HOURS PRN
Status: DISCONTINUED | OUTPATIENT
Start: 2024-04-10 | End: 2024-04-11 | Stop reason: HOSPADM

## 2024-04-10 RX ORDER — SODIUM CHLORIDE 9 MG/ML
INJECTION, SOLUTION INTRAVENOUS PRN
Status: DISCONTINUED | OUTPATIENT
Start: 2024-04-10 | End: 2024-04-11 | Stop reason: HOSPADM

## 2024-04-10 RX ORDER — LISINOPRIL 5 MG/1
10 TABLET ORAL DAILY
Status: DISCONTINUED | OUTPATIENT
Start: 2024-04-10 | End: 2024-04-11 | Stop reason: HOSPADM

## 2024-04-10 RX ORDER — PROPOFOL 10 MG/ML
INJECTION, EMULSION INTRAVENOUS CONTINUOUS PRN
Status: DISCONTINUED | OUTPATIENT
Start: 2024-04-10 | End: 2024-04-10 | Stop reason: SDUPTHER

## 2024-04-10 RX ADMIN — CEFAZOLIN 2000 MG: 2 INJECTION, POWDER, FOR SOLUTION INTRAMUSCULAR; INTRAVENOUS at 09:55

## 2024-04-10 RX ADMIN — ASPIRIN 325 MG: 325 TABLET ORAL at 20:34

## 2024-04-10 RX ADMIN — Medication 0.1 MG: at 10:03

## 2024-04-10 RX ADMIN — SODIUM CHLORIDE, POTASSIUM CHLORIDE, SODIUM LACTATE AND CALCIUM CHLORIDE: 600; 310; 30; 20 INJECTION, SOLUTION INTRAVENOUS at 07:39

## 2024-04-10 RX ADMIN — Medication 0.1 MG: at 11:42

## 2024-04-10 RX ADMIN — BUPIVACAINE HYDROCHLORIDE IN DEXTROSE 15 MG: 7.5 INJECTION, SOLUTION SUBARACHNOID at 09:45

## 2024-04-10 RX ADMIN — ACETAMINOPHEN 1000 MG: 500 TABLET ORAL at 08:08

## 2024-04-10 RX ADMIN — Medication 0.1 MG: at 10:33

## 2024-04-10 RX ADMIN — TRANEXAMIC ACID 1000 MG: 10 INJECTION, SOLUTION INTRAVENOUS at 09:54

## 2024-04-10 RX ADMIN — PROPOFOL 100 MCG/KG/MIN: 10 INJECTION, EMULSION INTRAVENOUS at 09:53

## 2024-04-10 RX ADMIN — Medication 0.1 MG: at 10:42

## 2024-04-10 RX ADMIN — CEFAZOLIN 2000 MG: 2 INJECTION, POWDER, FOR SOLUTION INTRAMUSCULAR; INTRAVENOUS at 19:14

## 2024-04-10 RX ADMIN — SODIUM CHLORIDE, POTASSIUM CHLORIDE, SODIUM LACTATE AND CALCIUM CHLORIDE: 600; 310; 30; 20 INJECTION, SOLUTION INTRAVENOUS at 18:20

## 2024-04-10 RX ADMIN — HYDROMORPHONE HYDROCHLORIDE 0.5 MG: 1 INJECTION, SOLUTION INTRAMUSCULAR; INTRAVENOUS; SUBCUTANEOUS at 22:56

## 2024-04-10 RX ADMIN — LIDOCAINE HYDROCHLORIDE 80 MG: 20 INJECTION, SOLUTION INTRAVENOUS at 09:53

## 2024-04-10 RX ADMIN — ONDANSETRON 4 MG: 2 INJECTION INTRAMUSCULAR; INTRAVENOUS at 09:53

## 2024-04-10 RX ADMIN — MIDAZOLAM 2 MG: 1 INJECTION INTRAMUSCULAR; INTRAVENOUS at 08:43

## 2024-04-10 RX ADMIN — Medication 0.1 MG: at 11:28

## 2024-04-10 RX ADMIN — OXYCODONE HYDROCHLORIDE 5 MG: 5 TABLET ORAL at 14:14

## 2024-04-10 RX ADMIN — ROPIVACAINE HYDROCHLORIDE 20 ML: 5 INJECTION, SOLUTION EPIDURAL; INFILTRATION; PERINEURAL at 08:45

## 2024-04-10 RX ADMIN — GLYCOPYRROLATE 0.1 MG: 0.2 INJECTION INTRAMUSCULAR; INTRAVENOUS at 09:54

## 2024-04-10 RX ADMIN — LISINOPRIL 10 MG: 5 TABLET ORAL at 18:46

## 2024-04-10 RX ADMIN — OXYCODONE AND ACETAMINOPHEN 2 TABLET: 5; 325 TABLET ORAL at 19:58

## 2024-04-10 RX ADMIN — Medication 0.1 MG: at 11:10

## 2024-04-10 RX ADMIN — Medication 0.1 MG: at 11:02

## 2024-04-10 RX ADMIN — Medication 0.1 MG: at 11:21

## 2024-04-10 RX ADMIN — SODIUM CHLORIDE, POTASSIUM CHLORIDE, SODIUM LACTATE AND CALCIUM CHLORIDE: 600; 310; 30; 20 INJECTION, SOLUTION INTRAVENOUS at 10:00

## 2024-04-10 RX ADMIN — HYDROMORPHONE HYDROCHLORIDE 0.5 MG: 1 INJECTION, SOLUTION INTRAMUSCULAR; INTRAVENOUS; SUBCUTANEOUS at 12:46

## 2024-04-10 ASSESSMENT — PAIN DESCRIPTION - PAIN TYPE
TYPE: SURGICAL PAIN

## 2024-04-10 ASSESSMENT — PAIN DESCRIPTION - ONSET
ONSET: ON-GOING
ONSET: ON-GOING
ONSET: GRADUAL
ONSET: ON-GOING

## 2024-04-10 ASSESSMENT — PAIN SCALES - GENERAL
PAINLEVEL_OUTOF10: 8
PAINLEVEL_OUTOF10: 0
PAINLEVEL_OUTOF10: 7
PAINLEVEL_OUTOF10: 4
PAINLEVEL_OUTOF10: 4
PAINLEVEL_OUTOF10: 7
PAINLEVEL_OUTOF10: 7
PAINLEVEL_OUTOF10: 10
PAINLEVEL_OUTOF10: 9
PAINLEVEL_OUTOF10: 8
PAINLEVEL_OUTOF10: 8

## 2024-04-10 ASSESSMENT — PAIN DESCRIPTION - ORIENTATION
ORIENTATION: LEFT

## 2024-04-10 ASSESSMENT — PAIN - FUNCTIONAL ASSESSMENT

## 2024-04-10 ASSESSMENT — PAIN DESCRIPTION - DESCRIPTORS
DESCRIPTORS: DISCOMFORT;SHOOTING
DESCRIPTORS: ACHING;DISCOMFORT
DESCRIPTORS: ACHING
DESCRIPTORS: ACHING;DISCOMFORT
DESCRIPTORS: ACHING

## 2024-04-10 ASSESSMENT — PAIN DESCRIPTION - LOCATION
LOCATION: KNEE

## 2024-04-10 ASSESSMENT — PAIN DESCRIPTION - FREQUENCY
FREQUENCY: CONTINUOUS

## 2024-04-10 NOTE — ANESTHESIA POSTPROCEDURE EVALUATION
Department of Anesthesiology  Postprocedure Note    Patient: Valorie Galvan  MRN: [id number]  YOB: 1965  Date of evaluation: 4/10/2024    Procedure Summary       Date: 04/10/24 Room / Location: [address]    Anesthesia Start: 0940 Anesthesia Stop: 1215    Procedure: LEFT KNEE TOTAL ARTHROPLASTY ROBOTIC (Left) Diagnosis:       Primary osteoarthritis of left knee      Chronic pain of left knee      (Primary osteoarthritis of left knee [M17.12])      (Chronic pain of left knee [M25.562, G89.29])    Surgeons: Rj Marcano MD Responsible Provider: Sergio Novak MD    Anesthesia Type: General, Regional, MAC, Spinal ASA Status: 3            Anesthesia Type: General, Regional, MAC, Spinal    Romana Phase I: Romana Score: 10    Romana Phase II:      Anesthesia Post Evaluation    Patient location during evaluation: PACU  Patient participation: complete - patient participated  Level of consciousness: awake  Airway patency: patent  Nausea & Vomiting: no nausea  Cardiovascular status: blood pressure returned to baseline  Respiratory status: acceptable  Hydration status: stable  Pain management: adequate    No notable events documented.

## 2024-04-10 NOTE — PROGRESS NOTES
1212: Patient arrived in PACU from the OR s/p left total knee. Received report from OR nurse and Bryan BRYANT. Patient arrived fully awake on room air. Patient attached to monitor and all alarms are on. ICE man on left knee present.   1223: Patient tolerating ice chips, denies pain and nausea at this time.   1225: Patient states he has feeling in his bottom and can wiggle his toes.  1238: Patient awake and alert, A&Ox4, follows commands. 98% on room air. Denies pain or nausea at this time.  1246: Patient c/o pain see mar.  1335: Patient transported by RN to room [facility] on [facility], bedside handoff report given to Ninoska CHAVIS.

## 2024-04-10 NOTE — DISCHARGE INSTRUCTIONS
Unwrap the Ace bandage and postoperative dressings the day after surgery.  Pull the compression stocking down to remove the gauze dressings.  There is a surgical tape and glue over the skin.  This is water tight.  You may shower and clean the leg as needed.      You can place a dry gauze dressing over the incision as needed, a small amount of drainage may be present for a day or 2. Pull the compression stocking up over the knee and use for the first 3-5 days. This helps circulation and controls swelling.    You can then leave the surgical tape open to air.  Remove the Dermabond tape 2 weeks after surgery.    Use ice as needed to help with pain and swelling.    Practice range of motion exercises multiple times a day as instructed by physical therapy.  You should practice fully straightening and stretching the back of the knee as well as practice fully bending and stretching the front of the knee.    Use a walker for ambulation and transition to a cane as you demonstrate good mechanics at the guidance of physical therapy. Physical therapy should begin the week of surgery.    Take your blood thinner as prescribed to prevent blood clots in the leg.  Please contact the office if there are any signs for DVT such as severe pain or swelling in the calf with discoloration of the leg.    Follow-up in 2-3 weeks for x-rays and wound check.  If there are any concerns for wound healing problems or infection please call the office earlier.

## 2024-04-10 NOTE — H&P
Chief Complaint   Patient presents with    Hip Pain       Left   Severe worsening left knee pain, new onset right hip pain     History of Present Illness:                             Valorie Galvan is a 58 y.o. male returns today for evaluation of his right hip pain and worsening left knee pain.     He is having pain at the right hip on the lateral aspect worse with weightbearing and pushoff activities.  He attributes much of his hip pain to overuse and compensation due to the severe worsening problems he is having related to his known left knee arthritis.  He denies any falls or injuries.  The right hip is worse with the extremes of motion but he still has good flexion and rotational movements of the right hip.  Pain is generally better with rest and avoidance of strenuous activities of the right hip.     His biggest concern today is severe progression and worsening of left knee pain stiffness and swelling.  He has noticed difficulty with pushoff activities and has trouble going up and down stairs.  He does take frequent breaks and walk long distances because of the severity of his knee pain.  He has trouble bending his knee and feels tightness and pain referred to the anterior medial aspect of his knee joint during flexion and bending activities.  Pain is worse with going up and down stairs and getting up from seated position.  He feels unstable on the knee like it Wants to catch and grab and occasionally feels like it may give out and cause him to fall.            Patient returns to the officce with left knee pain and ready to discuss sx. Pt stated he he has had steroid injections in the past and feels he is ready to have sx, given his injections are becoming more of a short-term relief. Pt stated the pain is localized on the medial aspect of the knee and will radiate to the back of the knee with any extension. Pt denies any recent injuries.                Medical History  Patient's medications, allergies, past

## 2024-04-10 NOTE — PROGRESS NOTES
Physical Therapy  [facility] ACUTE CARE PHYSICAL THERAPY EVALUATION  Valorie Galvan, 1965, OR/NONE, 4/10/2024    History  [place]:  The encounter diagnosis was S/P total knee arthroplasty, left.  Patient  has a past medical history of Arthritis, Bilateral carpal tunnel syndrome, CAD (coronary artery disease), ECHO, H/O echocardiogram, Hyperlipidemia, Hypertension, Medial meniscus tear, Prediabetes, and Wears glasses.  Patient  has a past surgical history that includes shoulder surgery (Right, 2006); Appendectomy (age 17); Hand surgery (Right, 2002); Shoulder arthroscopy (Left, 7/21/2021); Carpal tunnel release (Left, 7/21/2021); and Colonoscopy (N/A, 4/19/2022).    Discharge Recommendation: To be safe at home, would need continual support/supervision and physical assist for home entry/exit. Recommend outpatient physical therapy.    Subjective:    Patient states: \"I'm so cold\"     Pain:  pt reports LLE pain, rated 8/10    Communication with other providers:  Handoff to RN.    Restrictions: general precautions, fall risk, LLE WBAT     Home Setup/Prior level of function  Social/Functional History  Lives With: Spouse  Type of Home: House  Home Layout: Two level, Bed/Bath upstairs  Home Access: Stairs to enter with rails  Entrance Stairs - Number of Steps: 2-3 + 3-4  Entrance Stairs - Rails: Both  Bathroom Shower/Tub: Walk-in shower  Bathroom Toilet: Standard  Home Equipment: Walker, rolling  Has the patient had two or more falls in the past year or any fall with injury in the past year?: No  Receives Help From: Family  ADL Assistance: Independent  Homemaking Assistance: Independent  Ambulation Assistance: Independent  Transfer Assistance: Independent  Active : Yes  Occupation: Full time employment    Examination of body systems (includes body structures/functions, activity/participation limitations):  Observation:  pt found resting in gurney with family at bedside upon arrival and agreeable to

## 2024-04-10 NOTE — ANESTHESIA PROCEDURE NOTES
Peripheral Block    Patient location during procedure: procedure area  Reason for block: post-op pain management and at surgeon's request  Start time: 4/10/2024 8:45 AM  End time: 4/10/2024 8:55 AM  Staffing  Performed: resident/CRNA   Anesthesiologist: Sergio Novak MD  Resident/CRNA: Esteban Nam APRN - CRNA  Other anesthesia staff: Deepali Ruiz APRN - CRNA  Performed by: Esteban Nam APRN - CRNA  Authorized by: Sergio Novak MD    Preanesthetic Checklist  Completed: patient identified, IV checked, site marked, risks and benefits discussed, surgical/procedural consents, equipment checked, pre-op evaluation, timeout performed, anesthesia consent given, oxygen available, monitors applied/VS acknowledged, fire risk safety assessment completed and verbalized and blood product R/B/A discussed and consented  Peripheral Block   Patient position: supine  Prep: ChloraPrep  Provider prep: mask and sterile gloves  Patient monitoring: cardiac monitor, continuous pulse ox, frequent blood pressure checks, IV access, oxygen and responsive to questions  Block type: Femoral  Adductor canal  Laterality: left  Injection technique: single-shot  Guidance: ultrasound guided  Local infiltration: ropivacaine  Local infiltration: ropivacaine    Needle   Needle type: insulated echogenic nerve stimulator needle   Needle gauge: 20 G  Needle localization: ultrasound guidance  Needle length: 11 cm  Assessment   Injection assessment: negative aspiration for heme, no paresthesia on injection, local visualized surrounding nerve on ultrasound and no intravascular symptoms  Paresthesia pain: none  Slow fractionated injection: yes  Hemodynamics: stable  Outcomes: uncomplicated and patient tolerated procedure well    Medications Administered  ropivacaine (NAROPIN) injection 0.5% - Perineural   20 mL - 4/10/2024 8:45:00 AM

## 2024-04-10 NOTE — ANESTHESIA PROCEDURE NOTES
Spinal Block    Patient location during procedure: OR  End time: 4/10/2024 9:51 AM  Reason for block: primary anesthetic and at surgeon's request  Staffing  Performed: resident/CRNA   Anesthesiologist: Sergio Novak MD  Resident/CRNA: Esteban Nam APRN - CRNA  Performed by: Esteban Nam APRN - CRNA  Authorized by: Sergio Novak MD    Spinal Block  Patient position: sitting  Prep: ChloraPrep  Patient monitoring: cardiac monitor, continuous pulse ox, frequent blood pressure checks and oxygen  Approach: midline  Location: L3/L4  Provider prep: mask and sterile gloves  Local infiltration: lidocaine  Needle  Needle type: Pencan   Needle gauge: 25 G  Needle length: 5 in  Assessment  Sensory level: T6  Swirl obtained: Yes  CSF: clear  Attempts: 1  Hemodynamics: stable  Preanesthetic Checklist  Completed: patient identified, IV checked, site marked, risks and benefits discussed, surgical/procedural consents, equipment checked, pre-op evaluation, timeout performed, anesthesia consent given, oxygen available, monitors applied/VS acknowledged, fire risk safety assessment completed and verbalized and blood product R/B/A discussed and consented

## 2024-04-10 NOTE — PROGRESS NOTES
Outpatient Pharmacy Progress Note for Meds-to-Beds    Total number of Prescriptions Filled: 4  The following medications were dispensed to the patient during the discharge process:  aspirin  cephALEXin  docusate sodium  oxyCODONE-acetaminophen    Additional Documentation:  Patient's family picked up in pharmacy      Thank you for letting us serve your patients.  [address] [address]    Phone: [phone]    Fax: [phone]

## 2024-04-10 NOTE — PROGRESS NOTES
4 Eyes Skin Assessment     NAME:  Valorie Galvan  YOB: 1965  MEDICAL RECORD NUMBER:  [id number]    The patient is being assessed for  Admission    I agree that at least one RN has performed a thorough Head to Toe Skin Assessment on the patient. ALL assessment sites listed below have been assessed.      Areas assessed by both nurses:    Head, Face, Ears, Shoulders, Back, Chest, Arms, Elbows, Hands, Sacrum. Buttock, Coccyx, Ischium, Legs. Feet and Heels, and Under Medical Devices         Does the Patient have a Wound? No noted wound(s)     Left knee total surgery   Octavio Prevention initiated by RN: No  Wound Care Orders initiated by RN: No    Pressure Injury (Stage 3,4, Unstageable, DTI, NWPT, and Complex wounds) if present, place Wound referral order by RN under : No    New Ostomies, if present place, Ostomy referral order under : No     Nurse 1 eSignature: Electronically signed by Fabian Ball LPN on 4/10/24 at 7:11 PM EDT    **SHARE this note so that the co-signing nurse can place an eSignature**    Nurse 2 eSignature: Electronically signed by Arlene Soares RN on 4/10/24 at 7:12 PM EDT

## 2024-04-10 NOTE — OP NOTE
correct.    The patient will be observed in the recovery area for pain control, physical therapy, and medical monitoring.  The patient will be on chemical DVT prophylaxis and will be weightbearing as tolerated.  If his pain is well-controlled and he passes physical therapy he may be discharged home the same day of surgery    Electronically signed by Rj Marcano MD on 4/10/2024 at 11:56 AM

## 2024-04-10 NOTE — PROGRESS NOTES
1334-received patient from pacu, report received from Clare CHAVIS, patient A&O, reports pain 4/10, denies nausea, dressing dry, family at bedside, call light within reach, given starry and crackers.  1414 - pain med given see mar  1512 - patient up in chair  1520 - patient feeling sweaty, pale, returned to bed, bp 85/54 iv bolus started  1525 - bp 103/61  1541 - bp 116/72, patients color is pink, states feeling better, pain 7/10  1546 - patient states he would like to stay tonight instead of going home  1553 - message left with nurse in OR for Dr. Marcano  1612 - patient given water and crackers  1706 - physical therapy at bedside  1723 - report to [facility] on 1N  1730 - VSS patient A&O, denies nausea, pain 9/10, dressing dry  1744 - patient voided in urinal  1757 - patient left sds via cart with transport

## 2024-04-11 VITALS
HEART RATE: 85 BPM | OXYGEN SATURATION: 94 % | SYSTOLIC BLOOD PRESSURE: 122 MMHG | HEIGHT: 68 IN | DIASTOLIC BLOOD PRESSURE: 69 MMHG | RESPIRATION RATE: 17 BRPM | TEMPERATURE: 98.1 F | BODY MASS INDEX: 31.22 KG/M2 | WEIGHT: 206 LBS

## 2024-04-11 PROCEDURE — 97116 GAIT TRAINING THERAPY: CPT

## 2024-04-11 PROCEDURE — G0378 HOSPITAL OBSERVATION PER HR: HCPCS

## 2024-04-11 PROCEDURE — 6370000000 HC RX 637 (ALT 250 FOR IP): Performed by: ORTHOPAEDIC SURGERY

## 2024-04-11 PROCEDURE — 2580000003 HC RX 258: Performed by: ORTHOPAEDIC SURGERY

## 2024-04-11 PROCEDURE — 6360000002 HC RX W HCPCS: Performed by: ORTHOPAEDIC SURGERY

## 2024-04-11 RX ADMIN — SODIUM CHLORIDE, POTASSIUM CHLORIDE, SODIUM LACTATE AND CALCIUM CHLORIDE: 600; 310; 30; 20 INJECTION, SOLUTION INTRAVENOUS at 06:51

## 2024-04-11 RX ADMIN — LISINOPRIL 10 MG: 5 TABLET ORAL at 08:09

## 2024-04-11 RX ADMIN — CEFAZOLIN 2000 MG: 2 INJECTION, POWDER, FOR SOLUTION INTRAMUSCULAR; INTRAVENOUS at 03:28

## 2024-04-11 RX ADMIN — SODIUM CHLORIDE, PRESERVATIVE FREE 10 ML: 5 INJECTION INTRAVENOUS at 08:09

## 2024-04-11 RX ADMIN — OXYCODONE AND ACETAMINOPHEN 2 TABLET: 5; 325 TABLET ORAL at 03:23

## 2024-04-11 RX ADMIN — ASPIRIN 325 MG: 325 TABLET ORAL at 08:08

## 2024-04-11 RX ADMIN — OXYCODONE AND ACETAMINOPHEN 2 TABLET: 5; 325 TABLET ORAL at 07:35

## 2024-04-11 ASSESSMENT — PAIN SCALES - GENERAL
PAINLEVEL_OUTOF10: 8
PAINLEVEL_OUTOF10: 0
PAINLEVEL_OUTOF10: 0
PAINLEVEL_OUTOF10: 10
PAINLEVEL_OUTOF10: 8

## 2024-04-11 ASSESSMENT — PAIN DESCRIPTION - DESCRIPTORS
DESCRIPTORS: ACHING
DESCRIPTORS: ACHING;THROBBING
DESCRIPTORS: ACHING

## 2024-04-11 ASSESSMENT — PAIN - FUNCTIONAL ASSESSMENT
PAIN_FUNCTIONAL_ASSESSMENT: ACTIVITIES ARE NOT PREVENTED

## 2024-04-11 ASSESSMENT — PAIN DESCRIPTION - LOCATION
LOCATION: KNEE

## 2024-04-11 ASSESSMENT — PAIN DESCRIPTION - PAIN TYPE: TYPE: SURGICAL PAIN

## 2024-04-11 ASSESSMENT — PAIN DESCRIPTION - ORIENTATION
ORIENTATION: LEFT

## 2024-04-11 ASSESSMENT — PAIN DESCRIPTION - ONSET: ONSET: ON-GOING

## 2024-04-11 ASSESSMENT — PAIN DESCRIPTION - FREQUENCY: FREQUENCY: CONTINUOUS

## 2024-04-11 NOTE — PROGRESS NOTES
V2.0  [facility] Hospitalist Progress Note      Name:  Valorie Galvan /Age/Sex: 1965  (59 y.o. male)   MRN & CSN:  [id number] & [id number] Encounter Date/Time: 2024 9:03 AM EDT    Location:  [address] [place] [address]-A PCP: Bebe Bolden DO       Hospital Day: 2    Assessment and Plan:   Valorie Galvan is a 59 y.o. male with pmh as noted below who presents with Primary osteoarthritis of left knee    Primary osteoarthritis of left knee-s/p total knee arthroplasty-4/10/2024  -Management as per orthopedic surgery  -PT/OT      Hypertension-patient is on lisinopril-resumed     Hyperlipidemia  -Lipid profile-3/2023-total cholesterol 204, HDL 54, , triglycerides 105.  -Patient not on statin, diet restriction..      Nonobstructive coronary artery disease  -CTA cardiac-10/4/2018 mid LAD calcified plaque 10-20%.  -Patient is on aspirin.     Left shoulder rotator cuff repair, left carpal tunnel release-2021     History of alcohol use  -Patient reports 1-2 times per month.     Obesity with BMI 31.44.      Diet ADULT DIET; Regular   DVT Prophylaxis [] Lovenox, []  Heparin, [] SCDs, [] Ambulation,  [] Eliquis, [] Xarelto  [] Coumadin   Code Status Full Code   Disposition From: Home  Expected Disposition: Home  Estimated Date of Discharge: Per primary  Patient requires continued admission due to per primary   Surrogate Decision Maker/ POA      Subjective:     Chief Complaint: No chief complaint on file.     Patient seen and examined today.  No acute events noted overnight. Denies CP, SOB, N/V/D/Abd pain.  Patient reports pain is tolerable with current analgesics.  Denies further needs at this time. Plan of care discussed.      Review of Systems:    Pertinent negatives/positives noted per HPI      Objective:     Intake/Output Summary (Last 24 hours) at 2024 0903  Last data filed at 2024 0817  Gross per 24 hour   Intake .29 ml   Output 50 ml   Net 2007.29 ml        Vitals:   Vitals:    24 0800   BP: 122/69

## 2024-04-11 NOTE — CONSULTS
V2.0  [facility] [place] Consult Note      Name:  Valorie Galvan /Age/Sex: 1965  (59 y.o. male)   MRN & CSN:  [id number] & [id number] Encounter Date/Time: 4/10/2024 9:55 PM EDT   Location:  [facility] PCP: Bebe Bolden DO     Attending:Rj Marcano MD  Consulting Provider: Pérez Godoy MD      Hospital Day: 1    Assessment and Recommendations     #.  Primary osteoarthritis of left knee-s/p total knee arthroplasty-4/10/2024  -Management as per orthopedic surgery  -PT/OT     #.  Hypertension-patient is on lisinopril-resumed    #.  Hyperlipidemia  -Lipid profile-3/2023-total cholesterol 204, HDL 54, , triglycerides 105.  -Patient not on statin, diet restriction..    #.  Nonobstructive coronary artery disease  -CTA cardiac-10/4/2018 mid LAD calcified plaque 10-20%.  -Patient is on aspirin.    #.  Left shoulder rotator cuff repair, left carpal tunnel release-2021    #.  History of alcohol use  -Patient reports 1-2 times per month.    #.  Obesity with BMI 31.44.    Diet ADULT DIET; Regular   DVT Prophylaxis Aspirin, ambulation   Code Status Full Code   Surrogate Decision Maker/ POA       Personally reviewed Lab Studies and Imaging.  Preop labs done 3/27/2024.      History From:    History obtained from the patient, EMR.     History of Present Illness:      Chief Complaint: Postop medical management    Valorie Galvan is a 59 y.o. male who presents with nonobstructive CAD, hypertension, hyperlipidemia, obesity, osteoarthritis who was admitted for elective left total knee arthroplasty.  Patient seen post surgery.  Complaining of pain, tolerable.  Patient was able to ambulate with therapist after surgery.  Denying any other complaints no headache, visual disturbance, nausea, vomiting, chest pain, shortness of breath, denying any abdominal pain.  Was able to urinate after coming back from surgery.  Vitals-/81, HR 95, RR 16, temp 98.5, saturating 98% on room air.  Preop labs from 3/27 reviewed.    Review

## 2024-04-11 NOTE — DISCHARGE SUMMARY
DISCHARGE SUMMARY:  Rj Marcano MD, MD     Date of Admission:      4/10/2024  Date of Discharge:    4/11/2024     Admission Diagnosis   Primary osteoarthritis of left knee [M17.12]  Chronic pain of left knee [M25.562, G89.29]   Discharge Diagnosis   Same    Procedure:    Left Total knee replacement 4/10/2024    Consultations:  IP CONSULT TO HOSPITALIST  IP CONSULT TO CASE MANAGEMENT  IP CONSULT TO HOME CARE NEEDS    Brief history and hospital stay.    Valorie Galvan is a 59 y.o. male who underwent total knee replacement procedure without complication.  Valorie Galvan was admitted to the floor following surgery.    Appropriate consults were obtained as outlined in progress notes. The patient’s diet was advanced as tolerated.  The patient remained hemodynamically stable and neurovascularly intact in the lower extremity throughout the hospital course.    On the day of discharge the patient's calf remained soft and showed no evidence of DVT.      Physical therapy and occupational therapy were consulted.  The patient progressed well with PT/OT throughout the stay.      DVT prophylaxis was initiated and will continue for 2 weeks.    The patients pain was controlled initially with IV pain medications and then was transitioned to oral pain medications prior to discharge    The patient was discharged to Home and will continue to follow the precautions outlined to them by us and PT/OT.     Condition on Discharge: Stable    Medications       Medication List        START taking these medications      cephALEXin 500 MG capsule  Commonly known as: KEFLEX  Take 1 capsule by mouth 3 times daily     docusate sodium 100 MG capsule  Commonly known as: COLACE  Take 1 capsule by mouth daily as needed for Constipation     oxyCODONE-acetaminophen 5-325 MG per tablet  Commonly known as: Percocet  Take 2 tablets by mouth every 4 hours as needed for Pain for up to 7 days. Intended supply: 7 days. Take

## 2024-04-11 NOTE — PROGRESS NOTES
Doing well postoperatively.    Pain controlled  Did well with therapy yesterday  Objective:   No data found.  Afebrile vital signs are stable    Physical Exam:     The patient is awake and alert  Resting comfortably in bed    Operative extremity:    The dressing is clean dry and intact.  Incision is intact with Dermabond dressing.  No erythema, drainage, or induration.  Calf is soft and nontender.  Sensation and motor function intact distally      LABS   CBC: No results for input(s): \"WBC\", \"HGB\", \"PLT\" in the last 72 hours.    Postoperative x-rays show well aligned prosthesis with no complications.    Assessment and Plan     1.  POD # 1 total knee replacement    1:  Physical therapy consult for mobilization   -Weight-bear as tolerated, progress as tolerated  2:  DVT prophylaxis   -Aspirin twice a day for 2 weeks  3:  Continue Pain Control   -Oral medications as needed, IV medication only for breakthrough pain  4.  Medical management per hospitalist service  5:  D/C Planning:    -Discharge to home later today if patient is mobilizing well with physical therapy and pain is well controlled.  -Follow-up in 2 weeks for x-rays and wound check.         Rj Marcano MD

## 2024-04-11 NOTE — PROGRESS NOTES
Physical Therapy Treatment Note  Name: Valorie Galvan MRN: [id number] :   1965   Date:  2024   Admission Date: 4/10/2024 Room:  [address] [place] [address]   Restrictions/Precautions: general precautions, fall risk, LLE WBAT   Communication with other providers:  Pt okay to see for therapy per RN   Subjective:  Patient states:  \"I'm in a lot more pain today\"   Pain:   Location, Type, Intensity (0/10 to 10/10):  7/10 at rest after pain meds, 9/10 with ambulation.   Objective:    Observation:  Pt supine in bed upon PTA arrival.   Objective Measures:  Tele, stable  Treatment, including education/measures:    Therapeutic Activity Training:   Therapeutic activity training was instructed today.  Cues were given for safety, sequence, UE/LE placement, awareness, and balance.    Activities performed today included bed mobility training, sup-sit, sit-stand, SPT.    Mobility:  Sup <> sit: SBA to R side of bed with increased effort. SBA to return to bed from R side of bed.   Scooting: SBA   STS: CGA from EOB    Gait:  Pt ambulated ~60ft x 2 with RW and CGA for safety. Pt demos step-to gait pattern, decreased step length, decreased gait speed, decreased stance on L LE, heavy reliance on UEs, decreased endurance as compared to previous session.     Pt declines to review stairs this date, pt reports he performed a full flight of stairs yesterday and feels confident in the same. Pt can recite appropriate LE advancement.     Safety:   Pt returned safely to recliner with chair alarm activated, call light in reach, all needs met.   Assessment / Impression:    Pt tolerated OOB activities well this date with min increase in fatigue by does present with increased pain levels this date.   Patient's tolerance of treatment:  Fair   Adverse Reaction: Pain  Significant change in status and impact:  none  Barriers to improvement:  Pain   Plan for Next Session:    Plan to continue OOB activities.   Time in:  911  Time out:  934  Timed treatment

## 2024-04-11 NOTE — PLAN OF CARE
Problem: Discharge Planning  Goal: Discharge to home or other facility with appropriate resources  Outcome: Progressing  Flowsheets (Taken 4/11/2024 0815)  Discharge to home or other facility with appropriate resources: Identify barriers to discharge with patient and caregiver     Problem: Pain  Goal: Verbalizes/displays adequate comfort level or baseline comfort level  Outcome: Progressing  Flowsheets (Taken 4/11/2024 0800)  Verbalizes/displays adequate comfort level or baseline comfort level: Encourage patient to monitor pain and request assistance     Problem: Safety - Adult  Goal: Free from fall injury  Outcome: Progressing     Problem: ABCDS Injury Assessment  Goal: Absence of physical injury  Outcome: Progressing

## 2024-04-15 ENCOUNTER — HOSPITAL ENCOUNTER (OUTPATIENT)
Dept: PHYSICAL THERAPY | Age: 59
Setting detail: THERAPIES SERIES
Discharge: HOME | End: 2024-04-15
Payer: COMMERCIAL

## 2024-04-15 ENCOUNTER — TELEPHONE (OUTPATIENT)
Dept: ORTHOPEDIC SURGERY | Age: 59
End: 2024-04-15

## 2024-04-15 PROCEDURE — 97161 PT EVAL LOW COMPLEX 20 MIN: CPT

## 2024-04-15 PROCEDURE — 97016 VASOPNEUMATIC DEVICE THERAPY: CPT

## 2024-04-15 PROCEDURE — 97110 THERAPEUTIC EXERCISES: CPT

## 2024-04-15 ASSESSMENT — PAIN DESCRIPTION - PAIN TYPE: TYPE: SURGICAL PAIN

## 2024-04-15 ASSESSMENT — PAIN SCALES - GENERAL: PAINLEVEL_OUTOF10: 7

## 2024-04-15 NOTE — PROGRESS NOTES
by 4 Visits Goal Status   Pt will improve active L knee extension range of motion to 0 deg in order to demonstrate proper gait mechanics. New                                                             Long Term Goals Completed by 10 Visits Goal Status   Pt will improve active L knee flexion range of motion to 120 deg or more in order to demonstrate proper gait mechanics. New   Pt will improve TUG to 13.5 sec in order to be considered a safe ambulator and achieve MDC. New   Pt will improve 5x STS to 12 sec or less in order to not be considered a fall risk and acheive MDC. New   Pt will improve L LE strength to 4+/5 or more in order to improve strength needed for work related tasks. New   Pt will improve KOOS score to 18 or less in order to improve subjective reports of function. New                                      TREATMENT PLAN       Requires PT Follow-Up: Yes  Treatment Initiated : Y on 4/15  Specific Instructions for Next Treatment: L knee range of motion, L LE strength training, gait training    Pt. actively involved in establishing Plan of Care and Goals: Y  Patient/ Caregiver education and instruction: Goals, PT Role, Plan of Care, Evaluative findings, Home Exercise Program, Precautions, Weight-bearing Education, Transfer Training, General Safety, Adaptive Device Training, Gait Training, Functional Mobility Training, Work related activity, Anatomy of condition HEP and importance of compliance, POC and goals, anatomy and physiology related to condition.     Current home exercise program (HEP): Heel prop, heel slides, quad sets, and SAQ     Treatment may include any combination of the following: Current Treatment Recommendations: Strengthening, ROM, Functional mobility training, ADL/Self-care training, IADL training, Neuromuscular re-education, Gait training, Stair training, Endurance training, Manual, Pain management, Return to work related activity, Home exercise program, Modalities, Therapeutic

## 2024-04-15 NOTE — FLOWSHEET NOTE
Outpatient Physical Therapy  [place]           [x] Phone: [phone]   Fax: [phone]  [facility]           [] Phone: [phone]   Fax: [phone]        Physical Therapy Daily Treatment Note  Date:  4/15/2024    Patient Name:  Valorie Galvan    :  1965  MRN: [id number]  Restrictions/Precautions:   WB Status: WBAT  Diagnosis:   Diagnosis: Primary osteoarthritis of the left knee, chornic pain of left knee  Date of Injury/Surgery:   Treatment Diagnosis:  Increased L knee pain, impaired L knee range of motion, impaired L LE strength  Insurance/Certification information: BCBS - 60 PCY  Referring Physician:   Rj Marcano MD   PCP: Bebe Bolden DO  Next Doctor Visit:    Plan of care signed (Y/N):  POC sent 4/15  Outcome Measure: KOOS   Visit# / total visits:   1/10  Pain level: 7/10   Goals:     Patient goals: Improve strength, range of motion, and endurance  Short term goals  Time Frame for Short term goals: 4 Visits  Pt will improve active L knee extension range of motion to 0 deg in order to demonstrate proper gait mechanics.              Long Term Goals  Time Frame for Long Term Goals: 10 Visits  Pt will improve active L knee flexion range of motion to 120 deg or more in order to demonstrate proper gait mechanics.  Pt will improve TUG to 13.5 sec in order to be considered a safe ambulator and achieve MDC.  Pt will improve 5x STS to 12 sec or less in order to not be considered a fall risk and acheive MDC.  Pt will improve L LE strength to 4+/5 or more in order to improve strength needed for work related tasks.  Pt will improve KOOS score to 18 or less in order to improve subjective reports of function.      Summary of Evaluation:  Assessment: Pt is a 59-year-old male who presents to therapy s/p L TKA on 4/10/2024. Upon assessment, pt demonstrates impaired L knee range of motion, impaired L knee strength, increased L knee pain, impaired gait mechanics, and decreased tolerance to

## 2024-04-15 NOTE — PLAN OF CARE
Outpatient Physical Therapy           [place]           [x] Phone: [phone]   Fax: [phone]  [place]           [] Phone: [phone]   Fax: [phone]     To:  Rj Marcano MD   From: Maryellen Laws, SPT and Florina Ruiz, PT, DPT     Patient: Valorie Galvan       : 1965  Diagnosis: Diagnosis: Primary osteoarthritis of the left knee, chornic pain of left knee  Treatment Diagnosis: Increased L knee pain, impaired L knee range of motion, impaired L LE strength  Date: 4/15/2024    Physical Therapy Certification/Re-Certification Form  Dear Dr. Rj Marcano,  The following patient has been evaluated for physical therapy services and for therapy to continue, insurance requires physician review of the treatment plan initially and every 90 days. Please review the attached evaluation and/or summary of the patient's plan of care, and verify that you agree therapy should continue by signing the attached document and sending it back to our office.    Assessment:    Assessment: Pt is a 59-year-old male who presents to therapy s/p L TKA on 4/10/2024. Upon assessment, pt demonstrates impaired L knee range of motion, impaired L knee strength, increased L knee pain, impaired gait mechanics, and decreased tolerance to transfers. Pt would benefit from skilled physical therapy to address left lower extremity weakness, deficits with mobility, and to progress towards current goals to prevent regression or injury.    Patient agrees with established plan of care and assisted in the development of their short term and long term goals. Patient had no adverse reaction with initial treatment and there are no barriers to learning.  Learning preferences include demonstration, practice, and handouts.  Patient expressed understanding of HEP and appears to be motivated to participate in an active PT program including compliance with HEP expectations.      Plan of Care/Treatment to date:  [x] Therapeutic Exercise  [x]

## 2024-04-15 NOTE — TELEPHONE ENCOUNTER
Patient called to state he was feeling somewhat better after surgery however he had a loss of appetite. This nurse called and LVM for patient to call back and speak with this nurse.     Loss of appetite may be d/t the pain medication.

## 2024-04-17 ENCOUNTER — TELEPHONE (OUTPATIENT)
Dept: ORTHOPEDIC SURGERY | Age: 59
End: 2024-04-17

## 2024-04-17 DIAGNOSIS — Z96.652 S/P TOTAL KNEE ARTHROPLASTY, LEFT: Primary | ICD-10-CM

## 2024-04-17 RX ORDER — OXYCODONE HYDROCHLORIDE AND ACETAMINOPHEN 5; 325 MG/1; MG/1
2 TABLET ORAL EVERY 6 HOURS PRN
Qty: 40 TABLET | Refills: 0 | Status: SHIPPED | OUTPATIENT
Start: 2024-04-17 | End: 2024-04-24

## 2024-04-17 NOTE — TELEPHONE ENCOUNTER
Patient requesting pain medication refill, please send to [facility] in [place]. Left TKA, DOS 4/10/2024

## 2024-04-19 ENCOUNTER — HOSPITAL ENCOUNTER (OUTPATIENT)
Dept: PHYSICAL THERAPY | Age: 59
Setting detail: THERAPIES SERIES
Discharge: HOME | End: 2024-04-19
Payer: COMMERCIAL

## 2024-04-19 PROCEDURE — 97110 THERAPEUTIC EXERCISES: CPT

## 2024-04-19 PROCEDURE — 97016 VASOPNEUMATIC DEVICE THERAPY: CPT

## 2024-04-19 PROCEDURE — 97530 THERAPEUTIC ACTIVITIES: CPT

## 2024-04-19 NOTE — FLOWSHEET NOTE
patient reported pain levels.        Communication with other providers:  POC sent 4/15      Assessment:  Pt tolerated today's treatment session well without complications. Pt demonstrate weight shift to the right with UE assistance when completing first few reps of STS. Pt cued for even weight shifts during task. Pt demonstrated fair quad contraction, however required PT assistance to complete exercises within full available ROM. Pt would continue to benefit from skilled physical therapy to address lower extremity weakness, deficits with mobility, and to progress towards current goals to prevent regression or injury.     End Pain: 5/10       Plan for Next Session: Specific Instructions for Next Treatment: L knee range of motion, L LE strength training, gait training        Time In / Time Out:    2106 - 1125      Timed Code/Total Treatment Minutes:  39'/49': 10' Vaso x1, 29' TE x2, 10' TA x1      Next Progress Note due:  10th Visit      Plan of Care Interventions:  [x] Therapeutic Exercise  [x] Modalities:  [x] Therapeutic Activity     [] Ultrasound  [x] Estim  [x] Gait Training      [] Cervical Traction [] Lumbar Traction  [x] Neuromuscular Re-education    [x] Cold/hotpack [] Iontophoresis   [x] Instruction in HEP      [x] Vasopneumatic   [] Dry Needling    [x] Manual Therapy               [] Aquatic Therapy              Electronically signed by:  Maryellen Laws, SPT, 4/19/2024, 7:36 AM

## 2024-04-25 ENCOUNTER — HOSPITAL ENCOUNTER (OUTPATIENT)
Dept: PHYSICAL THERAPY | Age: 59
Setting detail: THERAPIES SERIES
Discharge: HOME | End: 2024-04-25
Payer: COMMERCIAL

## 2024-04-25 PROCEDURE — 97140 MANUAL THERAPY 1/> REGIONS: CPT

## 2024-04-25 PROCEDURE — 97110 THERAPEUTIC EXERCISES: CPT

## 2024-04-25 PROCEDURE — 97016 VASOPNEUMATIC DEVICE THERAPY: CPT

## 2024-04-25 NOTE — FLOWSHEET NOTE
Outpatient Physical Therapy  [place]           [x] Phone: [phone]   Fax: [phone]  [place]           [] Phone: [phone]   Fax: [phone]        Physical Therapy Daily Treatment Note  Date:  2024  Patient Name:  Valorie Galvan                       :  1965                     MRN: [id number]  Restrictions/Precautions:   WB Status: WBAT  Diagnosis:   Diagnosis: Primary osteoarthritis of the left knee, chornic pain of left knee  Date of Injury/Surgery:   Treatment Diagnosis:  Increased L knee pain, impaired L knee range of motion, impaired L LE strength  Insurance/Certification information: BCBS - 60 PCY  Referring Physician:   Rj Marcano MD   PCP: Bebe Bolden DO  Next Doctor Visit:    Plan of care signed (Y/N):  POC sent 4/15  Outcome Measure: KOOS   Visit# / total visits:   3/10  Pain level:     0-6 10       Goals:     Patient goals: Improve strength, range of motion, and endurance  Short term goals  Time Frame for Short term goals: 4 Visits  Pt will improve active L knee extension range of motion to 0 deg in order to demonstrate proper gait mechanics.  Long Term Goals  Time Frame for Long Term Goals: 10 Visits  Pt will improve active L knee flexion range of motion to 120 deg or more in order to demonstrate proper gait mechanics.  Pt will improve TUG to 13.5 sec in order to be considered a safe ambulator and achieve MDC.  Pt will improve 5x STS to 12 sec or less in order to not be considered a fall risk and acheive MDC.  Pt will improve L LE strength to 4+/5 or more in order to improve strength needed for work related tasks.  Pt will improve KOOS score to 18 or less in order to improve subjective reports of function.        Summary of Evaluation:  Assessment: Pt is a 59-year-old male who presents to therapy s/p L TKA on 4/10/2024. Upon assessment, pt demonstrates impaired L knee range of motion, impaired L knee strength, increased L knee pain, impaired gait mechanics,

## 2024-04-27 ENCOUNTER — HOSPITAL ENCOUNTER (OUTPATIENT)
Dept: PHYSICAL THERAPY | Age: 59
Setting detail: THERAPIES SERIES
Discharge: HOME | End: 2024-04-27
Payer: COMMERCIAL

## 2024-04-27 PROCEDURE — 97110 THERAPEUTIC EXERCISES: CPT

## 2024-04-27 PROCEDURE — 97016 VASOPNEUMATIC DEVICE THERAPY: CPT

## 2024-04-27 PROCEDURE — 97140 MANUAL THERAPY 1/> REGIONS: CPT

## 2024-04-27 NOTE — FLOWSHEET NOTE
Outpatient Physical Therapy  [place]           [x] Phone: [phone]   Fax: [phone]  [place]           [] Phone: [phone]   Fax: [phone]        Physical Therapy Daily Treatment Note  Date:  2024  Patient Name:  Valorie Galvan                       :  1965                     MRN: [id number]  Restrictions/Precautions:   WB Status: WBAT  Diagnosis:   Diagnosis: Primary osteoarthritis of the left knee, chornic pain of left knee  Date of Injury/Surgery:   Treatment Diagnosis:  Increased L knee pain, impaired L knee range of motion, impaired L LE strength  Insurance/Certification information: BCBS - 60 Y  Referring Physician:   Rj Marcano MD   Next Doctor Visit:    Plan of care signed (Y/N):  POC sent 4/15  Outcome Measure: KOOS   Visit# / total visits:  4/10  Pain level:    2 /10       Goals:     Patient goals: Improve strength, range of motion, and endurance  Short term goals  Time Frame for Short term goals: 4 Visits  Pt will improve active L knee extension range of motion to 0 deg in order to demonstrate proper gait mechanics.: MET   Long Term Goals  Time Frame for Long Term Goals: 10 Visits  Pt will improve active L knee flexion range of motion to 120 deg or more in order to demonstrate proper gait mechanics.  Pt will improve TUG to 13.5 sec in order to be considered a safe ambulator and achieve MDC.  Pt will improve 5x STS to 12 sec or less in order to not be considered a fall risk and acheive MDC.  Pt will improve L LE strength to 4+/5 or more in order to improve strength needed for work related tasks.  Pt will improve KOOS score to 18 or less in order to improve subjective reports of function.        Summary of Evaluation:  Assessment: Pt is a 59-year-old male who presents to therapy s/p L TKA on 4/10/2024. Upon assessment, pt demonstrates impaired L knee range of motion, impaired L knee strength, increased L knee pain, impaired gait mechanics, and decreased

## 2024-04-29 ENCOUNTER — HOSPITAL ENCOUNTER (OUTPATIENT)
Dept: PHYSICAL THERAPY | Age: 59
Setting detail: THERAPIES SERIES
Discharge: HOME | End: 2024-04-29
Payer: COMMERCIAL

## 2024-04-29 PROCEDURE — 97016 VASOPNEUMATIC DEVICE THERAPY: CPT

## 2024-04-29 PROCEDURE — 97140 MANUAL THERAPY 1/> REGIONS: CPT

## 2024-04-29 PROCEDURE — 97110 THERAPEUTIC EXERCISES: CPT

## 2024-04-29 NOTE — FLOWSHEET NOTE
tolerance to transfers. Pt would benefit from skilled physical therapy to address left lower extremity weakness, deficits with mobility, and to progress towards current goals to prevent regression or injury.       Subjective: Pt rated L knee pain at  4/10 today. Pt stated that his knee was tight today, but that he has been working on his ROM. Pt also stated that he has been having increased pain in his quads.     Any changes in Ambulatory Summary Sheet?  None      Objective: Ambulated with 2WW and decreased knee ROM    PROM 80° after manual   5° from full extension after manual         Exercises: (No more than 4 columns)   Exercise/Equipment 4/19/2024 #2 4/25/2024 #3 4/27/24 #4 4/29/2024 #5            WARM UP       NuStep L1, 6' L-3 x 5'  L3 x5' L-4 x 5'           TABLE       Heel Slides 15x10\" hold Sliding board and strap 10 x 2 5\"  X20, 5\" Sliding board and strap 10 x 2 5\"    Heel Prop 4' Man with OP x2' Man OP   Quad Set 10x3\" hold 10 x 2 5\"   10 x 2 5\"    SAQ x5 active, x15 active assistive for full ROM  10 x 2 3\" with asssist and end range 2x10 10 x 2 3\"    SLR   2x8 10x    LAQ x10 with assistance for full ROM 10x 3\"      Flex under table   X10, 5\" ea Man    Prone quad stretch    man   Prone hamstring curl     10 x 2. Man, too   STANDING       STS x10  X10 emphasis on equal WB     Flex onto step   X10, 5\" 6\" 10x5\"    Ant step up     4\" 10 x 2    Mini squats   At counter 10x2     High marching   At counter 10 x 2 ea LE alt Counter x10 ea    HR / TR  10x 2 ea dir       Gt training   Emphasis on flexion and extension  W/ SPC - good to do around his house now per PT    PROPRIOCEPTION                     MODALITIES       Vaso 10' 10'  10' 10'               Other Therapeutic Activities/Education:  HEP    Home Exercise Program:  Heel slides, heel prop, SAQ, quad sets    Manual Treatments:  PROM into flexion, sup/inf patellar mobs, STM lateral quad and distal IT band    Modalities:  Patient received vasocompression on

## 2024-04-30 ENCOUNTER — OFFICE VISIT (OUTPATIENT)
Dept: ORTHOPEDIC SURGERY | Age: 59
End: 2024-04-30

## 2024-04-30 VITALS
HEIGHT: 68 IN | TEMPERATURE: 98.2 F | RESPIRATION RATE: 14 BRPM | OXYGEN SATURATION: 97 % | HEART RATE: 74 BPM | WEIGHT: 205 LBS | BODY MASS INDEX: 31.07 KG/M2

## 2024-04-30 DIAGNOSIS — Z96.652 STATUS POST LEFT KNEE REPLACEMENT: Primary | ICD-10-CM

## 2024-04-30 PROCEDURE — 99024 POSTOP FOLLOW-UP VISIT: CPT

## 2024-04-30 RX ORDER — HYDROCODONE BITARTRATE AND ACETAMINOPHEN 5; 325 MG/1; MG/1
1 TABLET ORAL EVERY 8 HOURS PRN
Qty: 21 TABLET | Refills: 0 | Status: SHIPPED | OUTPATIENT
Start: 2024-04-30 | End: 2024-05-07

## 2024-04-30 NOTE — PATIENT INSTRUCTIONS
Continue doing physical therapy  Continue using walker or cane  Continue weight bear as tolerated  May clean incision with soap and water.  May begin to submerge incision in about a week.  Ice and elevate as needed  Tylenol or Motrin for pain  Follow up in 3 weeks with Dr. Rj Marcano.    If you have any question post operatively. Please contact office to speak with [facility].     We are committed to providing you the best care possible.     If you receive a survey after visiting one of our offices, please take time to share your experience concerning your physician office visit.  These surveys are confidential and no health information about you is shared.     We are eager to improve for you and we are counting on your feedback to help make that happen.

## 2024-05-01 ENCOUNTER — HOSPITAL ENCOUNTER (OUTPATIENT)
Dept: PHYSICAL THERAPY | Age: 59
Setting detail: THERAPIES SERIES
Discharge: HOME | End: 2024-05-01
Payer: COMMERCIAL

## 2024-05-01 PROCEDURE — 97110 THERAPEUTIC EXERCISES: CPT

## 2024-05-01 PROCEDURE — 97140 MANUAL THERAPY 1/> REGIONS: CPT

## 2024-05-01 PROCEDURE — 97112 NEUROMUSCULAR REEDUCATION: CPT

## 2024-05-01 NOTE — FLOWSHEET NOTE
tolerance to transfers. Pt would benefit from skilled physical therapy to address left lower extremity weakness, deficits with mobility, and to progress towards current goals to prevent regression or injury.       Subjective: Pt rated L knee pain at  3-4/10 today. Pt stated that he saw Sunny Flower PA-C and was told that he had 3 weeks to get 120° or he would need to be scheduled for a EDUARD.    Any changes in Ambulatory Summary Sheet?  None      Objective: Ambulated with 2WW and decreased knee ROM  After manual   PROM 87°   5° from full extension  Trial of Danish E-stim    Exercises: (No more than 4 columns)   Exercise/Equipment 4/19/2024 #2 4/25/2024 #3 4/27/24 #4 4/29/2024 #5 5/1/2024 #6             WARM UP        NuStep L1, 6' L-3 x 5'  L3 x5' L-4 x 5'  L-4 x 5'            TABLE        Heel Slides 15x10\" hold Sliding board and strap 10 x 2 5\"  X20, 5\" Sliding board and strap 10 x 2 5\"  Sliding board and strap 10 x 2 5\"    Heel Prop 4' Man with OP x2' Man OP Man with OP   Quad Set 10x3\" hold 10 x 2 5\"   10 x 2 5\"  X 5 ' with Russian E-stim   SAQ x5 active, x15 active assistive for full ROM  10 x 2 3\" with asssist and end range 2x10 10 x 2 3\"  X 5' with Russian E-stim  10 x 2 3\"    SLR   2x8 10x  10x with ext lag   LAQ x10 with assistance for full ROM 10x 3\"    10 x 2 3\"    Flex under table   X10, 5\" ea Man  Man   Prone quad stretch    man    Prone hamstring curl     10 x 2. Man, too    STANDING        STS x10  X10 emphasis on equal WB   Lower table  10 x 2 with L LE blocked into flexion   Flex onto step   X10, 5\" 6\" 10x5\"  6\" 10x 5\"    Ant step up     4\" 10 x 2     Mini squats   At counter 10x2   At ARC 10 x 2    High marching   At counter 10 x 2 ea LE alt Counter x10 ea     HR / TR  10x 2 ea dir    10 x 2 ea dir    Gt training   Emphasis on flexion and extension  W/ SPC - good to do around his house now per PT  Throughout gym   PROPRIOCEPTION                        MODALITIES        Vaso 10' 10'  10' 10'  Held

## 2024-05-06 ENCOUNTER — HOSPITAL ENCOUNTER (OUTPATIENT)
Dept: PHYSICAL THERAPY | Age: 59
Setting detail: THERAPIES SERIES
Discharge: HOME | End: 2024-05-06
Payer: COMMERCIAL

## 2024-05-06 PROCEDURE — 97110 THERAPEUTIC EXERCISES: CPT

## 2024-05-06 PROCEDURE — 97140 MANUAL THERAPY 1/> REGIONS: CPT

## 2024-05-06 PROCEDURE — 97016 VASOPNEUMATIC DEVICE THERAPY: CPT

## 2024-05-06 NOTE — FLOWSHEET NOTE
Outpatient Physical Therapy  [place]           [x] Phone: [phone]   Fax: [phone]  [place]           [] Phone: [phone]   Fax: [phone]        Physical Therapy Daily Treatment Note  Date:  2024  Patient Name:  Valorie Galvan                       :  1965                     MRN: [id number]  Restrictions/Precautions:   WB Status: WBAT  Diagnosis:   Diagnosis: Primary osteoarthritis of the left knee, chornic pain of left knee  Date of Injury/Surgery:   Treatment Diagnosis:  Increased L knee pain, impaired L knee range of motion, impaired L LE strength  Insurance/Certification information: BCBS - 60 PCY  Referring Physician:   Rj Marcano MD   Next Doctor Visit:    Plan of care signed (Y/N):  POC sent 4/15  Outcome Measure: KOOS   Visit# / total visits:  7/10  Pain level:    4 /10       Goals:     Patient goals: Improve strength, range of motion, and endurance  Short term goals  Time Frame for Short term goals: 4 Visits  Pt will improve active L knee extension range of motion to 0 deg in order to demonstrate proper gait mechanics.: MET   Long Term Goals  Time Frame for Long Term Goals: 10 Visits  Pt will improve active L knee flexion range of motion to 120 deg or more in order to demonstrate proper gait mechanics.  Pt will improve TUG to 13.5 sec in order to be considered a safe ambulator and achieve MDC.  Pt will improve 5x STS to 12 sec or less in order to not be considered a fall risk and acheive MDC.  Pt will improve L LE strength to 4+/5 or more in order to improve strength needed for work related tasks.  Pt will improve KOOS score to 18 or less in order to improve subjective reports of function.        Summary of Evaluation:  Assessment: Pt is a 59-year-old male who presents to therapy s/p L TKA on 4/10/2024. Upon assessment, pt demonstrates impaired L knee range of motion, impaired L knee strength, increased L knee pain, impaired gait mechanics, and decreased

## 2024-05-08 ENCOUNTER — HOSPITAL ENCOUNTER (OUTPATIENT)
Dept: PHYSICAL THERAPY | Age: 59
Setting detail: THERAPIES SERIES
Discharge: HOME | End: 2024-05-08
Payer: COMMERCIAL

## 2024-05-08 PROCEDURE — 97110 THERAPEUTIC EXERCISES: CPT

## 2024-05-08 PROCEDURE — 97112 NEUROMUSCULAR REEDUCATION: CPT

## 2024-05-08 PROCEDURE — 97140 MANUAL THERAPY 1/> REGIONS: CPT

## 2024-05-08 NOTE — FLOWSHEET NOTE
Therapeutic Activities/Education:  HEP    Home Exercise Program:  Heel slides, heel prop, SAQ, quad sets    Manual Treatments:  PROM into flexion and extension . Scar tissue massage. Patellar mobs all directions. STM to hamstrings and quads and along ITB x 15'     Modalities:    Communication with other providers:  POC sent 4/15    Assessment: Pt tolerated treatment fairly well today. Pt was able to get more ROM after manual. Pt was able to increase activity in the gym today. Pt rated pain at 0/10 after treatment.     Plan for Next Session: L knee range of motion, L LE strength training, gait training     Time In / Time Out: 1415/ 1503    Timed Code/Total Treatment Minutes:48'/ 48' 1 Man (15') 1 TE ( 23') 1 neuro (10')     Next Progress Note due:  10th Visit    Plan of Care Interventions:  [x] Therapeutic Exercise  [x] Modalities:  [x] Therapeutic Activity     [] Ultrasound  [x] Estim  [x] Gait Training      [] Cervical Traction [] Lumbar Traction  [x] Neuromuscular Re-education    [x] Cold/hotpack [] Iontophoresis   [x] Instruction in HEP      [x] Vasopneumatic   [] Dry Needling    [x] Manual Therapy               [] Aquatic Therapy              Electronically signed by:  Angelina Thakkar PTA 5/8/2024, 1:04 PM          5/8/2024,3:24 PM

## 2024-05-13 ENCOUNTER — HOSPITAL ENCOUNTER (OUTPATIENT)
Dept: PHYSICAL THERAPY | Age: 59
Setting detail: THERAPIES SERIES
Discharge: HOME | End: 2024-05-13
Payer: COMMERCIAL

## 2024-05-13 PROCEDURE — 97140 MANUAL THERAPY 1/> REGIONS: CPT

## 2024-05-13 PROCEDURE — 97110 THERAPEUTIC EXERCISES: CPT

## 2024-05-13 PROCEDURE — 97016 VASOPNEUMATIC DEVICE THERAPY: CPT

## 2024-05-13 NOTE — PROGRESS NOTES
Outpatient Physical Therapy           [place]           [x] Phone: [phone]   Fax: [phone]  [place]           [] Phone: [phone]   Fax: [phone]      To: Rj Marcano MD     From: Florina Ruiz, PT, DPT     Patient: Valorie Galvan                    : 1965  Diagnosis:  Primary osteoarthritis of the left knee, chornic pain of left knee      Treatment Diagnosis:     Increased L knee pain, impaired L knee range of motion, impaired L LE strength   Date: 2024  [x]  Progress Note                []  Discharge Note    Evaluation Date:  4/15/24 Total Visits to date:  9  Cancels/No-shows to date:  0    Subjective:    Pt is doing well this date. He is able to do more at home now and feels his motion and walking are improving. He can now sit in a chair with comfortable bend in his knee. He is doing better with his HEP at home and is completing 3x/day. He is able to get in and out of a chair easier now as well.  KOOS:     Plan of Care/Treatment to date:  [x] Therapeutic Exercise    [x] Modalities:  [x] Therapeutic Activity     [] Ultrasound  [x] Electrical Stimulation  [x] Gait Training      [] Cervical Traction   [] Lumbar Traction  [x] Neuromuscular Re-education  [x] Cold/hotpack [] Iontophoresis  [x] Instruction in HEP      Other:  [x] Manual Therapy       [x]  Vasopneumatic  [] Aquatic Therapy       []   Dry Needle Therapy                      Objective/Significant Findings At Last Visit/Comments:    AROM L knee               Flex: 85° before stretching              Ext: 0° after stretching  PROM L knee              Flex: 96°     Strength LLE  L Hip Flexion: 4/5  L Hip ABduction: 4/5  L Hip ADduction: 4/5  L Knee Flexion: 4-/5  L Knee Extension: 4-/5  L Ankle Dorsiflexion: 4+/5     TU.20 seconds SPC  5x STS: 16.23 seconds BUE use    Assessment:   Pt has shown good improvement since therapy start with overall improved ROM, strength, gait and functional mobility independence.

## 2024-05-14 ASSESSMENT — ENCOUNTER SYMPTOMS
BACK PAIN: 0
FACIAL SWELLING: 0
COUGH: 0
SHORTNESS OF BREATH: 0
RHINORRHEA: 0
NAUSEA: 0

## 2024-05-14 NOTE — PROGRESS NOTES
4/30/2024   Chief Complaint   Patient presents with    Post-Op Check     Left TKA DOS 04/10/2024        History of Present Illness:                             Valorie Galvan is a 59 y.o. male returning to the office today for continued postoperative management regarding his left total knee arthroplasty, DOS 4/10/2024.  Patient states he appears to be doing well with his healing process.  He denies any signs of infection from his incision area such as erythema, fluctuance, drainage, or surrounding temperature changes.  Patient states he can walk on his own power with minimal assistance.  He denies any distal paresthesias or calf pain.  He is overall happy with his progress.        Medical History  Patient's medications, allergies, past medical, surgical, social and family histories were reviewed and updated as appropriate.      Review of Systems   Constitutional:  Negative for fever.   HENT:  Negative for facial swelling and rhinorrhea.    Respiratory:  Negative for cough and shortness of breath.    Cardiovascular:  Negative for chest pain.   Gastrointestinal:  Negative for nausea.   Musculoskeletal:  Positive for arthralgias and gait problem. Negative for back pain, joint swelling, myalgias, neck pain and neck stiffness.   Skin:  Negative for pallor and rash.   Neurological:  Negative for facial asymmetry and speech difficulty.   Psychiatric/Behavioral:  Negative for agitation and confusion.                                                Examination:  General Exam:  Vitals: Pulse 74   Temp 98.2 °F (36.8 °C) (Infrared)   Resp 14   Ht 1.727 m (5' 8\")   Wt 93 kg (205 lb)   SpO2 97%   BMI 31.17 kg/m²    Physical Exam  Constitutional:       General: He is not in acute distress.     Appearance: Normal appearance. He is not ill-appearing.   HENT:      Head: Normocephalic and atraumatic.      Nose: No rhinorrhea.   Eyes:      General: No scleral icterus.        Right eye: No discharge.         Left eye: No

## 2024-05-15 ENCOUNTER — HOSPITAL ENCOUNTER (OUTPATIENT)
Dept: PHYSICAL THERAPY | Age: 59
Setting detail: THERAPIES SERIES
Discharge: HOME | End: 2024-05-15
Payer: COMMERCIAL

## 2024-05-15 PROCEDURE — 97110 THERAPEUTIC EXERCISES: CPT

## 2024-05-15 PROCEDURE — 97140 MANUAL THERAPY 1/> REGIONS: CPT

## 2024-05-15 PROCEDURE — 97112 NEUROMUSCULAR REEDUCATION: CPT

## 2024-05-15 NOTE — FLOWSHEET NOTE
range of motion, impaired L knee strength, increased L knee pain, impaired gait mechanics, and decreased tolerance to transfers. Pt would benefit from skilled physical therapy to address left lower extremity weakness, deficits with mobility, and to progress towards current goals to prevent regression or injury.       Subjective: Pt stated that his knee pain was about 3/10 today. Pt stated that he walked around the house without his cane and ran the vacuum, took out the garbage, and cleaned up the back porch. Pt stated that he woke up with increased pain today.     Any changes in Ambulatory Summary Sheet?  None      Objective  poor quad set   Decreased knee flexion and extension during gt with cane.  AROM L knee    Flex: 84° before stretching   Ext: 15° from full extension  PROM L knee   Flex: 96° after manual               Ext : 4° from full extension       Exercises: (No more than 4 columns)   Exercise/Equipment 5/6/24 #7 5/8/2024 #8 5/13/24 #9 5/15/2024 #10            WARM UP       NuStep L5 x5' L-5 x5'   L-5 x 5'    bike   x5'    TABLE       Heel Slides X20, 5\" Sliding board and strap 10 x 2 5\"  Sliding board and strap 10 x 2 5\"   Heel Prop During manual Man with OP     Quad Set    10' with Russian E-stim   SAQ 2x12 10x2 5\"  2x10 10 x 2 w/ Russian E-stim   SLR 2x10 10x2 2x10 10 x 2 with significant ext lag   LAQ  10 x 2 3\"      Flex under table Man  Man     Prone quad stretch  Man  Man   Prone hamstring curl   10 x 2   Man, too  10 x 2   Man, too   STANDING       STS 2x10 cues for equal WB Lowest level on table 10 x 2 with Knee blocked by PTA X15 equal stance cues    Flex onto step X10, 5\" 8\" 10x5\"  x15    step up    Fwd x10 6\"  Lat x10 6\"    Mini squats        shuttle 1C 2x10 DL  2C 2x10 DL    High marching   25 ft ea dir with cane     Backwards marching   25 ft x 2 with cane     Lateral walking   25 ft x 2 with cane     HR / TR        Gt training   Throughout gym       PROPRIOCEPTION       Cone taps   9\" cone

## 2024-05-20 ENCOUNTER — HOSPITAL ENCOUNTER (OUTPATIENT)
Dept: PHYSICAL THERAPY | Age: 59
Setting detail: THERAPIES SERIES
Discharge: HOME | End: 2024-05-20
Payer: COMMERCIAL

## 2024-05-20 PROCEDURE — 97110 THERAPEUTIC EXERCISES: CPT

## 2024-05-20 PROCEDURE — 97530 THERAPEUTIC ACTIVITIES: CPT

## 2024-05-20 NOTE — FLOWSHEET NOTE
last visit with both improved ROM into flex and ext. Is showing improved gait pattern. Pt would continue to benefit from skilled therapy interventions to address remaining impairments, improve mobility and strength and progress toward goal completion while reducing risk for re-injury or further decline.  End pain: same    Plan for Next Session: L knee range of motion, L LE strength training, gait training     Time In / Time Out: 7346 - 2285    Timed Code/Total Treatment Minutes:  39': 10' TA x1, 25' TE x2, 4' MT x0    Next Progress Note due:  10th Visit    Plan of Care Interventions:  [x] Therapeutic Exercise  [x] Modalities:  [x] Therapeutic Activity     [] Ultrasound  [x] Estim  [x] Gait Training      [] Cervical Traction [] Lumbar Traction  [x] Neuromuscular Re-education    [x] Cold/hotpack [] Iontophoresis   [x] Instruction in HEP      [x] Vasopneumatic   [] Dry Needling    [x] Manual Therapy               [] Aquatic Therapy              Electronically signed by:  Florina Ruiz PT, DPT 5/20/2024, 11:27 AM

## 2024-05-23 ENCOUNTER — HOSPITAL ENCOUNTER (OUTPATIENT)
Dept: PHYSICAL THERAPY | Age: 59
Setting detail: THERAPIES SERIES
Discharge: HOME | End: 2024-05-23
Payer: COMMERCIAL

## 2024-05-23 PROCEDURE — 97530 THERAPEUTIC ACTIVITIES: CPT

## 2024-05-23 PROCEDURE — 97110 THERAPEUTIC EXERCISES: CPT

## 2024-05-23 NOTE — FLOWSHEET NOTE
Outpatient Physical Therapy  [place]           [x] Phone: [phone]   Fax: [phone]  [place]           [] Phone: [phone]   Fax: [phone]        Physical Therapy Daily Treatment Note  Date:  2024  Patient Name:  Valorie Galvan                       :  1965                     MRN: [id number]  Restrictions/Precautions: WB Status: WBAT  Diagnosis:  Primary osteoarthritis of the left knee, chornic pain of left knee  Date of Injury/Surgery: 4/10/24  Treatment Diagnosis:  Increased L knee pain, impaired L knee range of motion, impaired L LE strength  Insurance/Certification information: BCBS - 60 Y  Referring Physician:   Rj Marcano MD   Next Doctor Visit:  2024   Plan of care signed (Y/N):  POC sent 4/15  Outcome Measure: KOOS:   Visit# / total visits:    Pain level:   0 /10       Goals:     Patient goals: Improve strength, range of motion, and endurance Progressing   Short term goals  Time Frame for Short term goals: 4 Visits  Pt will improve active L knee extension range of motion to 0 deg in order to demonstrate proper gait mechanics.: MET   Long Term Goals  Time Frame for Long Term Goals: 10 Visits  Pt will improve active L knee flexion range of motion to 120 deg or more in order to demonstrate proper gait mechanics. Progressing   Pt will improve TUG to 13.5 sec in order to be considered a safe ambulator and achieve MDC. Progressing   Pt will improve 5x STS to 12 sec or less in order to not be considered a fall risk and acheive MDC. Progressing    Pt will improve L LE strength to 4+/5 or more in order to improve strength needed for work related tasks. Progressing    Pt will improve KOOS score to 18 or less in order to improve subjective reports of function. MET         Summary of Evaluation:  Assessment: Pt is a 59-year-old male who presents to therapy s/p L TKA on 4/10/2024. Upon assessment, pt demonstrates impaired L knee range of

## 2024-05-29 ENCOUNTER — HOSPITAL ENCOUNTER (OUTPATIENT)
Dept: PHYSICAL THERAPY | Age: 59
Setting detail: THERAPIES SERIES
Discharge: HOME | End: 2024-05-29
Payer: COMMERCIAL

## 2024-05-29 PROCEDURE — 97112 NEUROMUSCULAR REEDUCATION: CPT

## 2024-05-29 PROCEDURE — 97110 THERAPEUTIC EXERCISES: CPT

## 2024-05-29 NOTE — FLOWSHEET NOTE
Modalities: Communication with other providers:  POC sent 4/15    Assessment: Pt tolerated today's treatment without any adverse reactions or complications this date. Pt with good tolerance to addition of hurdles and half rounds for step over; high marches. Pt would continue to benefit from skilled therapy interventions to address remaining impairments, improve mobility and strength and progress toward goal completion while reducing risk for re-injury or further decline.  End pain: 0/10 \"more flexible\"    Plan for Next Session: L knee range of motion, L LE strength training, gait training     Time In / Time Out: 5342-6804    Timed Code/Total Treatment Minutes: 46 min (2 TE; 1 Neuro)    Next Progress Note due:  10th Visit    Plan of Care Interventions:  [x] Therapeutic Exercise  [x] Modalities:  [x] Therapeutic Activity     [] Ultrasound  [x] Estim  [x] Gait Training      [] Cervical Traction [] Lumbar Traction  [x] Neuromuscular Re-education    [x] Cold/hotpack [] Iontophoresis   [x] Instruction in HEP      [x] Vasopneumatic   [] Dry Needling    [x] Manual Therapy               [] Aquatic Therapy              Electronically signed by:  Margy Camacho PTA,  5/29/2024, 11:21 AM

## 2024-05-30 ENCOUNTER — TELEPHONE (OUTPATIENT)
Dept: ORTHOPEDIC SURGERY | Age: 59
End: 2024-05-30

## 2024-05-30 NOTE — TELEPHONE ENCOUNTER
Patient requesting to return to work on 6/3/24. With the following restrictions of no pushing no pulling and no twisting

## 2024-05-31 ENCOUNTER — HOSPITAL ENCOUNTER (OUTPATIENT)
Dept: PHYSICAL THERAPY | Age: 59
Setting detail: THERAPIES SERIES
Discharge: HOME | End: 2024-05-31
Payer: COMMERCIAL

## 2024-05-31 PROCEDURE — 97530 THERAPEUTIC ACTIVITIES: CPT

## 2024-05-31 PROCEDURE — 97110 THERAPEUTIC EXERCISES: CPT

## 2024-05-31 NOTE — FLOWSHEET NOTE
Outpatient Physical Therapy  [place]           [x] Phone: [phone]   Fax: [phone]  [place]           [] Phone: [phone]   Fax: [phone]        Physical Therapy Daily Treatment Note  Date:  2024  Patient Name:  Valorie Galvan                       :  1965                     MRN: [id number]  Restrictions/Precautions: WB Status: WBAT  Diagnosis:  Primary osteoarthritis of the left knee, chornic pain of left knee  Date of Injury/Surgery: 4/10/24  Treatment Diagnosis:  Increased L knee pain, impaired L knee range of motion, impaired L LE strength  Insurance/Certification information: BCBS - 60 Y  Referring Physician:   Rj Marcano MD   Next Doctor Visit:  2024   Plan of care signed (Y/N):  POC sent 4/15  Outcome Measure: KOOS:   Visit# / total visits:    Pain level:   0 /10 \"just stiffness\"      Goals:     Patient goals: Improve strength, range of motion, and endurance Progressing   Short term goals  Time Frame for Short term goals: 4 Visits  Pt will improve active L knee extension range of motion to 0 deg in order to demonstrate proper gait mechanics.: MET   Long Term Goals  Time Frame for Long Term Goals: 10 Visits  Pt will improve active L knee flexion range of motion to 120 deg or more in order to demonstrate proper gait mechanics. Progressing   Pt will improve TUG to 13.5 sec in order to be considered a safe ambulator and achieve MDC. Progressing   Pt will improve 5x STS to 12 sec or less in order to not be considered a fall risk and acheive MDC. Progressing    Pt will improve L LE strength to 4+/5 or more in order to improve strength needed for work related tasks. Progressing    Pt will improve KOOS score to 18 or less in order to improve subjective reports of function. MET         Summary of Evaluation:  Assessment: Pt is a 59-year-old male who presents to therapy s/p L TKA on 4/10/2024. Upon assessment, pt demonstrates impaired

## 2024-06-03 ENCOUNTER — HOSPITAL ENCOUNTER (OUTPATIENT)
Dept: PHYSICAL THERAPY | Age: 59
Setting detail: THERAPIES SERIES
Discharge: HOME | End: 2024-06-03
Payer: COMMERCIAL

## 2024-06-03 DIAGNOSIS — Z96.652 STATUS POST LEFT KNEE REPLACEMENT: Primary | ICD-10-CM

## 2024-06-03 PROCEDURE — 97110 THERAPEUTIC EXERCISES: CPT

## 2024-06-03 PROCEDURE — 97530 THERAPEUTIC ACTIVITIES: CPT

## 2024-06-03 RX ORDER — HYDROCODONE BITARTRATE AND ACETAMINOPHEN 5; 325 MG/1; MG/1
1 TABLET ORAL EVERY 4 HOURS PRN
Qty: 30 TABLET | Refills: 0 | Status: SHIPPED | OUTPATIENT
Start: 2024-06-03 | End: 2024-06-08

## 2024-06-03 NOTE — FLOWSHEET NOTE
Outpatient Physical Therapy  [place]           [x] Phone: [phone]   Fax: [phone]  [place]           [] Phone: [phone]   Fax: [phone]        Physical Therapy Daily Treatment Note  Date:  6/3/2024  Patient Name:  Valorie Galvan                       :  1965                     MRN: [id number]  Restrictions/Precautions: WB Status: WBAT  Diagnosis:  Primary osteoarthritis of the left knee, chornic pain of left knee  Date of Injury/Surgery: 4/10/24  Treatment Diagnosis:  Increased L knee pain, impaired L knee range of motion, impaired L LE strength  Insurance/Certification information: BCBS - 60 Y  Referring Physician:   Rj Marcano MD   Next Doctor Visit:  2024   Plan of care signed (Y/N):  POC sent 4/15  Outcome Measure: KOOS:   Visit# / total visits:  15/20  Pain level:   0 /10 \"just stiffness\"      Goals:     Patient goals: Improve strength, range of motion, and endurance Progressing   Short term goals  Time Frame for Short term goals: 4 Visits  Pt will improve active L knee extension range of motion to 0 deg in order to demonstrate proper gait mechanics.: MET   Long Term Goals  Time Frame for Long Term Goals: 10 Visits  Pt will improve active L knee flexion range of motion to 120 deg or more in order to demonstrate proper gait mechanics. Progressing   Pt will improve TUG to 13.5 sec in order to be considered a safe ambulator and achieve MDC. Progressing   Pt will improve 5x STS to 12 sec or less in order to not be considered a fall risk and acheive MDC. Progressing    Pt will improve L LE strength to 4+/5 or more in order to improve strength needed for work related tasks. Progressing    Pt will improve KOOS score to 18 or less in order to improve subjective reports of function. MET         Summary of Evaluation:  Assessment: Pt is a 59-year-old male who presents to therapy s/p L TKA on 4/10/2024. Upon assessment, pt demonstrates impaired

## 2024-06-06 ENCOUNTER — HOSPITAL ENCOUNTER (OUTPATIENT)
Dept: PHYSICAL THERAPY | Age: 59
Setting detail: THERAPIES SERIES
Discharge: HOME | End: 2024-06-06
Payer: COMMERCIAL

## 2024-06-06 PROCEDURE — 97140 MANUAL THERAPY 1/> REGIONS: CPT

## 2024-06-06 PROCEDURE — 97110 THERAPEUTIC EXERCISES: CPT

## 2024-06-06 PROCEDURE — 97016 VASOPNEUMATIC DEVICE THERAPY: CPT

## 2024-06-06 NOTE — PROGRESS NOTES
Outpatient Physical Therapy           [place]           [x] Phone: [phone]   Fax: [phone]  [place]           [] Phone: [phone]   Fax: [phone]      To: Rj Marcano MD     From: Florina Ruiz, PT, DPT     Patient: Valorie Galvan                    : 1965  Diagnosis:  Primary osteoarthritis of the left knee, chornic pain of left knee      Treatment Diagnosis:     Increased L knee pain, impaired L knee range of motion, impaired L LE strength   Date: 2024  [x]  Progress Note                []  Discharge Note    Evaluation Date:  4/15/24 Total Visits to date:  16  Cancels/No-shows to date:  0    Subjective:  Pt is doing well this date but is stiff from work. He is no longer having the band of pain around his knee like before and can comfortably sit in a chair with his knee bent compared to before. He is able to also now comfortably sit in the car w/o scooting the seat all the way back. No issues with ADLs. Is back to work light duty and doing alright, not yet back full duty.     Plan of Care/Treatment to date:  [x] Therapeutic Exercise    [x] Modalities:  [x] Therapeutic Activity     [] Ultrasound  [x] Electrical Stimulation  [x] Gait Training      [] Cervical Traction   [] Lumbar Traction  [x] Neuromuscular Re-education  [x] Cold/hotpack [] Iontophoresis  [x] Instruction in HEP      Other:  [x] Manual Therapy       [x]  Vasopneumatic  [] Aquatic Therapy       []   Dry Needle Therapy                      Objective/Significant Findings At Last Visit/Comments:    AROM L knee               Flex: 98°               Ext: 0°   PROM L knee              Flex: 103°     Strength LLE  L Hip Flexion: 4+/5  L Hip ABduction: 4+/5  L Hip ADduction: 4+/5  L Knee Flexion: 4/5  L Knee Extension: 4/5  L Ankle Dorsiflexion: 5/5     TUG: 10.94 seconds no AD  5x STS: 12.27 seconds no BUE use       Assessment:    Pt is doing well this date. He is showing very good goal progression with ext ROM, strength

## 2024-06-06 NOTE — FLOWSHEET NOTE
motion, impaired L knee strength, increased L knee pain, impaired gait mechanics, and decreased tolerance to transfers. Pt would benefit from skilled physical therapy to address left lower extremity weakness, deficits with mobility, and to progress towards current goals to prevent regression or injury.       Subjective: Pt is doing well this date but is stiff from work. He is no longer having the band of pain around his knee like before and can comfortably sit in a chair with his knee bent compared to before. He is able to also now comfortably sit in the car w/o scooting the seat all the way back. No issues with ADLs. Is back to work light duty and doing alright, not yet back full duty.    Any changes in Ambulatory Summary Sheet?  None      Objective    AROM L knee               Flex: 98°               Ext: 0°   PROM L knee              Flex: 103°     Strength LLE  L Hip Flexion: 4+/5  L Hip ABduction: 4+/5  L Hip ADduction: 4+/5  L Knee Flexion: 4/5  L Knee Extension: 4/5  L Ankle Dorsiflexion: 5/5     TUG: 10.94 seconds no AD  5x STS: 12.27 seconds no BUE use    Exercises: (No more than 4 columns)   Exercise/Equipment 5/31/24 #14 6/3/24 #15 6/6/24 #16           WARM UP      NuStep      bike x5' 5' 5' - first day w/ full revolution   TABLE      Heel Slides x20 x20 x20   Heel Prop Ext hand 4' 2# Ext hang x3.5'  2# Ext hang 4' 2#   SAQ 2x10, 1# 2x10, 1#    SLR 2x10 2x10  2x10   LAQ 2x10, 1# 2x10, 1# 2x10   Flex under table      STANDING      STS x10 x20    Flex onto step x10 x15 x20   step up  Large staircase x2 laps  Large staircase x2 laps     Mini squats       shuttle 2C DL 2x10     High marching       Lateral walking        Leg press  44# DL 2x10 44# DL 2x10   PROPRIOCEPTION      FM walkouts      MODALITIES      Vaso                Other Therapeutic Activities/Education:  HEP    Home Exercise Program:  Heel slides, heel prop, SAQ, quad sets    Manual Treatments:  PROM into flexion supine and

## 2024-06-11 ENCOUNTER — HOSPITAL ENCOUNTER (OUTPATIENT)
Dept: PHYSICAL THERAPY | Age: 59
Setting detail: THERAPIES SERIES
Discharge: HOME | End: 2024-06-11
Payer: COMMERCIAL

## 2024-06-11 PROCEDURE — 97530 THERAPEUTIC ACTIVITIES: CPT

## 2024-06-11 PROCEDURE — 97110 THERAPEUTIC EXERCISES: CPT

## 2024-06-11 NOTE — FLOWSHEET NOTE
Outpatient Physical Therapy  [place]           [x] Phone: [phone]   Fax: [phone]  [place]           [] Phone: [phone]   Fax: [phone]        Physical Therapy Daily Treatment Note  Date:  2024  Patient Name:  Valorie Galvan                       :  1965                     MRN: [id number]  Restrictions/Precautions: WB Status: WBAT  Diagnosis:  Primary osteoarthritis of the left knee, chornic pain of left knee  Date of Injury/Surgery: 4/10/24  Treatment Diagnosis:  Increased L knee pain, impaired L knee range of motion, impaired L LE strength  Insurance/Certification information: BCBS - 60 Y  Referring Physician:   Rj Marcano MD   Next Doctor Visit:  2024   Plan of care signed (Y/N):  POC sent 4/15  Outcome Measure: KOOS:   Visit# / total visits:    Pain level:   0 /10 \"just stiffness\"      Goals:     Patient goals: Improve strength, range of motion, and endurance Progressing   Short term goals  Time Frame for Short term goals: 4 Visits  Pt will improve active L knee extension range of motion to 0 deg in order to demonstrate proper gait mechanics.: MET   Long Term Goals  Time Frame for Long Term Goals: 10 Visits  Pt will improve active L knee flexion range of motion to 120 deg or more in order to demonstrate proper gait mechanics. Progressing 6/6  Pt will improve TUG to 13.5 sec in order to be considered a safe ambulator and achieve MDC. MET /  Pt will improve 5x STS to 12 sec or less in order to not be considered a fall risk and acheive MDC Almost MET /  Pt will improve L LE strength to 4+/5 or more in order to improve strength needed for work related tasks. Almost MET /6  Pt will improve KOOS score to 18 or less in order to improve subjective reports of function. MET         Summary of Evaluation:  Assessment: Pt is a 59-year-old male who presents to therapy s/p L TKA on 4/10/2024. Upon assessment, pt demonstrates impaired L knee range of

## 2024-06-13 ENCOUNTER — HOSPITAL ENCOUNTER (OUTPATIENT)
Dept: PHYSICAL THERAPY | Age: 59
Setting detail: THERAPIES SERIES
Discharge: HOME | End: 2024-06-13
Payer: COMMERCIAL

## 2024-06-13 PROCEDURE — 97110 THERAPEUTIC EXERCISES: CPT

## 2024-06-13 PROCEDURE — 97140 MANUAL THERAPY 1/> REGIONS: CPT

## 2024-06-13 PROCEDURE — 97016 VASOPNEUMATIC DEVICE THERAPY: CPT

## 2024-06-13 NOTE — FLOWSHEET NOTE
Outpatient Physical Therapy  [place]           [x] Phone: [phone]   Fax: [phone]  [place]           [] Phone: [phone]   Fax: [phone]        Physical Therapy Daily Treatment Note  Date:  2024  Patient Name:  Valorie Galvan                       :  1965                     MRN: [id number]  Restrictions/Precautions: WB Status: WBAT  Diagnosis:  Primary osteoarthritis of the left knee, chornic pain of left knee  Date of Injury/Surgery: 4/10/24  Treatment Diagnosis:  Increased L knee pain, impaired L knee range of motion, impaired L LE strength  Insurance/Certification information: BCBS - 60 Y  Referring Physician:   Rj Marcano MD   Next Doctor Visit:   Plan of care signed (Y/N):  Y  Outcome Measure: KOOS:   Visit# / total visits:    Pain level:   0 /10 \"just stiffness\"      Goals:     Patient goals: Improve strength, range of motion, and endurance Progressing   Short term goals  Time Frame for Short term goals: 4 Visits  Pt will improve active L knee extension range of motion to 0 deg in order to demonstrate proper gait mechanics.: MET   Long Term Goals  Time Frame for Long Term Goals: 10 Visits  Pt will improve active L knee flexion range of motion to 120 deg or more in order to demonstrate proper gait mechanics. Progressing /  Pt will improve TUG to 13.5 sec in order to be considered a safe ambulator and achieve MDC. MET /  Pt will improve 5x STS to 12 sec or less in order to not be considered a fall risk and acheive MDC Almost MET   Pt will improve L LE strength to 4+/5 or more in order to improve strength needed for work related tasks. Almost MET /  Pt will improve KOOS score to 18 or less in order to improve subjective reports of function. MET         Summary of Evaluation:  Assessment: Pt is a 59-year-old male who presents to therapy s/p L TKA on 4/10/2024. Upon assessment, pt demonstrates impaired L knee range of motion, impaired L knee

## 2024-06-18 ENCOUNTER — HOSPITAL ENCOUNTER (OUTPATIENT)
Dept: PHYSICAL THERAPY | Age: 59
Setting detail: THERAPIES SERIES
Discharge: HOME | End: 2024-06-18
Payer: COMMERCIAL

## 2024-06-18 PROCEDURE — 97110 THERAPEUTIC EXERCISES: CPT

## 2024-06-18 NOTE — FLOWSHEET NOTE
Outpatient Physical Therapy  [place]           [x] Phone: [phone]   Fax: [phone]  [place]           [] Phone: [phone]   Fax: [phone]        Physical Therapy Daily Treatment Note  Date:  2024  Patient Name:  Valorie Galvan                       :  1965                     MRN: [id number]  Restrictions/Precautions: WB Status: WBAT  Diagnosis:  Primary osteoarthritis of the left knee, chornic pain of left knee  Date of Injury/Surgery: 4/10/24  Treatment Diagnosis:  Increased L knee pain, impaired L knee range of motion, impaired L LE strength  Insurance/Certification information: BCBS - 60 Y  Referring Physician:   Rj Marcano MD   Next Doctor Visit:   Plan of care signed (Y/N):  Y  Outcome Measure: KOOS:   Visit# / total visits:    Pain level:   0 /10 \"just stiffness\"      Goals:     Patient goals: Improve strength, range of motion, and endurance Progressing   Short term goals  Time Frame for Short term goals: 4 Visits  Pt will improve active L knee extension range of motion to 0 deg in order to demonstrate proper gait mechanics.: MET   Long Term Goals  Time Frame for Long Term Goals: 10 Visits  Pt will improve active L knee flexion range of motion to 120 deg or more in order to demonstrate proper gait mechanics. Progressing /  Pt will improve TUG to 13.5 sec in order to be considered a safe ambulator and achieve MDC. MET /  Pt will improve 5x STS to 12 sec or less in order to not be considered a fall risk and acheive MDC Almost MET   Pt will improve L LE strength to 4+/5 or more in order to improve strength needed for work related tasks. Almost MET /  Pt will improve KOOS score to 18 or less in order to improve subjective reports of function. MET         Summary of Evaluation:  Assessment: Pt is a 59-year-old male who presents to therapy s/p L TKA on 4/10/2024. Upon assessment, pt demonstrates impaired L knee range of motion, impaired L knee  No

## 2024-06-21 ENCOUNTER — HOSPITAL ENCOUNTER (OUTPATIENT)
Dept: PHYSICAL THERAPY | Age: 59
Setting detail: THERAPIES SERIES
Discharge: HOME | End: 2024-06-21
Payer: COMMERCIAL

## 2024-06-21 PROCEDURE — 97530 THERAPEUTIC ACTIVITIES: CPT

## 2024-06-21 PROCEDURE — 97110 THERAPEUTIC EXERCISES: CPT

## 2024-06-21 NOTE — FLOWSHEET NOTE
Outpatient Physical Therapy  [place]           [x] Phone: [phone]   Fax: [phone]  [place]           [] Phone: [phone]   Fax: [phone]        Physical Therapy Daily Treatment Note  Date:  2024  Patient Name:  Valorie Galvan                       :  1965                     MRN: [id number]  Restrictions/Precautions: WB Status: WBAT  Diagnosis:  Primary osteoarthritis of the left knee, chornic pain of left knee  Date of Injury/Surgery: 4/10/24  Treatment Diagnosis:  Increased L knee pain, impaired L knee range of motion, impaired L LE strength  Insurance/Certification information: BCBS - 60 Y  Referring Physician:   Rj Marcano MD   Next Doctor Visit:   Plan of care signed (Y/N):  Y  Outcome Measure: KOOS:   Visit# / total visits:    Pain level:   0 /10 \"just stiffness\"      Goals:     Patient goals: Improve strength, range of motion, and endurance Progressing   Short term goals  Time Frame for Short term goals: 4 Visits  Pt will improve active L knee extension range of motion to 0 deg in order to demonstrate proper gait mechanics.: MET   Long Term Goals  Time Frame for Long Term Goals: 10 Visits  Pt will improve active L knee flexion range of motion to 120 deg or more in order to demonstrate proper gait mechanics. Progressing /  Pt will improve TUG to 13.5 sec in order to be considered a safe ambulator and achieve MDC. MET /  Pt will improve 5x STS to 12 sec or less in order to not be considered a fall risk and acheive MDC Almost MET   Pt will improve L LE strength to 4+/5 or more in order to improve strength needed for work related tasks. Almost MET /  Pt will improve KOOS score to 18 or less in order to improve subjective reports of function. MET         Summary of Evaluation:  Assessment: Pt is a 59-year-old male who presents to therapy s/p L TKA on 4/10/2024. Upon assessment, pt demonstrates impaired L knee range of motion, impaired L knee

## 2024-06-27 ENCOUNTER — HOSPITAL ENCOUNTER (OUTPATIENT)
Dept: PHYSICAL THERAPY | Age: 59
Setting detail: THERAPIES SERIES
Discharge: HOME | End: 2024-06-27
Payer: COMMERCIAL

## 2024-06-27 PROCEDURE — 97140 MANUAL THERAPY 1/> REGIONS: CPT

## 2024-06-27 PROCEDURE — 97110 THERAPEUTIC EXERCISES: CPT

## 2024-06-27 NOTE — FLOWSHEET NOTE
Outpatient Physical Therapy  [place]           [x] Phone: [phone]   Fax: [phone]  [place]           [] Phone: [phone]   Fax: [phone]        Physical Therapy Daily Treatment Note  Date:  2024  Patient Name:  Valorie Galvan                       :  1965                     MRN: [id number]  Restrictions/Precautions: WB Status: WBAT  Diagnosis:  Primary osteoarthritis of the left knee, chornic pain of left knee  Date of Injury/Surgery: 4/10/24  Treatment Diagnosis:  Increased L knee pain, impaired L knee range of motion, impaired L LE strength  Insurance/Certification information: BCBS - 60 Y  Referring Physician:   Rj Marcano MD   Next Doctor Visit:   Plan of care signed (Y/N):  Y  Outcome Measure: KOOS:   Visit# / total visits:    Pain level:   0 /10 \"just stiffness\"      Goals:     Patient goals: Improve strength, range of motion, and endurance Progressing   Short term goals  Time Frame for Short term goals: 4 Visits  Pt will improve active L knee extension range of motion to 0 deg in order to demonstrate proper gait mechanics.: MET   Long Term Goals  Time Frame for Long Term Goals: 10 Visits  Pt will improve active L knee flexion range of motion to 120 deg or more in order to demonstrate proper gait mechanics. Progressing /  Pt will improve TUG to 13.5 sec in order to be considered a safe ambulator and achieve MDC. MET /  Pt will improve 5x STS to 12 sec or less in order to not be considered a fall risk and acheive MDC Almost MET   Pt will improve L LE strength to 4+/5 or more in order to improve strength needed for work related tasks. Almost MET /  Pt will improve KOOS score to 18 or less in order to improve subjective reports of function. MET         Summary of Evaluation:  Assessment: Pt is a 59-year-old male who presents to therapy s/p L TKA on 4/10/2024. Upon assessment, pt demonstrates impaired L knee range of motion, impaired L knee

## 2024-07-02 ENCOUNTER — HOSPITAL ENCOUNTER (OUTPATIENT)
Dept: PHYSICAL THERAPY | Age: 59
Setting detail: THERAPIES SERIES
Discharge: HOME | End: 2024-07-02
Payer: COMMERCIAL

## 2024-07-02 ENCOUNTER — OFFICE VISIT (OUTPATIENT)
Dept: ORTHOPEDIC SURGERY | Age: 59
End: 2024-07-02

## 2024-07-02 VITALS — TEMPERATURE: 97.6 F | HEART RATE: 68 BPM | OXYGEN SATURATION: 100 %

## 2024-07-02 DIAGNOSIS — Z96.652 STATUS POST LEFT KNEE REPLACEMENT: Primary | ICD-10-CM

## 2024-07-02 PROCEDURE — 99024 POSTOP FOLLOW-UP VISIT: CPT

## 2024-07-02 PROCEDURE — 97110 THERAPEUTIC EXERCISES: CPT

## 2024-07-02 PROCEDURE — 97140 MANUAL THERAPY 1/> REGIONS: CPT

## 2024-07-02 ASSESSMENT — ENCOUNTER SYMPTOMS
COUGH: 0
RHINORRHEA: 0
SHORTNESS OF BREATH: 0
BACK PAIN: 0
NAUSEA: 0
FACIAL SWELLING: 0

## 2024-07-02 NOTE — PROGRESS NOTES
7/2/2024   Chief Complaint   Patient presents with    Knee Pain     12wk post op LT TKA DOS 4/10/24          History of Present Illness:    Today's HPI:  Patient is a 59-year-old male returning to the office today for continued postoperative management regarding his left TKA, DOS 4/10/2024.  Patient states he is feeling much better and has improved his range of motion.  He is notes his biggest challenge is still stiffness and full flexion.  He notes an occasional popping sensation of the left knee but believes it is likely scar tissue.    Previous HPI:    Valorie Galvan is a 59 y.o. male returning to the office today for continued postoperative management regarding his left total knee arthroplasty, DOS 4/10/2024.  Patient states he appears to be doing well with his healing process.  He denies any signs of infection from his incision area such as erythema, fluctuance, drainage, or surrounding temperature changes.  Patient states he can walk on his own power with minimal assistance.  He denies any distal paresthesias or calf pain.  He is overall happy with his progress.    Medical History  Patient's medications, allergies, past medical, surgical, social and family histories were reviewed and updated as appropriate.    Review of Systems   Constitutional:  Negative for fever.   HENT:  Negative for facial swelling and rhinorrhea.    Respiratory:  Negative for cough and shortness of breath.    Cardiovascular:  Negative for chest pain.   Gastrointestinal:  Negative for nausea.   Musculoskeletal:  Positive for arthralgias and gait problem. Negative for back pain, joint swelling, myalgias, neck pain and neck stiffness.   Skin:  Negative for pallor and rash.   Neurological:  Negative for facial asymmetry and speech difficulty.   Psychiatric/Behavioral:  Negative for agitation and confusion.                                                Examination:  General Exam:  Vitals: Pulse 68   Temp 97.6 °F (36.4 °C)   SpO2 100%

## 2024-07-02 NOTE — FLOWSHEET NOTE
Outpatient Physical Therapy  [place]           [x] Phone: [phone]   Fax: [phone]  [place]           [] Phone: [phone]   Fax: [phone]        Physical Therapy Daily Treatment Note  Date:  2024  Patient Name:  Valorie Galvan                       :  1965                     MRN: [id number]  Restrictions/Precautions: WB Status: WBAT  Diagnosis:  Primary osteoarthritis of the left knee, chornic pain of left knee  Date of Injury/Surgery: 4/10/24  Treatment Diagnosis:  Increased L knee pain, impaired L knee range of motion, impaired L LE strength  Insurance/Certification information: BCBS - 60 Y  Referring Physician:   Rj Marcano MD   Next Doctor Visit:   Plan of care signed (Y/N):  Y  Outcome Measure: KOOS:   Visit# / total visits:    Pain level:   0 /10 \"just stiffness\"      Goals:     Patient goals: Improve strength, range of motion, and endurance Progressing   Short term goals  Time Frame for Short term goals: 4 Visits  Pt will improve active L knee extension range of motion to 0 deg in order to demonstrate proper gait mechanics.: MET   Long Term Goals  Time Frame for Long Term Goals: 10 Visits  Pt will improve active L knee flexion range of motion to 120 deg or more in order to demonstrate proper gait mechanics. Progressing 6/6  Pt will improve TUG to 13.5 sec in order to be considered a safe ambulator and achieve MDC. MET /6  Pt will improve 5x STS to 12 sec or less in order to not be considered a fall risk and acheive MDC Almost MET /  Pt will improve L LE strength to 4+/5 or more in order to improve strength needed for work related tasks. Almost MET /6  Pt will improve KOOS score to 18 or less in order to improve subjective reports of function. MET         Summary of Evaluation:  Assessment: Pt is a 59-year-old male who presents to therapy s/p L TKA on 4/10/2024. Upon assessment, pt demonstrates impaired L knee range of motion, impaired L knee

## 2024-07-02 NOTE — PROGRESS NOTES
Patient seen in office today for:12wk post op LT TKA DOS 4/10/24  No issues ..healing good ..still some  pain and its taking him longer to heal he is at 107 degree and PT says 120 degrees   Has a popping noise in his knee doing PT - wants to know what that is ...there is no pain PT has been rolling it helping some but stiffness is his main problem but it is getting better      Patient reports 2/10 pain.  RICE and medication are effective to alleviate pain and reduce swelling.   Pain also alleviated by: OTC med   Pain worsened by: Patient reports painful ROM & weight bearing.     Patient is currently physical therapy

## 2024-07-09 ENCOUNTER — HOSPITAL ENCOUNTER (OUTPATIENT)
Dept: PHYSICAL THERAPY | Age: 59
Setting detail: THERAPIES SERIES
Discharge: HOME | End: 2024-07-09
Payer: COMMERCIAL

## 2024-07-09 PROCEDURE — 97140 MANUAL THERAPY 1/> REGIONS: CPT

## 2024-07-09 PROCEDURE — 97110 THERAPEUTIC EXERCISES: CPT

## 2024-07-09 NOTE — FLOWSHEET NOTE
Outpatient Physical Therapy  [place]           [x] Phone: [phone]   Fax: [phone]  [place]           [] Phone: [phone]   Fax: [phone]        Physical Therapy Daily Treatment Note  Date:  2024  Patient Name:  Valorie Galvan                       :  1965                     MRN: [id number]  Restrictions/Precautions: WB Status: WBAT  Diagnosis:  Primary osteoarthritis of the left knee, chornic pain of left knee  Date of Injury/Surgery: 4/10/24  Treatment Diagnosis:  Increased L knee pain, impaired L knee range of motion, impaired L LE strength  Insurance/Certification information: BCBS - 60 Y  Referring Physician:   Rj Marcano MD   Next Doctor Visit:   Plan of care signed (Y/N):  Y  Outcome Measure: KOOS:   Visit# / total visits:    Pain level:   0 /10 \"just stiffness\"      Goals:     Patient goals: Improve strength, range of motion, and endurance Progressing   Short term goals  Time Frame for Short term goals: 4 Visits  Pt will improve active L knee extension range of motion to 0 deg in order to demonstrate proper gait mechanics.: MET   Long Term Goals  Time Frame for Long Term Goals: 10 Visits  Pt will improve active L knee flexion range of motion to 120 deg or more in order to demonstrate proper gait mechanics. Progressing 6/6  Pt will improve TUG to 13.5 sec in order to be considered a safe ambulator and achieve MDC. MET /6  Pt will improve 5x STS to 12 sec or less in order to not be considered a fall risk and acheive MDC Almost MET /  Pt will improve L LE strength to 4+/5 or more in order to improve strength needed for work related tasks. Almost MET /6  Pt will improve KOOS score to 18 or less in order to improve subjective reports of function. MET         Summary of Evaluation:  Assessment: Pt is a 59-year-old male who presents to therapy s/p L TKA on 4/10/2024. Upon assessment, pt demonstrates impaired L knee range of motion, impaired L knee

## 2024-07-12 ENCOUNTER — OFFICE VISIT (OUTPATIENT)
Dept: INTERNAL MEDICINE CLINIC | Age: 59
End: 2024-07-12
Payer: COMMERCIAL

## 2024-07-12 ENCOUNTER — TELEPHONE (OUTPATIENT)
Dept: INTERNAL MEDICINE CLINIC | Age: 59
End: 2024-07-12

## 2024-07-12 VITALS
WEIGHT: 206.8 LBS | OXYGEN SATURATION: 96 % | DIASTOLIC BLOOD PRESSURE: 80 MMHG | BODY MASS INDEX: 31.34 KG/M2 | SYSTOLIC BLOOD PRESSURE: 126 MMHG | HEIGHT: 68 IN | HEART RATE: 88 BPM

## 2024-07-12 DIAGNOSIS — M17.11 PRIMARY OSTEOARTHRITIS OF RIGHT KNEE: ICD-10-CM

## 2024-07-12 DIAGNOSIS — I10 ESSENTIAL HYPERTENSION: Primary | ICD-10-CM

## 2024-07-12 DIAGNOSIS — E78.5 HYPERLIPIDEMIA, UNSPECIFIED HYPERLIPIDEMIA TYPE: ICD-10-CM

## 2024-07-12 DIAGNOSIS — Z96.652 STATUS POST TOTAL LEFT KNEE REPLACEMENT: ICD-10-CM

## 2024-07-12 PROBLEM — R73.03 PREDIABETES: Status: RESOLVED | Noted: 2020-11-14 | Resolved: 2024-07-12

## 2024-07-12 PROBLEM — R40.20 LOC (LOSS OF CONSCIOUSNESS) (HCC): Status: RESOLVED | Noted: 2022-02-26 | Resolved: 2024-07-12

## 2024-07-12 PROCEDURE — 3074F SYST BP LT 130 MM HG: CPT | Performed by: FAMILY MEDICINE

## 2024-07-12 PROCEDURE — 99214 OFFICE O/P EST MOD 30 MIN: CPT | Performed by: FAMILY MEDICINE

## 2024-07-12 PROCEDURE — 3079F DIAST BP 80-89 MM HG: CPT | Performed by: FAMILY MEDICINE

## 2024-07-12 RX ORDER — LISINOPRIL 10 MG/1
10 TABLET ORAL DAILY
Qty: 90 TABLET | Refills: 1 | Status: SHIPPED | OUTPATIENT
Start: 2024-07-12

## 2024-07-12 RX ORDER — IBUPROFEN 800 MG/1
800 TABLET ORAL
Qty: 90 TABLET | Refills: 0 | Status: SHIPPED | OUTPATIENT
Start: 2024-07-12

## 2024-07-12 ASSESSMENT — ENCOUNTER SYMPTOMS
ABDOMINAL PAIN: 0
SHORTNESS OF BREATH: 0
NAUSEA: 0
COUGH: 0

## 2024-07-12 NOTE — PROGRESS NOTES
Valorie Galvan (:  1965) is a 59 y.o. male,established patient, here for evaluation of the following chief complaint(s):  Follow-up (6 month f/u ), Hypertension, and Medication Refill (Ibuprofen 800 mg- refill )      Assessment & Plan   ASSESSMENT/PLAN:  1. Essential hypertension  - lisinopril (PRINIVIL;ZESTRIL) 10 MG tablet; Take 1 tablet by mouth daily  Dispense: 90 tablet; Refill: 1    2. Primary osteoarthritis of right knee  - ibuprofen (ADVIL;MOTRIN) 800 MG tablet; Take 1 tablet by mouth 3 times daily (with meals)  Dispense: 90 tablet; Refill: 0  ADR's explained. Use sparingly    3. Hyperlipidemia, unspecified hyperlipidemia type  The patient is asked to make an attempt to improve diet     4. Status post total left knee replacement  Keep f/u with orthopedics    Medications reconciled and discussed with the patient  Return in about 6 months (around 2025) for HTN.       Lab Results   Component Value Date    WBC 7.7 2024    HGB 14.0 2024    HCT 44.0 2024    MCV 92.4 2024     2024     Lab Results   Component Value Date    CHOL 204 (H) 03/10/2023     Lab Results   Component Value Date    TRIG 105 03/10/2023     Lab Results   Component Value Date    HDL 54 03/10/2023     Lab Results   Component Value Date    LDLDIRECT 114 (H) 10/01/2021     Lab Results   Component Value Date    LABA1C 5.4 03/10/2023     Lab Results   Component Value Date    .3 03/10/2023     Lab Results   Component Value Date     2024    K 4.2 2024     2024    CO2 25 2024    BUN 12 2024    CREATININE 0.7 (L) 2024    GLUCOSE 106 (H) 2024    CALCIUM 9.1 2024    BILITOT 0.7 2024    ALKPHOS 46 2024    AST 16 2024    ALT 16 2024    LABGLOM >90 2024    GFRAA >60 2022    AGRATIO 1.8 2020    GLOB 2.5 2020     Lab Results   Component Value Date    TSHFT4 1.21 2022     Lab Results   Component

## 2024-07-16 ENCOUNTER — HOSPITAL ENCOUNTER (OUTPATIENT)
Dept: PHYSICAL THERAPY | Age: 59
Setting detail: THERAPIES SERIES
Discharge: HOME | End: 2024-07-16
Payer: COMMERCIAL

## 2024-07-16 PROCEDURE — 97110 THERAPEUTIC EXERCISES: CPT

## 2024-07-16 NOTE — FLOWSHEET NOTE
strength, increased L knee pain, impaired gait mechanics, and decreased tolerance to transfers. Pt would benefit from skilled physical therapy to address left lower extremity weakness, deficits with mobility, and to progress towards current goals to prevent regression or injury.       Subjective:  Pt is doing well this date. States work is going well and he is not having issues with tasks there. Continues to feel his motion improves slowly with time.      Any changes in Ambulatory Summary Sheet?  None      Objective    AAROM:   Flex: 110 deg  Ext: 0 deg     Exercises: (No more than 4 columns)   Exercise/Equipment 7/2/24 #22 7/9/24 #23 7/16/24 #24           WARM UP      NuStep      bike 5' (able to do 10 revolutions in a row) 5' 5'   TABLE      Heel Slides X20 X20 x20   Heel Prop Ext hang 4' 3#  Ext hang 4' 3#   Ext hang 4' 3#   SAQ      SLR 2x10  2x10  2x10   LAQ 2x10, 1# 2x10, 1# 2x10   Flex under table            STANDING      STS      Flex onto step X20 + x10 2x10 2x10   step up  Full steps x2 laps     Mini squats       shuttle 1C w/ stool 2x10 DL  2C 2x10 onto stool   High marching       Lateral walking        Leg press DL 2x10 44# DL 2x10 44# DL 2x10 44#   L FW mini lunge into GTB resisted TKE  X10 c GTB    PROPRIOCEPTION      FM walkouts      MODALITIES      Vaso                Other Therapeutic Activities/Education:  HEP    Home Exercise Program:  Heel slides, heel prop, SAQ, quad sets    Manual Treatments:  flexion PROM    Modalities: Communication with other providers:  POC sent 4/15    Assessment: Pt tolerated today's treatment without any adverse reactions or complications this date. Pt did well this date demo improved ROM into flexion compared to prior visits. Goal check next visit. Pt would continue to benefit from skilled therapy interventions to address remaining impairments, improve mobility and strength and progress toward goal completion while reducing risk for re-injury or further decline.  End

## 2024-07-23 ENCOUNTER — HOSPITAL ENCOUNTER (OUTPATIENT)
Dept: PHYSICAL THERAPY | Age: 59
Setting detail: THERAPIES SERIES
Discharge: HOME | End: 2024-07-23
Payer: COMMERCIAL

## 2024-07-23 PROCEDURE — 97110 THERAPEUTIC EXERCISES: CPT

## 2024-07-23 NOTE — PROGRESS NOTES
with any of his activities because of this. Pt to go on 30 day hold to ensure progression with HEP then dc if he does not return. PT educated pt on continuation of HEP after discharge for max benefits and reduced risk for future decline.     Goal Status:  [x] Achieved [x] Partially Achieved  [] Not Achieved     Patient goals: Improve strength, range of motion, and endurance MET 7/23  Short term goals  Time Frame for Short term goals: 4 Visits  Pt will improve active L knee extension range of motion to 0 deg in order to demonstrate proper gait mechanics.: ReMET 7/23  Long Term Goals  Time Frame for Long Term Goals: 10 Visits  Pt will improve active L knee flexion range of motion to 120 deg or more in order to demonstrate proper gait mechanics. Partially MET 7/23  Pt will improve TUG to 13.5 sec in order to be considered a safe ambulator and achieve MDC. MET 6/6  Pt will improve 5x STS to 12 sec or less in order to not be considered a fall risk and acheive MDC MET 7/23  Pt will improve L LE strength to 4+/5 or more in order to improve strength needed for work related tasks. MET 6/6  Pt will improve KOOS score to 18 or less in order to improve subjective reports of function. ReMET 7/23        Patient Status: [x] 30 day hold     [] Patient now discharged     [] Additional visits requested, Please re-certify for additional visits:      Requested frequency/duration:  2 /week for 5 weeks    If we are requesting more visits, we fully anticipate the patient's condition is expected to improve within the treatment timeframe we are requesting.    Electronically signed by:  Florina Ruiz PT, DPT, 7/23/2024, 7:50 AM    If you have any questions or concerns, please don't hesitate to call.  Thank you for your referral.    Physician Signature:______________________ Date:______ Time: ________  By signing above, therapist’s plan is approved by physician

## 2024-07-23 NOTE — FLOWSHEET NOTE
Outpatient Physical Therapy  [place]           [x] Phone: [phone]   Fax: [phone]  [place]           [] Phone: [phone]   Fax: [phone]        Physical Therapy Daily Treatment Note  Date:  2024  Patient Name:  Valorie Galvan                       :  1965                     MRN: [id number]  Restrictions/Precautions: WB Status: WBAT  Diagnosis:  Primary osteoarthritis of the left knee, chornic pain of left knee  Date of Injury/Surgery: 4/10/24  Treatment Diagnosis:  Increased L knee pain, impaired L knee range of motion, impaired L LE strength  Insurance/Certification information: BCBS - 60 Y  Referring Physician:   Rj Marcano MD   Next Doctor Visit:   Plan of care signed (Y/N):  Y  Outcome Measure: KOOS:   Visit# / total visits:    Pain level:   0 /10 \"just stiffness\"      Goals:     Patient goals: Improve strength, range of motion, and endurance MET   Short term goals  Time Frame for Short term goals: 4 Visits  Pt will improve active L knee extension range of motion to 0 deg in order to demonstrate proper gait mechanics.: ReMET   Long Term Goals  Time Frame for Long Term Goals: 10 Visits  Pt will improve active L knee flexion range of motion to 120 deg or more in order to demonstrate proper gait mechanics. Partially MET   Pt will improve TUG to 13.5 sec in order to be considered a safe ambulator and achieve MDC. MET /  Pt will improve 5x STS to 12 sec or less in order to not be considered a fall risk and acheive MDC MET   Pt will improve L LE strength to 4+/5 or more in order to improve strength needed for work related tasks. MET 6/  Pt will improve KOOS score to 18 or less in order to improve subjective reports of function. ReMET         Summary of Evaluation:  Assessment: Pt is a 59-year-old male who presents to therapy s/p L TKA on 4/10/2024. Upon assessment, pt demonstrates impaired L knee range of motion, impaired L knee strength,

## 2024-11-07 NOTE — PROGRESS NOTES
11/8/2024  Primary cardiologist: Dr. Cordero    CC:   Valorie  is an established 59 y.o.  male here for a follow up on hypertension      SUBJECTIVE/OBJECTIVE:  Valorie is a 59 y.o. male with a history of hypertension and hyperlipidemia    HPI:  Valorie reports has noticed chest discomfort over the last couple weeks.  States he has had some chest tightness across the anterior chest wall.  Can occur with and without activity.  Reports she has had heartburn over the last couple of weeks which is new.  Notes episodes of palpitations where he feels he needs to take a deep breath.  Has associated dizziness.      Review of Systems   Constitutional: Negative for diaphoresis and malaise/fatigue.   Cardiovascular:  Positive for chest pain and palpitations. Negative for claudication, dyspnea on exertion, irregular heartbeat, leg swelling, near-syncope, orthopnea and paroxysmal nocturnal dyspnea.   Respiratory:  Negative for shortness of breath.    Neurological:  Positive for dizziness and light-headedness.       Vitals:    11/08/24 1121   BP: 130/76   Site: Left Upper Arm   Position: Sitting   Cuff Size: Medium Adult   Pulse: 75   SpO2: 97%   Weight: 100.2 kg (221 lb)   Height: 1.727 m (5' 8\")     Wt Readings from Last 3 Encounters:   11/08/24 100.2 kg (221 lb)   07/12/24 93.8 kg (206 lb 12.8 oz)   04/30/24 93 kg (205 lb)      Body mass index is 33.6 kg/m².     Physical Exam  Vitals reviewed.   Eyes:      Pupils: Pupils are equal, round, and reactive to light.   Neck:      Vascular: No carotid bruit.   Cardiovascular:      Rate and Rhythm: Normal rate and regular rhythm.      Pulses: Normal pulses.   Pulmonary:      Effort: Pulmonary effort is normal.      Breath sounds: Normal breath sounds.   Musculoskeletal:      Cervical back: No tenderness.      Right lower leg: No edema.      Left lower leg: No edema.   Skin:     General: Skin is warm and dry.      Capillary Refill: Capillary refill takes less than 2 seconds.   Neurological:

## 2024-11-08 ENCOUNTER — OFFICE VISIT (OUTPATIENT)
Dept: CARDIOLOGY CLINIC | Age: 59
End: 2024-11-08
Payer: COMMERCIAL

## 2024-11-08 VITALS
DIASTOLIC BLOOD PRESSURE: 76 MMHG | HEIGHT: 68 IN | BODY MASS INDEX: 33.49 KG/M2 | HEART RATE: 75 BPM | WEIGHT: 221 LBS | OXYGEN SATURATION: 97 % | SYSTOLIC BLOOD PRESSURE: 130 MMHG

## 2024-11-08 DIAGNOSIS — R07.9 CHEST PAIN, UNSPECIFIED TYPE: Primary | ICD-10-CM

## 2024-11-08 DIAGNOSIS — R42 DIZZINESS: ICD-10-CM

## 2024-11-08 DIAGNOSIS — I10 PRIMARY HYPERTENSION: ICD-10-CM

## 2024-11-08 PROCEDURE — 3078F DIAST BP <80 MM HG: CPT | Performed by: NURSE PRACTITIONER

## 2024-11-08 PROCEDURE — 3075F SYST BP GE 130 - 139MM HG: CPT | Performed by: NURSE PRACTITIONER

## 2024-11-08 PROCEDURE — 99214 OFFICE O/P EST MOD 30 MIN: CPT | Performed by: NURSE PRACTITIONER

## 2024-11-08 ASSESSMENT — ENCOUNTER SYMPTOMS
SHORTNESS OF BREATH: 0
ORTHOPNEA: 0

## 2024-11-19 ENCOUNTER — TELEPHONE (OUTPATIENT)
Dept: CARDIOLOGY CLINIC | Age: 59
End: 2024-11-19

## 2024-11-20 NOTE — TELEPHONE ENCOUNTER
Pt is returning call for Angelia from 11/19.Pt is at work and may not be able to answer phone. He states you may leave message.

## 2024-11-27 ENCOUNTER — TELEPHONE (OUTPATIENT)
Dept: CARDIOLOGY CLINIC | Age: 59
End: 2024-11-27

## 2024-11-27 NOTE — TELEPHONE ENCOUNTER
----- Message from Annie Pederson, RYANN - CNP sent at 11/22/2024  3:49 PM EST -----  Normal testing     Notified     Nuclear lexiscan stress test with myocardial perfusion     Image quality is good.    Stress Test: A pharmacological stress test was performed using regadenoson (Lexiscan). The patient reported chest pain, dizziness, flushing and chest heaviness 5/10, sob during the stress test. Chest pain was characterized as pressure-like. Hemodynamics are adequate for diagnosis. Blood pressure demonstrated a normal response and heart rate demonstrated a normal response to stress. The patient's heart rate recovery was normal.    No EKG changes    Cardiolite shows normal cardiolite uptake in all segments of myocardium on stress and rest , ef is normal    Normal study, FU as scheduled

## 2024-12-05 ENCOUNTER — OFFICE VISIT (OUTPATIENT)
Dept: CARDIOLOGY CLINIC | Age: 59
End: 2024-12-05
Payer: COMMERCIAL

## 2024-12-05 VITALS
DIASTOLIC BLOOD PRESSURE: 84 MMHG | SYSTOLIC BLOOD PRESSURE: 138 MMHG | BODY MASS INDEX: 33.71 KG/M2 | WEIGHT: 222.4 LBS | HEIGHT: 68 IN | HEART RATE: 81 BPM

## 2024-12-05 DIAGNOSIS — I10 PRIMARY HYPERTENSION: ICD-10-CM

## 2024-12-05 DIAGNOSIS — R07.9 CHEST PAIN, UNSPECIFIED TYPE: Primary | ICD-10-CM

## 2024-12-05 PROCEDURE — 3075F SYST BP GE 130 - 139MM HG: CPT | Performed by: NURSE PRACTITIONER

## 2024-12-05 PROCEDURE — 99214 OFFICE O/P EST MOD 30 MIN: CPT | Performed by: NURSE PRACTITIONER

## 2024-12-05 PROCEDURE — 3079F DIAST BP 80-89 MM HG: CPT | Performed by: NURSE PRACTITIONER

## 2024-12-05 RX ORDER — METOPROLOL TARTRATE 25 MG/1
25 TABLET, FILM COATED ORAL 2 TIMES DAILY
Qty: 60 TABLET | Refills: 3 | Status: SHIPPED | OUTPATIENT
Start: 2024-12-05

## 2024-12-05 NOTE — PROGRESS NOTES
12/5/2024  Primary cardiologist: Dr. Cordero    CC:   Valorie  is an established 59 y.o.  male here for a follow up on results      SUBJECTIVE/OBJECTIVE:  Valorie is a 59 y.o. male with a history of hypertension and hyperlipidemia    HPI:  Valorie is being seen today to follow-up on stress test.  He continues to have chest discomfort with activity.  Episodes are lasting 3 to 4 minutes.  Needs to sit and rest for relief.      Review of Systems   Constitutional: Negative for diaphoresis and malaise/fatigue.   Cardiovascular:  Positive for chest pain. Negative for claudication, dyspnea on exertion, irregular heartbeat, leg swelling, near-syncope, orthopnea, palpitations and paroxysmal nocturnal dyspnea.   Respiratory:  Negative for shortness of breath.    Neurological:  Negative for dizziness and light-headedness.       Vitals:    12/05/24 1551   BP: 138/84   Site: Left Upper Arm   Position: Sitting   Cuff Size: Medium Adult   Pulse: 81   Weight: 100.9 kg (222 lb 6.4 oz)   Height: 1.727 m (5' 8\")       Wt Readings from Last 3 Encounters:   12/05/24 100.9 kg (222 lb 6.4 oz)   11/08/24 100.2 kg (221 lb)   07/12/24 93.8 kg (206 lb 12.8 oz)      Body mass index is 33.82 kg/m².     Physical Exam  Vitals reviewed.   Eyes:      Pupils: Pupils are equal, round, and reactive to light.   Neck:      Vascular: No carotid bruit.   Cardiovascular:      Rate and Rhythm: Normal rate and regular rhythm.      Pulses: Normal pulses.   Pulmonary:      Effort: Pulmonary effort is normal.      Breath sounds: Normal breath sounds.   Musculoskeletal:      Right lower leg: No edema.      Left lower leg: No edema.   Skin:     General: Skin is warm and dry.      Capillary Refill: Capillary refill takes less than 2 seconds.   Neurological:      General: No focal deficit present.      Mental Status: He is alert.                Current Outpatient Medications   Medication Sig Dispense Refill    Aspirin 81 MG CAPS Take 81 mg by mouth daily      lisinopril

## 2024-12-06 ASSESSMENT — ENCOUNTER SYMPTOMS
ORTHOPNEA: 0
SHORTNESS OF BREATH: 0

## 2024-12-09 ENCOUNTER — TELEPHONE (OUTPATIENT)
Dept: CARDIOLOGY CLINIC | Age: 59
End: 2024-12-09

## 2024-12-09 NOTE — TELEPHONE ENCOUNTER
Case reviewed with Dr Cordero: he continues to have chest pain. Recommend [facility] to define his coronary artery disease:

## 2024-12-09 NOTE — TELEPHONE ENCOUNTER
Per KADE Pederson NP, I spoke with patient to advise of [facility] and procedure team will be calling back to schedule.     Patient asked for call back after 4pm as he can't talk at work.

## 2024-12-10 DIAGNOSIS — Z01.810 PRE-OPERATIVE CARDIOVASCULAR EXAMINATION: Primary | ICD-10-CM

## 2024-12-10 DIAGNOSIS — R07.9 CHEST PAIN, UNSPECIFIED TYPE: ICD-10-CM

## 2024-12-11 ENCOUNTER — TELEPHONE (OUTPATIENT)
Dept: CARDIOLOGY CLINIC | Age: 59
End: 2024-12-11

## 2024-12-11 NOTE — TELEPHONE ENCOUNTER
[facility]     Dr. Indio Cordero     LEFT HEART CATHETERIZATION WITH POSSIBLE PERCUTANEOUS CORONARY INTERVENTION      Patient Name: Valorie Galvan   : 1965  MRN# [id number]    Date of Procedure: 24 Time: 2 Pm Arrival Time: 12 Pm    The catheterization and angiogram are usually outpatient procedures, however if stenting is needed you may need to stay overnight. You will need to arrive at the hospital two hours before the procedure.  You will go to registration in the main lobby.  You will need to arrange for someone to drive you home.      HOSPITAL:  [facility] ([facility])      X   If you have received orders for blood work and or a chest x-ray, please have         them done on assigned date at [facility],           [facility], or [facility].     X Please do not have anything by mouth after midnight prior to or 8 hours before   the procedure.    X You may take your medications with a sip of water in the morning of your               procedure or take them with you to the hospital          X If you take Viagra (Sildenafil) or Cialis (Tadalafil) you will need to hold it for 3 days before your procedure.

## 2024-12-14 DIAGNOSIS — I10 ESSENTIAL HYPERTENSION: ICD-10-CM

## 2024-12-16 RX ORDER — METOPROLOL TARTRATE 25 MG/1
25 TABLET, FILM COATED ORAL 2 TIMES DAILY
Qty: 60 TABLET | Refills: 3 | OUTPATIENT
Start: 2024-12-16

## 2024-12-16 RX ORDER — LISINOPRIL 10 MG/1
10 TABLET ORAL DAILY
Qty: 90 TABLET | Refills: 0 | Status: SHIPPED | OUTPATIENT
Start: 2024-12-16

## 2024-12-17 ENCOUNTER — TELEPHONE (OUTPATIENT)
Dept: CARDIOLOGY CLINIC | Age: 59
End: 2024-12-17

## 2024-12-17 NOTE — TELEPHONE ENCOUNTER
Patient given instructions over telephone on 12/17/24.  Procedure is scheduled for 12/19/24 @ 2pm, w/arrival @ 12pm, @ [facility]. Medication/Education Letter gone over with patient. Procedure and risks were explained to patient. Questions answered, Patient voiced understanding.        Patient was notified that surgery date or time could be changed due to an emergency. Patient voiced understanding.

## 2024-12-18 NOTE — H&P
CC:   Valorie  is an established 59 y.o.  male here for a follow up on results   here for [facility]     SUBJECTIVE/OBJECTIVE:  Valorie is a 59 y.o. male with a history of hypertension and hyperlipidemia     HPI:  Valorie is being seen today to follow-up on stress test.  He continues to have chest discomfort with activity.  Episodes are lasting 3 to 4 minutes.  Needs to sit and rest for relief.        Review of Systems   Constitutional: Negative for diaphoresis and malaise/fatigue.   Cardiovascular:  Positive for chest pain. Negative for claudication, dyspnea on exertion, irregular heartbeat, leg swelling, near-syncope, orthopnea, palpitations and paroxysmal nocturnal dyspnea.   Respiratory:  Negative for shortness of breath.    Neurological:  Negative for dizziness and light-headedness.         Vitals       Vitals:     12/05/24 1551   BP: 138/84   Site: Left Upper Arm   Position: Sitting   Cuff Size: Medium Adult   Pulse: 81   Weight: 100.9 kg (222 lb 6.4 oz)   Height: 1.727 m (5' 8\")                Wt Readings from Last 3 Encounters:   12/05/24 100.9 kg (222 lb 6.4 oz)   11/08/24 100.2 kg (221 lb)   07/12/24 93.8 kg (206 lb 12.8 oz)      Body mass index is 33.82 kg/m².      Physical Exam  Vitals reviewed.   Eyes:      Pupils: Pupils are equal, round, and reactive to light.   Neck:      Vascular: No carotid bruit.   Cardiovascular:      Rate and Rhythm: Normal rate and regular rhythm.      Pulses: Normal pulses.   Pulmonary:      Effort: Pulmonary effort is normal.      Breath sounds: Normal breath sounds.   Musculoskeletal:      Right lower leg: No edema.      Left lower leg: No edema.   Skin:     General: Skin is warm and dry.      Capillary Refill: Capillary refill takes less than 2 seconds.   Neurological:      General: No focal deficit present.      Mental Status: He is alert.                     Current Facility-Administered Medications          Current Outpatient Medications   Medication Sig Dispense Refill    Aspirin 81 MG

## 2024-12-19 ENCOUNTER — HOSPITAL ENCOUNTER (OUTPATIENT)
Age: 59
Discharge: HOME | End: 2024-12-19
Payer: COMMERCIAL

## 2024-12-19 ENCOUNTER — APPOINTMENT (OUTPATIENT)
Dept: GENERAL RADIOLOGY | Age: 59
End: 2024-12-19
Attending: INTERNAL MEDICINE
Payer: COMMERCIAL

## 2024-12-19 ENCOUNTER — HOSPITAL ENCOUNTER (OUTPATIENT)
Age: 59
Setting detail: OUTPATIENT SURGERY
Discharge: HOME | End: 2024-12-19
Attending: INTERNAL MEDICINE | Admitting: INTERNAL MEDICINE
Payer: COMMERCIAL

## 2024-12-19 VITALS
SYSTOLIC BLOOD PRESSURE: 131 MMHG | HEART RATE: 94 BPM | BODY MASS INDEX: 33.65 KG/M2 | TEMPERATURE: 97.2 F | WEIGHT: 222 LBS | RESPIRATION RATE: 16 BRPM | OXYGEN SATURATION: 97 % | DIASTOLIC BLOOD PRESSURE: 75 MMHG | HEIGHT: 68 IN

## 2024-12-19 DIAGNOSIS — Z01.810 PRE-OPERATIVE CARDIOVASCULAR EXAMINATION: ICD-10-CM

## 2024-12-19 DIAGNOSIS — R07.9 CHEST PAIN: ICD-10-CM

## 2024-12-19 LAB
ABO + RH BLD: NORMAL
ANION GAP SERPL CALCULATED.3IONS-SCNC: 10 MMOL/L (ref 9–17)
BLOOD BANK COMMENT: NORMAL
BLOOD BANK SAMPLE EXPIRATION: NORMAL
BLOOD GROUP ANTIBODIES SERPL: NEGATIVE
BUN SERPL-MCNC: 15 MG/DL (ref 7–20)
CALCIUM SERPL-MCNC: 9.2 MG/DL (ref 8.3–10.6)
CHLORIDE SERPL-SCNC: 105 MMOL/L (ref 99–110)
CO2 SERPL-SCNC: 26 MMOL/L (ref 21–32)
CREAT SERPL-MCNC: 0.8 MG/DL (ref 0.9–1.3)
ECHO BSA: 2.2 M2
ERYTHROCYTE [DISTWIDTH] IN BLOOD BY AUTOMATED COUNT: 12.3 % (ref 11.7–14.9)
GFR, ESTIMATED: >90 ML/MIN/1.73M2
GLUCOSE SERPL-MCNC: 109 MG/DL (ref 74–99)
HCT VFR BLD AUTO: 44.3 % (ref 42–52)
HGB BLD-MCNC: 14.3 G/DL (ref 13.5–18)
MCH RBC QN AUTO: 29.4 PG (ref 27–31)
MCHC RBC AUTO-ENTMCNC: 32.3 G/DL (ref 32–36)
MCV RBC AUTO: 91 FL (ref 78–100)
PLATELET # BLD AUTO: 207 K/UL (ref 140–440)
PMV BLD AUTO: 9.1 FL (ref 7.5–11.1)
POTASSIUM SERPL-SCNC: 4.6 MMOL/L (ref 3.5–5.1)
RBC # BLD AUTO: 4.87 M/UL (ref 4.6–6.2)
SODIUM SERPL-SCNC: 140 MMOL/L (ref 136–145)
WBC OTHER # BLD: 5.4 K/UL (ref 4–10.5)

## 2024-12-19 PROCEDURE — 93458 L HRT ARTERY/VENTRICLE ANGIO: CPT | Performed by: INTERNAL MEDICINE

## 2024-12-19 PROCEDURE — 93458 L HRT ARTERY/VENTRICLE ANGIO: CPT

## 2024-12-19 PROCEDURE — 2709999900 HC NON-CHARGEABLE SUPPLY: Performed by: INTERNAL MEDICINE

## 2024-12-19 PROCEDURE — 71046 X-RAY EXAM CHEST 2 VIEWS: CPT

## 2024-12-19 PROCEDURE — 85027 COMPLETE CBC AUTOMATED: CPT

## 2024-12-19 PROCEDURE — 6360000002 HC RX W HCPCS: Performed by: INTERNAL MEDICINE

## 2024-12-19 PROCEDURE — 7100000011 HC PHASE II RECOVERY - ADDTL 15 MIN: Performed by: INTERNAL MEDICINE

## 2024-12-19 PROCEDURE — C1769 GUIDE WIRE: HCPCS | Performed by: INTERNAL MEDICINE

## 2024-12-19 PROCEDURE — 7100000010 HC PHASE II RECOVERY - FIRST 15 MIN: Performed by: INTERNAL MEDICINE

## 2024-12-19 PROCEDURE — 86850 RBC ANTIBODY SCREEN: CPT

## 2024-12-19 PROCEDURE — 2500000003 HC RX 250 WO HCPCS: Performed by: INTERNAL MEDICINE

## 2024-12-19 PROCEDURE — 6360000004 HC RX CONTRAST MEDICATION: Performed by: INTERNAL MEDICINE

## 2024-12-19 PROCEDURE — C1894 INTRO/SHEATH, NON-LASER: HCPCS | Performed by: INTERNAL MEDICINE

## 2024-12-19 PROCEDURE — 2580000003 HC RX 258: Performed by: INTERNAL MEDICINE

## 2024-12-19 PROCEDURE — 80048 BASIC METABOLIC PNL TOTAL CA: CPT

## 2024-12-19 PROCEDURE — 86900 BLOOD TYPING SEROLOGIC ABO: CPT

## 2024-12-19 PROCEDURE — 86901 BLOOD TYPING SEROLOGIC RH(D): CPT

## 2024-12-19 PROCEDURE — 36415 COLL VENOUS BLD VENIPUNCTURE: CPT

## 2024-12-19 RX ORDER — IOPAMIDOL 755 MG/ML
INJECTION, SOLUTION INTRAVASCULAR PRN
Status: DISCONTINUED | OUTPATIENT
Start: 2024-12-19 | End: 2024-12-19 | Stop reason: HOSPADM

## 2024-12-19 RX ORDER — SODIUM CHLORIDE 9 MG/ML
INJECTION, SOLUTION INTRAVENOUS CONTINUOUS PRN
Status: COMPLETED | OUTPATIENT
Start: 2024-12-19 | End: 2024-12-19

## 2024-12-19 RX ORDER — SODIUM CHLORIDE 9 MG/ML
INJECTION, SOLUTION INTRAVENOUS CONTINUOUS
Status: DISCONTINUED | OUTPATIENT
Start: 2024-12-19 | End: 2024-12-19 | Stop reason: HOSPADM

## 2024-12-19 NOTE — PROGRESS NOTES
Discharge instructions reviewed with patient and wife. Voices understanding.  Ambulated without difficulty.  Procedure site remains free from active bleeding or hematoma.

## 2025-01-12 ENCOUNTER — CLINICAL DOCUMENTATION (OUTPATIENT)
Dept: CARDIOLOGY | Age: 60
End: 2025-01-12

## 2025-01-13 ENCOUNTER — TELEPHONE (OUTPATIENT)
Dept: CARDIOLOGY CLINIC | Age: 60
End: 2025-01-13

## 2025-01-13 NOTE — TELEPHONE ENCOUNTER
I am in [place] I have pt on phone you lm to sched consult with warren. can you anthony call him  call [phone] at work  friday after 10 is best you can leave appt time on voice mail.

## 2025-01-13 NOTE — TELEPHONE ENCOUNTER
----- Message from CONNER WALKER LPN sent at 1/13/2025  1:03 PM EST -----  Please see below message from Dr. Cordero  ----- Message -----  From: Indio Cordero MD  Sent: 1/12/2025  10:52 AM EST  To: Last [facility] Clinical Staff Pool    No cad has VT  Need to see EP

## 2025-01-22 ENCOUNTER — OFFICE VISIT (OUTPATIENT)
Dept: CARDIOLOGY CLINIC | Age: 60
End: 2025-01-22
Payer: COMMERCIAL

## 2025-01-22 VITALS
DIASTOLIC BLOOD PRESSURE: 86 MMHG | HEIGHT: 68 IN | HEART RATE: 88 BPM | WEIGHT: 225 LBS | SYSTOLIC BLOOD PRESSURE: 138 MMHG | BODY MASS INDEX: 34.1 KG/M2

## 2025-01-22 DIAGNOSIS — R07.89 OTHER CHEST PAIN: Primary | ICD-10-CM

## 2025-01-22 DIAGNOSIS — I47.20 VENTRICULAR TACHYCARDIA, UNSPECIFIED (HCC): ICD-10-CM

## 2025-01-22 PROCEDURE — 3079F DIAST BP 80-89 MM HG: CPT | Performed by: INTERNAL MEDICINE

## 2025-01-22 PROCEDURE — 99213 OFFICE O/P EST LOW 20 MIN: CPT | Performed by: INTERNAL MEDICINE

## 2025-01-22 PROCEDURE — 3075F SYST BP GE 130 - 139MM HG: CPT | Performed by: INTERNAL MEDICINE

## 2025-01-22 RX ORDER — MAGNESIUM OXIDE 400 MG/1
400 TABLET ORAL DAILY
Qty: 30 TABLET | Refills: 1 | Status: SHIPPED | OUTPATIENT
Start: 2025-01-22

## 2025-01-22 NOTE — PROGRESS NOTES
CC:   Valorie  is an established 59 y.o.  male here for a follow up on results   here for fu on [facility]     SUBJECTIVE/OBJECTIVE:  Valorie is a 59 y.o. male with a history of hypertension and hyperlipidemia     HPI:  Valorie is being seen today to follow-up on stress test.  He continues to have chest discomfort with activity.  Episodes are lasting 3 to 4 minutes.  Needs to sit and rest for relief.        Review of Systems   Constitutional: Negative for diaphoresis and malaise/fatigue.   Cardiovascular:  Positive for chest pain. Negative for claudication, dyspnea on exertion, irregular heartbeat, leg swelling, near-syncope, orthopnea, palpitations and paroxysmal nocturnal dyspnea.   Respiratory:  Negative for shortness of breath.    Neurological:  Negative for dizziness and light-headedness.         Vitals       Vitals:     12/05/24 1551   BP: 138/84   Site: Left Upper Arm   Position: Sitting   Cuff Size: Medium Adult   Pulse: 81   Weight: 100.9 kg (222 lb 6.4 oz)   Height: 1.727 m (5' 8\")                Wt Readings from Last 3 Encounters:   12/05/24 100.9 kg (222 lb 6.4 oz)   11/08/24 100.2 kg (221 lb)   07/12/24 93.8 kg (206 lb 12.8 oz)      Body mass index is 33.82 kg/m².      Physical Exam  Vitals reviewed.   Eyes:      Pupils: Pupils are equal, round, and reactive to light.   Neck:      Vascular: No carotid bruit.   Cardiovascular:      Rate and Rhythm: Normal rate and regular rhythm.      Pulses: Normal pulses.   Pulmonary:      Effort: Pulmonary effort is normal.      Breath sounds: Normal breath sounds.   Musculoskeletal:      Right lower leg: No edema.      Left lower leg: No edema.   Skin:     General: Skin is warm and dry.      Capillary Refill: Capillary refill takes less than 2 seconds.   Neurological:      General: No focal deficit present.      Mental Status: He is alert.                     Current Facility-Administered Medications          Current Outpatient Medications   Medication Sig Dispense Refill    Aspirin

## 2025-01-22 NOTE — PATIENT INSTRUCTIONS
Thank you for allowing us to care for you today!   We want to ensure we can follow your treatment plan and we strive to give you the best outcomes and experience possible.   If you ever have a life threatening emergency and call 911 - for an ambulance (EMS)  REMEMBER  Our providers can only care for you at:   [facility] or [facility]   Even if you have someone take you or you drive yourself we can only care for you in a [facility] facility. Our providers are not setup at the other healthcare locations!    PLEASE CALL OUR OFFICE DURING NORMAL BUSINESS HOURS  Monday through Friday 8 am to 5 pm  AFTER HOURS the physician on-call cannot help with scheduling, rescheduling, procedure instruction questions or any type of medication need or issue.  [facility] P:[phone] - [facility] P:[phone] - [facility] P:[phone]      If you receive a survey:  We would appreciate you taking the time to share your experience concerning your provider visit in the office.    These surveys are confidential!  We are eager to improve and are counting on you to share your feedback so we can ensure you get the best care possible.

## 2025-01-24 ENCOUNTER — INITIAL CONSULT (OUTPATIENT)
Age: 60
End: 2025-01-24
Payer: COMMERCIAL

## 2025-01-24 VITALS
HEIGHT: 68 IN | BODY MASS INDEX: 34.16 KG/M2 | SYSTOLIC BLOOD PRESSURE: 142 MMHG | HEART RATE: 83 BPM | DIASTOLIC BLOOD PRESSURE: 80 MMHG | WEIGHT: 225.4 LBS

## 2025-01-24 DIAGNOSIS — I10 PRIMARY HYPERTENSION: ICD-10-CM

## 2025-01-24 DIAGNOSIS — R00.0 WIDE-COMPLEX TACHYCARDIA: Primary | ICD-10-CM

## 2025-01-24 PROCEDURE — 3079F DIAST BP 80-89 MM HG: CPT | Performed by: INTERNAL MEDICINE

## 2025-01-24 PROCEDURE — 93000 ELECTROCARDIOGRAM COMPLETE: CPT | Performed by: INTERNAL MEDICINE

## 2025-01-24 PROCEDURE — 3077F SYST BP >= 140 MM HG: CPT | Performed by: INTERNAL MEDICINE

## 2025-01-24 PROCEDURE — 99204 OFFICE O/P NEW MOD 45 MIN: CPT | Performed by: INTERNAL MEDICINE

## 2025-01-24 ASSESSMENT — ENCOUNTER SYMPTOMS
CHEST TIGHTNESS: 0
PHOTOPHOBIA: 0
COUGH: 0
SHORTNESS OF BREATH: 1
BLOOD IN STOOL: 0
BACK PAIN: 0
NAUSEA: 0
VOMITING: 0
WHEEZING: 0
CONSTIPATION: 0
EYE PAIN: 0
DIARRHEA: 0
ABDOMINAL PAIN: 0
COLOR CHANGE: 0

## 2025-01-24 NOTE — PROGRESS NOTES
Right lower leg: No edema.      Left lower leg: No edema.   Skin:     General: Skin is warm and dry.   Neurological:      General: No focal deficit present.      Mental Status: He is alert and oriented to person, place, and time.               CBC:   Lab Results   Component Value Date/Time    WBC 5.4 12/19/2024 09:07 AM    HGB 14.3 12/19/2024 09:07 AM    HCT 44.3 12/19/2024 09:07 AM     12/19/2024 09:07 AM     Lipids:  Lab Results   Component Value Date    CHOL 204 (H) 03/10/2023    TRIG 105 03/10/2023    HDL 54 03/10/2023    LDLDIRECT 114 (H) 10/01/2021     PT/INR:   Lab Results   Component Value Date/Time    INR 0.89 10/04/2015 01:12 AM        BMP:    Lab Results   Component Value Date     12/19/2024    K 4.6 12/19/2024     12/19/2024    CO2 26 12/19/2024    BUN 15 12/19/2024     CMP:   Lab Results   Component Value Date    AST 16 03/27/2024    ALT 16 03/27/2024    BILITOT 0.7 03/27/2024    ALKPHOS 46 03/27/2024     TSH:  No results found for: \"TSH\", \"T4\"    EKGINTERPRETATION - EKG Interpretation:  sinus rhythm    LHC  The left main is a large tubular vessel has no significant stenosis  The left and descending artery has no significant stenosis is a tortuous vessel  The circular vessel is a nondominant vessel is no significant stenosis  The right coronary artery is a tortuous vessel as well with a dominant vessel has no significant stenosis  The LV pressures were normal     Assessment plan  Will continue with medical therapy  Suggest referral to EP for evaluation for ventricular tachycardia      Echo     Limited study due to patients body habitus.   Left ventricular function and size is normal, EF is estimated at 55-60%.   Mild left ventricular hypertrophy.   E/A reversal; indeterminate diastolic function.   No regional wall motion abnormalities were detected.   Mild pulmonic regurgitation present.   No significant valvular disease noted.   No evidence of pericardial effusion.      IMPRESSION /

## 2025-01-27 DIAGNOSIS — R00.0 WIDE-COMPLEX TACHYCARDIA: Primary | ICD-10-CM

## 2025-01-28 ENCOUNTER — TELEPHONE (OUTPATIENT)
Dept: CARDIOLOGY CLINIC | Age: 60
End: 2025-01-28

## 2025-01-28 NOTE — TELEPHONE ENCOUNTER
LVM with insurance co trying to obtain prior auth. Have tried calling several times with no answer.  Left cpt codes, dx codes and all info needed to contact me

## 2025-02-10 ENCOUNTER — TELEPHONE (OUTPATIENT)
Dept: CARDIOLOGY CLINIC | Age: 60
End: 2025-02-10

## 2025-02-13 ENCOUNTER — NURSE ONLY (OUTPATIENT)
Age: 60
End: 2025-02-13

## 2025-02-13 DIAGNOSIS — I47.20 VENTRICULAR TACHYCARDIA, UNSPECIFIED (HCC): Primary | ICD-10-CM

## 2025-02-13 NOTE — PROGRESS NOTES
Patient here in office and educated on 2/13/202, scheduled for EP Study/SVT ablation on 2/18/2025 @ 1330, with arrival @ 1130, @ [facility]; consents signed. Copy of orders given for labs and CXR due 2/13/2025 at [facility]. Instructions given to patient to :NPO after midnight including water the night before procedure. May take rest of morning medications day of procedure except the following;  Hold Metoprolol 2 days prior to procedure beginning on 2/16/2025 and day of procedure 2/18/2025. Take Aspirin 3 days before procedure beginning 2/15/2025 and day of procedure 2/18/2025. Advised patient to plan for an overnight stay in the hospital. Patient voiced understanding. Copies of consent & info scanned in chart.

## 2025-02-14 ENCOUNTER — HOSPITAL ENCOUNTER (OUTPATIENT)
Age: 60
Discharge: HOME | End: 2025-02-14
Payer: COMMERCIAL

## 2025-02-14 ENCOUNTER — HOSPITAL ENCOUNTER (OUTPATIENT)
Dept: GENERAL RADIOLOGY | Age: 60
Discharge: HOME | End: 2025-02-14
Payer: COMMERCIAL

## 2025-02-14 DIAGNOSIS — R00.0 WIDE-COMPLEX TACHYCARDIA: ICD-10-CM

## 2025-02-14 LAB
ANION GAP SERPL CALCULATED.3IONS-SCNC: 11 MMOL/L (ref 9–17)
BUN SERPL-MCNC: 21 MG/DL (ref 7–20)
CALCIUM SERPL-MCNC: 9.3 MG/DL (ref 8.3–10.6)
CHLORIDE SERPL-SCNC: 104 MMOL/L (ref 99–110)
CO2 SERPL-SCNC: 24 MMOL/L (ref 21–32)
CREAT SERPL-MCNC: 0.8 MG/DL (ref 0.9–1.3)
ERYTHROCYTE [DISTWIDTH] IN BLOOD BY AUTOMATED COUNT: 12.2 % (ref 11.7–14.9)
GFR, ESTIMATED: >90 ML/MIN/1.73M2
GLUCOSE SERPL-MCNC: 161 MG/DL (ref 74–99)
HCT VFR BLD AUTO: 43.7 % (ref 42–52)
HGB BLD-MCNC: 14.3 G/DL (ref 13.5–18)
INR PPP: 0.9
MAGNESIUM SERPL-MCNC: 2.2 MG/DL (ref 1.8–2.4)
MCH RBC QN AUTO: 29.4 PG (ref 27–31)
MCHC RBC AUTO-ENTMCNC: 32.7 G/DL (ref 32–36)
MCV RBC AUTO: 89.7 FL (ref 78–100)
PARTIAL THROMBOPLASTIN TIME: 27.5 SEC (ref 25.1–37.1)
PHOSPHATE SERPL-MCNC: 3.7 MG/DL (ref 2.5–4.9)
PLATELET # BLD AUTO: 242 K/UL (ref 140–440)
PMV BLD AUTO: 9.6 FL (ref 7.5–11.1)
POTASSIUM SERPL-SCNC: 4 MMOL/L (ref 3.5–5.1)
PROTHROMBIN TIME: 12.8 SEC (ref 11.7–14.5)
RBC # BLD AUTO: 4.87 M/UL (ref 4.6–6.2)
SODIUM SERPL-SCNC: 139 MMOL/L (ref 136–145)
WBC OTHER # BLD: 7.3 K/UL (ref 4–10.5)

## 2025-02-14 PROCEDURE — 85027 COMPLETE CBC AUTOMATED: CPT

## 2025-02-14 PROCEDURE — 80048 BASIC METABOLIC PNL TOTAL CA: CPT

## 2025-02-14 PROCEDURE — 85730 THROMBOPLASTIN TIME PARTIAL: CPT

## 2025-02-14 PROCEDURE — 71046 X-RAY EXAM CHEST 2 VIEWS: CPT

## 2025-02-14 PROCEDURE — 85610 PROTHROMBIN TIME: CPT

## 2025-02-14 PROCEDURE — 83735 ASSAY OF MAGNESIUM: CPT

## 2025-02-14 PROCEDURE — 84100 ASSAY OF PHOSPHORUS: CPT

## 2025-02-18 ENCOUNTER — HOSPITAL ENCOUNTER (OUTPATIENT)
Age: 60
Setting detail: OUTPATIENT SURGERY
Discharge: HOME | End: 2025-02-18
Attending: INTERNAL MEDICINE | Admitting: INTERNAL MEDICINE
Payer: COMMERCIAL

## 2025-02-18 VITALS
SYSTOLIC BLOOD PRESSURE: 122 MMHG | RESPIRATION RATE: 18 BRPM | HEIGHT: 68 IN | DIASTOLIC BLOOD PRESSURE: 81 MMHG | HEART RATE: 112 BPM | OXYGEN SATURATION: 98 % | BODY MASS INDEX: 34.1 KG/M2 | WEIGHT: 225 LBS | TEMPERATURE: 96.5 F

## 2025-02-18 DIAGNOSIS — R00.0 WIDE-COMPLEX TACHYCARDIA: ICD-10-CM

## 2025-02-18 LAB
ABO + RH BLD: NORMAL
BLOOD BANK SAMPLE EXPIRATION: NORMAL
BLOOD GROUP ANTIBODIES SERPL: NEGATIVE
ECHO BSA: 2.21 M2

## 2025-02-18 PROCEDURE — C1894 INTRO/SHEATH, NON-LASER: HCPCS | Performed by: INTERNAL MEDICINE

## 2025-02-18 PROCEDURE — 86901 BLOOD TYPING SEROLOGIC RH(D): CPT

## 2025-02-18 PROCEDURE — 6360000002 HC RX W HCPCS: Performed by: INTERNAL MEDICINE

## 2025-02-18 PROCEDURE — 93623 PRGRMD STIMJ&PACG IV RX NFS: CPT | Performed by: INTERNAL MEDICINE

## 2025-02-18 PROCEDURE — 2720000010 HC SURG SUPPLY STERILE: Performed by: INTERNAL MEDICINE

## 2025-02-18 PROCEDURE — 2709999900 HC NON-CHARGEABLE SUPPLY: Performed by: INTERNAL MEDICINE

## 2025-02-18 PROCEDURE — 6360000002 HC RX W HCPCS

## 2025-02-18 PROCEDURE — 7100000010 HC PHASE II RECOVERY - FIRST 15 MIN: Performed by: INTERNAL MEDICINE

## 2025-02-18 PROCEDURE — C1730 CATH, EP, 19 OR FEW ELECT: HCPCS | Performed by: INTERNAL MEDICINE

## 2025-02-18 PROCEDURE — 86900 BLOOD TYPING SEROLOGIC ABO: CPT

## 2025-02-18 PROCEDURE — 2580000003 HC RX 258

## 2025-02-18 PROCEDURE — 7100000011 HC PHASE II RECOVERY - ADDTL 15 MIN: Performed by: INTERNAL MEDICINE

## 2025-02-18 PROCEDURE — C1733 CATH, EP, OTHR THAN COOL-TIP: HCPCS | Performed by: INTERNAL MEDICINE

## 2025-02-18 PROCEDURE — 2580000003 HC RX 258: Performed by: INTERNAL MEDICINE

## 2025-02-18 PROCEDURE — 36415 COLL VENOUS BLD VENIPUNCTURE: CPT

## 2025-02-18 PROCEDURE — 2500000003 HC RX 250 WO HCPCS: Performed by: INTERNAL MEDICINE

## 2025-02-18 PROCEDURE — 2500000003 HC RX 250 WO HCPCS

## 2025-02-18 PROCEDURE — 86850 RBC ANTIBODY SCREEN: CPT

## 2025-02-18 RX ORDER — HEPARIN SODIUM 10000 [USP'U]/ML
INJECTION, SOLUTION INTRAVENOUS; SUBCUTANEOUS PRN
Status: DISCONTINUED | OUTPATIENT
Start: 2025-02-18 | End: 2025-02-18 | Stop reason: HOSPADM

## 2025-02-18 RX ORDER — ADENOSINE 3 MG/ML
INJECTION, SOLUTION INTRAVENOUS PRN
Status: DISCONTINUED | OUTPATIENT
Start: 2025-02-18 | End: 2025-02-18 | Stop reason: HOSPADM

## 2025-02-18 RX ORDER — FENTANYL CITRATE 50 UG/ML
INJECTION, SOLUTION INTRAMUSCULAR; INTRAVENOUS PRN
Status: DISCONTINUED | OUTPATIENT
Start: 2025-02-18 | End: 2025-02-18 | Stop reason: HOSPADM

## 2025-02-18 ASSESSMENT — ENCOUNTER SYMPTOMS
WHEEZING: 0
COLOR CHANGE: 0
NAUSEA: 0
ABDOMINAL PAIN: 0
DIARRHEA: 0
CONSTIPATION: 0
BACK PAIN: 0
CHEST TIGHTNESS: 0
BLOOD IN STOOL: 0
COUGH: 0
SHORTNESS OF BREATH: 1
EYE PAIN: 0
VOMITING: 0
PHOTOPHOBIA: 0

## 2025-02-18 NOTE — PROGRESS NOTES
Pt OOB. Annie RN and Jessica RN escorted patient x1 lap around cath lab holding. Bilateral groin sites stable. Scant blood noted R groin. Dressing removed and site assessed. No ecchymosis, hematoma or oozing. New  sterile, dry, occlusive drsg placed. Groin instructions reinforced. Pt stated understanding. No questions at this time.

## 2025-02-18 NOTE — PROGRESS NOTES
Discharge instructions reviewed- Follow up appointments and medications highlighted.  All questions answered. Pt stated understanding. Belongings gathered and compared to admission belonging list. All posessons accounted. Pt taken by wheel chair to personal car.

## 2025-02-18 NOTE — DISCHARGE INSTRUCTIONS
Discharge Home Instuctions  Name:Valorie Galvan  :1965    Your instructions:    Shower only for the first 5 days, NO TUB BATHS.      Wash procedure site with mild soap, rinse and pat dry.  Do not rub over the procedure site for two (2) days.  Remove dressing and replace with a band-aid in 2 days.    Site observations-Observe the area for bleeding or hematoma (lump under the skin caused by bleeding.)      A.  Painless bruising is normal.  B.  If a firmness/swelling seems to be increasing or there is bright red bleeding from site       (hold pressure over procedure site) and  CALL 911 IMMEDIATELY.    What to do after you leave the hospital:    Recommended activity: no lifting, Driving, or Strenuous exercise for 3 days.  DO NOT lift more than 10lbs.    For your procedure you may have been given a sedative to help you relax. This drug will make you sleepy. It is usually given in a vein (by IV).  Don't do anything for 24 hours that requires attention to detail. It takes time for the medicine effects to completely wear off.  Do not sign any legally binding contracts or documents over the next 24 hours.  For your safety, you should not drive or operate any machinery that could be dangerous until the medicine wears off and you can think clearly and react easily.    Follow-up with physician in 7-10 days.    Take your medication as ordered.      If you have any questions, you may call [phone] or your physicians office

## 2025-02-18 NOTE — H&P
Electrophysiology H&p Note      Reason for consultation:  Wide complex tachycardia    Chief complaint :  here for EP study and possible ablation    Referring physician:       Primary care physician: Bebe Bolden DO      History of Present Illness:     Today visit (02/18/25)    Patient here for Ep study and possible ablation. No change in H&P noted from previous clinic visit.       Previous visit:   History of Present Illness    The patient is a 59-year-old male with a history of hypertension and hyperlipidemia, referred by Dr. Cordero for wide-complex tachycardia.    He reports experiencing since causing episodes of chest pain and pressure and also shortness of breath.  Patient also feels very tired with the episodes.  Patient attributes to episodes of tachycardia occurring several times a week.   He has been prescribed metoprolol but has not yet initiated the medication. A left heart catheterization was performed previously.    MEDICATIONS  Lisinopril, aspirin, metoprolol (not started yet).    Patient denies alcohol. Patient uses chewing tobacco, 1 can lasts 3 days. Patient drinks 1 cup of coffee and drinks Tea daily. Patient denies exercise.       Pastmedical history:   Past Medical History:   Diagnosis Date    Arthritis     Bilateral carpal tunnel syndrome     CAD (coronary artery disease)     Follows with     ECHO 07/14/2021    EF 55-60%. Mild Pulmonic regurgitation is present, No evidence of pericardial effusion.    H/O echocardiogram 04/28/2016    EF 55% WNL- Stage 1 dysfunction     History of left heart catheterization (LHC) 12/19/2024    The left main is a large tubular vessel has no significant stenosis The left and descending artery has no significant stenosis is a tortuous vessel The circular vessel is a nondominant vessel is no significant stenosis The right coronary artery is a tortuous vessel as well with a dominant vessel has no significant

## 2025-02-27 ENCOUNTER — OFFICE VISIT (OUTPATIENT)
Age: 60
End: 2025-02-27
Payer: COMMERCIAL

## 2025-02-27 VITALS
HEART RATE: 84 BPM | BODY MASS INDEX: 34.01 KG/M2 | WEIGHT: 224.4 LBS | SYSTOLIC BLOOD PRESSURE: 144 MMHG | HEIGHT: 68 IN | DIASTOLIC BLOOD PRESSURE: 78 MMHG

## 2025-02-27 DIAGNOSIS — I10 PRIMARY HYPERTENSION: ICD-10-CM

## 2025-02-27 DIAGNOSIS — R00.0 WIDE-COMPLEX TACHYCARDIA: Primary | ICD-10-CM

## 2025-02-27 PROCEDURE — 3078F DIAST BP <80 MM HG: CPT | Performed by: NURSE PRACTITIONER

## 2025-02-27 PROCEDURE — 99214 OFFICE O/P EST MOD 30 MIN: CPT | Performed by: NURSE PRACTITIONER

## 2025-02-27 PROCEDURE — 3077F SYST BP >= 140 MM HG: CPT | Performed by: NURSE PRACTITIONER

## 2025-02-27 PROCEDURE — 93000 ELECTROCARDIOGRAM COMPLETE: CPT | Performed by: NURSE PRACTITIONER

## 2025-02-27 ASSESSMENT — ENCOUNTER SYMPTOMS
PHOTOPHOBIA: 0
COLOR CHANGE: 0
BACK PAIN: 0
ABDOMINAL PAIN: 0
COUGH: 0
ABDOMINAL DISTENTION: 0
BLOOD IN STOOL: 0
SINUS PRESSURE: 0
SHORTNESS OF BREATH: 0
SINUS PAIN: 0

## 2025-02-27 NOTE — PROGRESS NOTES
Electrophysiology follow up Note      Reason for consultation:  Wide complex tachycardia    Chief complaint : 1 week follow-up status post EP study    Referring physician:       Primary care physician: Bebe Bolden DO      History of Present Illness:     This visit 2/27/2025  Patient here today for 1 week follow-up status post EP study.  Patient states he is feeling well.  He states his groins are bruised.  He denies cardiac complaints.  He is not taking metoprolol.      Previous visit:   History of Present Illness    The patient is a 59-year-old male with a history of hypertension and hyperlipidemia, referred by Dr. Cordero for wide-complex tachycardia.    He reports experiencing since causing episodes of chest pain and pressure and also shortness of breath.  Patient also feels very tired with the episodes.  Patient attributes to episodes of tachycardia occurring several times a week.   He has been prescribed metoprolol but has not yet initiated the medication. A left heart catheterization was performed previously.    MEDICATIONS  Lisinopril, aspirin, metoprolol (not started yet).    Patient denies alcohol. Patient uses chewing tobacco, 1 can lasts 3 days. Patient drinks 1 cup of coffee and drinks Tea daily. Patient denies exercise.       Pastmedical history:   Past Medical History:   Diagnosis Date    Arthritis     Bilateral carpal tunnel syndrome     CAD (coronary artery disease)     Follows with     ECHO 07/14/2021    EF 55-60%. Mild Pulmonic regurgitation is present, No evidence of pericardial effusion.    H/O echocardiogram 04/28/2016    EF 55% WNL- Stage 1 dysfunction     History of left heart catheterization (LHC) 12/19/2024    The left main is a large tubular vessel has no significant stenosis The left and descending artery has no significant stenosis is a tortuous vessel The circular vessel is a nondominant vessel is no significant stenosis The right

## 2025-03-27 DIAGNOSIS — I10 ESSENTIAL HYPERTENSION: ICD-10-CM

## 2025-03-27 RX ORDER — LISINOPRIL 10 MG/1
10 TABLET ORAL DAILY
Qty: 90 TABLET | Refills: 0 | Status: SHIPPED | OUTPATIENT
Start: 2025-03-27

## 2025-04-21 SDOH — ECONOMIC STABILITY: TRANSPORTATION INSECURITY
IN THE PAST 12 MONTHS, HAS THE LACK OF TRANSPORTATION KEPT YOU FROM MEDICAL APPOINTMENTS OR FROM GETTING MEDICATIONS?: NO

## 2025-04-21 SDOH — ECONOMIC STABILITY: TRANSPORTATION INSECURITY
IN THE PAST 12 MONTHS, HAS LACK OF TRANSPORTATION KEPT YOU FROM MEETINGS, WORK, OR FROM GETTING THINGS NEEDED FOR DAILY LIVING?: NO

## 2025-04-21 SDOH — ECONOMIC STABILITY: FOOD INSECURITY: WITHIN THE PAST 12 MONTHS, THE FOOD YOU BOUGHT JUST DIDN'T LAST AND YOU DIDN'T HAVE MONEY TO GET MORE.: NEVER TRUE

## 2025-04-21 SDOH — ECONOMIC STABILITY: INCOME INSECURITY: IN THE LAST 12 MONTHS, WAS THERE A TIME WHEN YOU WERE NOT ABLE TO PAY THE MORTGAGE OR RENT ON TIME?: NO

## 2025-04-21 SDOH — ECONOMIC STABILITY: FOOD INSECURITY: WITHIN THE PAST 12 MONTHS, YOU WORRIED THAT YOUR FOOD WOULD RUN OUT BEFORE YOU GOT MONEY TO BUY MORE.: NEVER TRUE

## 2025-04-21 ASSESSMENT — PATIENT HEALTH QUESTIONNAIRE - PHQ9
1. LITTLE INTEREST OR PLEASURE IN DOING THINGS: NOT AT ALL
1. LITTLE INTEREST OR PLEASURE IN DOING THINGS: NOT AT ALL
SUM OF ALL RESPONSES TO PHQ9 QUESTIONS 1 & 2: 0
2. FEELING DOWN, DEPRESSED OR HOPELESS: NOT AT ALL
SUM OF ALL RESPONSES TO PHQ QUESTIONS 1-9: 0
2. FEELING DOWN, DEPRESSED OR HOPELESS: NOT AT ALL
SUM OF ALL RESPONSES TO PHQ QUESTIONS 1-9: 0

## 2025-04-24 ENCOUNTER — OFFICE VISIT (OUTPATIENT)
Dept: INTERNAL MEDICINE CLINIC | Age: 60
End: 2025-04-24
Payer: COMMERCIAL

## 2025-04-24 ENCOUNTER — PATIENT MESSAGE (OUTPATIENT)
Dept: INTERNAL MEDICINE CLINIC | Age: 60
End: 2025-04-24

## 2025-04-24 VITALS
DIASTOLIC BLOOD PRESSURE: 78 MMHG | BODY MASS INDEX: 32.69 KG/M2 | WEIGHT: 215 LBS | SYSTOLIC BLOOD PRESSURE: 130 MMHG | HEART RATE: 90 BPM | OXYGEN SATURATION: 97 %

## 2025-04-24 DIAGNOSIS — M17.11 PRIMARY OSTEOARTHRITIS OF RIGHT KNEE: ICD-10-CM

## 2025-04-24 DIAGNOSIS — I10 ESSENTIAL HYPERTENSION: ICD-10-CM

## 2025-04-24 DIAGNOSIS — R00.0 WIDE-COMPLEX TACHYCARDIA: ICD-10-CM

## 2025-04-24 DIAGNOSIS — Z12.5 SCREENING FOR PROSTATE CANCER: ICD-10-CM

## 2025-04-24 DIAGNOSIS — R73.03 PREDIABETES: ICD-10-CM

## 2025-04-24 DIAGNOSIS — Z00.00 ANNUAL PHYSICAL EXAM: Primary | ICD-10-CM

## 2025-04-24 DIAGNOSIS — E78.5 HYPERLIPIDEMIA, UNSPECIFIED HYPERLIPIDEMIA TYPE: ICD-10-CM

## 2025-04-24 DIAGNOSIS — L98.9 SKIN LESION: ICD-10-CM

## 2025-04-24 PROCEDURE — 3078F DIAST BP <80 MM HG: CPT | Performed by: FAMILY MEDICINE

## 2025-04-24 PROCEDURE — 3075F SYST BP GE 130 - 139MM HG: CPT | Performed by: FAMILY MEDICINE

## 2025-04-24 PROCEDURE — 99396 PREV VISIT EST AGE 40-64: CPT | Performed by: FAMILY MEDICINE

## 2025-04-24 ASSESSMENT — ENCOUNTER SYMPTOMS
BLOOD IN STOOL: 0
NAUSEA: 0
BACK PAIN: 0
DIARRHEA: 0
COUGH: 0
ABDOMINAL PAIN: 0
SHORTNESS OF BREATH: 0
CONSTIPATION: 0

## 2025-04-24 NOTE — PROGRESS NOTES
[facility] Note  Name: Valorie Galvan Today’s Date: 2025   MRN: [id number] Sex: Male   Age: 60 y.o. Ethnicity: Non- / Non    : 1965 Race: White (non-)        History:  Patient Active Problem List    Diagnosis Date Noted    Right carpal tunnel syndrome 2023    Primary osteoarthritis of right knee 2023    Wide-complex tachycardia 2025    Ventricular tachycardia, unspecified (HCC) 2025    S/P total knee arthroplasty, left 04/10/2024    Primary osteoarthritis of left knee 04/10/2024    Localized primary osteoarthritis of carpometacarpal joint of right thumb 2024    Arthritis of right wrist due to gout 2024    Biceps tendon tear 2024    Osteoarthritis, localized, shoulder, right 10/01/2023    Rotator cuff arthropathy, right 10/01/2023    Nontraumatic complete tear of right rotator cuff 10/01/2023    Arthritis of left knee 2021    Acquired trigger finger of left middle finger 10/11/2021    Carpal tunnel syndrome on left     Sprain of left rotator cuff capsule 2021    Bilateral carpal tunnel syndrome     Medial meniscus tear     Hypertension     Hyperlipidemia     Chest pain        History of Present Illness  The patient is a 60-year-old male who presents today for an annual physical exam.    He has known hypertension that has been stable on lisinopril 10 mg tablet.    He has hyperlipidemia and prediabetes, which he is trying to manage with dietary changes.    He has been evaluated by cardiology for wide-complex tachycardia.. He had a EP study recently, and has a follow-up appointment tomorrow with the specialist. He reports intermittent palpitations, but has discontinued metoprolol.    He has noticed  skin lesions on his nose and extremities. Given his family history of skin cancer, he would like to see a dermatologist for an assessment.    He has primary osteoarthritis of the right knee. He has a history of a left total knee

## 2025-04-25 ENCOUNTER — OFFICE VISIT (OUTPATIENT)
Dept: CARDIOLOGY CLINIC | Age: 60
End: 2025-04-25
Payer: COMMERCIAL

## 2025-04-25 VITALS
DIASTOLIC BLOOD PRESSURE: 70 MMHG | HEART RATE: 88 BPM | BODY MASS INDEX: 33.19 KG/M2 | HEIGHT: 68 IN | SYSTOLIC BLOOD PRESSURE: 132 MMHG | WEIGHT: 219 LBS

## 2025-04-25 DIAGNOSIS — I10 PRIMARY HYPERTENSION: Primary | ICD-10-CM

## 2025-04-25 PROCEDURE — 3075F SYST BP GE 130 - 139MM HG: CPT | Performed by: NURSE PRACTITIONER

## 2025-04-25 PROCEDURE — G8417 CALC BMI ABV UP PARAM F/U: HCPCS | Performed by: NURSE PRACTITIONER

## 2025-04-25 PROCEDURE — 1036F TOBACCO NON-USER: CPT | Performed by: NURSE PRACTITIONER

## 2025-04-25 PROCEDURE — 93000 ELECTROCARDIOGRAM COMPLETE: CPT | Performed by: NURSE PRACTITIONER

## 2025-04-25 PROCEDURE — G8427 DOCREV CUR MEDS BY ELIG CLIN: HCPCS | Performed by: NURSE PRACTITIONER

## 2025-04-25 PROCEDURE — 3078F DIAST BP <80 MM HG: CPT | Performed by: NURSE PRACTITIONER

## 2025-04-25 PROCEDURE — 99213 OFFICE O/P EST LOW 20 MIN: CPT | Performed by: NURSE PRACTITIONER

## 2025-04-25 PROCEDURE — 3017F COLORECTAL CA SCREEN DOC REV: CPT | Performed by: NURSE PRACTITIONER

## 2025-04-25 ASSESSMENT — ENCOUNTER SYMPTOMS
SHORTNESS OF BREATH: 0
ORTHOPNEA: 0

## 2025-04-25 NOTE — PROGRESS NOTES
CLINICAL STAFF DOCUMENTATION    Annie Pederson, LOVE     Valorie Galvan  1965  [id number]    Have you had any Chest Pain recently? - No          Have you had any Shortness of Breath - No        Have you had any dizziness - No          Have you had any palpitations recently? - Yes  If Yes DO EKG - Do you feel your heart skipping    When did they begin? - Months   How long do they last - .10  minutes   Is there anything that aggravates or triggers them?  No  Is there anything that relieves them?  No  Any thyroid issues? - No    Do you have any edema - swelling in No          When did you have your last labs drawn 2/25  What doctor ordered Unknown provider  Do we have the labs in their chart Yes        Do you need any prescriptions refilled? - No    Do you have a surgery or procedure scheduled in the near future - No          Do use tobacco products? -  Pt occasionally uses chew  Do you drink alcohol? - Yes  Do you use any illicit drugs? - No  Caffeine? - Yes  How much caffeine? .1  cups daily.

## 2025-04-25 NOTE — PROGRESS NOTES
4/25/2025  Primary cardiologist: Dr. Cordero    CC  Valorie  is an established 60 y.o.  male here for a follow up on HTN      SUBJECTIVE/OBJECTIVE:  Valorie is a 60 y.o. male with a history of hypertension, hyperlipidemia and wide complex tachycardia  Jose Angel had a LHC in December 2024 that showed tortuous vessels however no significant CAD    HPI:  Valorie states he is feeling well. He notes occasional palpitation.       Review of Systems   Constitutional: Negative for diaphoresis and malaise/fatigue.   Cardiovascular:  Positive for palpitations. Negative for chest pain, claudication, dyspnea on exertion, irregular heartbeat, leg swelling, near-syncope, orthopnea and paroxysmal nocturnal dyspnea.   Respiratory:  Negative for shortness of breath.    Musculoskeletal:  Positive for joint pain (left knee).   Neurological:  Negative for dizziness and light-headedness.       Vitals:    04/25/25 1055   BP: 132/70   BP Site: Left Upper Arm   Patient Position: Sitting   BP Cuff Size: Medium Adult   Pulse: 88   Weight: 99.3 kg (219 lb)   Height: 1.727 m (5' 8\")         Wt Readings from Last 3 Encounters:   04/25/25 99.3 kg (219 lb)   04/24/25 97.5 kg (215 lb)   02/27/25 101.8 kg (224 lb 6.4 oz)      Body mass index is 33.3 kg/m².     Physical Exam  Vitals reviewed.   Eyes:      Pupils: Pupils are equal, round, and reactive to light.   Neck:      Vascular: No carotid bruit.   Cardiovascular:      Rate and Rhythm: Normal rate and regular rhythm.      Pulses: Normal pulses.   Pulmonary:      Effort: Pulmonary effort is normal.      Breath sounds: Normal breath sounds.   Skin:     General: Skin is warm and dry.      Capillary Refill: Capillary refill takes less than 2 seconds.   Neurological:      Mental Status: He is alert and oriented to person, place, and time.                Current Outpatient Medications   Medication Sig Dispense Refill    lisinopril (PRINIVIL;ZESTRIL) 10 MG tablet Take 1 tablet by mouth daily 90 tablet 0    magnesium

## 2025-07-03 DIAGNOSIS — I10 ESSENTIAL HYPERTENSION: ICD-10-CM

## 2025-07-03 RX ORDER — LISINOPRIL 10 MG/1
10 TABLET ORAL DAILY
Qty: 90 TABLET | Refills: 1 | Status: SHIPPED | OUTPATIENT
Start: 2025-07-03

## 2025-07-14 SDOH — HEALTH STABILITY: PHYSICAL HEALTH: ON AVERAGE, HOW MANY DAYS PER WEEK DO YOU ENGAGE IN MODERATE TO STRENUOUS EXERCISE (LIKE A BRISK WALK)?: 4 DAYS

## 2025-07-14 SDOH — HEALTH STABILITY: PHYSICAL HEALTH: ON AVERAGE, HOW MANY MINUTES DO YOU ENGAGE IN EXERCISE AT THIS LEVEL?: 90 MIN

## 2025-07-17 ENCOUNTER — OFFICE VISIT (OUTPATIENT)
Dept: ORTHOPEDIC SURGERY | Age: 60
End: 2025-07-17

## 2025-07-17 VITALS
HEART RATE: 64 BPM | RESPIRATION RATE: 16 BRPM | OXYGEN SATURATION: 100 % | BODY MASS INDEX: 30.31 KG/M2 | WEIGHT: 200 LBS | HEIGHT: 68 IN

## 2025-07-17 DIAGNOSIS — M17.11 PRIMARY OSTEOARTHRITIS OF RIGHT KNEE: Primary | ICD-10-CM

## 2025-07-17 DIAGNOSIS — Z96.652 STATUS POST LEFT KNEE REPLACEMENT: ICD-10-CM

## 2025-07-17 RX ORDER — FLUOROURACIL 50 MG/G
CREAM TOPICAL
COMMUNITY
Start: 2025-05-09

## 2025-07-17 RX ORDER — TRIAMCINOLONE ACETONIDE 40 MG/ML
80 INJECTION, SUSPENSION INTRA-ARTICULAR; INTRAMUSCULAR ONCE
Status: COMPLETED | OUTPATIENT
Start: 2025-07-17 | End: 2025-07-17

## 2025-07-17 RX ADMIN — TRIAMCINOLONE ACETONIDE 80 MG: 40 INJECTION, SUSPENSION INTRA-ARTICULAR; INTRAMUSCULAR at 11:40

## 2025-07-17 NOTE — PROGRESS NOTES
Patient returns to the office with bilateral knee pain. Pt stated his left knee pain has been consistent since surgery and feels a generalized stiffness that he is unable to flex his knee past 90 degrees. Pt notices the pain mostly on the lateral aspect of the knee and wears a compression brace for comfort.     Pt is also complaining of a new issue with his right knee. Pt stated that he was working and fell with his knee completely flexed and has had pain in the knee since. Pt stated the pain is at the patella with any dorsiflexion with the foot and when he is walking he notices a pulling sensation on the posterior aspect. Pt stated the injury occurred about a month ago and has been using a brace for comfort.

## 2025-07-17 NOTE — PATIENT INSTRUCTIONS
Continue weight-bearing as tolerated.  Continue range of motion exercises as instructed.  Ice and elevate as needed.  Tylenol or Motrin for pain.  Steroid injection given into the right knee today.  Follow up as needed.

## 2025-07-18 ASSESSMENT — ENCOUNTER SYMPTOMS
COLOR CHANGE: 0
SHORTNESS OF BREATH: 0
CHEST TIGHTNESS: 0

## 2025-07-18 NOTE — PROGRESS NOTES
7/17/2025   Chief Complaint   Patient presents with    Knee Pain     Bilateral knee pain, hx of TKA in the left         History of Present Illness:                             Valorie Galvan is a 60 y.o. male who returns today for follow-up of his left knee replacement as well as evaluation of worsening right knee pain.    He has noticed good improvement in his pain level of the left knee following knee replacement and his ability for standing walking long distances is much improved.  His biggest concern is tightness and difficulty with full flexion activities.  He has discomfort in trouble getting his knee flexed past 90 degrees but with stretching exercises he can get it to go farther.    His right knee hurts worse with getting up from a seated position or with prolonged standing walking.  He has been using braces and anti-inflammatory medications with some partial relief.  His right knee feels similar to what he experienced in his left knee prior to replacement surgery    Patient returns to the office with bilateral knee pain. Pt stated his left knee pain has been consistent since surgery and feels a generalized stiffness that he is unable to flex his knee past 90 degrees. Pt notices the pain mostly on the lateral aspect of the knee and wears a compression brace for comfort.      Pt is also complaining of a new issue with his right knee. Pt stated that he was working and fell with his knee completely flexed and has had pain in the knee since. Pt stated the pain is at the patella with any dorsiflexion with the foot and when he is walking he notices a pulling sensation on the posterior aspect. Pt stated the injury occurred about a month ago and has been using a brace for comfort.                  Medical History  Patient's medications, allergies, past medical, surgical, social and family histories were reviewed and updated as appropriate.    Past Medical History:   Diagnosis Date    Arthritis     Bilateral

## (undated) DEVICE — BOOT POS LEG DEMAYO

## (undated) DEVICE — ZIMMER® STERILE DISPOSABLE TOURNIQUET CUFF WITH PLC, DUAL PORT, SINGLE BLADDER, 18 IN. (46 CM)

## (undated) DEVICE — INTRODUCER SHTH 8FR L12CM DIA0.038IN STD HEMSTAS CLOSE TOL

## (undated) DEVICE — CATHETER EP 6FR L110CM 2-5-2MM SPC 4 ELECTRD A CRV NYL

## (undated) DEVICE — Device

## (undated) DEVICE — GLOVE SURG SZ 8 CRM LTX FREE POLYISOPRENE POLYMER BEAD ANTI

## (undated) DEVICE — NEEDLE ANGIO L1IN DIA21GA 1 THN WALL SMOOTH STD HUB

## (undated) DEVICE — STOCKINETTE ORTH W4XL48IN 1 PLY

## (undated) DEVICE — CLASSIC CLD THER SYS

## (undated) DEVICE — NEEDLE HYPO 20GA L1.5IN YEL POLYPR HUB S STL REG BVL STR

## (undated) DEVICE — BAND COMPR L24CM REG CLR PLAS HEMSTAT EXT HK AND LOOP RETEN

## (undated) DEVICE — KIT DRP FOR RIO ROBOTIC ARM ASST SYS

## (undated) DEVICE — IMMOBILIZER SHLDR SWTH UNIV DEV BLK P.A.D. III

## (undated) DEVICE — HOOD: Brand: T7PLUS

## (undated) DEVICE — PATCH REF EXT FOR CARTO 3 SYS (EA = 6 PACKS)

## (undated) DEVICE — Device: Brand: POWER-FLO®

## (undated) DEVICE — T4 HOOD

## (undated) DEVICE — RADIFOCUS OPTITORQUE ANGIOGRAPHIC CATHETER: Brand: OPTITORQUE

## (undated) DEVICE — BANDAGE COMPR W2INXL5.5YD BGE POLY COT BLEND YARN HNY STRP

## (undated) DEVICE — SUTURE NONABSORBABLE MONOFILAMENT 4-0 FS-2 18 IN ETHILON 662H

## (undated) DEVICE — DRESSING BORDERED ADH GZ UNIV GEN USE 4INX10IN AND 2INX8IN

## (undated) DEVICE — BANDAGE,ELASTIC,ESMARK,STERILE,6"X9',LF: Brand: MEDLINE

## (undated) DEVICE — STOCKING,ANTI-EMBOLISM,T-L,XL REG,LF: Brand: MEDLINE

## (undated) DEVICE — GLOVE ORANGE PI 7 1/2   MSG9075

## (undated) DEVICE — SUTURE VICRYL SZ 2-0 L18IN ABSRB UD CT-1 L36MM 1/2 CIR J839D

## (undated) DEVICE — GLOVE SURG SZ 65 THK91MIL LTX FREE SYN POLYISOPRENE

## (undated) DEVICE — KIT MICROINTRODUCER 4FR ECHOGENIC NDL L7CM 21GA STIFF COAX

## (undated) DEVICE — BLADE SURG SAW STD S STL OSC W/ SERR EDGE DISP

## (undated) DEVICE — SOLUTION IRRIG 3000ML 0.9% SOD CHL USP UROMATIC PLAS CONT

## (undated) DEVICE — PERCUTANEOUS ENTRY THINWALL NEEDLE  ONE-PART: Brand: COOK

## (undated) DEVICE — ENDOSCOPY KIT: Brand: MEDLINE INDUSTRIES, INC.

## (undated) DEVICE — TUBING, SUCTION, 9/32" X 10', STRAIGHT: Brand: MEDLINE

## (undated) DEVICE — 4-PORT MANIFOLD: Brand: NEPTUNE 2

## (undated) DEVICE — 3M™ STERI-DRAPE™ U-DRAPE 1015: Brand: STERI-DRAPE™

## (undated) DEVICE — [AGGRESSIVE 6-FLUTE BARREL BUR, ARTHROSCOPIC SHAVER BLADE,  DO NOT RESTERILIZE,  DO NOT USE IF PACKAGE IS DAMAGED,  KEEP DRY,  KEEP AWAY FROM SUNLIGHT]: Brand: FORMULA

## (undated) DEVICE — KIT TRK KNEE PROC VIZADISC

## (undated) DEVICE — COUNTER NDL 30 COUNT FOAM STRP SGL MAG

## (undated) DEVICE — PAD THER KNEE 11.25X9.75 IN MULT USE CLD NS

## (undated) DEVICE — DRESSING,GAUZE,XEROFORM,CURAD,1"X8",ST: Brand: CURAD

## (undated) DEVICE — BLADE SHV DIA4MM RED RESECT FOR GEN DEB REM OF PERIOST FR

## (undated) DEVICE — STERILE LATEX POWDER-FREE SURGICAL GLOVESWITH NITRILE COATING: Brand: PROTEXIS

## (undated) DEVICE — DRAPE SHEET ULTRAGARD: Brand: MEDLINE

## (undated) DEVICE — MARKER SURG SKIN UTIL REGULAR/FINE 2 TIP W/ RUL AND 9 LBL

## (undated) DEVICE — SOLUTION IRRIG 1000ML 0.9% SOD CHL USP POUR PLAS BTL

## (undated) DEVICE — Z DISCONTINUED USE 2860885 SUTURE STRATAFIX SPRL SZ 1 L14IN ABSRB VLT L48CM CTX 1/2 SXPD2B405

## (undated) DEVICE — PAD,ABDOMINAL,5"X9",ST,LF,25/BX: Brand: MEDLINE INDUSTRIES, INC.

## (undated) DEVICE — SPONGE GZ W4XL8IN COT WVN 12 PLY

## (undated) DEVICE — DUAL CUT SAGITTAL BLADE

## (undated) DEVICE — NEEDLE REPROC SCORPION MULTIFIRE

## (undated) DEVICE — GLOVE ORANGE PI 8   MSG9080

## (undated) DEVICE — SHEATH INTRO 7FR L12CM GWIRE L150CM DIA0.038IN STD HEMSTAS

## (undated) DEVICE — PROTECTOR EYE PT SELF ADH NS OPT GRD LF

## (undated) DEVICE — GLOVE SURG SZ 7 CRM LTX FREE POLYISOPRENE POLYMER BEAD ANTI

## (undated) DEVICE — TOWEL,OR,DSP,ST,BLUE,STD,6/PK,12PK/CS: Brand: MEDLINE

## (undated) DEVICE — CATHETER DIAG 5FR 4 POLE HIS CRV WEBST

## (undated) DEVICE — SYRINGE MED 30ML STD CLR PLAS LUERLOCK TIP N CTRL DISP

## (undated) DEVICE — DRESSING TRNSPAR W6XL8IN FLM SURESITE 123

## (undated) DEVICE — YANKAUER,FLEXIBLE HANDLE,REGLR CAPACITY: Brand: MEDLINE INDUSTRIES, INC.

## (undated) DEVICE — TUBING PMP FOR CARTO SYS SMARTABLATE

## (undated) DEVICE — PIN BNE FIX TEMP L140MM DIA4MM MAKO

## (undated) DEVICE — BANDAGE COMPR W2INXL5YD WHT BGE POLY COT M E WRP WV HK AND

## (undated) DEVICE — MAT FLOOR ULTRA ABS 28X48IN

## (undated) DEVICE — PADDING CAST 2INW X 4YDL COTTON RAYON ROLYAN LATEX FREE

## (undated) DEVICE — STOCKINETTE ORTH W6XL48IN OFF WHT SGL PLY UNBLEACHED COT RIB

## (undated) DEVICE — SOLUTION IV 100ML 0.9% SOD CHL PLAS CONT USP VIAFLX 1 PER

## (undated) DEVICE — INTRODUCER SHTH 6FR L12CM DIA0.038IN STD HEMSTAS CLOSE TOL

## (undated) DEVICE — WEREWOLF FLOW 90 COBLATION WAND: Brand: COBLATION

## (undated) DEVICE — GUIDEWIRE VASC L260CM DIA0.035IN RAD 3MM J TIP L7CM PTFE

## (undated) DEVICE — HOOD WITH PEEL AWAY FACE SHIELD: Brand: T7PLUS

## (undated) DEVICE — CANNULA ARTHSCP L7CM DIA6MM CONIC TIP THRD NONSHIELDED DISP

## (undated) DEVICE — SURGICAL PROCEDURE PACK TOT KNEE LF

## (undated) DEVICE — SUTURE MONOCRYL ABSORBABLE 3-0 PS-1 18 IN UD MONOCRYL + STRATAFIX SXMP1B102

## (undated) DEVICE — GLIDESHEATH SLENDER ACCESS KIT: Brand: GLIDESHEATH SLENDER

## (undated) DEVICE — [AGGRESSIVE PLUS CUTTER, ARTHROSCOPIC SHAVER BLADE,  DO NOT RESTERILIZE,  DO NOT USE IF PACKAGE IS DAMAGED,  KEEP DRY,  KEEP AWAY FROM SUNLIGHT]: Brand: FORMULA

## (undated) DEVICE — ZIMMER® STERILE DISPOSABLE TOURNIQUET CUFF WITH PLC, DUAL PORT, SINGLE BLADDER, 34 IN. (86 CM)

## (undated) DEVICE — SUTURE ETHLN SZ 3-0 L30IN NONABSORBABLE BLK FS-1 L24MM 3/8 669H

## (undated) DEVICE — KIT INT FIX FEM TIB CKPT MAKOPLASTY

## (undated) DEVICE — SUTURE ETHLN SZ 3-0 L18IN NONABSORBABLE BLK FS-1 L24MM 3/8 663H

## (undated) DEVICE — PAD CLD R HIP REG HOSE NONSTERILE

## (undated) DEVICE — SYSTEM SKIN CLSR 22CM DERMBND PRINEO

## (undated) DEVICE — ZIMMER® STERILE DISPOSABLE TOURNIQUET CUFF WITH PLC, DUAL PORT, SINGLE BLADDER, 30 IN. (76 CM)

## (undated) DEVICE — BANDAGE ELASTIC  HONYCMB 6.0INX15YD

## (undated) DEVICE — CANNULA ARTHSCP L7CM DIA7MM TRNSLUC THRD FLX W/ NO SQUIRT

## (undated) DEVICE — ELECTRODE ES AD CRDLSS PT RET REM POLYHESIVE

## (undated) DEVICE — GAUZE,SPONGE,4"X4",16PLY,XRAY,STRL,LF: Brand: MEDLINE

## (undated) DEVICE — APPLICATOR MEDICATED 26 CC SOLUTION HI LT ORNG CHLORAPREP

## (undated) DEVICE — BANDAGE COMPR 2IN X5YD SELF ADH RED N STRLE CO FLX

## (undated) DEVICE — GOWN,ECLIPSE,POLYRNF,BRTHSLV,L,30/CS: Brand: MEDLINE

## (undated) DEVICE — CATHETER EP 6FR L115CM 2-8-2MM SPC TIP 2MM 10 ELECTRD CSD

## (undated) DEVICE — CORD RETRCT SIL

## (undated) DEVICE — COVER,TABLE,44X90,STERILE: Brand: MEDLINE

## (undated) DEVICE — ANGIOGRAPHY KIT CUST MANIFOLD

## (undated) DEVICE — PIN BNE FIX L110MM DIA32MM

## (undated) DEVICE — Z INACTIVE NO ACTIVE SUPPLIER APPLICATOR MEDICATED 26 CC TINT HI-LITE ORNG STRL CHLORAPREP